# Patient Record
Sex: FEMALE | NOT HISPANIC OR LATINO | Employment: OTHER | URBAN - METROPOLITAN AREA
[De-identification: names, ages, dates, MRNs, and addresses within clinical notes are randomized per-mention and may not be internally consistent; named-entity substitution may affect disease eponyms.]

---

## 2017-01-11 ENCOUNTER — OFFICE VISIT (OUTPATIENT)
Dept: MEDICAL GROUP | Facility: MEDICAL CENTER | Age: 82
End: 2017-01-11
Payer: MEDICARE

## 2017-01-11 VITALS
OXYGEN SATURATION: 90 % | WEIGHT: 149 LBS | HEART RATE: 82 BPM | TEMPERATURE: 98.8 F | DIASTOLIC BLOOD PRESSURE: 72 MMHG | BODY MASS INDEX: 23.95 KG/M2 | RESPIRATION RATE: 16 BRPM | SYSTOLIC BLOOD PRESSURE: 120 MMHG | HEIGHT: 66 IN

## 2017-01-11 DIAGNOSIS — G89.29 CHRONIC PAIN OF RIGHT KNEE: ICD-10-CM

## 2017-01-11 DIAGNOSIS — R93.1 ABNORMAL ECHOCARDIOGRAM: ICD-10-CM

## 2017-01-11 DIAGNOSIS — Z01.818 PREOPERATIVE CLEARANCE: ICD-10-CM

## 2017-01-11 DIAGNOSIS — M25.561 CHRONIC PAIN OF RIGHT KNEE: ICD-10-CM

## 2017-01-11 PROCEDURE — 99213 OFFICE O/P EST LOW 20 MIN: CPT | Performed by: FAMILY MEDICINE

## 2017-01-11 NOTE — PROGRESS NOTES
CC: Preoperative     HPI:   Cecilia presents today patient is hr for preoperative clearance, patient is scheduled for right knee replacement in October.She is a low risk for anaesthesia. Has no h/o chronic heat or lung disease.has an echocardiogram done in 3/2016 showed normal left ventricular size, wall thickness, and systolic function.However patient has mild to moderate aortic insufficiency, and moderate pulmonic insufficiency, but has normal right ventricular systolic pressure is  20 mmHg.Patient wants to be seen by cardiology to make sure she is fine with the surgery. In my opinion she is low risk for surgery .       Patient Active Problem List    Diagnosis Date Noted   • Essential hypertension 02/29/2016   • H/O: stroke 02/29/2016   • Primary osteoarthritis involving multiple joints 02/29/2016   • Depression 02/29/2016   • Primary localized osteoarthrosis, pelvic region and thigh 03/02/2015   • S/P lumbar fusion 05/14/2013   • Lumbosacral spondylosis without myelopathy 05/09/2013       Current Outpatient Prescriptions   Medication Sig Dispense Refill   • amlodipine (NORVASC) 2.5 MG Tab Take 1 Tab by mouth every day. 90 Tab 1   • paroxetine (PAXIL) 10 MG Tab Take 1 Tab by mouth every day. 90 Tab 1   • atorvastatin (LIPITOR) 20 MG Tab Take 1 Tab by mouth every day. 90 Tab 1   • carvedilol (COREG) 3.125 MG Tab Take 1 Tab by mouth 2 times a day, with meals. 180 Tab 1   • trazodone (DESYREL) 50 MG Tab Take 1 Tab by mouth every bedtime. 90 Tab 0   • Dexlansoprazole 60 MG CAPSULE DELAYED RELEASE delayed-release capsule Take 1 Cap by mouth every morning before breakfast. Indications: Gastroesophageal Reflux Disease 90 Cap 1   • Cetirizine-Pseudoephedrine (ZYRTEC-D PO) Take  by mouth.     • Phosphatidylserine 50 MG Cap Take  by mouth.     • Cholecalciferol (VITAMIN D3 PO) Take 1 Tab by mouth every day.     • Lutein 40 MG Cap Take 1 Cap by mouth every day.     • Pyridoxine HCl (B-6 PO) Take 1 Tab by mouth every day.     •  "Omega-3 Fatty Acids (OMEGA 3 PO) Take 1 Tab by mouth every day.     • acetaminophen (TYLENOL) 500 MG TABS Take 1 Tab by mouth every 6 hours as needed for Mild Pain. 30 Tab 0   • Docusate Sodium (EQ STOOL SOFTENER PO) Take 1 Cap by mouth every bedtime.     • polyethylene glycol/lytes (MIRALAX) PACK Take 17 g by mouth every bedtime. Indications: Constipation     • fluticasone (FLONASE) 50 MCG/ACT nasal spray Spray 2 Sprays in nose every bedtime. Indications: Hayfever       No current facility-administered medications for this visit.         Allergies as of 01/11/2017 - Shashank as Reviewed 01/11/2017   Allergen Reaction Noted   • Codeine  02/01/2013   • Lisinopril Cough 02/29/2016   • Soap Rash and Itching 02/20/2015   • Tape Rash 02/01/2013        ROS: Denies any chest pain, Shortness of breath, Changes bowel or bladder, Lower extremity edema.    Physical Exam:  /72 mmHg  Pulse 82  Temp(Src) 37.1 °C (98.8 °F)  Resp 16  Ht 1.676 m (5' 5.98\")  Wt 67.586 kg (149 lb)  BMI 24.06 kg/m2  SpO2 90%  Gen.: Well-developed, well-nourished, no apparent distress,pleasant and cooperative with the examination  Skin:  Warm and dry with good turgor. No rashes or suspicious lesions in visible areas  HEENT:Sinuses nontender with palpation, TMs clear, nares patent with pink mucosa and clear rhinorrhea,no septal deviation ,polyps or lesions. lips without lesions, oropharynx clear.  Neck: Trachea midline,no masses or adenopathy. No JVD.  Cor: Regular rate and rhythm without murmur, gallop or rub.  Lungs: Respirations unlabored.Clear to auscultation with equal breath sounds bilaterally. No wheezes, rhonchi.  Extremities: No cyanosis, clubbing or edema.Restricted knee movement.      Assessment and Plan.   83 y.o. female     1. Preoperative clearance  Patient is a low risk for anaesthesia. No h/o chronic heat or lung disease.  Has mild to moderate Aortic, and pulmonic insufficiency, patient is asymptomatic.patient wants to be seen by " cardiology for 2nd opinion.    2. Chronic pain of right knee  Will be scheduled for knee replacement.    3. Abnormal echocardiogram  Normal Systolic function, however patient has mild to moderate aortic insufficiency, and moderate pulmonic insufficiency, but has normal right ventricular systolic pressure is  20 mmHg.patient wants to be seen by cardiology for 2nd opinion.    - REFERRAL TO CARDIOLOGY

## 2017-01-11 NOTE — MR AVS SNAPSHOT
"        Cecilia Alatorre   2017 1:20 PM   Office Visit   MRN: [id number]    Department:  [address] [place] [address]   Dept Phone:  [phone]    Description:  Female : 1933   Provider:  Luz Contreras M.D.           Reason for Visit     Medical Clearance right knee       Allergies as of 2017     Allergen Noted Reactions    Codeine 2013       Mental confusion    Lisinopril 2016   Cough    Soap 2015   Rash, Itching    Bathing and laundry soap cause itching and rash non scented soap OK    Tape 2013   Rash    Rash-Paper tape OK      You were diagnosed with     Preoperative clearance   [972369]       Chronic pain of right knee   [6657379]       Abnormal echocardiogram   [[id number]]         Vital Signs     Blood Pressure Pulse Temperature Respirations Height Weight    120/72 mmHg 82 37.1 °C (98.8 °F) 16 1.676 m (5' 5.98\") 67.586 kg (149 lb)    Body Mass Index Oxygen Saturation Smoking Status             24.06 kg/m2 90% Former Smoker         Basic Information     Date Of Birth Sex Race Ethnicity Preferred Language    1933 Female  or  Non- English      Your appointments     Mar 06, 2017  2:00 PM   Established Patient with Luz Contreras M.D.   [facility] [address] [place] ([facility])    [address] [facility] [address]  [facility] [address] [phone]           You will be receiving a confirmation call a few days before your appointment from our automated call confirmation system.            Mar 28, 2017 10:00 AM   Follow Up Visit with Michael J Bloch, M.D.   [facility]  (--)    [address] [facility] [address]   [phone]              Problem List              ICD-10-CM Priority Class Noted - Resolved    Lumbosacral spondylosis without myelopathy M47.817   2013 - Present    S/P lumbar fusion Z98.1   2013 - Present    Primary localized osteoarthrosis, pelvic region and thigh " M16.10   3/2/2015 - Present    Essential hypertension I10   2/29/2016 - Present    H/O: stroke Z86.73   2/29/2016 - Present    Primary osteoarthritis involving multiple joints M15.0   2/29/2016 - Present    Depression F32.9   2/29/2016 - Present      Health Maintenance        Date Due Completion Dates    IMM DTaP/Tdap/Td Vaccine (1 - Tdap) 2/6/1952 ---    PAP SMEAR 2/6/1954 ---    MAMMOGRAM 2/6/1973 ---    COLONOSCOPY 2/6/1983 ---    IMM ZOSTER VACCINE 2/6/1993 ---    BONE DENSITY 2/6/1998 ---    IMM PNEUMOCOCCAL 65+ (ADULT) LOW/MEDIUM RISK SERIES (2 of 2 - PCV13) 10/1/2011 10/1/2010    IMM INFLUENZA (1) 9/1/2016 11/2/2014, 10/10/2012            Current Immunizations     Influenza TIV (IM) 11/2/2014, 10/10/2012    Pneumococcal polysaccharide vaccine (PPSV-23) 10/1/2010      Below and/or attached are the medications your provider expects you to take. Review all of your home medications and newly ordered medications with your provider and/or pharmacist. Follow medication instructions as directed by your provider and/or pharmacist. Please keep your medication list with you and share with your provider. Update the information when medications are discontinued, doses are changed, or new medications (including over-the-counter products) are added; and carry medication information at all times in the event of emergency situations     Allergies:  CODEINE - (reactions not documented)     LISINOPRIL - Cough     SOAP - Rash,Itching     TAPE - Rash               Medications  Valid as of: January 11, 2017 -  1:56 PM    Generic Name Brand Name Tablet Size Instructions for use    Acetaminophen (Tab) TYLENOL 500 MG Take 1 Tab by mouth every 6 hours as needed for Mild Pain.        AmLODIPine Besylate (Tab) NORVASC 2.5 MG Take 1 Tab by mouth every day.        Atorvastatin Calcium (Tab) LIPITOR 20 MG Take 1 Tab by mouth every day.        Carvedilol (Tab) COREG 3.125 MG Take 1 Tab by mouth 2 times a day, with meals.         Cetirizine-Pseudoephedrine   Take  by mouth.        Cholecalciferol   Take 1 Tab by mouth every day.        Dexlansoprazole (CAPSULE DELAYED RELEASE) Dexlansoprazole 60 MG Take 1 Cap by mouth every morning before breakfast. Indications: Gastroesophageal Reflux Disease        Docusate Sodium   Take 1 Cap by mouth every bedtime.        Fluticasone Propionate (Suspension) FLONASE 50 MCG/ACT Spray 2 Sprays in nose every bedtime. Indications: Hayfever        Lutein (Cap) Lutein 40 MG Take 1 Cap by mouth every day.        Omega-3 Fatty Acids   Take 1 Tab by mouth every day.        PARoxetine HCl (Tab) PAXIL 10 MG Take 1 Tab by mouth every day.        Phosphatidylserine (Cap) Phosphatidylserine 50 MG Take  by mouth.        Polyethylene Glycol 3350 (Pack) MIRALAX  Take 17 g by mouth every bedtime. Indications: Constipation        Pyridoxine HCl   Take 1 Tab by mouth every day.        TraZODone HCl (Tab) DESYREL 50 MG Take 1 Tab by mouth every bedtime.        .                 Medicines prescribed today were sent to:     [facility] - [place], [place] - [address] AT PORTAL TO REGISTERED [facility]    [address] [facility] [address]    Phone: [phone] Fax: [phone]    Open 24 Hours?: No      Medication refill instructions:       If your prescription bottle indicates you have medication refills left, it is not necessary to call your provider’s office. Please contact your pharmacy and they will refill your medication.    If your prescription bottle indicates you do not have any refills left, you may request refills at any time through one of the following ways: The online Bee There system (except Urgent Care), by calling your provider’s office, or by asking your pharmacy to contact your provider’s office with a refill request. Medication refills are processed only during regular business hours and may not be available until the next business day. Your provider may request additional  information or to have a follow-up visit with you prior to refilling your medication.   *Please Note: Medication refills are assigned a new Rx number when refilled electronically. Your pharmacy may indicate that no refills were authorized even though a new prescription for the same medication is available at the pharmacy. Please request the medicine by name with the pharmacy before contacting your provider for a refill.        Referral     A referral request has been sent to our patient care coordination department. Please allow 3-5 business days for us to process this request and contact you either by phone or mail. If you do not hear from us by the 5th business day, please call us at [phone].           Careerise Access Code: [email or web address]  Expires: 2/10/2017  1:56 PM    Careerise  A secure, online tool to manage your health information     [employer]’s Careerise® is a secure, online tool that connects you to your personalized health information from the privacy of your home -- day or night - making it very easy for you to manage your healthcare. Once the activation process is completed, you can even access your medical information using the Careerise jose, which is available for free in the Apple Jose store or Google Play store.     Careerise provides the following levels of access (as shown below):   My Chart Features   [facility] Doctor [facility]  Urgent  Care Non-[facility]  Primary Care  Doctor   Email your healthcare team securely and privately 24/7 X X X    Manage appointments: schedule your next appointment; view details of past/upcoming appointments X      Request prescription refills. X      View recent personal medical records, including lab and immunizations X X X X   View health record, including health history, allergies, medications X X X X   Read reports about your outpatient visits, procedures, consult and ER notes X X X X   See your discharge summary, which is a recap of  your hospital and/or ER visit that includes your diagnosis, lab results, and care plan. X X       How to register for OrSense:  1. Go to  [email or web address].  2. Click on the Sign Up Now box, which takes you to the New Member Sign Up page. You will need to provide the following information:  a. Enter your OrSense Access Code exactly as it appears at the top of this page. (You will not need to use this code after you’ve completed the sign-up process. If you do not sign up before the expiration date, you must request a new code.)   b. Enter your date of birth.   c. Enter your home email address.   d. Click Submit, and follow the next screen’s instructions.  3. Create a L3t ID. This will be your OrSense login ID and cannot be changed, so think of one that is secure and easy to remember.  4. Create a L3t password. You can change your password at any time.  5. Enter your Password Reset Question and Answer. This can be used at a later time if you forget your password.   6. Enter your e-mail address. This allows you to receive e-mail notifications when new information is available in OrSense.  7. Click Sign Up. You can now view your health information.    For assistance activating your OrSense account, call [phone]

## 2017-01-19 DIAGNOSIS — Z01.812 PRE-OPERATIVE LABORATORY EXAMINATION: ICD-10-CM

## 2017-01-19 DIAGNOSIS — Z01.810 PRE-OPERATIVE CARDIOVASCULAR EXAMINATION: ICD-10-CM

## 2017-01-19 LAB
ANION GAP SERPL CALC-SCNC: 4 MMOL/L (ref 0–11.9)
APPEARANCE UR: CLEAR
BASOPHILS # BLD AUTO: 0.9 % (ref 0–1.8)
BASOPHILS # BLD: 0.05 K/UL (ref 0–0.12)
BILIRUB UR QL STRIP.AUTO: NEGATIVE
BUN SERPL-MCNC: 13 MG/DL (ref 8–22)
CALCIUM SERPL-MCNC: 9.2 MG/DL (ref 8.5–10.5)
CHLORIDE SERPL-SCNC: 106 MMOL/L (ref 96–112)
CO2 SERPL-SCNC: 30 MMOL/L (ref 20–33)
COLOR UR: NORMAL
CREAT SERPL-MCNC: 0.74 MG/DL (ref 0.5–1.4)
CULTURE IF INDICATED INDCX: NO UA CULTURE
EKG IMPRESSION: NORMAL
EOSINOPHIL # BLD AUTO: 0.12 K/UL (ref 0–0.51)
EOSINOPHIL NFR BLD: 2.2 % (ref 0–6.9)
ERYTHROCYTE [DISTWIDTH] IN BLOOD BY AUTOMATED COUNT: 45.8 FL (ref 35.9–50)
GFR SERPL CREATININE-BSD FRML MDRD: >60 ML/MIN/1.73 M 2
GLUCOSE SERPL-MCNC: 88 MG/DL (ref 65–99)
GLUCOSE UR STRIP.AUTO-MCNC: NEGATIVE MG/DL
HCT VFR BLD AUTO: 37.3 % (ref 37–47)
HGB BLD-MCNC: 12.4 G/DL (ref 12–16)
IMM GRANULOCYTES # BLD AUTO: 0.02 K/UL (ref 0–0.11)
IMM GRANULOCYTES NFR BLD AUTO: 0.4 % (ref 0–0.9)
KETONES UR STRIP.AUTO-MCNC: NEGATIVE MG/DL
LEUKOCYTE ESTERASE UR QL STRIP.AUTO: NEGATIVE
LYMPHOCYTES # BLD AUTO: 1.24 K/UL (ref 1–4.8)
LYMPHOCYTES NFR BLD: 22.8 % (ref 22–41)
MCH RBC QN AUTO: 31.1 PG (ref 27–33)
MCHC RBC AUTO-ENTMCNC: 33.2 G/DL (ref 33.6–35)
MCV RBC AUTO: 93.5 FL (ref 81.4–97.8)
MICRO URNS: NORMAL
MONOCYTES # BLD AUTO: 0.51 K/UL (ref 0–0.85)
MONOCYTES NFR BLD AUTO: 9.4 % (ref 0–13.4)
NEUTROPHILS # BLD AUTO: 3.5 K/UL (ref 2–7.15)
NEUTROPHILS NFR BLD: 64.3 % (ref 44–72)
NITRITE UR QL STRIP.AUTO: NEGATIVE
NRBC # BLD AUTO: 0 K/UL
NRBC BLD AUTO-RTO: 0 /100 WBC
PH UR STRIP.AUTO: 6.5 [PH]
PLATELET # BLD AUTO: 260 K/UL (ref 164–446)
PMV BLD AUTO: 9.2 FL (ref 9–12.9)
POTASSIUM SERPL-SCNC: 4 MMOL/L (ref 3.6–5.5)
PROT UR QL STRIP: NEGATIVE MG/DL
RBC # BLD AUTO: 3.99 M/UL (ref 4.2–5.4)
RBC UR QL AUTO: NEGATIVE
SCCMEC + MECA PNL NOSE NAA+PROBE: NEGATIVE
SCCMEC + MECA PNL NOSE NAA+PROBE: POSITIVE
SODIUM SERPL-SCNC: 140 MMOL/L (ref 135–145)
SP GR UR STRIP.AUTO: 1.01
WBC # BLD AUTO: 5.4 K/UL (ref 4.8–10.8)

## 2017-01-19 PROCEDURE — 87640 STAPH A DNA AMP PROBE: CPT

## 2017-01-19 PROCEDURE — 81003 URINALYSIS AUTO W/O SCOPE: CPT

## 2017-01-19 PROCEDURE — 85025 COMPLETE CBC W/AUTO DIFF WBC: CPT

## 2017-01-19 PROCEDURE — 80048 BASIC METABOLIC PNL TOTAL CA: CPT

## 2017-01-19 PROCEDURE — 36415 COLL VENOUS BLD VENIPUNCTURE: CPT

## 2017-01-19 PROCEDURE — 87641 MR-STAPH DNA AMP PROBE: CPT

## 2017-01-19 NOTE — DISCHARGE PLANNING
Met with patient during preadmission appointment.  Procedure: Total Knee    Procedure Date: 10/4/16  Insurance:  Medicare  Equipment currently available at home? FWW, raised toilet seat, shower chair  Steps into the home? 14  Steps within the home? 0  Toilet height? Regular with raised toilet seat  Type of shower? Walk-in shower with chair  Who will be with you during your recovery? Lives alone, [facility]  Is Outpatient Physical Therapy set up after surgery? no  Did you take the Total Joint Class and where? Yes, [facility]    Plan: [facility]. I explained the process and that the doctor at Rehab would evaluate her. This is done on a case by case basis and there is no guarantee. Reviewed SNF list and star ratings, provided copies. I advised patient to choose 1 or 2 other SNF that she maybe would go to. She will think about it.  She has been to [facility] and does not want to go back there.

## 2017-01-20 ENCOUNTER — OFFICE VISIT (OUTPATIENT)
Dept: MEDICAL GROUP | Facility: MEDICAL CENTER | Age: 82
End: 2017-01-20
Payer: MEDICARE

## 2017-01-20 VITALS
TEMPERATURE: 98.2 F | BODY MASS INDEX: 23.63 KG/M2 | OXYGEN SATURATION: 94 % | RESPIRATION RATE: 16 BRPM | SYSTOLIC BLOOD PRESSURE: 124 MMHG | WEIGHT: 147 LBS | DIASTOLIC BLOOD PRESSURE: 70 MMHG | HEART RATE: 84 BPM | HEIGHT: 66 IN

## 2017-01-20 DIAGNOSIS — Z02.4 ENCOUNTER FOR DRIVER'S LICENSE HISTORY AND PHYSICAL: ICD-10-CM

## 2017-01-20 PROCEDURE — 99213 OFFICE O/P EST LOW 20 MIN: CPT | Performed by: FAMILY MEDICINE

## 2017-01-20 NOTE — MR AVS SNAPSHOT
"        Cecilia Alatorre   2017 2:40 PM   Office Visit   MRN: [id number]    Department:  [address] [place] [address]   Dept Phone:  [phone]    Description:  Female : 1933   Provider:  Luz Contreras M.D.           Reason for Visit     Other dmv paperwork      Allergies as of 2017     Allergen Noted Reactions    Codeine 2013       Mental confusion    Latex 2017       Only to adhesive bandaids and tape; rash, redness, itching    Lisinopril 2016   Cough    Soap 2015   Rash, Itching    Bathing and laundry soap cause itching and rash non scented soap OK    Tape 2013   Rash    Rash-Paper tape OK      Vital Signs     Blood Pressure Pulse Temperature Respirations Height Weight    124/70 mmHg 84 36.8 °C (98.2 °F) 16 1.676 m (5' 5.98\") 66.679 kg (147 lb)    Body Mass Index Oxygen Saturation Smoking Status             23.74 kg/m2 94% Former Smoker         Basic Information     Date Of Birth Sex Race Ethnicity Preferred Language    1933 Female  or  Non- English      Your appointments     Mar 06, 2017  2:00 PM   Established Patient with Luz Contreras M.D.   [facility] [address]    [address] [facility] [address]  [facility] [id number] [phone]           You will be receiving a confirmation call a few days before your appointment from our automated call confirmation system.            Mar 28, 2017 10:00 AM   Follow Up Visit with Michael J Bloch, M.D.   [facility]  (--)    [address] [facility] [address]   [phone]              Problem List              ICD-10-CM Priority Class Noted - Resolved    Lumbosacral spondylosis without myelopathy M47.817   2013 - Present    S/P lumbar fusion Z98.1   2013 - Present    Primary localized osteoarthrosis, pelvic region and thigh M16.10   3/2/2015 - Present    Essential hypertension I10   2016 - Present   " H/O: stroke Z86.73   2/29/2016 - Present    Primary osteoarthritis involving multiple joints M15.0   2/29/2016 - Present    Depression F32.9   2/29/2016 - Present      Health Maintenance        Date Due Completion Dates    IMM DTaP/Tdap/Td Vaccine (1 - Tdap) 2/6/1952 ---    PAP SMEAR 2/6/1954 ---    MAMMOGRAM 2/6/1973 ---    COLONOSCOPY 2/6/1983 ---    IMM ZOSTER VACCINE 2/6/1993 ---    BONE DENSITY 2/6/1998 ---    IMM PNEUMOCOCCAL 65+ (ADULT) LOW/MEDIUM RISK SERIES (2 of 2 - PCV13) 10/1/2011 10/1/2010    IMM INFLUENZA (1) 9/1/2016 11/2/2014, 10/10/2012            Current Immunizations     Influenza TIV (IM) 11/2/2014, 10/10/2012    Pneumococcal polysaccharide vaccine (PPSV-23) 10/1/2010      Below and/or attached are the medications your provider expects you to take. Review all of your home medications and newly ordered medications with your provider and/or pharmacist. Follow medication instructions as directed by your provider and/or pharmacist. Please keep your medication list with you and share with your provider. Update the information when medications are discontinued, doses are changed, or new medications (including over-the-counter products) are added; and carry medication information at all times in the event of emergency situations     Allergies:  CODEINE - (reactions not documented)     LATEX - (reactions not documented)     LISINOPRIL - Cough     SOAP - Rash,Itching     TAPE - Rash               Medications  Valid as of: January 20, 2017 -  3:04 PM    Generic Name Brand Name Tablet Size Instructions for use    Acetaminophen (Tab) TYLENOL 500 MG Take 1 Tab by mouth every 6 hours as needed for Mild Pain.        AmLODIPine Besylate (Tab) NORVASC 2.5 MG Take 1 Tab by mouth every day.        Atorvastatin Calcium (Tab) LIPITOR 20 MG Take 1 Tab by mouth every day.        Biotin   Take  by mouth every day.        Carvedilol (Tab) COREG 3.125 MG Take 1 Tab by mouth 2 times a day, with meals.         Cetirizine-Pseudoephedrine   Take  by mouth.        Cholecalciferol   Take 1 Tab by mouth every day.        Colostrum   Take  by mouth every day.        Dexlansoprazole (CAPSULE DELAYED RELEASE) Dexlansoprazole 60 MG Take 1 Cap by mouth every morning before breakfast. Indications: Gastroesophageal Reflux Disease        Docusate Sodium   Take 1 Cap by mouth every bedtime.        Fluticasone Propionate (Suspension) FLONASE 50 MCG/ACT Spray 2 Sprays in nose every bedtime. Indications: Hayfever        Lutein (Cap) Lutein 40 MG Take 1 Cap by mouth every day.        Omega-3 Fatty Acids   Take 1 Tab by mouth every day.        PARoxetine HCl (Tab) PAXIL 10 MG Take 1 Tab by mouth every day.        Phosphatidylserine (Cap) Phosphatidylserine 50 MG Take  by mouth.        Polyethylene Glycol 3350 (Pack) MIRALAX  Take 17 g by mouth every bedtime. Indications: Constipation        Pyridoxine HCl   Take 1 Tab by mouth every day.        TraZODone HCl (Tab) DESYREL 50 MG Take 1 Tab by mouth every bedtime.        .                 Medicines prescribed today were sent to:     [facility] - [place], [place] - [address] AT PORTAL TO REGISTERED [facility]    [address] [facility] [address]    Phone: [phone] Fax: [phone]    Open 24 Hours?: No      Medication refill instructions:       If your prescription bottle indicates you have medication refills left, it is not necessary to call your provider’s office. Please contact your pharmacy and they will refill your medication.    If your prescription bottle indicates you do not have any refills left, you may request refills at any time through one of the following ways: The online Lolabox system (except Urgent Care), by calling your provider’s office, or by asking your pharmacy to contact your provider’s office with a refill request. Medication refills are processed only during regular business hours and may not be available until the next business  day. Your provider may request additional information or to have a follow-up visit with you prior to refilling your medication.   *Please Note: Medication refills are assigned a new Rx number when refilled electronically. Your pharmacy may indicate that no refills were authorized even though a new prescription for the same medication is available at the pharmacy. Please request the medicine by name with the pharmacy before contacting your provider for a refill.           Amplimmune Access Code: [email or web address]  Expires: 2/10/2017  1:56 PM    Amplimmune  A secure, online tool to manage your health information     [employer]’s Amplimmune® is a secure, online tool that connects you to your personalized health information from the privacy of your home -- day or night - making it very easy for you to manage your healthcare. Once the activation process is completed, you can even access your medical information using the Amplimmune jose, which is available for free in the Apple Jose store or Google Play store.     Amplimmune provides the following levels of access (as shown below):   My Chart Features   [facility] Doctor [facility]  Specialists [facility]  Urgent  Care Non-[facility]  Primary Care  Doctor   Email your healthcare team securely and privately 24/7 X X X    Manage appointments: schedule your next appointment; view details of past/upcoming appointments X      Request prescription refills. X      View recent personal medical records, including lab and immunizations X X X X   View health record, including health history, allergies, medications X X X X   Read reports about your outpatient visits, procedures, consult and ER notes X X X X   See your discharge summary, which is a recap of your hospital and/or ER visit that includes your diagnosis, lab results, and care plan. X X       How to register for Amplimmune:  1. Go to  [email or web address].  2. Click on the Sign Up Now box, which takes you to the New Member Sign Up page.  You will need to provide the following information:  a. Enter your Moovweb Access Code exactly as it appears at the top of this page. (You will not need to use this code after you’ve completed the sign-up process. If you do not sign up before the expiration date, you must request a new code.)   b. Enter your date of birth.   c. Enter your home email address.   d. Click Submit, and follow the next screen’s instructions.  3. Create a Son[employer]ate Medicalt ID. This will be your Moovweb login ID and cannot be changed, so think of one that is secure and easy to remember.  4. Create a Moovweb password. You can change your password at any time.  5. Enter your Password Reset Question and Answer. This can be used at a later time if you forget your password.   6. Enter your e-mail address. This allows you to receive e-mail notifications when new information is available in Moovweb.  7. Click Sign Up. You can now view your health information.    For assistance activating your Moovweb account, call [phone]

## 2017-01-20 NOTE — PROGRESS NOTES
CC:  license renewal physical and history    HPI:   Cecilia presents today for  license renewal physical and history.    Patient has h/o minor stroke, but has now residual weakness, has been active, and independent with all ADLs, no dementia. No medical issues prevent patient from driving. However patient advised to avoid driving in the free way, and at night.  Her ophthalmologist stated patient has a progressive eye disease, however  her eye sight has been acceptable, patient denies any h/o macular degenration. Patient advised to contact eye physician to explain that, and also stated if the condition correctable or note. Patient advised to do all this before taking the form to the DMV.        Patient Active Problem List    Diagnosis Date Noted   • Essential hypertension 02/29/2016   • H/O: stroke 02/29/2016   • Primary osteoarthritis involving multiple joints 02/29/2016   • Depression 02/29/2016   • Primary localized osteoarthrosis, pelvic region and thigh 03/02/2015   • S/P lumbar fusion 05/14/2013   • Lumbosacral spondylosis without myelopathy 05/09/2013       Current Outpatient Prescriptions   Medication Sig Dispense Refill   • BIOTIN PO Take  by mouth every day.     • COLOSTRUM PO Take  by mouth every day.     • amlodipine (NORVASC) 2.5 MG Tab Take 1 Tab by mouth every day. 90 Tab 1   • paroxetine (PAXIL) 10 MG Tab Take 1 Tab by mouth every day. 90 Tab 1   • atorvastatin (LIPITOR) 20 MG Tab Take 1 Tab by mouth every day. 90 Tab 1   • carvedilol (COREG) 3.125 MG Tab Take 1 Tab by mouth 2 times a day, with meals. 180 Tab 1   • trazodone (DESYREL) 50 MG Tab Take 1 Tab by mouth every bedtime. 90 Tab 0   • Dexlansoprazole 60 MG CAPSULE DELAYED RELEASE delayed-release capsule Take 1 Cap by mouth every morning before breakfast. Indications: Gastroesophageal Reflux Disease 90 Cap 1   • Cetirizine-Pseudoephedrine (ZYRTEC-D PO) Take  by mouth.     • Cholecalciferol (VITAMIN D3 PO) Take 1 Tab by mouth every day.  "    • Lutein 40 MG Cap Take 1 Cap by mouth every day.     • Pyridoxine HCl (B-6 PO) Take 1 Tab by mouth every day.     • Omega-3 Fatty Acids (OMEGA 3 PO) Take 1 Tab by mouth every day.     • acetaminophen (TYLENOL) 500 MG TABS Take 1 Tab by mouth every 6 hours as needed for Mild Pain. 30 Tab 0   • Docusate Sodium (EQ STOOL SOFTENER PO) Take 1 Cap by mouth every bedtime.     • polyethylene glycol/lytes (MIRALAX) PACK Take 17 g by mouth every bedtime. Indications: Constipation     • fluticasone (FLONASE) 50 MCG/ACT nasal spray Spray 2 Sprays in nose every bedtime. Indications: Hayfever     • Phosphatidylserine 50 MG Cap Take  by mouth.       No current facility-administered medications for this visit.         Allergies as of 01/20/2017 - Shashank as Reviewed 01/20/2017   Allergen Reaction Noted   • Codeine  02/01/2013   • Latex  01/19/2017   • Lisinopril Cough 02/29/2016   • Soap Rash and Itching 02/20/2015   • Tape Rash 02/01/2013        ROS: Denies any chest pain, Shortness of breath, Changes bowel or bladder, Lower extremity edema.    Physical Exam:  /70 mmHg  Pulse 84  Temp(Src) 36.8 °C (98.2 °F)  Resp 16  Ht 1.676 m (5' 5.98\")  Wt 66.679 kg (147 lb)  BMI 23.74 kg/m2  SpO2 94%  Gen.: Well-developed, well-nourished, no apparent distress,pleasant and cooperative with the examination  Skin:  Warm and dry with good turgor. No rashes or suspicious lesions in visible areas  HEENT:Pupils are reactive to light, and accomodation, Sinuses nontender with palpation, TMs clear, nares patent with pink mucosa and clear rhinorrhea,no septal deviation ,polyps or lesions. lips without lesions, oropharynx clear.  Neck: Trachea midline,no masses or adenopathy. No JVD.  Cor: Regular rate and rhythm without murmur, gallop or rub.  Lungs: Respirations unlabored.Clear to auscultation with equal breath sounds bilaterally. No wheezes, rhonchi.  Extremities: No cyanosis, clubbing or edema.Normal reflexes, and " tone.        Assessment and Plan.   83 y.o. female     1. Encounter for 's license history and physical  No medical issues prevent patient drom driving. However advised to drive in the free way, and at night.  Her ophthalmologist stated patient has a progressive eye disease, however  her eye sight has been acceptable, patient denies any h/o macular degeneration. Patient advised to contact eye physician to explain that.

## 2017-01-30 NOTE — H&P
CHIEF COMPLAINT:  Right knee pain.    HISTORY OF PRESENT ILLNESS:  Patient is an 83-year-old female who we have   followed for many years with severe progression of degenerative arthritis in   her right knee.  She has been scheduled for knee replacement before, but we   postponed because her pain did not seem bad, but she has been extremely   limited lately and she is not responding well to injections.  She has kept her   weight down. She stays active, strengthens her leg, and she is just getting   pretty constant pain now and is coming in today for right knee replacement.    ALLERGIES:  TAPE, CODEINE, PERFUME, SOME SHEETS AND PILLOW CASES AND   CORTISONE.    CURRENT MEDICATIONS:  Celebrex, citalopram, amlodipine, carvedilol,   atorvastatin, lutein, coenzyme Q, multivitamins, calcium, Tylenol, and   trazodone.    PAST SURGICAL HISTORY:  Includes hand and wrist surgery on the right with   tonsillectomy, appendectomy, and total hip replacement.    PAST MEDICAL HISTORY:  Significant for high blood pressure and arthritis.  She   did have a TIA, but no significant residual.    SOCIAL HISTORY:  She is single.  She lives alone, but does have some family in   town.  She may end up needing a skilled nursing facility.    PHYSICAL EXAMINATION:  VITAL SIGNS:  She reports a height of 5 feet 2 inches, weight of 137 pounds.  GENERAL:  She is alert and oriented and responds appropriately.  EXTREMITIES:  Upper extremities move well.  She walks with a bit of an   antalgic gait.  Now, there is slight valgus deformity on the right knee.  She   does maintain very good range of motion of the right knee with stability and   mild effusion.  Tenderness, crepitus ___ over the lateral compartment.  The   hip and ankle move fine on the ___ and well aligned.    RADIOGRAPHS:  Standing 4 views of the right knee show severe bone-on-bone   degenerative arthritis in the lateral compartment.    IMPRESSION:  1.  Severe degenerative arthritis of the  right knee lateral compartment.  2.  History of transient ischemic attack/stroke.  3.  Lives alone, may need an extra help postoperatively.    PLAN:  Right total knee arthroplasty.  Patient understands the risks and goals   of surgery.       ____________________________________     MD STEPHEN MARINO / RUBI    DD:  01/29/2017 16:20:35  DT:  01/29/2017 17:58:49    D#:  [id number]  Job#:  [id number]

## 2017-02-01 ENCOUNTER — APPOINTMENT (OUTPATIENT)
Dept: RADIOLOGY | Facility: MEDICAL CENTER | Age: 82
DRG: 470 | End: 2017-02-01
Attending: ORTHOPAEDIC SURGERY
Payer: MEDICARE

## 2017-02-01 ENCOUNTER — PATIENT OUTREACH (OUTPATIENT)
Dept: HEALTH INFORMATION MANAGEMENT | Facility: OTHER | Age: 82
End: 2017-02-01

## 2017-02-01 ENCOUNTER — HOSPITAL ENCOUNTER (INPATIENT)
Facility: MEDICAL CENTER | Age: 82
LOS: 2 days | DRG: 470 | End: 2017-02-03
Attending: ORTHOPAEDIC SURGERY | Admitting: ORTHOPAEDIC SURGERY
Payer: MEDICARE

## 2017-02-01 DIAGNOSIS — M17.11 PRIMARY OSTEOARTHRITIS OF RIGHT KNEE: ICD-10-CM

## 2017-02-01 PROCEDURE — 770006 HCHG ROOM/CARE - MED/SURG/GYN SEMI*

## 2017-02-01 PROCEDURE — 160041 HCHG SURGERY MINUTES - EA ADDL 1 MIN LEVEL 4: Performed by: ORTHOPAEDIC SURGERY

## 2017-02-01 PROCEDURE — 700105 HCHG RX REV CODE 258: Performed by: ORTHOPAEDIC SURGERY

## 2017-02-01 PROCEDURE — 500088 HCHG BLADE, SAGITTAL: Performed by: ORTHOPAEDIC SURGERY

## 2017-02-01 PROCEDURE — 160048 HCHG OR STATISTICAL LEVEL 1-5: Performed by: ORTHOPAEDIC SURGERY

## 2017-02-01 PROCEDURE — 160009 HCHG ANES TIME/MIN: Performed by: ORTHOPAEDIC SURGERY

## 2017-02-01 PROCEDURE — 502779 HCHG SUTURE, QUILL: Performed by: ORTHOPAEDIC SURGERY

## 2017-02-01 PROCEDURE — 302196 LINEN, HYPOALLERGENIC: Performed by: ORTHOPAEDIC SURGERY

## 2017-02-01 PROCEDURE — 502240 HCHG MISC OR SUPPLY RC 0272: Performed by: ORTHOPAEDIC SURGERY

## 2017-02-01 PROCEDURE — 160002 HCHG RECOVERY MINUTES (STAT): Performed by: ORTHOPAEDIC SURGERY

## 2017-02-01 PROCEDURE — 501486 HCHG STRYKER IRRIG SET HC W/TUBING: Performed by: ORTHOPAEDIC SURGERY

## 2017-02-01 PROCEDURE — 160029 HCHG SURGERY MINUTES - 1ST 30 MINS LEVEL 4: Performed by: ORTHOPAEDIC SURGERY

## 2017-02-01 PROCEDURE — 700111 HCHG RX REV CODE 636 W/ 250 OVERRIDE (IP): Performed by: ORTHOPAEDIC SURGERY

## 2017-02-01 PROCEDURE — 700102 HCHG RX REV CODE 250 W/ 637 OVERRIDE(OP): Performed by: ORTHOPAEDIC SURGERY

## 2017-02-01 PROCEDURE — 160035 HCHG PACU - 1ST 60 MINS PHASE I: Performed by: ORTHOPAEDIC SURGERY

## 2017-02-01 PROCEDURE — 160036 HCHG PACU - EA ADDL 30 MINS PHASE I: Performed by: ORTHOPAEDIC SURGERY

## 2017-02-01 PROCEDURE — 502000 HCHG MISC OR IMPLANTS RC 0278: Performed by: ORTHOPAEDIC SURGERY

## 2017-02-01 PROCEDURE — 700101 HCHG RX REV CODE 250: Performed by: ORTHOPAEDIC SURGERY

## 2017-02-01 PROCEDURE — 501838 HCHG SUTURE GENERAL: Performed by: ORTHOPAEDIC SURGERY

## 2017-02-01 PROCEDURE — A9270 NON-COVERED ITEM OR SERVICE: HCPCS | Performed by: ORTHOPAEDIC SURGERY

## 2017-02-01 PROCEDURE — 502579 HCHG PACK, TOTAL KNEE: Performed by: ORTHOPAEDIC SURGERY

## 2017-02-01 PROCEDURE — 700101 HCHG RX REV CODE 250

## 2017-02-01 PROCEDURE — 500811 HCHG LENS/HOOD FOR SPACESUIT: Performed by: ORTHOPAEDIC SURGERY

## 2017-02-01 PROCEDURE — 700111 HCHG RX REV CODE 636 W/ 250 OVERRIDE (IP)

## 2017-02-01 PROCEDURE — A4606 OXYGEN PROBE USED W OXIMETER: HCPCS | Performed by: ORTHOPAEDIC SURGERY

## 2017-02-01 PROCEDURE — 501487 HCHG STRYKER TIP: Performed by: ORTHOPAEDIC SURGERY

## 2017-02-01 PROCEDURE — 500093 HCHG BONE CEMENT MIXER: Performed by: ORTHOPAEDIC SURGERY

## 2017-02-01 PROCEDURE — 160023 HCHG SPINAL: Performed by: ORTHOPAEDIC SURGERY

## 2017-02-01 PROCEDURE — 700112 HCHG RX REV CODE 229: Performed by: ORTHOPAEDIC SURGERY

## 2017-02-01 PROCEDURE — 160022 HCHG BLOCK: Performed by: ORTHOPAEDIC SURGERY

## 2017-02-01 PROCEDURE — 0SRC0J9 REPLACEMENT OF RIGHT KNEE JOINT WITH SYNTHETIC SUBSTITUTE, CEMENTED, OPEN APPROACH: ICD-10-PCS | Performed by: ORTHOPAEDIC SURGERY

## 2017-02-01 PROCEDURE — 500097 HCHG BONE CEMENT, SIMPLEX FULL DOSE: Performed by: ORTHOPAEDIC SURGERY

## 2017-02-01 PROCEDURE — 73560 X-RAY EXAM OF KNEE 1 OR 2: CPT | Mod: RT

## 2017-02-01 PROCEDURE — 110382 HCHG SHELL REV 271: Performed by: ORTHOPAEDIC SURGERY

## 2017-02-01 PROCEDURE — 51798 US URINE CAPACITY MEASURE: CPT

## 2017-02-01 PROCEDURE — 501480 HCHG STOCKINETTE: Performed by: ORTHOPAEDIC SURGERY

## 2017-02-01 DEVICE — IMPLANT GII CM TIB SIZE 3 RIGHT (1EA): Type: IMPLANTABLE DEVICE | Status: FUNCTIONAL

## 2017-02-01 DEVICE — BONE CEMENT SIMPLEX FULL DOSE - (10EA/PK): Type: IMPLANTABLE DEVICE | Status: FUNCTIONAL

## 2017-02-01 DEVICE — IMPLANTABLE DEVICE: Type: IMPLANTABLE DEVICE | Status: FUNCTIONAL

## 2017-02-01 DEVICE — IMPLANT GII PS HI FLEX ISRT SZ 3-4 11: Type: IMPLANTABLE DEVICE | Status: FUNCTIONAL

## 2017-02-01 RX ORDER — FLUTICASONE PROPIONATE 50 MCG
2 SPRAY, SUSPENSION (ML) NASAL
Status: DISCONTINUED | OUTPATIENT
Start: 2017-02-01 | End: 2017-02-03 | Stop reason: HOSPADM

## 2017-02-01 RX ORDER — SODIUM CHLORIDE, SODIUM LACTATE, POTASSIUM CHLORIDE, CALCIUM CHLORIDE 600; 310; 30; 20 MG/100ML; MG/100ML; MG/100ML; MG/100ML
1000 INJECTION, SOLUTION INTRAVENOUS
Status: COMPLETED | OUTPATIENT
Start: 2017-02-01 | End: 2017-02-01

## 2017-02-01 RX ORDER — ENEMA 19; 7 G/133ML; G/133ML
1 ENEMA RECTAL
Status: DISCONTINUED | OUTPATIENT
Start: 2017-02-01 | End: 2017-02-03 | Stop reason: HOSPADM

## 2017-02-01 RX ORDER — AMLODIPINE BESYLATE 5 MG/1
2.5 TABLET ORAL DAILY
Status: DISCONTINUED | OUTPATIENT
Start: 2017-02-01 | End: 2017-02-03 | Stop reason: HOSPADM

## 2017-02-01 RX ORDER — CEFAZOLIN SODIUM 2 G/100ML
2 INJECTION, SOLUTION INTRAVENOUS ONCE
Status: ACTIVE | OUTPATIENT
Start: 2017-02-01 | End: 2017-02-02

## 2017-02-01 RX ORDER — DEXAMETHASONE SODIUM PHOSPHATE 10 MG/ML
10 INJECTION, SOLUTION INTRAMUSCULAR; INTRAVENOUS ONCE
Status: DISCONTINUED | OUTPATIENT
Start: 2017-02-02 | End: 2017-02-01

## 2017-02-01 RX ORDER — POLYETHYLENE GLYCOL 3350 17 G/17G
1 POWDER, FOR SOLUTION ORAL
Status: DISCONTINUED | OUTPATIENT
Start: 2017-02-01 | End: 2017-02-02

## 2017-02-01 RX ORDER — KETOROLAC TROMETHAMINE 30 MG/ML
INJECTION, SOLUTION INTRAMUSCULAR; INTRAVENOUS
Status: DISCONTINUED | OUTPATIENT
Start: 2017-02-01 | End: 2017-02-01 | Stop reason: HOSPADM

## 2017-02-01 RX ORDER — HALOPERIDOL 5 MG/ML
1 INJECTION INTRAMUSCULAR EVERY 6 HOURS PRN
Status: DISCONTINUED | OUTPATIENT
Start: 2017-02-01 | End: 2017-02-03 | Stop reason: HOSPADM

## 2017-02-01 RX ORDER — OXYCODONE HYDROCHLORIDE 5 MG/1
2.5 TABLET ORAL
Status: DISCONTINUED | OUTPATIENT
Start: 2017-02-01 | End: 2017-02-03 | Stop reason: HOSPADM

## 2017-02-01 RX ORDER — LIDOCAINE HYDROCHLORIDE 10 MG/ML
INJECTION, SOLUTION INFILTRATION; PERINEURAL
Status: COMPLETED
Start: 2017-02-01 | End: 2017-02-01

## 2017-02-01 RX ORDER — DEXLANSOPRAZOLE 60 MG/1
1 CAPSULE, DELAYED RELEASE ORAL
Status: DISCONTINUED | OUTPATIENT
Start: 2017-02-01 | End: 2017-02-01

## 2017-02-01 RX ORDER — TRAMADOL HYDROCHLORIDE 50 MG/1
50 TABLET ORAL EVERY 6 HOURS PRN
Status: DISCONTINUED | OUTPATIENT
Start: 2017-02-01 | End: 2017-02-03 | Stop reason: HOSPADM

## 2017-02-01 RX ORDER — POLYETHYLENE GLYCOL 3350 17 G/17G
1 POWDER, FOR SOLUTION ORAL 2 TIMES DAILY PRN
Status: DISCONTINUED | OUTPATIENT
Start: 2017-02-01 | End: 2017-02-03 | Stop reason: HOSPADM

## 2017-02-01 RX ORDER — CARVEDILOL 6.25 MG/1
3.12 TABLET ORAL 2 TIMES DAILY WITH MEALS
Status: DISCONTINUED | OUTPATIENT
Start: 2017-02-01 | End: 2017-02-03 | Stop reason: HOSPADM

## 2017-02-01 RX ORDER — ONDANSETRON 2 MG/ML
4 INJECTION INTRAMUSCULAR; INTRAVENOUS EVERY 4 HOURS PRN
Status: DISCONTINUED | OUTPATIENT
Start: 2017-02-01 | End: 2017-02-03 | Stop reason: HOSPADM

## 2017-02-01 RX ORDER — BISACODYL 10 MG
10 SUPPOSITORY, RECTAL RECTAL
Status: DISCONTINUED | OUTPATIENT
Start: 2017-02-01 | End: 2017-02-03 | Stop reason: HOSPADM

## 2017-02-01 RX ORDER — OXYCODONE HYDROCHLORIDE 5 MG/1
5 TABLET ORAL
Status: DISCONTINUED | OUTPATIENT
Start: 2017-02-01 | End: 2017-02-03 | Stop reason: HOSPADM

## 2017-02-01 RX ORDER — DEXAMETHASONE SODIUM PHOSPHATE 4 MG/ML
10 INJECTION, SOLUTION INTRA-ARTICULAR; INTRALESIONAL; INTRAMUSCULAR; INTRAVENOUS; SOFT TISSUE ONCE
Status: COMPLETED | OUTPATIENT
Start: 2017-02-02 | End: 2017-02-02

## 2017-02-01 RX ORDER — TAMSULOSIN HYDROCHLORIDE 0.4 MG/1
0.4 CAPSULE ORAL
Status: DISCONTINUED | OUTPATIENT
Start: 2017-02-01 | End: 2017-02-03 | Stop reason: HOSPADM

## 2017-02-01 RX ORDER — DOCUSATE SODIUM 100 MG/1
100 CAPSULE, LIQUID FILLED ORAL 2 TIMES DAILY
Status: DISCONTINUED | OUTPATIENT
Start: 2017-02-01 | End: 2017-02-03 | Stop reason: HOSPADM

## 2017-02-01 RX ORDER — ACETAMINOPHEN 500 MG
1000 TABLET ORAL EVERY 6 HOURS
Status: DISCONTINUED | OUTPATIENT
Start: 2017-02-01 | End: 2017-02-03 | Stop reason: HOSPADM

## 2017-02-01 RX ORDER — VANCOMYCIN HYDROCHLORIDE 500 MG/10ML
INJECTION, POWDER, LYOPHILIZED, FOR SOLUTION INTRAVENOUS
Status: COMPLETED
Start: 2017-02-01 | End: 2017-02-01

## 2017-02-01 RX ORDER — OMEPRAZOLE 20 MG/1
40 CAPSULE, DELAYED RELEASE ORAL DAILY
Status: DISCONTINUED | OUTPATIENT
Start: 2017-02-02 | End: 2017-02-03 | Stop reason: HOSPADM

## 2017-02-01 RX ORDER — BUPIVACAINE HYDROCHLORIDE AND EPINEPHRINE 2.5; 5 MG/ML; UG/ML
INJECTION, SOLUTION EPIDURAL; INFILTRATION; INTRACAUDAL; PERINEURAL
Status: DISCONTINUED | OUTPATIENT
Start: 2017-02-01 | End: 2017-02-01 | Stop reason: HOSPADM

## 2017-02-01 RX ORDER — DIPHENHYDRAMINE HYDROCHLORIDE 50 MG/ML
25 INJECTION INTRAMUSCULAR; INTRAVENOUS EVERY 6 HOURS PRN
Status: DISCONTINUED | OUTPATIENT
Start: 2017-02-01 | End: 2017-02-03 | Stop reason: HOSPADM

## 2017-02-01 RX ORDER — METOCLOPRAMIDE 10 MG/1
10 TABLET ORAL 3 TIMES DAILY PRN
Status: DISCONTINUED | OUTPATIENT
Start: 2017-02-01 | End: 2017-02-03 | Stop reason: HOSPADM

## 2017-02-01 RX ORDER — ATORVASTATIN CALCIUM 20 MG/1
20 TABLET, FILM COATED ORAL DAILY
Status: DISCONTINUED | OUTPATIENT
Start: 2017-02-01 | End: 2017-02-03 | Stop reason: HOSPADM

## 2017-02-01 RX ORDER — CEFAZOLIN SODIUM 2 G/100ML
2 INJECTION, SOLUTION INTRAVENOUS EVERY 8 HOURS
Status: COMPLETED | OUTPATIENT
Start: 2017-02-01 | End: 2017-02-02

## 2017-02-01 RX ORDER — DEXAMETHASONE SODIUM PHOSPHATE 4 MG/ML
4 INJECTION, SOLUTION INTRA-ARTICULAR; INTRALESIONAL; INTRAMUSCULAR; INTRAVENOUS; SOFT TISSUE
Status: DISCONTINUED | OUTPATIENT
Start: 2017-02-01 | End: 2017-02-03 | Stop reason: HOSPADM

## 2017-02-01 RX ORDER — AMOXICILLIN 250 MG
1 CAPSULE ORAL NIGHTLY
Status: DISCONTINUED | OUTPATIENT
Start: 2017-02-01 | End: 2017-02-03 | Stop reason: HOSPADM

## 2017-02-01 RX ORDER — ACETAMINOPHEN 650 MG
TABLET, EXTENDED RELEASE ORAL
Status: DISCONTINUED | OUTPATIENT
Start: 2017-02-01 | End: 2017-02-01 | Stop reason: HOSPADM

## 2017-02-01 RX ORDER — MAGNESIUM HYDROXIDE 1200 MG/15ML
LIQUID ORAL
Status: DISCONTINUED | OUTPATIENT
Start: 2017-02-01 | End: 2017-02-01 | Stop reason: HOSPADM

## 2017-02-01 RX ORDER — DIPHENHYDRAMINE HCL 25 MG
25 TABLET ORAL EVERY 6 HOURS PRN
Status: DISCONTINUED | OUTPATIENT
Start: 2017-02-01 | End: 2017-02-03 | Stop reason: HOSPADM

## 2017-02-01 RX ORDER — AMOXICILLIN 250 MG
1 CAPSULE ORAL
Status: DISCONTINUED | OUTPATIENT
Start: 2017-02-01 | End: 2017-02-03 | Stop reason: HOSPADM

## 2017-02-01 RX ORDER — DIPHENHYDRAMINE HCL 25 MG
25 TABLET ORAL NIGHTLY PRN
Status: DISCONTINUED | OUTPATIENT
Start: 2017-02-02 | End: 2017-02-03 | Stop reason: HOSPADM

## 2017-02-01 RX ORDER — CELECOXIB 100 MG/1
100 CAPSULE ORAL DAILY
Status: DISCONTINUED | OUTPATIENT
Start: 2017-02-01 | End: 2017-02-03 | Stop reason: HOSPADM

## 2017-02-01 RX ORDER — PAROXETINE HYDROCHLORIDE 20 MG/1
10 TABLET, FILM COATED ORAL DAILY
Status: DISCONTINUED | OUTPATIENT
Start: 2017-02-02 | End: 2017-02-03 | Stop reason: HOSPADM

## 2017-02-01 RX ORDER — TRAZODONE HYDROCHLORIDE 50 MG/1
50 TABLET ORAL
Status: DISCONTINUED | OUTPATIENT
Start: 2017-02-01 | End: 2017-02-03 | Stop reason: HOSPADM

## 2017-02-01 RX ADMIN — SODIUM CHLORIDE, SODIUM LACTATE, POTASSIUM CHLORIDE, CALCIUM CHLORIDE 1000 ML: 600; 310; 30; 20 INJECTION, SOLUTION INTRAVENOUS at 07:51

## 2017-02-01 RX ADMIN — TAMSULOSIN HYDROCHLORIDE 0.4 MG: 0.4 CAPSULE ORAL at 13:17

## 2017-02-01 RX ADMIN — ONDANSETRON 4 MG: 2 INJECTION, SOLUTION INTRAMUSCULAR; INTRAVENOUS at 16:27

## 2017-02-01 RX ADMIN — CELECOXIB 100 MG: 100 CAPSULE ORAL at 13:16

## 2017-02-01 RX ADMIN — LIDOCAINE HYDROCHLORIDE: 10 INJECTION, SOLUTION INFILTRATION; PERINEURAL at 07:15

## 2017-02-01 RX ADMIN — ACETAMINOPHEN 1000 MG: 500 TABLET, FILM COATED ORAL at 16:28

## 2017-02-01 RX ADMIN — OXYCODONE HYDROCHLORIDE 5 MG: 5 TABLET ORAL at 19:19

## 2017-02-01 RX ADMIN — ACETAMINOPHEN 1000 MG: 500 TABLET, FILM COATED ORAL at 22:06

## 2017-02-01 RX ADMIN — TRANEXAMIC ACID 1000 MG: 100 INJECTION, SOLUTION INTRAVENOUS at 06:00

## 2017-02-01 RX ADMIN — HYDROMORPHONE HYDROCHLORIDE 0.25 MG: 1 INJECTION, SOLUTION INTRAMUSCULAR; INTRAVENOUS; SUBCUTANEOUS at 13:16

## 2017-02-01 RX ADMIN — CARVEDILOL 3.12 MG: 6.25 TABLET, FILM COATED ORAL at 16:27

## 2017-02-01 RX ADMIN — Medication 1 TABLET: at 21:00

## 2017-02-01 RX ADMIN — ATORVASTATIN CALCIUM 20 MG: 20 TABLET, FILM COATED ORAL at 13:17

## 2017-02-01 RX ADMIN — OXYCODONE HYDROCHLORIDE 5 MG: 5 TABLET ORAL at 15:51

## 2017-02-01 RX ADMIN — ATORVASTATIN CALCIUM 20 MG: 20 TABLET, FILM COATED ORAL at 13:16

## 2017-02-01 RX ADMIN — FLUTICASONE PROPIONATE 100 MCG: 50 SPRAY, METERED NASAL at 22:12

## 2017-02-01 RX ADMIN — ACETAMINOPHEN 1000 MG: 500 TABLET, FILM COATED ORAL at 13:16

## 2017-02-01 RX ADMIN — ASPIRIN 81 MG: 81 TABLET ORAL at 22:06

## 2017-02-01 RX ADMIN — FENTANYL CITRATE 25 MCG: 50 INJECTION, SOLUTION INTRAMUSCULAR; INTRAVENOUS at 12:10

## 2017-02-01 RX ADMIN — DOCUSATE SODIUM 100 MG: 100 CAPSULE ORAL at 13:17

## 2017-02-01 RX ADMIN — CEFAZOLIN SODIUM 2 G: 2 INJECTION, SOLUTION INTRAVENOUS at 16:28

## 2017-02-01 RX ADMIN — OXYCODONE HYDROCHLORIDE 5 MG: 5 TABLET ORAL at 15:34

## 2017-02-01 RX ADMIN — POLYETHYLENE GLYCOL 3350 1 PACKET: 17 POWDER, FOR SOLUTION ORAL at 22:07

## 2017-02-01 RX ADMIN — TRAZODONE HYDROCHLORIDE 50 MG: 50 TABLET ORAL at 21:00

## 2017-02-01 RX ADMIN — FENTANYL CITRATE 25 MCG: 50 INJECTION, SOLUTION INTRAMUSCULAR; INTRAVENOUS at 11:55

## 2017-02-01 RX ADMIN — AMLODIPINE BESYLATE 2.5 MG: 2.5 TABLET ORAL at 13:18

## 2017-02-01 RX ADMIN — TRANEXAMIC ACID 1000 MG: 100 INJECTION, SOLUTION INTRAVENOUS at 15:34

## 2017-02-01 RX ADMIN — DOCUSATE SODIUM 100 MG: 100 CAPSULE ORAL at 22:06

## 2017-02-01 ASSESSMENT — PAIN SCALES - GENERAL
PAINLEVEL_OUTOF10: 6
PAINLEVEL_OUTOF10: 0
PAINLEVEL_OUTOF10: 0
PAINLEVEL_OUTOF10: 6
PAINLEVEL_OUTOF10: 0
PAINLEVEL_OUTOF10: 0
PAINLEVEL_OUTOF10: 3
PAINLEVEL_OUTOF10: 4
PAINLEVEL_OUTOF10: 5
PAINLEVEL_OUTOF10: 0
PAINLEVEL_OUTOF10: 5

## 2017-02-01 ASSESSMENT — LIFESTYLE VARIABLES
AVERAGE NUMBER OF DAYS PER WEEK YOU HAVE A DRINK CONTAINING ALCOHOL: 0
ON A TYPICAL DAY WHEN YOU DRINK ALCOHOL HOW MANY DRINKS DO YOU HAVE: 0
EVER_SMOKED: NEVER
EVER FELT BAD OR GUILTY ABOUT YOUR DRINKING: NO
HOW MANY TIMES IN THE PAST YEAR HAVE YOU HAD 5 OR MORE DRINKS IN A DAY: 0
HAVE PEOPLE ANNOYED YOU BY CRITICIZING YOUR DRINKING: NO
TOTAL SCORE: 0
TOTAL SCORE: 0
CONSUMPTION TOTAL: NEGATIVE
EVER HAD A DRINK FIRST THING IN THE MORNING TO STEADY YOUR NERVES TO GET RID OF A HANGOVER: NO
ALCOHOL_USE: YES
TOTAL SCORE: 0
HAVE YOU EVER FELT YOU SHOULD CUT DOWN ON YOUR DRINKING: NO

## 2017-02-01 ASSESSMENT — PATIENT HEALTH QUESTIONNAIRE - PHQ9
SUM OF ALL RESPONSES TO PHQ9 QUESTIONS 1 AND 2: 0
1. LITTLE INTEREST OR PLEASURE IN DOING THINGS: NOT AT ALL
SUM OF ALL RESPONSES TO PHQ QUESTIONS 1-9: 0

## 2017-02-01 NOTE — OR NURSING
Patient requesting hypoallergenic linen following surgery. New hypoallergenic linens placed on patient bed prior to bringing patient to PACU.

## 2017-02-01 NOTE — PROGRESS NOTES
Received shift report from PACU RNAmanda, and assumed care of this pt at 1245 Pt AOx4, calm, resting in bed. Pt reports pain is 5/10, polar ice in place, medicated prn per MAR. Instructed on use of call light, pt return demonstrates properly. PIV assessed and is patent, CDI, running LR from PACU @ 100ml/hr. Pt is on RA with SaO2 >90%,  in place. SCDs placed. Ace to R knee, CDI. Knees on bed locked in flat position. Pt states priority this shift is pain management. Discussed POC for monitoring for void 2/2 spinal block in sx (bladder scan ordered), medications, day time routine, comfort, and safety. Patient has call light and personal belongings within reach. Safety and fall precautions in place. Reviewed orders, notes, labs, and test results. Hourly rounding in place with RN rounding on odd hours and CNA on even hours.

## 2017-02-01 NOTE — OP REPORT
DATE OF SERVICE:  02/01/2017    PREOPERATIVE DIAGNOSIS:  Degenerative arthritis, right knee.    POSTOPERATIVE DIAGNOSIS:  Degenerative arthritis, right knee.    PROCEDURE PERFORMED:  Right total knee arthroplasty.    SURGEON:  Aaron Burton MD    ANESTHESIA:  Spinal and adductor canal block.    ANESTHESIOLOGIST:  Carrie Roca MD    ASSISTANT:  Vince Crocker MD    ESTIMATED BLOOD LOSS:  50 mL.    COMPONENTS PLACED:  Cemented [employer] size 6 narrow posterior   stabilized femur, size 3 tibia with 11 mm insert and a 29 mm thin patellar   button.    FINDINGS:  Severe lateral patellofemoral disease.    COMPLICATIONS:  None.    INDICATIONS FOR PROCEDURE:  The patient is an 83-year-old female with a   progressively worsening pain and limitation of activities of daily living   secondary to degenerative arthritis of her right knee.  She has been followed   by us for years.  She has always kept her weight down.  She is on physical   therapy to strengthen the leg for months' at a time.  She has had   intraarticular injections, taken over-the-counter anti-inflammatories, used   bracing, but just not getting adequate relief at this point.  On exam, she has   got a valgus deformity of the right knee of about 9 degrees.  She has got   crepitus, grating, and tenderness laterally and a mild effusion.  Overall good   arc of motion and good ligament stability.  Her x-ray standing 4 views of the   right knee show bone-on-bone degenerative change in the lateral compartment   and nearly bone-on-bone at the patellofemoral compartment with peripheral   osteophytes, subchondral sclerosis.  She is a therefore a candidate for right   knee replacement.    SUMMARY:  Patient was brought to the operating room and anesthesia was   administered.  Antibiotics and tranexamic acid were given IV.  Right leg   prepped and draped in the usual sterile manner.  Leg was exsanguinated,   tourniquet inflated, time-out was called.   We made an anterior incision   centered over the medial aspect of the patella.  Dissection was carried   sharply through the skin and subcutaneous tissues.  Bleeders stopped with   cautery.  A medial parapatellar arthrotomy was made.  The medial release done   distally just to the mid coronal plane.  We everted the patella, excised the   synovium and fat pad enough for visualization.  The patella was quite thin.    It measured just 22 mm.  It was very worn throughout it.  We just took it down   to uniform 13-14 mm.  A 29 button had the best coverage.  We medialized it,   prepared it and resected some of the excess lateral bone.  That was retracted   laterally and the knee flexed up.  Tourniquet was released at this point.    Bleeders stopped with cautery throughout the rest of the case.  We used a 5   degree jig on the distal femur.  We just took the standard amount off because   there was no significant contracture.  Because of that wear laterally, we just   ended up taking a few millimeters off that distal lateral cut.  We set the   rotation of [place]'s line appropriately and measured to a size 6.  We made   our anterior cut, it was really a flush cut.  She had a pretty large femur,   anterior to posterior.  We really were not going to be able to go any smaller   than the 6.  All the cuts were then made for a size 6 cruciate retaining   implant.  Tibia was then exposed.  Extramedullary jig utilized.  We centered   off the medial third tubercle, centered the ankle with just slight posterior   slope and took the standard amount of less involved medial side.  Tibia cut   piece was removed with care to protect the medial collateral and posterior   cruciate.  Peripheral osteophytes removed from the tibia.  Posterior   osteophytes were removed from the femur, besides it was between a size 3 and 4   for the tibia.  We pinned the 4 and there was a little bit of overhang   posterolaterally and we trialed it at this  point, but even though what looked   like palpable posterior cruciate fibers, it really was not functional at 90   degrees of flexion.  She had more posterior sag then I was comfortable with,   so we converted the femoral component to a posterior stabilized and that   improved the mechanics greatly.  She was a little bit tighter laterally than   medially, so we released intact fibers of the IT band and we were able to get   an 11 mm in which stabilized her very nicely in flexion and extension.  The   patella was tracking perfectly, so we were comfortable at that point with our   sizing, tracking, and balancing.  Looking at the trials, we went down on the   size 3 tibia, set the rotation appropriately and punched that and had less   overhang and we definitely needed the 6 narrow femur.  Everything was   irrigated out thoroughly.  The components were cemented into place.  Cement   debris was removed.  Wound was soaked with dilute Betadine for a few minutes,   which was then flushed with saline.  After trialing, we went again with the 11   mm, which I felt was most stable throughout the full motion.  Final reduction   was done.  We made sure that the poly was securely locked into place with   flexion and rotation.  The knee was placed in flexion and tendon closed with a   running #2 Quill.  Skin closed with interrupted 2-0 Vicryl and running 3-0   Monocryl.  We injected the knee intraarticularly with solution of analgesic   and anesthetic.  We placed a silver impregnated dressing followed by a   compression wrap and PolarCare machine.  She was stable during the procedure   and went to recovery room in good condition.       ____________________________________     MD STEPHEN MARINO / RUBI    DD:  02/01/2017 10:20:53  DT:  02/01/2017 13:20:29    D#:  [id number]  Job#:  [id number]

## 2017-02-01 NOTE — IP AVS SNAPSHOT
" <p align=\"LEFT\"><IMG SRC=\"//EMRWB/blob$/[email or web address]\" alt=\"Image\" WIDTH=\"50%\" HEIGHT=\"200\" BORDER=\"\"></p>                   Name:Cecilia Alatorre  Medical Record Number:[id number]  CSN: [id number]    YOB: 1933   Age: 83 y.o.  Sex: female  HT:1.626 m (5' 4.02\") WT: 66.5 kg (146 lb 9.7 oz)          Admit Date: 2/1/2017     Discharge Date:   Today's Date: 2/3/2017  Attending Doctor:  Aaron Burton M.D.                  Allergies:  Codeine; Latex; Lisinopril; Soap; and Tape          Your appointments     Mar 06, 2017  2:00 PM   Established Patient with Luz Contreras M.D.   [facility] [address] [place] [address] ([address])    [address] [place] [address]  [facility] [id number] [phone]           You will be receiving a confirmation call a few days before your appointment from our automated call confirmation system.            Apr 10, 2017  4:40 PM   Follow Up Visit with Michael J Bloch, M.D.   [facility]  (--)    [address] [facility] [id number] [phone]              Follow-up Information     1. Schedule an appointment as soon as possible for a visit with Aaron Burton M.D..    Specialty:  Orthopaedics    Contact information    [address]  [address]  [facility] [id number] [phone]           Medication List      Take these Medications        Instructions    acetaminophen 500 MG Tabs   Commonly known as:  TYLENOL    Take 1 Tab by mouth every 6 hours as needed for Mild Pain.   Dose:  500 mg       amlodipine 2.5 MG Tabs   Commonly known as:  NORVASC    Take 1 Tab by mouth every day.   Dose:  2.5 mg       aspirin 81 MG EC tablet    Take 1 Tab by mouth 2 Times a Day.   Dose:  81 mg       atorvastatin 20 MG Tabs   Commonly known as:  LIPITOR    Take 1 Tab by mouth every day.   Dose:  20 mg       carvedilol 3.125 MG Tabs   Commonly known as:  COREG    Take 1 Tab by mouth 2 times a day, with meals.   Dose:  3.125 mg       Dexlansoprazole 60 MG " Cpdr delayed-release capsule    Take 1 Cap by mouth every morning before breakfast. Indications: Gastroesophageal Reflux Disease   Dose:  1 Cap       EQ STOOL SOFTENER PO    Take 1 Cap by mouth every bedtime.   Dose:  1 Cap       fluticasone 50 MCG/ACT nasal spray   Commonly known as:  FLONASE    Spray 2 Sprays in nose every bedtime. Indications: Hayfever   Dose:  2 Spray       magnesium hydroxide 400 MG/5ML Susp   Commonly known as:  MILK OF MAGNESIA    Take 30 mL by mouth 1 time daily as needed (if sennosides, docusate, and/or polyethylene glycol 3350 ineffective or not ordered).   Dose:  30 mL       oxycodone immediate-release 5 MG Tabs   Commonly known as:  ROXICODONE    Take 1-2 Tabs by mouth every four hours as needed (pain).   Dose:  5-10 mg       paroxetine 10 MG Tabs   Commonly known as:  PAXIL    Take 1 Tab by mouth every day.   Dose:  10 mg       trazodone 50 MG Tabs   Commonly known as:  DESYREL    Take 1 Tab by mouth every bedtime.   Dose:  50 mg

## 2017-02-01 NOTE — OR NURSING
Respirations easy. Right leg elevated with ice pack in place. Toes warm and pink with brisk cap refill, easily palpable pedal pulses. Patient able to wiggle her toes. Denies pain and nausea. Report called to floor.

## 2017-02-01 NOTE — IP AVS SNAPSHOT
2/3/2017          Cecilia Alatorre  [address]   [place] [address]    Dear Cecilia:    [facility] wants to ensure your discharge home is safe and you or your loved ones have had all your questions answered regarding your care after you leave the hospital.    You may receive a telephone call within two days of your discharge.  This call is to make certain you understand your discharge instructions as well as ensure we provided you with the best care possible during your stay with us.     The call will only last approximately 3-5 minutes and will be done by a nurse.    Once again, we want to ensure your discharge home is safe and that you have a clear understanding of any next steps in your care.  If you have any questions or concerns, please do not hesitate to contact us, we are here for you.  Thank you for choosing [facility] for your healthcare needs.    Sincerely,    López Frank    [facility]

## 2017-02-01 NOTE — PROGRESS NOTES
"Bladder scan complete upon arrival from PACU; Pt w 600ml; Discussed options for voiding w pt; Pt declining straight cath at this time, stating \"I will get to the commode\"    Update 1430: Pt voided small amount, bladder rescan ordered  "

## 2017-02-01 NOTE — IP AVS SNAPSHOT
PayRange Access Code: [email or web address]  Expires: 2/10/2017  1:56 PM    Your email address is not on file at [facility].  Email Addresses are required for you to sign up for PayRange, please contact [phone] to verify your personal information and to provide your email address prior to attempting to register for PayRange.    Cecilia Nguyen Celi  [address] [place] [address]    PayRange  A secure, online tool to manage your health information     [facility]’s PayRange® is a secure, online tool that connects you to your personalized health information from the privacy of your home -- day or night - making it very easy for you to manage your healthcare. Once the activation process is completed, you can even access your medical information using the PayRange jose, which is available for free in the Apple Jose store or Google Play store.     To learn more about PayRange, visit [email or web address]    There are two levels of access available (as shown below):   My Chart Features  [facility] Primary Care Doctor [facility]  Specialists [facility]  Urgent  Care Non-[facility] Primary Care Doctor   Email your healthcare team securely and privately 24/7 X X X    Manage appointments: schedule your next appointment; view details of past/upcoming appointments X      Request prescription refills. X      View recent personal medical records, including lab and immunizations X X X X   View health record, including health history, allergies, medications X X X X   Read reports about your outpatient visits, procedures, consult and ER notes X X X X   See your discharge summary, which is a recap of your hospital and/or ER visit that includes your diagnosis, lab results, and care plan X X  X     How to register for [employer]:  Once your e-mail address has been verified, follow the following steps to sign up for [employer].     1. Go to  [email or web address]  2. Click on the Sign Up Now box, which takes you to the New Member Sign Up page.  You will need to provide the following information:  a. Enter your Zill[employer]te Access Code exactly as it appears at the top of this page. (You will not need to use this code after you’ve completed the sign-up process. If you do not sign up before the expiration date, you must request a new code.)   b. Enter your date of birth.   c. Enter your home email address.   d. Click Submit, and follow the next screen’s instructions.  3. Create a ASSURED PHARMACYt ID. This will be your Zillabyte login ID and cannot be changed, so think of one that is secure and easy to remember.  4. Create a Zillabyte password. You can change your password at any time.  5. Enter your Password Reset Question and Answer. This can be used at a later time if you forget your password.   6. Enter your e-mail address. This allows you to receive e-mail notifications when new information is available in Zillabyte.  7. Click Sign Up. You can now view your health information.    For assistance activating your Zillabyte account, call [phone]

## 2017-02-01 NOTE — OR SURGEON
Immediate Post-Operative Note      PreOp Diagnosis: right knee arthritis    PostOp Diagnosis: same    Procedure(s):  KNEE ARTHROPLASTY TOTAL - Wound Class: Clean    Surgeon(s):  Aaron Burton M.D.    Anesthesiologist/Type of Anesthesia:  Anesthesiologist: Carrie Roca M.D./Spinal    Surgical Staff:  Circulator: Tamera Bardales R.N.  Scrub Person: Catrina Ogden; Amy Smalls    Specimen: none    Estimated Blood Loss: 100 mL    Findings: see operative note    Complications: none        2/1/2017 10:33 AM Vince Crocker

## 2017-02-01 NOTE — OR NURSING
Post-op knee X-ray done.  IV running slowly, as patient had spinal and had 1500 cc in OR. VSS. Denies urge to void, bladder non-distended to palpation.

## 2017-02-01 NOTE — PROGRESS NOTES
Outcome:Pt currently admitted-requested call back    Attempt # 1st    Annual Wellness Visit Scheduling  Scheduling Status: requested call back    Care Gap Scheduling (Attempt to Schedule EACH Overdue Care Gap!)  Health Maintenance Due   Topic Date Due   • Annual Wellness Visit  02/06/1933   • PAP SMEAR  02/06/1954   • IMM ZOSTER VACCINE  02/06/1993   • BONE DENSITY  02/06/1998   • IMM PNEUMOCOCCAL 65+ (ADULT) LOW/MEDIUM RISK SERIES (2 of 2 - PCV13) 10/01/2011   • IMM INFLUENZA (1) 09/01/2016   • IMM DTaP/Tdap/Td Vaccine (2 - Td) 10/18/2016         MyChart Activation:  Already Active/Declined/Sent Activation Code

## 2017-02-01 NOTE — IP AVS SNAPSHOT
" After Visit Summary                                                                                                                  Name:Cecilia Alatorre  Medical Record Number:[id number]  CSN: [id number]    YOB: 1933   Age: 83 y.o.  Sex: female  HT:1.626 m (5' 4.02\") WT: 66.5 kg (146 lb 9.7 oz)          Admit Date: 2/1/2017     Discharge Date:   Today's Date: 2/3/2017  Attending Doctor:  Aaron Burton M.D.                  Allergies:  Codeine; Latex; Lisinopril; Soap; and Tape            Discharge Instructions       Discharge Instructions    Discharged to [facility] by medical transportation with self. Discharged via wheelchair, hospital escort: Yes.  Special equipment needed: Not Applicable    Be sure to schedule a follow-up appointment with your primary care doctor or any specialists as instructed.     Discharge Plan:   Diet Plan: Discussed  Activity Level: Discussed  Confirmed Follow up Appointment: Patient to Call and Schedule Appointment  Confirmed Symptoms Management: Discussed  Medication Reconciliation Updated: Yes  Influenza Vaccine Indication: Not indicated: Previously immunized this influenza season and > 8 years of age (10/2016)    I understand that a diet low in cholesterol, fat, and sodium is recommended for good health. Unless I have been given specific instructions below for another diet, I accept this instruction as my diet prescription.   Other diet: regular diet    Special Instructions:  *Follow up with Dr. Burton at scheduled appointment  *Weight bearing as tolerated.                      *Activity as tolerated  *Use assistive device for all activity  *Continue exercises provided by physical therapy and range of motion  *Elevate leg as needed; Ok to have pillow under the ankle NO pillow under knee  *Ice as needed (20 minutes every 1-2 hours)  *Keep silver dressing in place until follow up with doctor, ok to remove ace wrap  *Ok to shower with waterproof dressing in " place  *No soaking of the incision; no baths, hot tubs, or swimming until cleared by doctor  * Aspirin 81mg twice a day for blood clot prevention           *Take medications as prescribed by doctor  *Call doctor’s office with any questions or concerns      Discharge instructions for the Orthopedic Patient    Follow up with Primary Care Physician within 2 weeks of discharge to home, regarding:  Review of medications and diagnostic testing.  Surveillance for medical complications.  Workup and treatment of osteoporosis, if appropriate.     -Is this a Joint Replacement patient? Yes Total Joint Knee Replacement Discharge Instructions    Pain  - The goal is to slowly wean off the prescription pain medicine.  - Ice can be used for pain control.  20 minutes at a time is recommended, and never directly against your skin or incision.  - Most patients are off the pain pills by 3 weeks; others may require a low level of pain medications for many months.  If your pain continues to be severe, follow up with your physician.  Infection    Knee joint infections; occur in fewer than 2% of patients. The most common causes of infection following total knee replacement surgery are from bacteria that enter the bloodstream during dental procedures, urinary tract infections, or skin infections. These bacteria can lodge around your knee replacement and cause an infection.  - Keep the incision as clean and dry as possible.  - Always wash your hands before touching your incision.  - Skin infections tend to develop around 7-10 days after surgery; most can be treated with oral antibiotics.  - Dental Care should be delayed for 3 months after surgery, your surgeon recommends taking a dose of antibiotics 1 hour prior to any dental procedure. After 2 years, most surgeons recommend antibiotics only before an extensive procedure.  Ask your surgeon what he recommends.  - Signs and symptoms of infection can include:  low grade fever, redness, pain,  swelling and drainage from your incision.  Notify your surgeon immediately if you develop any of these symptoms.  Other instructions  - Bowel habits - constipation is extremely common and is caused by a combination of anesthesia, lack of mobility and pain medicine.  Use stool softeners or laxatives if necessary. It is important not to ignore this problem, as bowel obstructions can be a serious complication after joint replacement surgery.  - Mood/Energy Level - Many patients experience a lack of energy and endurance for up to 2-3 months after surgery.  Some may also feel down and can even become depressed.  This is likely due to the postoperative anemia, change in activity level, lack of sleep, pain medicine and just the emotional reaction to the surgery itself that is a big disruption in a person’s life.  This usually passes.  If symptoms persist, follow up with your primary physician.  - Returning to work - Your surgeon will give you more specific instructions. Depending on the type of activities you perform, it may be 6 to 8 weeks before you return to work.   Generally, if you work a sedentary job requiring little standing or walking, most patients may return within 2-6 weeks.  Manual labor jobs involving walking, lifting and standing may take longer. Your surgeon’s office can provide a release to part-time or light duty work early on in your recovery and progress you to full duty as able.    - Driving - If your left knee was replaced and you have an automatic transmission, you may be able to begin driving in a week or so, provided you are no longer taking narcotic pain medication. If your right knee was replaced, avoid driving for 6 to 8 weeks. Remember that your reflexes may not be as sharp as before your surgery. Ask your surgeon for specific instructions.   - Avoiding falls - A fall during the first few weeks after surgery can damage your new knee and may result in a need for further surgery.   throw  rugs and tack down loose carpeting.  Be aware of floor hazards such as pets, small objects or uneven surfaces.    - Airport Metal Detectors - The sensitivity of metal detectors varies and it is likely that your prosthesis will cause an alarm.  Inform the  of your artificial joint.  Diet  - Resume your normal diet as tolerated.  - It is important to achieve a healthy nutritional status by eating a well balanced diet on a regular basis.  - Your physician may recommend that you take iron and vitamin supplements.   - Continue to drink plenty of fluids.  Shower/Bathing  - You may shower as soon as you get home from the hospital unless otherwise instructed.  - Keep your incision out of water.  To keep the incision dry when showering, cover it with a plastic bag or plastic wrap.  - Pat incision dry if it gets wet.  Don’t rub.  - Do not submerge in a bath until staples are out and the incision is completely healed. (Approximately 6-8 weeks)  Dressing Change:  Procedure (if recommended by your physician)  - Wash hands.  - Open all new dressing change materials.  - Remove old dressing and discard.  - Inspect incision for redness, increase in clear drainage, yellow/green drainage, odor and surrounding skin hot to touch.  -  ABD (large gauze) pad or “island dressing” by one corner and lay over the incision.  Be careful not to touch the inside of the dressing that will lay over the incision.  - Secure in place as instructed (Ace wrap or tape).    Swelling/Bruising    - Swelling can last from 3-6 months.  - Elevate your leg higher than your heart while reclining.   The first week you are home you should elevate your leg an equal amount of time, as you are active.    - Anti-inflammatory pills can be taken once you have stopped the blood thinners.  - The swelling is usually worse after you go home since you are upright for longer periods of time.  - Bruising is common and can involve the entire leg including  the thigh, calf and even foot. Bruising often does not appear until after you arrive home and it can be quite dramatic- purple, black, and green.  The bruising you can see is not usually concerning and will subside without any treatment.      Blood Clot Prevention  Blood clots in the legs and the less common, but frightening, clots that travel to the lungs are a real focus of our preventative. Most patients are at standard risk for them, but those patients who are at higher risk include people who have had previous clots, a family history of clotting, smoking, diabetes, obesity, advanced age, use of estrogen and a sedentary lifestyle.    - Signs of blood clots in legs - Swelling in thigh, calf or ankle that does not go down with elevation.  Pain, heat and tenderness in calf, back of calf or groin area.  NOTE: blood clots can occur in either leg.  - You have been receiving anticoagulant therapy (blood thinners) in the hospital and you may be instructed to continue at home depending on your risk factors.  - Your risk for developing a clot continues for up to 2-3 months after surgery.  You should avoid prolonged sitting and dehydration during that time (long air trips and car trips).  If you do take a trip during this time, please get up and move around every 1- 1.5 hours.  - If you are prescribed blood-thinning medication for home, follow instructions as directed. (Handouts provided if applicable).      Activity  Once home, you should continue to stay active. The key is to remember not to overdo it! While you can expect some good days and some bad days, you should notice a gradual improvement and a gradual increase in your endurance over the next 6 to 12 months. Exercise is a critical component of home care, particularly during the first few weeks after surgery.     - Normal activities of daily living You should be able to resume most within 3 to 6 weeks following surgery. Some pain with activity and at night is  common for several weeks after surgery  -  Physical Therapy Exercises - Continue to do the exercises prescribed for at least two months after surgery. Riding a stationary bicycle can help maintain muscle tone and keep your knee flexible. Try to achieve the maximum degree of bending and extension possible. (handout provided by Therapist).  - Sexual Activity -. Your surgeon can tell you when it safe to resume sexual activity.    - Sleeping Positions - You can safely sleep on your back, on either side, or on your stomach.   - Other Activities - Walk as much as you like, but remember that walking is no substitute for the exercises your doctor and physical therapist will prescribe. Lower impact activities are preferred.  If you have specific questions, consult your Surgeon.    When to Call the Doctor   Call the physician if:   - Fever over 100.5? F  - Increased pain, drainage, redness, odor or heat around the incision area  - Shaking chills  - Increased knee pain with activity and rest  - Increased pain in calf, tenderness or redness above or below the knee  - Increased swelling of calf, ankle, foot  - Sudden increased shortness of breath, sudden onset of chest pain, localized chest pain with coughing  - Incision opening  Or, if there are any questions or concerns about medications or care.       -Is this patient being discharged with medication to prevent blood clots?  Yes, Aspirin Aspirin, ASA oral tablets  What is this medicine?  ASPIRIN (AS pir in) is a pain reliever. It is used to treat mild pain and fever. This medicine is also used as directed by a doctor to prevent and to treat heart attacks, to prevent strokes, and to treat arthritis or inflammation.  This medicine may be used for other purposes; ask your health care provider or pharmacist if you have questions.  COMMON BRAND NAME(S): Aspir-Low, Aspir-Riya , Aspirtab , Beata Advanced Aspirin, Beata Aspirin Extra Strength, Beata Aspirin Plus, Beata Aspirin, Beata  Genuine Aspirin, Beata Womens Aspirin , Bufferin Extra Strength, Bufferin Low Dose, Bufferin  What should I tell my health care provider before I take this medicine?  They need to know if you have any of these conditions:  -anemia  -asthma  -bleeding problems  -child with chickenpox, the flu, or other viral infection  -diabetes  -gout  -if you frequently drink alcohol containing drinks  -kidney disease  -liver disease  -low level of vitamin K  -lupus  -smoke tobacco  -stomach ulcers or other problems  -an unusual or allergic reaction to aspirin, tartrazine dye, other medicines, dyes, or preservatives  -pregnant or trying to get pregnant  -breast-feeding  How should I use this medicine?  Take this medicine by mouth with a glass of water. Follow the directions on the package or prescription label. You can take this medicine with or without food. If it upsets your stomach, take it with food. Do not take your medicine more often than directed.  Talk to your pediatrician regarding the use of this medicine in children. While this drug may be prescribed for children as young as 12 years of age for selected conditions, precautions do apply. Children and teenagers should not use this medicine to treat chicken pox or flu symptoms unless directed by a doctor.  Patients over 65 years old may have a stronger reaction and need a smaller dose.  Overdosage: If you think you have taken too much of this medicine contact a poison control center or emergency room at once.  NOTE: This medicine is only for you. Do not share this medicine with others.  What if I miss a dose?  If you are taking this medicine on a regular schedule and miss a dose, take it as soon as you can. If it is almost time for your next dose, take only that dose. Do not take double or extra doses.  What may interact with this medicine?  Do not take this medicine with any of the following medications:  -cidofovir  -ketorolac  -probenecid  This medicine may also  interact with the following medications:  -alcohol  -alendronate  -bismuth subsalicylate  -flavocoxid  -herbal supplements like feverfew, garlic, ivone, ginkgo biloba, horse chestnut  -medicines for diabetes or glaucoma like acetazolamide, methazolamide  -medicines for gout  -medicines that treat or prevent blood clots like enoxaparin, heparin, ticlopidine, warfarin  -other aspirin and aspirin-like medicines  -NSAIDs, medicines for pain and inflammation, like ibuprofen or naproxen  -pemetrexed  -sulfinpyrazone  -varicella live vaccine  This list may not describe all possible interactions. Give your health care provider a list of all the medicines, herbs, non-prescription drugs, or dietary supplements you use. Also tell them if you smoke, drink alcohol, or use illegal drugs. Some items may interact with your medicine.  What should I watch for while using this medicine?  If you are treating yourself for pain, tell your doctor or health care professional if the pain lasts more than 10 days, if it gets worse, or if there is a new or different kind of pain. Tell your doctor if you see redness or swelling. Also, check with your doctor if you have a fever that lasts for more than 3 days. Only take this medicine to prevent heart attacks or blood clotting if prescribed by your doctor or health care professional.  Do not take aspirin or aspirin-like medicines with this medicine. Too much aspirin can be dangerous. Always read the labels carefully.  This medicine can irritate your stomach or cause bleeding problems. Do not smoke cigarettes or drink alcohol while taking this medicine. Do not lie down for 30 minutes after taking this medicine to prevent irritation to your throat.  If you are scheduled for any medical or dental procedure, tell your healthcare provider that you are taking this medicine. You may need to stop taking this medicine before the procedure.  What side effects may I notice from receiving this  medicine?  Side effects that you should report to your doctor or health care professional as soon as possible:  -allergic reactions like skin rash, itching or hives, swelling of the face, lips, or tongue  -black, tarry stools  -bloody, coffee ground-like vomit  -breathing problems  -changes in hearing, ringing in the ears  -confusion  -general ill feeling or flu-like symptoms  -pain on swallowing  -redness, blistering, peeling or loosening of the skin, including inside the mouth or nose  -trouble passing urine or change in the amount of urine  -unusual bleeding or bruising  -unusually weak or tired  -yellowing of the eyes or skin  Side effects that usually do not require medical attention (report to your doctor or health care professional if they continue or are bothersome):  -diarrhea or constipation  -nausea, vomiting  -stomach gas, heartburn  This list may not describe all possible side effects. Call your doctor for medical advice about side effects. You may report side effects to FDA at 7-698-FDA-1870.  Where should I keep my medicine?  Keep out of the reach of children.  Store at room temperature between 15 and 30 degrees C (59 and 86 degrees F). Protect from heat and moisture. Do not use this medicine if it has a strong vinegar smell. Throw away any unused medicine after the expiration date.  NOTE: This sheet is a summary. It may not cover all possible information. If you have questions about this medicine, talk to your doctor, pharmacist, or health care provider.  © 2014, [employer]/Gold Standard. (3/10/2009 10:44:17 AM)      · Is patient discharged on Warfarin / Coumadin?   No     · Is patient Post Blood Transfusion?  No    Depression / Suicide Risk    As you are discharged from this [facility] facility, it is important to learn how to keep safe from harming yourself.    Recognize the warning signs:  · Abrupt changes in personality, positive or negative- including increase in energy   · Giving away  possessions  · Change in eating patterns- significant weight changes-  positive or negative  · Change in sleeping patterns- unable to sleep or sleeping all the time   · Unwillingness or inability to communicate  · Depression  · Unusual sadness, discouragement and loneliness  · Talk of wanting to die  · Neglect of personal appearance   · Rebelliousness- reckless behavior  · Withdrawal from people/activities they love  · Confusion- inability to concentrate     If you or a loved one observes any of these behaviors or has concerns about self-harm, here's what you can do:  · Talk about it- your feelings and reasons for harming yourself  · Remove any means that you might use to hurt yourself (examples: pills, rope, extension cords, firearm)  · Get professional help from the community (Mental Health, Substance Abuse, psychological counseling)  · Do not be alone:Call your Safe Contact- someone whom you trust who will be there for you.  · Call your local CRISIS HOTLINE 194-5608 or 928-504-7914  · Call your local Children's Mobile Crisis Response Team [place] (113) 824-1464 or wwwTabUp  · Call the toll free National Suicide Prevention Hotlines   · National Suicide Prevention Lifeline 606-724-KXGZ (1747)  · National Hope Line Network 800-SUICIDE (633-5234)        Your appointments     Mar 06, 2017  2:00 PM   Established Patient with Luz Contreras M.D.   [facility] [address]    [address]  [address]  [place] [id number] [phone]           You will be receiving a confirmation call a few days before your appointment from our automated call confirmation system.            Apr 10, 2017  4:40 PM   Follow Up Visit with Michael J Bloch, M.D.   [facility]  (--)    [address] [facility] [place] [address] [phone]              Follow-up Information     1. Schedule an appointment as soon as possible for a visit with Aaron  MIKY Burton M.D..    Specialty:  Orthopaedics    Contact information    [address] [address]  [phone]           Discharge Medication Instructions:    Below are the medications your physician expects you to take upon discharge:    Review all your home medications and newly ordered medications with your doctor and/or pharmacist. Follow medication instructions as directed by your doctor and/or pharmacist.    Please keep your medication list with you and share with your physician.               Medication List      START taking these medications        Instructions    aspirin 81 MG EC tablet   Last time this was given:  81 mg on 2/3/2017  8:12 AM    Take 1 Tab by mouth 2 Times a Day.   Dose:  81 mg       magnesium hydroxide 400 MG/5ML Susp   Last time this was given:  30 mL on 2/2/2017  3:43 PM   Commonly known as:  MILK OF MAGNESIA    Take 30 mL by mouth 1 time daily as needed (if sennosides, docusate, and/or polyethylene glycol 3350 ineffective or not ordered).   Dose:  30 mL       oxycodone immediate-release 5 MG Tabs   Last time this was given:  5 mg on 2/2/2017  9:48 PM   Commonly known as:  ROXICODONE    Take 1-2 Tabs by mouth every four hours as needed (pain).   Dose:  5-10 mg         CONTINUE taking these medications        Instructions    acetaminophen 500 MG Tabs   Last time this was given:  1,000 mg on 2/3/2017 12:00 PM   Commonly known as:  TYLENOL    Take 1 Tab by mouth every 6 hours as needed for Mild Pain.   Dose:  500 mg       amlodipine 2.5 MG Tabs   Last time this was given:  2.5 mg on 2/3/2017  8:12 AM   Commonly known as:  NORVASC    Take 1 Tab by mouth every day.   Dose:  2.5 mg       atorvastatin 20 MG Tabs   Last time this was given:  20 mg on 2/3/2017  8:12 AM   Commonly known as:  LIPITOR    Take 1 Tab by mouth every day.   Dose:  20 mg       carvedilol 3.125 MG Tabs   Last time this was given:  3.125 mg on 2/3/2017  8:12 AM   Commonly known as:  COREG    Take 1 Tab by  mouth 2 times a day, with meals.   Dose:  3.125 mg       Dexlansoprazole 60 MG Cpdr delayed-release capsule    Take 1 Cap by mouth every morning before breakfast. Indications: Gastroesophageal Reflux Disease   Dose:  1 Cap       EQ STOOL SOFTENER PO   Last time this was given:  100 mg on 2/3/2017  8:12 AM    Take 1 Cap by mouth every bedtime.   Dose:  1 Cap       fluticasone 50 MCG/ACT nasal spray   Last time this was given:  100 mcg on 2/1/2017 10:12 PM   Commonly known as:  FLONASE    Spray 2 Sprays in nose every bedtime. Indications: Hayfever   Dose:  2 Spray       paroxetine 10 MG Tabs   Last time this was given:  10 mg on 2/3/2017  8:12 AM   Commonly known as:  PAXIL    Take 1 Tab by mouth every day.   Dose:  10 mg       trazodone 50 MG Tabs   Last time this was given:  50 mg on 2/2/2017  9:48 PM   Commonly known as:  DESYREL    Take 1 Tab by mouth every bedtime.   Dose:  50 mg               Instructions           Diet / Nutrition:    Follow any diet instructions given to you by your doctor or the dietician, including how much salt (sodium) you are allowed each day.    If you are overweight, talk to your doctor about a weight reduction plan.    Activity:    Remain physically active following your doctor's instructions about exercise and activity.    Rest often.     Any time you become even a little tired or short of breath, SIT DOWN and rest.    Worsening Symptoms:    Report any of the following signs and symptoms to the doctor's office immediately:    *Pain of jaw, arm, or neck  *Chest pain not relieved by medication                               *Dizziness or loss of consciousness  *Difficulty breathing even when at rest   *More tired than usual                                       *Bleeding drainage or swelling of surgical site  *Swelling of feet, ankles, legs or stomach                 *Fever (>100ºF)  *Pink or blood tinged sputum  *Weight gain (3lbs/day or 5lbs /week)           *Shock from internal  defibrillator (if applicable)  *Palpitations or irregular heartbeats                *Cool and/or numb extremities    Stroke Awareness    Common Risk Factors for Stroke include:    Age  Atrial Fibrillation  Carotid Artery Stenosis  Diabetes Mellitus  Excessive alcohol consumption  High blood pressure  Overweight   Physical inactivity  Smoking    Warning signs and symptoms of a stroke include:    *Sudden numbness or weakness of the face, arm or leg (especially on one side of the body).  *Sudden confusion, trouble speaking or understanding.  *Sudden trouble seeing in one or both eyes.  *Sudden trouble walking, dizziness, loss of balance or coordination.Sudden severe headache with no known cause.    It is very important to get treatment quickly when a stroke occurs. If you experience any of the above warning signs, call 911 immediately.                   Disclaimer         Quit Smoking / Tobacco Use:    I understand the use of any tobacco products increases my chance of suffering from future heart disease or stroke and could cause other illnesses which may shorten my life. Quitting the use of tobacco products is the single most important thing I can do to improve my health. For further information on smoking / tobacco cessation call a Toll Free Quit Line at 1-230.556.7285 (*[employer] [place]) or 1-163.710.4866 (American [employer] Association) or you can access the web based program at www.lungusa.org.    [place] Tobacco Users Help Line:  (193) 364-9718       Toll Free: 1-797.679.9311  Quit Tobacco Program [facility] (094)317-6321    Crisis Hotline:    [place] Crisis Hotline:  8-193-WCJDADT or 1-506.349.1548    [place] Crisis Hotline:    1-989.324.5756 or 177-116-2411    Discharge Survey:   Thank you for choosing [facility]. We hope we did everything we could to make your hospital stay a pleasant one. You may be receiving a phone survey and we would appreciate your time and participation in  answering the questions. Your input is very valuable to us in our efforts to improve our service to our patients and their families.        My signature on this form indicates that:    1. I have reviewed and understand the above information.  2. My questions regarding this information have been answered to my satisfaction.  3. I have formulated a plan with my discharge nurse to obtain my prescribed medications for home.                  Disclaimer         __________________________________                     __________       ________                       Patient Signature                                                 Date                    Time

## 2017-02-01 NOTE — OR NURSING
Patient beginning to have discomfort in operative knee. No change in NV status, dressing remains clean and dry. IV Fentanyl with good effect.  Patient prefers to take oral meds with food.

## 2017-02-02 LAB
HCT VFR BLD AUTO: 31 % (ref 37–47)
HGB BLD-MCNC: 10.1 G/DL (ref 12–16)

## 2017-02-02 PROCEDURE — 97162 PT EVAL MOD COMPLEX 30 MIN: CPT

## 2017-02-02 PROCEDURE — 700111 HCHG RX REV CODE 636 W/ 250 OVERRIDE (IP): Performed by: ORTHOPAEDIC SURGERY

## 2017-02-02 PROCEDURE — 85014 HEMATOCRIT: CPT

## 2017-02-02 PROCEDURE — 302196 LINEN, HYPOALLERGENIC: Performed by: ORTHOPAEDIC SURGERY

## 2017-02-02 PROCEDURE — 770006 HCHG ROOM/CARE - MED/SURG/GYN SEMI*

## 2017-02-02 PROCEDURE — A9270 NON-COVERED ITEM OR SERVICE: HCPCS | Performed by: ORTHOPAEDIC SURGERY

## 2017-02-02 PROCEDURE — G8979 MOBILITY GOAL STATUS: HCPCS | Mod: CI

## 2017-02-02 PROCEDURE — 700102 HCHG RX REV CODE 250 W/ 637 OVERRIDE(OP): Performed by: ORTHOPAEDIC SURGERY

## 2017-02-02 PROCEDURE — G8978 MOBILITY CURRENT STATUS: HCPCS | Mod: CJ

## 2017-02-02 PROCEDURE — 97165 OT EVAL LOW COMPLEX 30 MIN: CPT

## 2017-02-02 PROCEDURE — G8988 SELF CARE GOAL STATUS: HCPCS | Mod: CI

## 2017-02-02 PROCEDURE — 85018 HEMOGLOBIN: CPT

## 2017-02-02 PROCEDURE — 700112 HCHG RX REV CODE 229: Performed by: ORTHOPAEDIC SURGERY

## 2017-02-02 PROCEDURE — G8987 SELF CARE CURRENT STATUS: HCPCS | Mod: CJ

## 2017-02-02 PROCEDURE — 36415 COLL VENOUS BLD VENIPUNCTURE: CPT

## 2017-02-02 PROCEDURE — A9270 NON-COVERED ITEM OR SERVICE: HCPCS

## 2017-02-02 PROCEDURE — 700102 HCHG RX REV CODE 250 W/ 637 OVERRIDE(OP)

## 2017-02-02 RX ORDER — POLYETHYLENE GLYCOL 3350 17 G/17G
1 POWDER, FOR SOLUTION ORAL DAILY
Status: DISCONTINUED | OUTPATIENT
Start: 2017-02-02 | End: 2017-02-03 | Stop reason: HOSPADM

## 2017-02-02 RX ADMIN — CEFAZOLIN SODIUM 2 G: 2 INJECTION, SOLUTION INTRAVENOUS at 00:30

## 2017-02-02 RX ADMIN — DEXAMETHASONE SODIUM PHOSPHATE 10 MG: 4 INJECTION, SOLUTION INTRAMUSCULAR; INTRAVENOUS at 06:39

## 2017-02-02 RX ADMIN — ASPIRIN 81 MG: 81 TABLET ORAL at 21:49

## 2017-02-02 RX ADMIN — CELECOXIB 100 MG: 100 CAPSULE ORAL at 09:04

## 2017-02-02 RX ADMIN — DOCUSATE SODIUM 100 MG: 100 CAPSULE ORAL at 21:49

## 2017-02-02 RX ADMIN — OMEPRAZOLE 40 MG: 20 CAPSULE, DELAYED RELEASE ORAL at 09:04

## 2017-02-02 RX ADMIN — POLYETHYLENE GLYCOL 3350 1 PACKET: 17 POWDER, FOR SOLUTION ORAL at 17:20

## 2017-02-02 RX ADMIN — DOCUSATE SODIUM 100 MG: 100 CAPSULE ORAL at 09:04

## 2017-02-02 RX ADMIN — PAROXETINE HYDROCHLORIDE 10 MG: 20 TABLET, FILM COATED ORAL at 09:04

## 2017-02-02 RX ADMIN — ASPIRIN 81 MG: 81 TABLET ORAL at 09:04

## 2017-02-02 RX ADMIN — OXYCODONE HYDROCHLORIDE 5 MG: 5 TABLET ORAL at 00:39

## 2017-02-02 RX ADMIN — ACETAMINOPHEN 1000 MG: 500 TABLET, FILM COATED ORAL at 12:00

## 2017-02-02 RX ADMIN — OXYCODONE HYDROCHLORIDE 5 MG: 5 TABLET ORAL at 14:15

## 2017-02-02 RX ADMIN — OXYCODONE HYDROCHLORIDE 5 MG: 5 TABLET ORAL at 21:48

## 2017-02-02 RX ADMIN — TRAZODONE HYDROCHLORIDE 50 MG: 50 TABLET ORAL at 21:48

## 2017-02-02 RX ADMIN — TAMSULOSIN HYDROCHLORIDE 0.4 MG: 0.4 CAPSULE ORAL at 09:04

## 2017-02-02 RX ADMIN — ACETAMINOPHEN 1000 MG: 500 TABLET, FILM COATED ORAL at 18:00

## 2017-02-02 RX ADMIN — Medication 1 TABLET: at 21:48

## 2017-02-02 RX ADMIN — ATORVASTATIN CALCIUM 20 MG: 20 TABLET, FILM COATED ORAL at 09:04

## 2017-02-02 RX ADMIN — MAGNESIUM HYDROXIDE 30 ML: 400 SUSPENSION ORAL at 15:43

## 2017-02-02 RX ADMIN — ACETAMINOPHEN 1000 MG: 500 TABLET, FILM COATED ORAL at 06:39

## 2017-02-02 RX ADMIN — CARVEDILOL 3.12 MG: 6.25 TABLET, FILM COATED ORAL at 17:17

## 2017-02-02 ASSESSMENT — GAIT ASSESSMENTS
GAIT LEVEL OF ASSIST: CONTACT GUARD ASSIST
DEVIATION: ANTALGIC;STEP TO;BRADYKINETIC
ASSISTIVE DEVICE: FRONT WHEEL WALKER
DISTANCE (FEET): 20

## 2017-02-02 ASSESSMENT — PAIN SCALES - GENERAL
PAINLEVEL_OUTOF10: ASSUMED PAIN PRESENT
PAINLEVEL_OUTOF10: 5
PAINLEVEL_OUTOF10: 3
PAINLEVEL_OUTOF10: 2
PAINLEVEL_OUTOF10: 5
PAINLEVEL_OUTOF10: 2

## 2017-02-02 ASSESSMENT — ACTIVITIES OF DAILY LIVING (ADL): TOILETING: INDEPENDENT

## 2017-02-02 NOTE — CARE PLAN
Problem: Safety  Goal: Will remain free from injury  Call light within reach, pt calls for assistance at all times.   Bed in low position and locked, upper bedside rails up, proper mobility signs placed, personal possessions within reach, treaded socks on.  Hourly rounding in place.          Problem: Risk for Deep Vein Thrombosis/Venous Thromboembolism  Goal: DVT/VTE Prevention Measures in Place  Intervention: Intermittent sequential compression device/foot pumps/GINA hose  SCDS to CARMELO, asa per MAR for DVT prophylaxis

## 2017-02-02 NOTE — CARE PLAN
Problem: Risk for Impaired Mobility  Goal: Mobilization  Outcome: PROGRESSING AS EXPECTED  Ambulated 50 + feet pod # 0    Problem: Elimination  Goal: Establishment and Maintenance of regular bowel elimination  Outcome: PROGRESSING SLOWER THAN EXPECTED  Pt c/o constipation stool softeners given

## 2017-02-02 NOTE — CARE PLAN
Problem: Nutritional:  Goal: Achieve adequate nutritional intake  Patient will consume 50% of meals  Outcome: MET Date Met:  02/02/17  -patient denies difficulty tolerating regular textured food and beverage  -patient states that she takes B vitamin complex at home, but does not drink nutrition supplements (Boost, Ensure) - she does not want supplements while admitted to [facility]

## 2017-02-02 NOTE — CARE PLAN
Problem: Knowledge Deficit  Goal: Knowledge of disease process/condition, treatment plan, diagnostic tests, and medications will improve  Outcome: PROGRESSING AS EXPECTED  Pt educated regarding plan of care and medications. All questions answered.     Problem: Risk for Impaired Mobility  Goal: Mobilization  Intervention: Progressive mobilization-ADL Flow Sheet  Pt OOB to bathroom with 1 assist + FWW; Pt ambulated for 5min and distance of 50ft; Tolerated well      Problem: Risk for Circulatory Compromise  Goal: Maintain optimum circulation, motion and sensation  Outcome: PROGRESSING AS EXPECTED  Q4 CMS assessments; Nerve block wearing off    Problem: Elimination  Goal: Regular urinary elimination  Outcome: PROGRESSING AS EXPECTED  Pt able to void following sx; 500ml @ 1530, followed by 350 @ 1630    Problem: Oxygenation/Respiratory Function  Goal: Patient will Achieve/Maintain Optimal Respiratory Function  Outcome: PROGRESSING AS EXPECTED  Pt on 1-2 L O2 via nc;  in place; Pt desats when sleeping; Plan to wean

## 2017-02-02 NOTE — PROGRESS NOTES
Assume care for patient at 0700. Pt AA/O X4. LS clear. On ra. IS enc pulling 2353-1225. VSS. HRR. BS+ Denies N/V. Voids via brp with x1 assist using fww. CMS+ ADAMES. WBAT to RLE. Denies N/T. SCDS to BLE. Rt knee with mepilex silver and ace wrap dressing cdi, polar ice in place. PIV to lfa saline lock. Denies need for pain meds. Discussed hourly rounding and POC, pt agreeable. Refused bed alarm, pt educated re: fall risk and need of bed alarm, Pt educated and verbalized understanding of the education, pt call for assistance at all times. Pt reoriented to skylight and call light at bedside and within reach.

## 2017-02-02 NOTE — PROGRESS NOTES
"S: Overall comfortable    O: dressing dry, Good ROM, NVI    Blood pressure 129/67, pulse 67, temperature 36.8 °C (98.2 °F), resp. rate 16, height 1.626 m (5' 4.02\"), weight 66.5 kg (146 lb 9.7 oz), SpO2 95 %, not currently breastfeeding.    Recent Labs      02/02/17   0250   HEMOGLOBIN  10.1*   HEMATOCRIT  31.0*         Intake/Output Summary (Last 24 hours) at 02/02/17 0715  Last data filed at 02/01/17 2045   Gross per 24 hour   Intake   2080 ml   Output   1350 ml   Net    730 ml         A:  Patient Active Problem List    Diagnosis Date Noted   • Primary osteoarthritis of right knee 02/01/2017   • Essential hypertension 02/29/2016   • H/O: stroke 02/29/2016   • Primary osteoarthritis involving multiple joints 02/29/2016   • Depression 02/29/2016   • Primary localized osteoarthrosis, pelvic region and thigh 03/02/2015   • S/P lumbar fusion 05/14/2013   • Lumbosacral spondylosis without myelopathy 05/09/2013       STable after TKA    PLAN: Mobilize, likely SKilled on Sat.  "

## 2017-02-02 NOTE — THERAPY
"Occupational Therapy Evaluation completed.   Functional Status:  Pt seen for OT eval due to recent R total knee sx. Pt lives alone and has assistance for occassional IADLs only. Min A for ADLs at this time. Pt will benefit from continued OT in the acute care setting and next level of care.   Plan of Care: Will benefit from Occupational Therapy 3 times per week  Discharge Recommendations:  Equipment: Will Continue to Assess for Equipment Needs. Post-acute therapy recommended before discharged home.    See \"Rehab Therapy-Acute\" Patient Summary Report for complete documentation.    "

## 2017-02-03 VITALS
DIASTOLIC BLOOD PRESSURE: 71 MMHG | HEIGHT: 64 IN | HEART RATE: 80 BPM | SYSTOLIC BLOOD PRESSURE: 116 MMHG | BODY MASS INDEX: 25.03 KG/M2 | RESPIRATION RATE: 16 BRPM | TEMPERATURE: 98.4 F | OXYGEN SATURATION: 93 % | WEIGHT: 146.61 LBS

## 2017-02-03 PROCEDURE — 700112 HCHG RX REV CODE 229: Performed by: ORTHOPAEDIC SURGERY

## 2017-02-03 PROCEDURE — 302196 LINEN, HYPOALLERGENIC: Performed by: ORTHOPAEDIC SURGERY

## 2017-02-03 PROCEDURE — A9270 NON-COVERED ITEM OR SERVICE: HCPCS | Performed by: ORTHOPAEDIC SURGERY

## 2017-02-03 PROCEDURE — 700102 HCHG RX REV CODE 250 W/ 637 OVERRIDE(OP): Performed by: ORTHOPAEDIC SURGERY

## 2017-02-03 PROCEDURE — 700102 HCHG RX REV CODE 250 W/ 637 OVERRIDE(OP)

## 2017-02-03 PROCEDURE — A9270 NON-COVERED ITEM OR SERVICE: HCPCS

## 2017-02-03 RX ORDER — ASPIRIN 81 MG/1
81 TABLET ORAL 2 TIMES DAILY
Qty: 30 TAB | Status: SHIPPED | DISCHARGE
Start: 2017-02-03 | End: 2017-07-18

## 2017-02-03 RX ORDER — OXYCODONE HYDROCHLORIDE 5 MG/1
5-10 TABLET ORAL EVERY 4 HOURS PRN
Status: SHIPPED | DISCHARGE
Start: 2017-02-03 | End: 2017-07-18

## 2017-02-03 RX ADMIN — ASPIRIN 81 MG: 81 TABLET ORAL at 08:12

## 2017-02-03 RX ADMIN — POLYETHYLENE GLYCOL 3350 1 PACKET: 17 POWDER, FOR SOLUTION ORAL at 08:11

## 2017-02-03 RX ADMIN — BISACODYL 10 MG: 10 SUPPOSITORY RECTAL at 13:40

## 2017-02-03 RX ADMIN — DOCUSATE SODIUM 100 MG: 100 CAPSULE ORAL at 08:12

## 2017-02-03 RX ADMIN — CELECOXIB 100 MG: 100 CAPSULE ORAL at 08:11

## 2017-02-03 RX ADMIN — ATORVASTATIN CALCIUM 20 MG: 20 TABLET, FILM COATED ORAL at 08:12

## 2017-02-03 RX ADMIN — CARVEDILOL 3.12 MG: 6.25 TABLET, FILM COATED ORAL at 08:12

## 2017-02-03 RX ADMIN — ACETAMINOPHEN 1000 MG: 500 TABLET, FILM COATED ORAL at 06:40

## 2017-02-03 RX ADMIN — TAMSULOSIN HYDROCHLORIDE 0.4 MG: 0.4 CAPSULE ORAL at 08:11

## 2017-02-03 RX ADMIN — AMLODIPINE BESYLATE 2.5 MG: 2.5 TABLET ORAL at 08:12

## 2017-02-03 RX ADMIN — ACETAMINOPHEN 1000 MG: 500 TABLET, FILM COATED ORAL at 12:00

## 2017-02-03 RX ADMIN — OMEPRAZOLE 40 MG: 20 CAPSULE, DELAYED RELEASE ORAL at 08:12

## 2017-02-03 RX ADMIN — PAROXETINE HYDROCHLORIDE 10 MG: 20 TABLET, FILM COATED ORAL at 08:12

## 2017-02-03 ASSESSMENT — PATIENT HEALTH QUESTIONNAIRE - PHQ9
SUM OF ALL RESPONSES TO PHQ QUESTIONS 1-9: 0
1. LITTLE INTEREST OR PLEASURE IN DOING THINGS: NOT AT ALL
SUM OF ALL RESPONSES TO PHQ9 QUESTIONS 1 AND 2: 0

## 2017-02-03 ASSESSMENT — PAIN SCALES - GENERAL: PAINLEVEL_OUTOF10: 5

## 2017-02-03 NOTE — THERAPY
"Physical Therapy Evaluation completed.   Bed Mobility:  Supine to Sit:  (NT up to chair)  Transfers: Sit to Stand: Contact Guard Assist  Gait: Level Of Assist: Contact Guard Assist with Front-Wheel Walker       Plan of Care: Will benefit from Physical Therapy 5 times per week  Discharge Recommendations: Equipment: Front-Wheel Walker. Post-acute therapy recommended before discharged home.    See \"Rehab Therapy-Acute\" Patient Summary Report for complete documentation.     "

## 2017-02-03 NOTE — DISCHARGE PLANNING
TCN met with patient at bedside to discuss the care teams recommendation for SNF. Patient reports she is going to [facility]. Patient signed choice for [facility], Choice faxed to [facility]. TCN to follow as needed.

## 2017-02-03 NOTE — DISCHARGE PLANNING
Medical Social Work    Pt is medically cleared to transfer to SNF today and has been accepted by [facility].  Transport arranged for 1630.  SW met with pt at bedside.  Pt agreeable to transfer and signed cobra.  Cobra and transfer packet placed on pt's chart.  Pt states she has already contacted her family to inform of transfer.  Bedside RN updated.     Plan: Pt to transfer to [facility] today at 1630.

## 2017-02-03 NOTE — PROGRESS NOTES
Assume care for patient at 0700. Pt AA/O X4. LS clear. On ra. IS enc pulling 2000. VSS. HRR. BS+ Denies N/V. Voids via brp with x1 assist using fww. CMS+ ADAMES. WBAT to RLE. Denies N/T. SCDS to BLE. Rt knee with mepilex silver and ace wrap dressing cdi, polar ice in place. PIV to lfa saline lock. Oxy ir prn for pain with adequate relief. Discussed hourly rounding and POC, pt agreeable. Refused bed alarm, pt educated re: fall risk and need of bed alarm, Pt educated and verbalized understanding of the education, pt call for assistance at all times. Pt reoriented to skylight and call light at bedside and within reach. Pending SNF placement.

## 2017-02-03 NOTE — PROGRESS NOTES
"S: More pain today, but walking well, no BM    O: DRessing dry, with good ROM    Blood pressure 148/72, pulse 72, temperature 36.9 °C (98.4 °F), resp. rate 16, height 1.626 m (5' 4.02\"), weight 66.5 kg (146 lb 9.7 oz), SpO2 93 %, not currently breastfeeding.    Recent Labs      02/02/17   0250   HEMOGLOBIN  10.1*   HEMATOCRIT  31.0*         Intake/Output Summary (Last 24 hours) at 02/03/17 0703  Last data filed at 02/03/17 0500   Gross per 24 hour   Intake    150 ml   Output      0 ml   Net    150 ml         A:  Patient Active Problem List    Diagnosis Date Noted   • Primary osteoarthritis of right knee 02/01/2017   • Essential hypertension 02/29/2016   • H/O: stroke 02/29/2016   • Primary osteoarthritis involving multiple joints 02/29/2016   • Depression 02/29/2016   • Primary localized osteoarthrosis, pelvic region and thigh 03/02/2015   • S/P lumbar fusion 05/14/2013   • Lumbosacral spondylosis without myelopathy 05/09/2013       Stable after TKA, no BM    PLAN: Plan for skilled tomorrow if she has BM  "

## 2017-02-03 NOTE — DISCHARGE INSTRUCTIONS
Discharge Instructions    Discharged to [facility] by medical transportation with self. Discharged via wheelchair, hospital escort: Yes.  Special equipment needed: Not Applicable    Be sure to schedule a follow-up appointment with your primary care doctor or any specialists as instructed.     Discharge Plan:   Diet Plan: Discussed  Activity Level: Discussed  Confirmed Follow up Appointment: Patient to Call and Schedule Appointment  Confirmed Symptoms Management: Discussed  Medication Reconciliation Updated: Yes  Influenza Vaccine Indication: Not indicated: Previously immunized this influenza season and > 8 years of age (10/2016)    I understand that a diet low in cholesterol, fat, and sodium is recommended for good health. Unless I have been given specific instructions below for another diet, I accept this instruction as my diet prescription.   Other diet: regular diet    Special Instructions:  *Follow up with Dr. Burton at scheduled appointment  *Weight bearing as tolerated.                      *Activity as tolerated  *Use assistive device for all activity  *Continue exercises provided by physical therapy and range of motion  *Elevate leg as needed; Ok to have pillow under the ankle NO pillow under knee  *Ice as needed (20 minutes every 1-2 hours)  *Keep silver dressing in place until follow up with doctor, ok to remove ace wrap  *Ok to shower with waterproof dressing in place  *No soaking of the incision; no baths, hot tubs, or swimming until cleared by doctor  * Aspirin 81mg twice a day for blood clot prevention           *Take medications as prescribed by doctor  *Call doctor’s office with any questions or concerns      Discharge instructions for the Orthopedic Patient    Follow up with Primary Care Physician within 2 weeks of discharge to home, regarding:  Review of medications and diagnostic testing.  Surveillance for medical complications.  Workup and treatment of osteoporosis, if appropriate.      -Is this a Joint Replacement patient? Yes Total Joint Knee Replacement Discharge Instructions    Pain  - The goal is to slowly wean off the prescription pain medicine.  - Ice can be used for pain control.  20 minutes at a time is recommended, and never directly against your skin or incision.  - Most patients are off the pain pills by 3 weeks; others may require a low level of pain medications for many months.  If your pain continues to be severe, follow up with your physician.  Infection    Knee joint infections; occur in fewer than 2% of patients. The most common causes of infection following total knee replacement surgery are from bacteria that enter the bloodstream during dental procedures, urinary tract infections, or skin infections. These bacteria can lodge around your knee replacement and cause an infection.  - Keep the incision as clean and dry as possible.  - Always wash your hands before touching your incision.  - Skin infections tend to develop around 7-10 days after surgery; most can be treated with oral antibiotics.  - Dental Care should be delayed for 3 months after surgery, your surgeon recommends taking a dose of antibiotics 1 hour prior to any dental procedure. After 2 years, most surgeons recommend antibiotics only before an extensive procedure.  Ask your surgeon what he recommends.  - Signs and symptoms of infection can include:  low grade fever, redness, pain, swelling and drainage from your incision.  Notify your surgeon immediately if you develop any of these symptoms.  Other instructions  - Bowel habits - constipation is extremely common and is caused by a combination of anesthesia, lack of mobility and pain medicine.  Use stool softeners or laxatives if necessary. It is important not to ignore this problem, as bowel obstructions can be a serious complication after joint replacement surgery.  - Mood/Energy Level - Many patients experience a lack of energy and endurance for up to 2-3 months  after surgery.  Some may also feel down and can even become depressed.  This is likely due to the postoperative anemia, change in activity level, lack of sleep, pain medicine and just the emotional reaction to the surgery itself that is a big disruption in a person’s life.  This usually passes.  If symptoms persist, follow up with your primary physician.  - Returning to work - Your surgeon will give you more specific instructions. Depending on the type of activities you perform, it may be 6 to 8 weeks before you return to work.   Generally, if you work a sedentary job requiring little standing or walking, most patients may return within 2-6 weeks.  Manual labor jobs involving walking, lifting and standing may take longer. Your surgeon’s office can provide a release to part-time or light duty work early on in your recovery and progress you to full duty as able.    - Driving - If your left knee was replaced and you have an automatic transmission, you may be able to begin driving in a week or so, provided you are no longer taking narcotic pain medication. If your right knee was replaced, avoid driving for 6 to 8 weeks. Remember that your reflexes may not be as sharp as before your surgery. Ask your surgeon for specific instructions.   - Avoiding falls - A fall during the first few weeks after surgery can damage your new knee and may result in a need for further surgery.   throw rugs and tack down loose carpeting.  Be aware of floor hazards such as pets, small objects or uneven surfaces.    - [employer] Metal Detectors - The sensitivity of metal detectors varies and it is likely that your prosthesis will cause an alarm.  Inform the  of your artificial joint.  Diet  - Resume your normal diet as tolerated.  - It is important to achieve a healthy nutritional status by eating a well balanced diet on a regular basis.  - Your physician may recommend that you take iron and vitamin supplements.   - Continue  to drink plenty of fluids.  Shower/Bathing  - You may shower as soon as you get home from the hospital unless otherwise instructed.  - Keep your incision out of water.  To keep the incision dry when showering, cover it with a plastic bag or plastic wrap.  - Pat incision dry if it gets wet.  Don’t rub.  - Do not submerge in a bath until staples are out and the incision is completely healed. (Approximately 6-8 weeks)  Dressing Change:  Procedure (if recommended by your physician)  - Wash hands.  - Open all new dressing change materials.  - Remove old dressing and discard.  - Inspect incision for redness, increase in clear drainage, yellow/green drainage, odor and surrounding skin hot to touch.  -  ABD (large gauze) pad or “island dressing” by one corner and lay over the incision.  Be careful not to touch the inside of the dressing that will lay over the incision.  - Secure in place as instructed (Ace wrap or tape).    Swelling/Bruising    - Swelling can last from 3-6 months.  - Elevate your leg higher than your heart while reclining.   The first week you are home you should elevate your leg an equal amount of time, as you are active.    - Anti-inflammatory pills can be taken once you have stopped the blood thinners.  - The swelling is usually worse after you go home since you are upright for longer periods of time.  - Bruising is common and can involve the entire leg including the thigh, calf and even foot. Bruising often does not appear until after you arrive home and it can be quite dramatic- purple, black, and green.  The bruising you can see is not usually concerning and will subside without any treatment.      Blood Clot Prevention  Blood clots in the legs and the less common, but frightening, clots that travel to the lungs are a real focus of our preventative. Most patients are at standard risk for them, but those patients who are at higher risk include people who have had previous clots, a family history  of clotting, smoking, diabetes, obesity, advanced age, use of estrogen and a sedentary lifestyle.    - Signs of blood clots in legs - Swelling in thigh, calf or ankle that does not go down with elevation.  Pain, heat and tenderness in calf, back of calf or groin area.  NOTE: blood clots can occur in either leg.  - You have been receiving anticoagulant therapy (blood thinners) in the hospital and you may be instructed to continue at home depending on your risk factors.  - Your risk for developing a clot continues for up to 2-3 months after surgery.  You should avoid prolonged sitting and dehydration during that time (long air trips and car trips).  If you do take a trip during this time, please get up and move around every 1- 1.5 hours.  - If you are prescribed blood-thinning medication for home, follow instructions as directed. (Handouts provided if applicable).      Activity  Once home, you should continue to stay active. The key is to remember not to overdo it! While you can expect some good days and some bad days, you should notice a gradual improvement and a gradual increase in your endurance over the next 6 to 12 months. Exercise is a critical component of home care, particularly during the first few weeks after surgery.     - Normal activities of daily living You should be able to resume most within 3 to 6 weeks following surgery. Some pain with activity and at night is common for several weeks after surgery  -  Physical Therapy Exercises - Continue to do the exercises prescribed for at least two months after surgery. Riding a stationary bicycle can help maintain muscle tone and keep your knee flexible. Try to achieve the maximum degree of bending and extension possible. (handout provided by Therapist).  - Sexual Activity -. Your surgeon can tell you when it safe to resume sexual activity.    - Sleeping Positions - You can safely sleep on your back, on either side, or on your stomach.   - Other Activities -  Walk as much as you like, but remember that walking is no substitute for the exercises your doctor and physical therapist will prescribe. Lower impact activities are preferred.  If you have specific questions, consult your Surgeon.    When to Call the Doctor   Call the physician if:   - Fever over 100.5? F  - Increased pain, drainage, redness, odor or heat around the incision area  - Shaking chills  - Increased knee pain with activity and rest  - Increased pain in calf, tenderness or redness above or below the knee  - Increased swelling of calf, ankle, foot  - Sudden increased shortness of breath, sudden onset of chest pain, localized chest pain with coughing  - Incision opening  Or, if there are any questions or concerns about medications or care.       -Is this patient being discharged with medication to prevent blood clots?  Yes, Aspirin Aspirin, ASA oral tablets  What is this medicine?  ASPIRIN (AS pir in) is a pain reliever. It is used to treat mild pain and fever. This medicine is also used as directed by a doctor to prevent and to treat heart attacks, to prevent strokes, and to treat arthritis or inflammation.  This medicine may be used for other purposes; ask your health care provider or pharmacist if you have questions.  COMMON BRAND NAME(S): Aspir-Low, Aspir-Riya , Aspirtab , Click Security Advanced Aspirin, Click Security Aspirin Extra Strength, Click Security Aspirin Plus, Beata Aspirin, Beata Genuine Aspirin, Beata Womens Aspirin , Bufferin Extra Strength, Bufferin Low Dose, Bufferin  What should I tell my health care provider before I take this medicine?  They need to know if you have any of these conditions:  -anemia  -asthma  -bleeding problems  -child with chickenpox, the flu, or other viral infection  -diabetes  -gout  -if you frequently drink alcohol containing drinks  -kidney disease  -liver disease  -low level of vitamin K  -lupus  -smoke tobacco  -stomach ulcers or other problems  -an unusual or allergic reaction to  aspirin, tartrazine dye, other medicines, dyes, or preservatives  -pregnant or trying to get pregnant  -breast-feeding  How should I use this medicine?  Take this medicine by mouth with a glass of water. Follow the directions on the package or prescription label. You can take this medicine with or without food. If it upsets your stomach, take it with food. Do not take your medicine more often than directed.  Talk to your pediatrician regarding the use of this medicine in children. While this drug may be prescribed for children as young as 12 years of age for selected conditions, precautions do apply. Children and teenagers should not use this medicine to treat chicken pox or flu symptoms unless directed by a doctor.  Patients over 65 years old may have a stronger reaction and need a smaller dose.  Overdosage: If you think you have taken too much of this medicine contact a poison control center or emergency room at once.  NOTE: This medicine is only for you. Do not share this medicine with others.  What if I miss a dose?  If you are taking this medicine on a regular schedule and miss a dose, take it as soon as you can. If it is almost time for your next dose, take only that dose. Do not take double or extra doses.  What may interact with this medicine?  Do not take this medicine with any of the following medications:  -cidofovir  -ketorolac  -probenecid  This medicine may also interact with the following medications:  -alcohol  -alendronate  -bismuth subsalicylate  -flavocoxid  -herbal supplements like feverfew, garlic, ivone, ginkgo biloba, horse chestnut  -medicines for diabetes or glaucoma like acetazolamide, methazolamide  -medicines for gout  -medicines that treat or prevent blood clots like enoxaparin, heparin, ticlopidine, warfarin  -other aspirin and aspirin-like medicines  -NSAIDs, medicines for pain and inflammation, like ibuprofen or naproxen  -pemetrexed  -sulfinpyrazone  -varicella live vaccine  This  list may not describe all possible interactions. Give your health care provider a list of all the medicines, herbs, non-prescription drugs, or dietary supplements you use. Also tell them if you smoke, drink alcohol, or use illegal drugs. Some items may interact with your medicine.  What should I watch for while using this medicine?  If you are treating yourself for pain, tell your doctor or health care professional if the pain lasts more than 10 days, if it gets worse, or if there is a new or different kind of pain. Tell your doctor if you see redness or swelling. Also, check with your doctor if you have a fever that lasts for more than 3 days. Only take this medicine to prevent heart attacks or blood clotting if prescribed by your doctor or health care professional.  Do not take aspirin or aspirin-like medicines with this medicine. Too much aspirin can be dangerous. Always read the labels carefully.  This medicine can irritate your stomach or cause bleeding problems. Do not smoke cigarettes or drink alcohol while taking this medicine. Do not lie down for 30 minutes after taking this medicine to prevent irritation to your throat.  If you are scheduled for any medical or dental procedure, tell your healthcare provider that you are taking this medicine. You may need to stop taking this medicine before the procedure.  What side effects may I notice from receiving this medicine?  Side effects that you should report to your doctor or health care professional as soon as possible:  -allergic reactions like skin rash, itching or hives, swelling of the face, lips, or tongue  -black, tarry stools  -bloody, coffee ground-like vomit  -breathing problems  -changes in hearing, ringing in the ears  -confusion  -general ill feeling or flu-like symptoms  -pain on swallowing  -redness, blistering, peeling or loosening of the skin, including inside the mouth or nose  -trouble passing urine or change in the amount of urine  -unusual  bleeding or bruising  -unusually weak or tired  -yellowing of the eyes or skin  Side effects that usually do not require medical attention (report to your doctor or health care professional if they continue or are bothersome):  -diarrhea or constipation  -nausea, vomiting  -stomach gas, heartburn  This list may not describe all possible side effects. Call your doctor for medical advice about side effects. You may report side effects to FDA at 1-257-WAC-5643.  Where should I keep my medicine?  Keep out of the reach of children.  Store at room temperature between 15 and 30 degrees C (59 and 86 degrees F). Protect from heat and moisture. Do not use this medicine if it has a strong vinegar smell. Throw away any unused medicine after the expiration date.  NOTE: This sheet is a summary. It may not cover all possible information. If you have questions about this medicine, talk to your doctor, pharmacist, or health care provider.  © 2014, [employer]/Gold Standard. (3/10/2009 10:44:17 AM)      · Is patient discharged on Warfarin / Coumadin?   No     · Is patient Post Blood Transfusion?  No    Depression / Suicide Risk    As you are discharged from this [facility] facility, it is important to learn how to keep safe from harming yourself.    Recognize the warning signs:  · Abrupt changes in personality, positive or negative- including increase in energy   · Giving away possessions  · Change in eating patterns- significant weight changes-  positive or negative  · Change in sleeping patterns- unable to sleep or sleeping all the time   · Unwillingness or inability to communicate  · Depression  · Unusual sadness, discouragement and loneliness  · Talk of wanting to die  · Neglect of personal appearance   · Rebelliousness- reckless behavior  · Withdrawal from people/activities they love  · Confusion- inability to concentrate     If you or a loved one observes any of these behaviors or has concerns about self-harm, here's what you  can do:  · Talk about it- your feelings and reasons for harming yourself  · Remove any means that you might use to hurt yourself (examples: pills, rope, extension cords, firearm)  · Get professional help from the community (Mental Health, Substance Abuse, psychological counseling)  · Do not be alone:Call your Safe Contact- someone whom you trust who will be there for you.  · Call your local CRISIS HOTLINE 813-5599 or 458-988-9014  · Call your local Children's Mobile Crisis Response Team [place] (342) 371-4895 or www.[employer]ewi  · Call the toll free National Suicide Prevention Hotlines   · National Suicide Prevention Lifeline 626-555-MLSO (1698)  · National Hope Line Network 800-SUICIDE (547-4358)

## 2017-02-03 NOTE — CARE PLAN
Problem: Pain  Goal: Alleviation of Pain or a reduction in pain to the patient’s comfort goal  Call light within reach, pt calls for assistance at all times.   Bed in low position and locked, upper bedside rails up, proper mobility signs placed, personal possessions within reach, treaded socks on.  Hourly rounding in place.          Problem: Risk for Deep Vein Thrombosis/Venous Thromboembolism  Goal: DVT/VTE Prevention Measures in Place  scds to ble, ASA for DVT prophylaxis

## 2017-02-03 NOTE — PROGRESS NOTES
Helped assist pt. to the bathroom. Gait was steady and required minimal assistance. Pt. voided successfully. Also ambulated in hallway, approx. 300 feet until she felt tired. Pt. Now back in bed, call light in reach, bed rails raised up and bed locked in lowest position.

## 2017-02-03 NOTE — DISCHARGE SUMMARY
Cecilia Alatorre was admitted on 2/1/2017 for UNILATERAL PRIMARY OSTEOARTHRITIS   Primary osteoarthritis of right knee  Patient was diagnosed with severe degenerative arthritis in the right knee and underwent a right total knee arthroplasty by Dr. Aaron Burton on the date of admission.    Hospital course:     The patient has done well, with no complications.  Patient denies chest pain, calf pain or shortness of breath.   Pain is well-controlled at present.  Patient is ambulating well with the use of an assistive device, and progressing in physical therapy.   Patient is neurologically and vascularly intact with palpable pedal pulses bilaterally.      Discharge date: 2/3/17    Patient is being discharged to SNF on Saturday.     Allergies:  Codeine; Latex; Lisinopril; Soap; and Tape       Medication List      START taking these medications       Instructions    aspirin 81 MG EC tablet   Last time this was given:  81 mg on 2/2/2017  9:49 PM    Take 1 Tab by mouth 2 Times a Day.   Dose:  81 mg       magnesium hydroxide 400 MG/5ML Susp   Last time this was given:  30 mL on 2/2/2017  3:43 PM   Commonly known as:  MILK OF MAGNESIA    Take 30 mL by mouth 1 time daily as needed (if sennosides, docusate, and/or polyethylene glycol 3350 ineffective or not ordered).   Dose:  30 mL       oxycodone immediate-release 5 MG Tabs   Last time this was given:  5 mg on 2/2/2017  9:48 PM   Commonly known as:  ROXICODONE    Take 1-2 Tabs by mouth every four hours as needed (pain).   Dose:  5-10 mg         CONTINUE taking these medications       Instructions    acetaminophen 500 MG Tabs   Last time this was given:  1,000 mg on 2/3/2017  6:40 AM   Commonly known as:  TYLENOL    Take 1 Tab by mouth every 6 hours as needed for Mild Pain.   Dose:  500 mg       amlodipine 2.5 MG Tabs   Last time this was given:  2.5 mg on 2/1/2017  1:18 PM   Commonly known as:  NORVASC    Take 1 Tab by mouth every day.   Dose:  2.5 mg       atorvastatin 20 MG  Tabs   Last time this was given:  20 mg on 2/2/2017  9:04 AM   Commonly known as:  LIPITOR    Take 1 Tab by mouth every day.   Dose:  20 mg       carvedilol 3.125 MG Tabs   Last time this was given:  3.125 mg on 2/2/2017  5:17 PM   Commonly known as:  COREG    Take 1 Tab by mouth 2 times a day, with meals.   Dose:  3.125 mg       Dexlansoprazole 60 MG Cpdr delayed-release capsule    Take 1 Cap by mouth every morning before breakfast. Indications: Gastroesophageal Reflux Disease   Dose:  1 Cap       EQ STOOL SOFTENER PO   Last time this was given:  100 mg on 2/2/2017  9:49 PM    Take 1 Cap by mouth every bedtime.   Dose:  1 Cap       fluticasone 50 MCG/ACT nasal spray   Last time this was given:  100 mcg on 2/1/2017 10:12 PM   Commonly known as:  FLONASE    Spray 2 Sprays in nose every bedtime. Indications: Hayfever   Dose:  2 Spray       paroxetine 10 MG Tabs   Last time this was given:  10 mg on 2/2/2017  9:04 AM   Commonly known as:  PAXIL    Take 1 Tab by mouth every day.   Dose:  10 mg       trazodone 50 MG Tabs   Last time this was given:  50 mg on 2/2/2017  9:48 PM   Commonly known as:  DESYREL    Take 1 Tab by mouth every bedtime.   Dose:  50 mg             Discharge Instructions:     Patient is instructed to ambulate and weight bear as tolerated with the use of an assistive device, and to continue physical therapy exercises given during this hospital stay.   Patient is to ice and elevate the surgical leg regularly, with pillows under the ankle, nothing is to be placed under the knee.   Patient was given detailed wound care instructions, and will leave the silver dressing on until first post-op visit.   Aspirin twice daily for DVT prophylaxis.  Patient is to follow up with Dr. Burton's office in 1-2 weeks.

## 2017-02-03 NOTE — DISCHARGE PLANNING
Transport arranged with [employer]. Patient will be leaving today @ 1630 via [facility] van going to [facility]. ZACHARIAH for BRIGIDA Rodriguez.

## 2017-02-04 NOTE — PROGRESS NOTES
Discharge orders given to patient. Pt instructed to take Aspirin for home for DVT prophylaxis. Pt verbalized understanding of the orders. Will f/u with Dr. Burton. IV removed, tip intact. Rt knee drsg cdi. [facility] all her personal belongings after all questions were answered. Attempted to give report to RN in [facility] but she is unavailable. Will try to give report again.

## 2017-02-06 DIAGNOSIS — F32.A DEPRESSION, UNSPECIFIED DEPRESSION TYPE: ICD-10-CM

## 2017-02-06 RX ORDER — PAROXETINE 10 MG/1
10 TABLET, FILM COATED ORAL DAILY
Qty: 90 TAB | Refills: 1 | Status: SHIPPED | OUTPATIENT
Start: 2017-02-06 | End: 2017-04-10

## 2017-02-13 DIAGNOSIS — I10 ESSENTIAL HYPERTENSION: ICD-10-CM

## 2017-02-13 DIAGNOSIS — F51.04 CHRONIC INSOMNIA: ICD-10-CM

## 2017-02-13 RX ORDER — CARVEDILOL 3.12 MG/1
3.12 TABLET ORAL 2 TIMES DAILY WITH MEALS
Qty: 180 TAB | Refills: 0 | Status: SHIPPED | OUTPATIENT
Start: 2017-02-13 | End: 2017-04-18 | Stop reason: SDUPTHER

## 2017-02-13 RX ORDER — TRAZODONE HYDROCHLORIDE 50 MG/1
50 TABLET ORAL
Qty: 90 TAB | Refills: 0 | Status: SHIPPED | OUTPATIENT
Start: 2017-02-13 | End: 2017-05-24 | Stop reason: SDUPTHER

## 2017-02-13 RX ORDER — CARVEDILOL 3.12 MG/1
TABLET ORAL
Refills: 1 | OUTPATIENT
Start: 2017-02-13

## 2017-03-08 ENCOUNTER — OFFICE VISIT (OUTPATIENT)
Dept: MEDICAL GROUP | Facility: MEDICAL CENTER | Age: 82
End: 2017-03-08
Payer: MEDICARE

## 2017-03-08 VITALS
WEIGHT: 142 LBS | BODY MASS INDEX: 24.24 KG/M2 | OXYGEN SATURATION: 93 % | HEIGHT: 64 IN | TEMPERATURE: 99.6 F | DIASTOLIC BLOOD PRESSURE: 70 MMHG | HEART RATE: 77 BPM | RESPIRATION RATE: 14 BRPM | SYSTOLIC BLOOD PRESSURE: 110 MMHG

## 2017-03-08 DIAGNOSIS — M25.561 RIGHT KNEE PAIN, UNSPECIFIED CHRONICITY: ICD-10-CM

## 2017-03-08 DIAGNOSIS — M79.604 PAIN OF RIGHT LOWER EXTREMITY: ICD-10-CM

## 2017-03-08 PROCEDURE — 4040F PNEUMOC VAC/ADMIN/RCVD: CPT | Performed by: FAMILY MEDICINE

## 2017-03-08 PROCEDURE — 99214 OFFICE O/P EST MOD 30 MIN: CPT | Performed by: FAMILY MEDICINE

## 2017-03-08 PROCEDURE — G8432 DEP SCR NOT DOC, RNG: HCPCS | Performed by: FAMILY MEDICINE

## 2017-03-08 PROCEDURE — G8484 FLU IMMUNIZE NO ADMIN: HCPCS | Performed by: FAMILY MEDICINE

## 2017-03-08 PROCEDURE — 1101F PT FALLS ASSESS-DOCD LE1/YR: CPT | Performed by: FAMILY MEDICINE

## 2017-03-08 PROCEDURE — G8420 CALC BMI NORM PARAMETERS: HCPCS | Performed by: FAMILY MEDICINE

## 2017-03-08 PROCEDURE — 1036F TOBACCO NON-USER: CPT | Performed by: FAMILY MEDICINE

## 2017-03-08 RX ORDER — GABAPENTIN 100 MG/1
100 CAPSULE ORAL 3 TIMES DAILY
Qty: 60 CAP | Refills: 0 | Status: SHIPPED | OUTPATIENT
Start: 2017-03-08 | End: 2017-03-09 | Stop reason: SDUPTHER

## 2017-03-08 ASSESSMENT — PATIENT HEALTH QUESTIONNAIRE - PHQ9
CLINICAL INTERPRETATION OF PHQ2 SCORE: 5
SUM OF ALL RESPONSES TO PHQ QUESTIONS 1-9: 15
5. POOR APPETITE OR OVEREATING: 0 - NOT AT ALL

## 2017-03-08 NOTE — MR AVS SNAPSHOT
"        Cecilia Alatorre   3/8/2017 10:20 AM   Office Visit   MRN: [id number]    Department:  [address] [place] [address]   Dept Phone:  [phone]    Description:  Female : 1933   Provider:  Luz Contreras M.D.           Reason for Visit     Follow-Up 3 month check up, States had a total knee rep. in feb., States right knee inflammed      Allergies as of 3/8/2017     Allergen Noted Reactions    Codeine 2013       Mental confusion    Latex 2017       Only to adhesive bandaids and tape; rash, redness, itching    Lisinopril 2016   Cough    Soap 2015   Rash, Itching    Bathing and laundry soap cause itching and rash non scented soap OK    Tape 2013   Rash    Rash-Paper tape OK      You were diagnosed with     Right knee pain, unspecified chronicity   [6515643]       Pain of right lower extremity   [[id number]]         Vital Signs     Blood Pressure Pulse Temperature Respirations Height Weight    110/70 mmHg 77 37.6 °C (99.6 °F) 14 1.626 m (5' 4.02\") 64.411 kg (142 lb)    Body Mass Index Oxygen Saturation Smoking Status             24.36 kg/m2 93% Former Smoker         Basic Information     Date Of Birth Sex Race Ethnicity Preferred Language    1933 Female  or  Non- English      Your appointments     Apr 10, 2017  4:40 PM   Follow Up Visit with Michael J Bloch, M.D.   [facility]  (--)    [address] [place] [id number]   [phone]              Problem List              ICD-10-CM Priority Class Noted - Resolved    Lumbosacral spondylosis without myelopathy M47.817   2013 - Present    S/P lumbar fusion Z98.1   2013 - Present    Primary localized osteoarthrosis, pelvic region and thigh M16.10   3/2/2015 - Present    Essential hypertension I10   2016 - Present    H/O: stroke Z86.73   2016 - Present    Primary osteoarthritis involving multiple joints M15.0   2016 - Present   " Depression F32.9   2/29/2016 - Present    Primary osteoarthritis of right knee M17.11   2/1/2017 - Present      Health Maintenance        Date Due Completion Dates    PAP SMEAR 2/6/1954 ---    BONE DENSITY 2/6/1998 ---    IMM PNEUMOCOCCAL 65+ (ADULT) LOW/MEDIUM RISK SERIES (2 of 2 - PCV13) 10/1/2011 10/1/2010    IMM INFLUENZA (1) 9/1/2016 10/1/2015, 11/2/2014, 10/10/2012    IMM DTaP/Tdap/Td Vaccine (2 - Td) 10/18/2016 10/18/2006            Current Immunizations     Hepatitis A Vaccine, Ped/Adol 5/8/2007, 10/10/2006    IPV 10/10/2006    Influenza TIV (IM) 10/1/2015, 11/2/2014, 10/10/2012    Pneumococcal polysaccharide vaccine (PPSV-23) 10/1/2010    SHINGLES VACCINE 6/28/2012    Tdap Vaccine 10/18/2006      Below and/or attached are the medications your provider expects you to take. Review all of your home medications and newly ordered medications with your provider and/or pharmacist. Follow medication instructions as directed by your provider and/or pharmacist. Please keep your medication list with you and share with your provider. Update the information when medications are discontinued, doses are changed, or new medications (including over-the-counter products) are added; and carry medication information at all times in the event of emergency situations     Allergies:  CODEINE - (reactions not documented)     LATEX - (reactions not documented)     LISINOPRIL - Cough     SOAP - Rash,Itching     TAPE - Rash               Medications  Valid as of: March 08, 2017 - 11:07 AM    Generic Name Brand Name Tablet Size Instructions for use    Acetaminophen (Tab) TYLENOL 500 MG Take 1 Tab by mouth every 6 hours as needed for Mild Pain.        AmLODIPine Besylate (Tab) NORVASC 2.5 MG Take 1 Tab by mouth every day.        Aspirin (Tablet Delayed Response) aspirin 81 MG Take 1 Tab by mouth 2 Times a Day.        Atorvastatin Calcium (Tab) LIPITOR 20 MG Take 1 Tab by mouth every day.        Carvedilol (Tab) COREG 3.125 MG Take 1  Tab by mouth 2 times a day, with meals.        Dexlansoprazole (CAPSULE DELAYED RELEASE) Dexlansoprazole 60 MG Take 1 Cap by mouth every morning before breakfast. Indications: Gastroesophageal Reflux Disease        Docusate Sodium   Take 1 Cap by mouth every bedtime.        Fluticasone Propionate (Suspension) FLONASE 50 MCG/ACT Spray 2 Sprays in nose every bedtime. Indications: Hayfever        Gabapentin (Cap) NEURONTIN 100 MG Take 1 Cap by mouth 3 times a day.        Magnesium Hydroxide (Suspension) MILK OF MAGNESIA 400 MG/5ML Take 30 mL by mouth 1 time daily as needed (if sennosides, docusate, and/or polyethylene glycol 3350 ineffective or not ordered).        OxyCODONE HCl (Tab) ROXICODONE 5 MG Take 1-2 Tabs by mouth every four hours as needed (pain).        PARoxetine HCl (Tab) PAXIL 10 MG Take 1 Tab by mouth every day.        TraZODone HCl (Tab) DESYREL 50 MG Take 1 Tab by mouth every bedtime.        .                 Medicines prescribed today were sent to:     [facility] - [place], [place] - [address]    [address] [address]    Phone: [phone] Fax: [phone]    Open 24 Hours?: No      Medication refill instructions:       If your prescription bottle indicates you have medication refills left, it is not necessary to call your provider’s office. Please contact your pharmacy and they will refill your medication.    If your prescription bottle indicates you do not have any refills left, you may request refills at any time through one of the following ways: The online [employer] system (except Urgent Care), by calling your provider’s office, or by asking your pharmacy to contact your provider’s office with a refill request. Medication refills are processed only during regular business hours and may not be available until the next business day. Your provider may request additional information or to have a follow-up visit with you  prior to refilling your medication.   *Please Note: Medication refills are assigned a new Rx number when refilled electronically. Your pharmacy may indicate that no refills were authorized even though a new prescription for the same medication is available at the pharmacy. Please request the medicine by name with the pharmacy before contacting your provider for a refill.        Your To Do List     Future Labs/Procedures Complete By Expires    US-EXTREMITY VENOUS UNILATERAL-LOWER RIGHT  As directed 3/8/2018         MyChart Status: Patient Declined

## 2017-03-08 NOTE — PROGRESS NOTES
CC: Right knee and leg pain    HPI:   Cecilia presents today because she has right knee and leg pain. Patient underwent total knee replacement in 2/1/2017 since then she has been having knee and leg pain, has been on tylenol, and oxycodone has helped a little bit but she does not want to take more than one tablet of oxycodone , so much of the time she takes the tylenol and it has been helping partially.The swelling gets a little bit better, and still there. Has been having the pain at rest, and more with movement. Denies any chest pain, or SOB. She has been feeling alittle bit down after the surgery but antoidepssant medications has been helping, denies any suicidal ideation.      Patient Active Problem List    Diagnosis Date Noted   • Primary osteoarthritis of right knee 02/01/2017   • Essential hypertension 02/29/2016   • H/O: stroke 02/29/2016   • Primary osteoarthritis involving multiple joints 02/29/2016   • Depression 02/29/2016   • Primary localized osteoarthrosis, pelvic region and thigh 03/02/2015   • S/P lumbar fusion 05/14/2013   • Lumbosacral spondylosis without myelopathy 05/09/2013       Current Outpatient Prescriptions   Medication Sig Dispense Refill   • gabapentin (NEURONTIN) 100 MG Cap Take 1 Cap by mouth 3 times a day. 60 Cap 0   • carvedilol (COREG) 3.125 MG Tab Take 1 Tab by mouth 2 times a day, with meals. 180 Tab 0   • trazodone (DESYREL) 50 MG Tab Take 1 Tab by mouth every bedtime. 90 Tab 0   • paroxetine (PAXIL) 10 MG Tab Take 1 Tab by mouth every day. 90 Tab 1   • aspirin EC 81 MG EC tablet Take 1 Tab by mouth 2 Times a Day. 30 Tab    • magnesium hydroxide (MILK OF MAGNESIA) 400 MG/5ML Suspension Take 30 mL by mouth 1 time daily as needed (if sennosides, docusate, and/or polyethylene glycol 3350 ineffective or not ordered). 1 Bottle    • oxycodone immediate-release (ROXICODONE) 5 MG Tab Take 1-2 Tabs by mouth every four hours as needed (pain).     • amlodipine (NORVASC) 2.5 MG Tab Take 1 Tab  "by mouth every day. 90 Tab 1   • atorvastatin (LIPITOR) 20 MG Tab Take 1 Tab by mouth every day. 90 Tab 1   • Dexlansoprazole 60 MG CAPSULE DELAYED RELEASE delayed-release capsule Take 1 Cap by mouth every morning before breakfast. Indications: Gastroesophageal Reflux Disease 90 Cap 1   • acetaminophen (TYLENOL) 500 MG TABS Take 1 Tab by mouth every 6 hours as needed for Mild Pain. 30 Tab 0   • Docusate Sodium (EQ STOOL SOFTENER PO) Take 1 Cap by mouth every bedtime.     • fluticasone (FLONASE) 50 MCG/ACT nasal spray Spray 2 Sprays in nose every bedtime. Indications: Hayfever       No current facility-administered medications for this visit.         Allergies as of 03/08/2017 - Shashank as Reviewed 03/08/2017   Allergen Reaction Noted   • Codeine  02/01/2013   • Latex  01/19/2017   • Lisinopril Cough 02/29/2016   • Soap Rash and Itching 02/20/2015   • Tape Rash 02/01/2013        ROS: Denies any chest pain, Shortness of breath, Changes bowel or bladder, Lower extremity edema.    Physical Exam:  /70 mmHg  Pulse 77  Temp(Src) 37.6 °C (99.6 °F)  Resp 14  Ht 1.626 m (5' 4.02\")  Wt 64.411 kg (142 lb)  BMI 24.36 kg/m2  SpO2 93%  Gen.: Well-developed, well-nourished, no apparent distress,pleasant and cooperative with the examination  Right knee, mid swelling, and tenderness, incision has healed normally, no sign of infection.  Right leg: Mild calf swelling and tenderness        Assessment and Plan.   84 y.o. female     1. Right knee pain, unspecified chronicity  S/P total knee replacement.  Will give gabapentin, continue on oxycodone as needed.    - gabapentin (NEURONTIN) 100 MG Cap; Take 1 Cap by mouth 3 times a day.  Dispense: 60 Cap; Refill: 0      2. Pain, and swelling of right lower extremity  R/O DVT.  STAT US of right LE ordered.    - US-EXTREMITY VENOUS UNILATERAL-LOWER RIGHT; Future (STAT)      "

## 2017-03-09 ENCOUNTER — HOSPITAL ENCOUNTER (OUTPATIENT)
Dept: RADIOLOGY | Facility: MEDICAL CENTER | Age: 82
End: 2017-03-09
Attending: FAMILY MEDICINE
Payer: MEDICARE

## 2017-03-09 DIAGNOSIS — M25.561 RIGHT KNEE PAIN, UNSPECIFIED CHRONICITY: ICD-10-CM

## 2017-03-09 DIAGNOSIS — M79.604 PAIN OF RIGHT LOWER EXTREMITY: ICD-10-CM

## 2017-03-09 PROCEDURE — 93971 EXTREMITY STUDY: CPT | Mod: RT

## 2017-03-09 RX ORDER — GABAPENTIN 100 MG/1
100 CAPSULE ORAL 3 TIMES DAILY
Qty: 90 CAP | Refills: 3 | Status: SHIPPED | OUTPATIENT
Start: 2017-03-09 | End: 2017-04-10

## 2017-03-09 NOTE — TELEPHONE ENCOUNTER
1. Caller Name: Cecilia                      Call Back Number: [phone] (home)     2. Message: Left VM stating that Melissa did not receive the Rx from the pharmacy, and she doesn't know what to do. Left VM to call us back. The gabapentin went to the mail order pharmacy. Pt didn't specify where Rx was supposed to go.    3. Patient approves office to leave a detailed voicemail/MyChart message: N\A

## 2017-03-13 ENCOUNTER — TELEPHONE (OUTPATIENT)
Dept: MEDICAL GROUP | Facility: MEDICAL CENTER | Age: 82
End: 2017-03-13

## 2017-03-13 NOTE — TELEPHONE ENCOUNTER
1. Caller Name: Cecilia                      Call Back Number: [phone] (home)     2. Message: States had the ultrasound done and came back negative, states has been elevating leg and swelling is coming down. She is worried on what to do next. I told her that everything looks good and sounds good. I scheduled a F/V for 4-18 at 3pm. She has a cardiology apt 4-10-17     3. Patient approves office to leave a detailed voicemail/MyChart message: yes

## 2017-03-21 NOTE — TELEPHONE ENCOUNTER
VOICEMAIL  1. Caller Name: Meaghan                      Call Back Number: [phone]    2. Message: Left Vm for the patient to call back regarding RX.    3. Patient approves office to leave a detailed voicemail/MyChart message: N\A

## 2017-04-10 ENCOUNTER — OFFICE VISIT (OUTPATIENT)
Dept: VASCULAR LAB | Facility: MEDICAL CENTER | Age: 82
End: 2017-04-10
Attending: INTERNAL MEDICINE
Payer: MEDICARE

## 2017-04-10 VITALS
SYSTOLIC BLOOD PRESSURE: 110 MMHG | HEART RATE: 80 BPM | HEIGHT: 64 IN | BODY MASS INDEX: 24.75 KG/M2 | WEIGHT: 145 LBS | DIASTOLIC BLOOD PRESSURE: 74 MMHG

## 2017-04-10 DIAGNOSIS — E78.5 DYSLIPIDEMIA: ICD-10-CM

## 2017-04-10 DIAGNOSIS — Z96.651 STATUS POST TOTAL RIGHT KNEE REPLACEMENT: ICD-10-CM

## 2017-04-10 DIAGNOSIS — I10 ESSENTIAL HYPERTENSION: ICD-10-CM

## 2017-04-10 PROCEDURE — G8432 DEP SCR NOT DOC, RNG: HCPCS | Performed by: INTERNAL MEDICINE

## 2017-04-10 PROCEDURE — 99213 OFFICE O/P EST LOW 20 MIN: CPT | Performed by: INTERNAL MEDICINE

## 2017-04-10 PROCEDURE — 4040F PNEUMOC VAC/ADMIN/RCVD: CPT | Performed by: INTERNAL MEDICINE

## 2017-04-10 PROCEDURE — 1036F TOBACCO NON-USER: CPT | Performed by: INTERNAL MEDICINE

## 2017-04-10 PROCEDURE — 1101F PT FALLS ASSESS-DOCD LE1/YR: CPT | Performed by: INTERNAL MEDICINE

## 2017-04-10 PROCEDURE — G8420 CALC BMI NORM PARAMETERS: HCPCS | Performed by: INTERNAL MEDICINE

## 2017-04-10 RX ORDER — TRAMADOL HYDROCHLORIDE 50 MG/1
50 TABLET ORAL EVERY 4 HOURS PRN
COMMUNITY
End: 2017-10-17

## 2017-04-10 ASSESSMENT — ENCOUNTER SYMPTOMS
CLAUDICATION: 0
SEIZURES: 0
HEADACHES: 0
DEPRESSION: 0
PALPITATIONS: 0
FOCAL WEAKNESS: 0
COUGH: 0
LOSS OF CONSCIOUSNESS: 0
SHORTNESS OF BREATH: 0

## 2017-04-10 NOTE — PROGRESS NOTES
"  INITIAL VASCULAR VISIT  Subjective:   Cecilia Alatorre is a 83 y.o. female who presents today 4/20/17   Chief Complaint   Patient presents with   • Amb Vascular Reason For Visit     HPI:   Patient here for f/u of CVA, htn and dyslipidemia  PCP instructed to increase atorva to 20 mg but hasn't done yet  BP usually 100-110s/70s  No blood thinners  No new stroke symptoms  Had echo and cartoids    Social History   Substance Use Topics   • Smoking status: Former Smoker -- 1.00 packs/day for 20 years     Types: Cigarettes     Quit date: 01/01/1972   • Smokeless tobacco: Never Used   • Alcohol Use: Yes      Comment: 0-1 per month     DIET AND EXERCISE:  Weight Change: Stable  Diet: Relatively heart healthy  Exercise: bikes but less with bands    Review of Systems   Constitutional: Negative for malaise/fatigue.   Respiratory: Negative for cough and shortness of breath.    Cardiovascular: Negative for chest pain, palpitations and claudication.   Neurological: Negative for focal weakness, seizures, loss of consciousness and headaches.   Psychiatric/Behavioral: Negative for depression.      Objective:     Filed Vitals:    04/10/17 1636   BP: 110/74   Pulse: 80   Height: 1.626 m (5' 4.02\")   Weight: 65.772 kg (145 lb)      Body mass index is 24.88 kg/(m^2).  Physical Exam   Constitutional: She is oriented to person, place, and time. No distress.   Cardiovascular: Normal rate, regular rhythm, normal heart sounds and intact distal pulses.    No murmur heard.  Pulmonary/Chest: Effort normal and breath sounds normal. No respiratory distress. She has no wheezes. She has no rales.   Musculoskeletal: She exhibits no edema.   Neurological: She is alert and oriented to person, place, and time. No cranial nerve deficit. Coordination normal.   Skin: She is not diaphoretic.   Psychiatric: She has a normal mood and affect.   Vitals reviewed.             Lab Results   Component Value Date    SODIUM 140 01/19/2017    POTASSIUM 4.0 " 01/19/2017    CHLORIDE 106 01/19/2017    CO2 30 01/19/2017    GLUCOSE 88 01/19/2017    BUN 13 01/19/2017    CREATININE 0.74 01/19/2017    IFAFRICA >60 01/19/2017    IFNOTAFR >60 01/19/2017        Lab Results   Component Value Date    WBC 5.4 01/19/2017    RBC 3.99* 01/19/2017    HEMOGLOBIN 10.1* 02/02/2017    HEMATOCRIT 31.0* 02/02/2017    MCV 93.5 01/19/2017    MCH 31.1 01/19/2017    MCHC 33.2* 01/19/2017    MPV 9.2 01/19/2017      Blood work from 5-16-16:  Glucose 87, GFR 74, LDL 85, nonHDL 104, tsh 2.55, urine for ma <3.0    Multiple imaging studies available in emr and were reviewed with patient at today's visit    Medical Decision Making:  Today's Assessment / Status / Plan:     1. Essential hypertension     2. Dyslipidemia       Patient Type: Secondary Prevention    Etiology of Established CVD if Present:   1. Intracerebral hemorrhage  2.  Lacunar cerebral infarction and microvascular change    Lipid Management: Qualifies for Statin Therapy Based on 2013 ACC/AHA Guidelines: yes  Calculated 10-Year Risk of ASCVD: N/A  Currently on Statin: Yes  Patient qualifies for moderate intensity statin which she is on   Lipids reasonable but not optimal  Plan:  - Agree with increase in atorva to 20 mg daily as recommended by pcp    Blood Pressure Management:Goal: JNC8 (2013) Office BP Goal:<150/90; Under Control: yes  Blood pressure is somewhat variable, but appears under reasonable control both at home and in the office  Had cough on lisinopril resolved with discontinuation of the agent  Tolerating current blood pressure medications  Plan:  - Continue carvedilol  - Continue amlodipine  - Continue lifestyle modification  - Continue home blood pressure monitoring    Glycemic Status: Normal    Anti-Platelet/Anti-Coagulant Tx: no  - Avoid all antiplatelet agents, anticoagulants, and NSAIDs    Smoking: Continue complete avoidance    Physical Activity: Recommended daily home therapy with bands and exercise bike as recommended  by PT.    Weight Management and Nutrition: Dietary plan was discussed with patient at this visit including Mediterranean style eating plan    Other:     1. Intracerebral hemorrhage - unclear whether or not actually related to trauma or elevated blood pressure. No evidence of aneurysm on MRA from [place].  - Blood pressure control as above  - Physical therapy as above  - Avoid antiplatelet agents as above    2.  Lacunar cerebral infarction and microvascular change  Echo and carotids essentially unremarkable  - Risk factor modification as above    Instructed to follow-up with PCP for remainder of adult medical needs: yes  We will partner with other providers in the management of established vascular disease and cardiometabolic risk factors.    Studies to Be Obtained: None  Labs to Be Obtained:  Per pcp    Follow up in: 9 months    Michael J Bloch, M.D.    Dr. Contreras

## 2017-04-10 NOTE — PROGRESS NOTES
"  INITIAL VASCULAR VISIT  Subjective:   Cecilia Alatorre is a 83 y.o. female who presents today 4/10/17 for   Chief Complaint   Patient presents with   • Amb Vascular Reason For Visit     HPI:   Patient here for f/u of CVA, htn and dyslipidemia  Had TKR - no cv complications  Recent u/s with now DVT  Doing PT  PCP instructed to increase atorva to 20 mg but hasn't done yet  BP usually 100-110s/70s  No blood thinners  No new stroke symptoms    Social History   Substance Use Topics   • Smoking status: Former Smoker -- 1.00 packs/day for 20 years     Types: Cigarettes     Quit date: 01/01/1972   • Smokeless tobacco: Never Used   • Alcohol Use: Yes      Comment: 0-1 per month     DIET AND EXERCISE:  Weight Change: Stable  Diet: Relatively heart healthy  Exercise: bikes but less with bands    Review of Systems   Constitutional: Negative for malaise/fatigue.   Respiratory: Negative for cough and shortness of breath.    Cardiovascular: Negative for chest pain, palpitations and claudication.   Neurological: Negative for focal weakness, seizures, loss of consciousness and headaches.   Psychiatric/Behavioral: Negative for depression.      Objective:     Filed Vitals:    04/10/17 1636   BP: 110/74   Pulse: 80   Height: 1.626 m (5' 4.02\")   Weight: 65.772 kg (145 lb)      Body mass index is 24.88 kg/(m^2).  Physical Exam   Constitutional: She is oriented to person, place, and time. No distress.   Cardiovascular: Normal rate, regular rhythm, normal heart sounds and intact distal pulses.    No murmur heard.  Pulmonary/Chest: Effort normal and breath sounds normal. No respiratory distress. She has no wheezes. She has no rales.   Musculoskeletal: She exhibits no edema.   Neurological: She is alert and oriented to person, place, and time. No cranial nerve deficit. Coordination normal.   Skin: She is not diaphoretic.   Psychiatric: She has a normal mood and affect.   Vitals reviewed.             Lab Results   Component Value Date "    SODIUM 140 01/19/2017    POTASSIUM 4.0 01/19/2017    CHLORIDE 106 01/19/2017    CO2 30 01/19/2017    GLUCOSE 88 01/19/2017    BUN 13 01/19/2017    CREATININE 0.74 01/19/2017    IFAFRICA >60 01/19/2017    IFNOTAFR >60 01/19/2017        Lab Results   Component Value Date    WBC 5.4 01/19/2017    RBC 3.99* 01/19/2017    HEMOGLOBIN 10.1* 02/02/2017    HEMATOCRIT 31.0* 02/02/2017    MCV 93.5 01/19/2017    MCH 31.1 01/19/2017    MCHC 33.2* 01/19/2017    MPV 9.2 01/19/2017      Blood work from 5-16-16:  Glucose 87, GFR 74, LDL 85, nonHDL 104, tsh 2.55, urine for ma <3.0    Medical Decision Making:  Today's Assessment / Status / Plan:     1. Essential hypertension     2. Dyslipidemia       Patient Type: Secondary Prevention    Etiology of Established CVD if Present:   1. Intracerebral hemorrhage  2.  Lacunar cerebral infarction and microvascular change    Lipid Management: Qualifies for Statin Therapy Based on 2013 ACC/AHA Guidelines: yes  Calculated 10-Year Risk of ASCVD: N/A  Currently on Statin: Yes  Patient qualifies for moderate intensity statin which she is on   Lipids panel much improved  Plan:  - continue atorvastatin 20 mg each night  - repeat fasting lipid panel for review of PCP    Blood Pressure Management:Goal: JNC8 (2013) Office BP Goal:<150/90; Under Control: yes  Blood pressure is somewhat variable, but appears under reasonable control both at home and in the office  Had cough on lisinopril resolved with discontinuation of the agent  Tolerating current blood pressure medications  Plan:  - Continue carvedilol  - Continue amlodipine  - Continue lifestyle modification  - Continue home blood pressure monitoring    Glycemic Status: Normal    Anti-Platelet/Anti-Coagulant Tx: no  - Avoid all antiplatelet agents, anticoagulants, and NSAIDs    Smoking: Continue complete avoidance    Physical Activity: Recommended daily home therapy with bands and exercise bike as recommended by PT.    Weight Management and  Nutrition: Dietary plan was discussed with patient at this visit including Mediterranean style eating plan    Other:     1. Intracerebral hemorrhage - unclear whether or not actually related to trauma or elevated blood pressure. No evidence of aneurysm on MRA from [place].  - Blood pressure control as above  - Physical therapy as above  - Avoid antiplatelet agents as above    2.  Lacunar cerebral infarction and microvascular change  Echo and carotids essentially unremarkable  - Risk factor modification as above    3.  Fatigue - most likely post op.  No evidence DVT on u/s.  Check cmp, cbc, tsh, urinalysis for review of PCP    Instructed to follow-up with PCP for remainder of adult medical needs: yes  We will partner with other providers in the management of established vascular disease and cardiometabolic risk factors.    Studies to Be Obtained: None  Labs to Be Obtained:  As above    Follow up in: AS NEEDED    Michael J Bloch, M.D.    Dr. Contreras

## 2017-04-18 ENCOUNTER — OFFICE VISIT (OUTPATIENT)
Dept: MEDICAL GROUP | Facility: MEDICAL CENTER | Age: 82
End: 2017-04-18
Payer: MEDICARE

## 2017-04-18 VITALS
TEMPERATURE: 97.7 F | OXYGEN SATURATION: 97 % | DIASTOLIC BLOOD PRESSURE: 52 MMHG | HEART RATE: 82 BPM | HEIGHT: 64 IN | WEIGHT: 143.08 LBS | RESPIRATION RATE: 14 BRPM | BODY MASS INDEX: 24.43 KG/M2 | SYSTOLIC BLOOD PRESSURE: 96 MMHG

## 2017-04-18 DIAGNOSIS — M17.11 PRIMARY OSTEOARTHRITIS OF RIGHT KNEE: ICD-10-CM

## 2017-04-18 DIAGNOSIS — I10 ESSENTIAL HYPERTENSION: ICD-10-CM

## 2017-04-18 DIAGNOSIS — R41.3 MEMORY PROBLEM: ICD-10-CM

## 2017-04-18 PROCEDURE — G8420 CALC BMI NORM PARAMETERS: HCPCS | Performed by: FAMILY MEDICINE

## 2017-04-18 PROCEDURE — 1101F PT FALLS ASSESS-DOCD LE1/YR: CPT | Performed by: FAMILY MEDICINE

## 2017-04-18 PROCEDURE — 4040F PNEUMOC VAC/ADMIN/RCVD: CPT | Performed by: FAMILY MEDICINE

## 2017-04-18 PROCEDURE — 1036F TOBACCO NON-USER: CPT | Performed by: FAMILY MEDICINE

## 2017-04-18 PROCEDURE — G8432 DEP SCR NOT DOC, RNG: HCPCS | Performed by: FAMILY MEDICINE

## 2017-04-18 PROCEDURE — 99214 OFFICE O/P EST MOD 30 MIN: CPT | Performed by: FAMILY MEDICINE

## 2017-04-18 RX ORDER — CARVEDILOL 3.12 MG/1
3.12 TABLET ORAL 2 TIMES DAILY WITH MEALS
Qty: 180 TAB | Refills: 2 | Status: SHIPPED | OUTPATIENT
Start: 2017-04-18 | End: 2017-11-13 | Stop reason: SDUPTHER

## 2017-04-18 RX ORDER — MELOXICAM 7.5 MG/1
7.5 TABLET ORAL DAILY
Qty: 90 TAB | Refills: 1 | Status: SHIPPED | OUTPATIENT
Start: 2017-04-18 | End: 2017-07-18

## 2017-04-18 RX ORDER — MELOXICAM 7.5 MG/1
7.5 TABLET ORAL DAILY
Qty: 30 TAB | Refills: 3 | Status: SHIPPED | OUTPATIENT
Start: 2017-04-18 | End: 2017-04-18

## 2017-04-18 RX ORDER — TRAMADOL HYDROCHLORIDE 50 MG/1
50 TABLET ORAL EVERY 8 HOURS PRN
Qty: 90 TAB | Refills: 2 | Status: CANCELLED
Start: 2017-04-18

## 2017-04-18 NOTE — PROGRESS NOTES
CC: Concerns about her memory/ right knee pain/ medication refill    HPI:   Cecilia presents today to discuss the following:    Patient has been having a real concern that she may have Alzheimer dementia, because she has been having memory issues. Her daughter has been telling her thinks to do and she has been forgetting it. She concerns about Alzheimer because her  had it, and he passed away, she dies want to go through what he went through. She has been active, and independent with all ADLs. We performed the mini mental status exam today and was normal. Has been reading a lot, and have a good social life.    Primary osteoarthritis of right knee, she underwent total knee replacement recently in 2/2017.Still doing physical therapy, and she has been successful. However sometimes when she wakes up in AM she feels great pain, and stiff knee joint, has been on otc tylenol has helped a little bit.        Patient Active Problem List    Diagnosis Date Noted   • Primary osteoarthritis of right knee 02/01/2017   • Essential hypertension 02/29/2016   • H/O: stroke 02/29/2016   • Primary osteoarthritis involving multiple joints 02/29/2016   • Depression 02/29/2016   • Primary localized osteoarthrosis, pelvic region and thigh 03/02/2015   • S/P lumbar fusion 05/14/2013   • Lumbosacral spondylosis without myelopathy 05/09/2013       Current Outpatient Prescriptions   Medication Sig Dispense Refill   • carvedilol (COREG) 3.125 MG Tab Take 1 Tab by mouth 2 times a day, with meals. 180 Tab 2   • meloxicam (MOBIC) 7.5 MG Tab Take 1 Tab by mouth every day. 90 Tab 1   • tramadol (ULTRAM) 50 MG Tab Take 50 mg by mouth every four hours as needed.     • trazodone (DESYREL) 50 MG Tab Take 1 Tab by mouth every bedtime. 90 Tab 0   • amlodipine (NORVASC) 2.5 MG Tab Take 1 Tab by mouth every day. 90 Tab 1   • atorvastatin (LIPITOR) 20 MG Tab Take 1 Tab by mouth every day. 90 Tab 1   • Dexlansoprazole 60 MG CAPSULE DELAYED RELEASE  "delayed-release capsule Take 1 Cap by mouth every morning before breakfast. Indications: Gastroesophageal Reflux Disease 90 Cap 1   • acetaminophen (TYLENOL) 500 MG TABS Take 1 Tab by mouth every 6 hours as needed for Mild Pain. 30 Tab 0   • Docusate Sodium (EQ STOOL SOFTENER PO) Take 1 Cap by mouth every bedtime.     • fluticasone (FLONASE) 50 MCG/ACT nasal spray Spray 2 Sprays in nose every bedtime. Indications: Hayfever     • aspirin EC 81 MG EC tablet Take 1 Tab by mouth 2 Times a Day. 30 Tab    • oxycodone immediate-release (ROXICODONE) 5 MG Tab Take 1-2 Tabs by mouth every four hours as needed (pain).       No current facility-administered medications for this visit.         Allergies as of 04/18/2017 - Shashank as Reviewed 04/18/2017   Allergen Reaction Noted   • Codeine  02/01/2013   • Latex  01/19/2017   • Lisinopril Cough 02/29/2016   • Soap Rash and Itching 02/20/2015   • Tape Rash 02/01/2013        ROS: Denies any chest pain, Shortness of breath, Changes bowel or bladder, Lower extremity edema.    Physical Exam:  BP 96/52 mmHg  Pulse 82  Temp(Src) 36.5 °C (97.7 °F)  Resp 14  Ht 1.626 m (5' 4\")  Wt 64.9 kg (143 lb 1.3 oz)  BMI 24.55 kg/m2  SpO2 97%  Gen.: Well-developed, well-nourished, no apparent distress,pleasant and cooperative with the examination  Skin:  Warm and dry with good turgor. No rashes or suspicious lesions in visible areas  Neck:  No JVD.  Cor: Regular rate and rhythm without murmur, gallop or rub.  Lungs: Respirations unlabored.Clear to auscultation with equal breath sounds bilaterally. No wheezes, rhonchi.  Extremities: No  edema.  Right knee: mild tenderness, and swelling, normal ROM.      MMSE : 29/30.    Assessment and Plan.   84 y.o. female     1. Memory problem  Probably age related, MMSE scroe is 29/30    2. Primary osteoarthritis of right knee  S/P total knee replacement recently in 2/2017.  Will give Meloxicam once a day with meals ( has been having normal kidney functions), " advised to take tylenol in between if pain persist.  Call, or RTC if gets worse.    - meloxicam (MOBIC) 7.5 MG Tab; Take 1 Tab by mouth every day.  Dispense: 90 Tab; Refill: 1    3. Medication refill    - carvedilol (COREG) 3.125 MG Tab; Take 1 Tab by mouth 2 times a day, with meals.  Dispense: 180 Tab; Refill: 2

## 2017-04-18 NOTE — MR AVS SNAPSHOT
"        Cecilia Alatorre   2017 3:00 PM   Office Visit   MRN: [id number]    Department:  [address] [place] [address]   Dept Phone:  [phone]    Description:  Female : 1933   Provider:  Luz Contreras M.D.           Reason for Visit     Follow-Up 1 month check up, lab results.    Knee Pain swelling from Rt knee surgery.      Allergies as of 2017     Allergen Noted Reactions    Codeine 2013       Mental confusion    Latex 2017       Only to adhesive bandaids and tape; rash, redness, itching    Lisinopril 2016   Cough    Soap 2015   Rash, Itching    Bathing and laundry soap cause itching and rash non scented soap OK    Tape 2013   Rash    Rash-Paper tape OK      You were diagnosed with     Essential hypertension   [4791704]       Memory problem   [821496]       Primary osteoarthritis of right knee   [172768]         Vital Signs     Blood Pressure Pulse Temperature Respirations Height Weight    96/52 mmHg 82 36.5 °C (97.7 °F) 14 1.626 m (5' 4\") 64.9 kg (143 lb 1.3 oz)    Body Mass Index Oxygen Saturation Smoking Status             24.55 kg/m2 97% Former Smoker         Basic Information     Date Of Birth Sex Race Ethnicity Preferred Language    1933 Female  or  Non- English      Your appointments     2017  2:20 PM   Established Patient with Luz Contreras M.D.   [address] ([address])    [address] [place] [id number]   [phone]           You will be receiving a confirmation call a few days before your appointment from our automated call confirmation system.              Problem List              ICD-10-CM Priority Class Noted - Resolved    Lumbosacral spondylosis without myelopathy M47.817   2013 - Present    S/P lumbar fusion Z98.1   2013 - Present    Primary localized osteoarthrosis, pelvic region and thigh M16.10   3/2/2015 - Present    Essential hypertension I10   " 2/29/2016 - Present    H/O: stroke Z86.73   2/29/2016 - Present    Primary osteoarthritis involving multiple joints M15.0   2/29/2016 - Present    Depression F32.9   2/29/2016 - Present    Primary osteoarthritis of right knee M17.11   2/1/2017 - Present      Health Maintenance        Date Due Completion Dates    PAP SMEAR 2/6/1954 ---    BONE DENSITY 2/6/1998 ---    IMM PNEUMOCOCCAL 65+ (ADULT) LOW/MEDIUM RISK SERIES (2 of 2 - PCV13) 10/1/2011 10/1/2010    IMM DTaP/Tdap/Td Vaccine (2 - Td) 10/18/2016 10/18/2006            Current Immunizations     Hepatitis A Vaccine, Ped/Adol 5/8/2007, 10/10/2006    IPV 10/10/2006    Influenza TIV (IM) 10/1/2015, 11/2/2014, 10/10/2012    Pneumococcal polysaccharide vaccine (PPSV-23) 10/1/2010    SHINGLES VACCINE 6/28/2012    Tdap Vaccine 10/18/2006      Below and/or attached are the medications your provider expects you to take. Review all of your home medications and newly ordered medications with your provider and/or pharmacist. Follow medication instructions as directed by your provider and/or pharmacist. Please keep your medication list with you and share with your provider. Update the information when medications are discontinued, doses are changed, or new medications (including over-the-counter products) are added; and carry medication information at all times in the event of emergency situations     Allergies:  CODEINE - (reactions not documented)     LATEX - (reactions not documented)     LISINOPRIL - Cough     SOAP - Rash,Itching     TAPE - Rash               Medications  Valid as of: April 18, 2017 -  4:06 PM    Generic Name Brand Name Tablet Size Instructions for use    Acetaminophen (Tab) TYLENOL 500 MG Take 1 Tab by mouth every 6 hours as needed for Mild Pain.        AmLODIPine Besylate (Tab) NORVASC 2.5 MG Take 1 Tab by mouth every day.        Aspirin (Tablet Delayed Response) aspirin 81 MG Take 1 Tab by mouth 2 Times a Day.        Atorvastatin Calcium (Tab) LIPITOR  20 MG Take 1 Tab by mouth every day.        Carvedilol (Tab) COREG 3.125 MG Take 1 Tab by mouth 2 times a day, with meals.        Dexlansoprazole (CAPSULE DELAYED RELEASE) Dexlansoprazole 60 MG Take 1 Cap by mouth every morning before breakfast. Indications: Gastroesophageal Reflux Disease        Docusate Sodium   Take 1 Cap by mouth every bedtime.        Fluticasone Propionate (Suspension) FLONASE 50 MCG/ACT Spray 2 Sprays in nose every bedtime. Indications: Hayfever        Meloxicam (Tab) MOBIC 7.5 MG Take 1 Tab by mouth every day.        OxyCODONE HCl (Tab) ROXICODONE 5 MG Take 1-2 Tabs by mouth every four hours as needed (pain).        TraMADol HCl (Tab) ULTRAM 50 MG Take 50 mg by mouth every four hours as needed.        TraZODone HCl (Tab) DESYREL 50 MG Take 1 Tab by mouth every bedtime.        .                 Medicines prescribed today were sent to:     [facility] - [place], [place] - [address] AT [address] TO [place] [address] [address] [facility] [address]    Phone: [phone] Fax: [phone]    Open 24 Hours?: No    [facility] [address] [facility], [place] - [address] [facility]    [address] [place] [address]    Phone: [phone] Fax: [phone]    Open 24 Hours?: No      Medication refill instructions:       If your prescription bottle indicates you have medication refills left, it is not necessary to call your provider’s office. Please contact your pharmacy and they will refill your medication.    If your prescription bottle indicates you do not have any refills left, you may request refills at any time through one of the following ways: The online [employer] system (except Urgent Care), by calling your provider’s office, or by asking your pharmacy to contact your provider’s office with a refill request. Medication refills are processed only during regular business hours and may not be available until the next business day. Your provider may request additional  information or to have a follow-up visit with you prior to refilling your medication.   *Please Note: Medication refills are assigned a new Rx number when refilled electronically. Your pharmacy may indicate that no refills were authorized even though a new prescription for the same medication is available at the pharmacy. Please request the medicine by name with the pharmacy before contacting your provider for a refill.           MyChart Status: Patient Declined

## 2017-05-15 ENCOUNTER — OFFICE VISIT (OUTPATIENT)
Dept: MEDICAL GROUP | Facility: MEDICAL CENTER | Age: 82
End: 2017-05-15
Payer: MEDICARE

## 2017-05-15 VITALS
DIASTOLIC BLOOD PRESSURE: 70 MMHG | HEART RATE: 84 BPM | HEIGHT: 64 IN | OXYGEN SATURATION: 93 % | TEMPERATURE: 98.8 F | SYSTOLIC BLOOD PRESSURE: 110 MMHG | WEIGHT: 140 LBS | BODY MASS INDEX: 23.9 KG/M2

## 2017-05-15 DIAGNOSIS — I10 ESSENTIAL HYPERTENSION: ICD-10-CM

## 2017-05-15 DIAGNOSIS — J40 BRONCHITIS: ICD-10-CM

## 2017-05-15 PROCEDURE — G8432 DEP SCR NOT DOC, RNG: HCPCS | Performed by: INTERNAL MEDICINE

## 2017-05-15 PROCEDURE — 99213 OFFICE O/P EST LOW 20 MIN: CPT | Performed by: INTERNAL MEDICINE

## 2017-05-15 PROCEDURE — 1101F PT FALLS ASSESS-DOCD LE1/YR: CPT | Performed by: INTERNAL MEDICINE

## 2017-05-15 PROCEDURE — G8420 CALC BMI NORM PARAMETERS: HCPCS | Performed by: INTERNAL MEDICINE

## 2017-05-15 PROCEDURE — 1036F TOBACCO NON-USER: CPT | Performed by: INTERNAL MEDICINE

## 2017-05-15 PROCEDURE — 4040F PNEUMOC VAC/ADMIN/RCVD: CPT | Performed by: INTERNAL MEDICINE

## 2017-05-15 RX ORDER — BENZONATATE 100 MG/1
100 CAPSULE ORAL 3 TIMES DAILY PRN
Qty: 45 CAP | Refills: 0 | Status: SHIPPED | OUTPATIENT
Start: 2017-05-15 | End: 2017-07-18

## 2017-05-15 NOTE — PROGRESS NOTES
Subjective:     Chief Complaint   Patient presents with   • Follow-Up     cough x 2 weeks with green phlem     Cecilia Alatorre is a 84 y.o. female here today for evaluation of a cough that she's had for the last 2 weeks.    Bronchitis  This patient reports that she has had a cough for the last 2 weeks. This was not preceded by an upper respiratory infection. She denies dyspnea or significant sputum production. She denies fevers. Aside from the cough, she feels well.    Essential hypertension  Her blood pressure is under quite good control. She denies lightheadedness or headaches.       Diagnoses of Bronchitis and Essential hypertension were pertinent to this visit.    Allergies: Codeine; Latex; Lisinopril; Soap; and Tape  Current medicines (including changes today)  Current Outpatient Prescriptions   Medication Sig Dispense Refill   • benzonatate (TESSALON) 100 MG Cap Take 1 Cap by mouth 3 times a day as needed for Cough. 45 Cap 0   • carvedilol (COREG) 3.125 MG Tab Take 1 Tab by mouth 2 times a day, with meals. 180 Tab 2   • meloxicam (MOBIC) 7.5 MG Tab Take 1 Tab by mouth every day. 90 Tab 1   • tramadol (ULTRAM) 50 MG Tab Take 50 mg by mouth every four hours as needed.     • trazodone (DESYREL) 50 MG Tab Take 1 Tab by mouth every bedtime. 90 Tab 0   • aspirin EC 81 MG EC tablet Take 1 Tab by mouth 2 Times a Day. 30 Tab    • oxycodone immediate-release (ROXICODONE) 5 MG Tab Take 1-2 Tabs by mouth every four hours as needed (pain).     • amlodipine (NORVASC) 2.5 MG Tab Take 1 Tab by mouth every day. 90 Tab 1   • atorvastatin (LIPITOR) 20 MG Tab Take 1 Tab by mouth every day. 90 Tab 1   • Dexlansoprazole 60 MG CAPSULE DELAYED RELEASE delayed-release capsule Take 1 Cap by mouth every morning before breakfast. Indications: Gastroesophageal Reflux Disease 90 Cap 1   • acetaminophen (TYLENOL) 500 MG TABS Take 1 Tab by mouth every 6 hours as needed for Mild Pain. 30 Tab 0   • Docusate Sodium (EQ STOOL SOFTENER PO)  "Take 1 Cap by mouth every bedtime.     • fluticasone (FLONASE) 50 MCG/ACT nasal spray Spray 2 Sprays in nose every bedtime. Indications: Hayfever       No current facility-administered medications for this visit.       She  has a past medical history of Indigestion; Hiatus hernia syndrome; Acid reflux; Psychiatric problem; CATARACT; Arthritis; Cancer (CMS-HCC); High cholesterol; Other specified disorder of intestines; Seasonal allergies; Hypertension; Dental disorder; Urinary bladder disorder; Intracerebral bleed (CMS-HCC) (12/16/2015); Pain (9/2016); Heart burn; Shoulder injury; and Urinary incontinence (2017).  Health Maintenance: We reviewed her outstanding health maintenance topics. She does not want any of them at this time and will discuss with Dr. Contreras.  ROS    Patient denies significant change in strength, weight or appetite.  No significant lightheadedness or headaches.  No change in vision, hearing, or swallowing.  No new dyspnea, coughing, chest pain, or palpitations except for the cough noted above.  No indigestion, abdominal pain, or change in bowel habits.  No change in urinating.  No new ankle swelling.       Objective:     PE:  /70 mmHg  Pulse 84  Temp(Src) 37.1 °C (98.8 °F)  Ht 1.626 m (5' 4.02\")  Wt 63.504 kg (140 lb)  BMI 24.02 kg/m2  SpO2 93%   Neck is supple without significant lymphadenopathy or masses. No percussion tenderness over the sinuses.  Lungs are clear with normal breath sounds without wheezes or rales .  Cardiovascular: peripheral circulation is satisfactory, heart sounds are unchanged and unremarkable.  Abdomen is soft, without masses or tenderness, with normal bowel sounds.  Extremities are without significant edema, cyanosis or deformity except for trace to 1+ ankle edema on the right which is not a new finding.      Assessment and Plan:   The following treatment plan was discussed  1. Bronchitis      She reports allergy to codeine. I thus ordered Tessalon pearls. " She will use Mucinex and remain well hydrated. She will report any lack of improvement.   2. Essential hypertension      Blood pressure is borderline low. She will report any significant lightheadedness. She was cautioned about getting up or turning quickly.       Followup: She will keep her next scheduled appointment with Dr. Contreras or contact us as needed sooner.

## 2017-05-15 NOTE — ASSESSMENT & PLAN NOTE
This patient reports that she has had a cough for the last 2 weeks. This was not preceded by an upper respiratory infection. She denies dyspnea or his significant sputum production. She denies fevers. Aside from the cough, she feels well.

## 2017-05-15 NOTE — MR AVS SNAPSHOT
"        Cecilia Alatorre   5/15/2017 10:00 AM   Office Visit   MRN: [id number]    Department:  [address] [place] [address]   Dept Phone:  [phone]    Description:  Female : 1933   Provider:  Ross Menchaca M.D.           Reason for Visit     Follow-Up cough x 2 weeks with green phlem      Allergies as of 5/15/2017     Allergen Noted Reactions    Codeine 2013       Mental confusion    Latex 2017       Only to adhesive bandaids and tape; rash, redness, itching    Lisinopril 2016   Cough    Soap 2015   Rash, Itching    Bathing and laundry soap cause itching and rash non scented soap OK    Tape 2013   Rash    Rash-Paper tape OK      You were diagnosed with     Bronchitis   [[id number]]         Vital Signs     Blood Pressure Pulse Temperature Height Weight Body Mass Index    110/70 mmHg 84 37.1 °C (98.8 °F) 1.626 m (5' 4.02\") 63.504 kg (140 lb) 24.02 kg/m2    Oxygen Saturation Smoking Status                93% Former Smoker          Basic Information     Date Of Birth Sex Race Ethnicity Preferred Language    1933 Female  or  Non- English      Your appointments     2017  2:20 PM   Established Patient with Luz Contreras M.D.   [place] [address] [address]    [address] [place] [address]  [facility] [address]   [phone]           You will be receiving a confirmation call a few days before your appointment from our automated call confirmation system.              Problem List              ICD-10-CM Priority Class Noted - Resolved    Lumbosacral spondylosis without myelopathy M47.817   2013 - Present    S/P lumbar fusion Z98.1   2013 - Present    Primary localized osteoarthrosis, pelvic region and thigh M16.10   3/2/2015 - Present    Essential hypertension I10   2016 - Present    H/O: stroke Z86.73   2016 - Present    Primary osteoarthritis involving multiple joints M15.0   2016 - Present    Depression F32.9   " 2/29/2016 - Present    Primary osteoarthritis of right knee M17.11   2/1/2017 - Present      Health Maintenance        Date Due Completion Dates    PAP SMEAR 2/6/1954 ---    BONE DENSITY 2/6/1998 ---    IMM PNEUMOCOCCAL 65+ (ADULT) LOW/MEDIUM RISK SERIES (2 of 2 - PCV13) 10/1/2011 10/1/2010    IMM DTaP/Tdap/Td Vaccine (2 - Td) 10/18/2016 10/18/2006            Current Immunizations     Hepatitis A Vaccine, Ped/Adol 5/8/2007, 10/10/2006    IPV 10/10/2006    Influenza TIV (IM) 10/1/2015, 11/2/2014, 10/10/2012    Pneumococcal polysaccharide vaccine (PPSV-23) 10/1/2010    SHINGLES VACCINE 6/28/2012    Tdap Vaccine 10/18/2006      Below and/or attached are the medications your provider expects you to take. Review all of your home medications and newly ordered medications with your provider and/or pharmacist. Follow medication instructions as directed by your provider and/or pharmacist. Please keep your medication list with you and share with your provider. Update the information when medications are discontinued, doses are changed, or new medications (including over-the-counter products) are added; and carry medication information at all times in the event of emergency situations     Allergies:  CODEINE - (reactions not documented)     LATEX - (reactions not documented)     LISINOPRIL - Cough     SOAP - Rash,Itching     TAPE - Rash               Medications  Valid as of: May 15, 2017 - 10:29 AM    Generic Name Brand Name Tablet Size Instructions for use    Acetaminophen (Tab) TYLENOL 500 MG Take 1 Tab by mouth every 6 hours as needed for Mild Pain.        AmLODIPine Besylate (Tab) NORVASC 2.5 MG Take 1 Tab by mouth every day.        Aspirin (Tablet Delayed Response) aspirin 81 MG Take 1 Tab by mouth 2 Times a Day.        Atorvastatin Calcium (Tab) LIPITOR 20 MG Take 1 Tab by mouth every day.        Benzonatate (Cap) TESSALON 100 MG Take 1 Cap by mouth 3 times a day as needed for Cough.        Carvedilol (Tab) COREG 3.125  MG Take 1 Tab by mouth 2 times a day, with meals.        Dexlansoprazole (CAPSULE DELAYED RELEASE) Dexlansoprazole 60 MG Take 1 Cap by mouth every morning before breakfast. Indications: Gastroesophageal Reflux Disease        Docusate Sodium   Take 1 Cap by mouth every bedtime.        Fluticasone Propionate (Suspension) FLONASE 50 MCG/ACT Spray 2 Sprays in nose every bedtime. Indications: Hayfever        Meloxicam (Tab) MOBIC 7.5 MG Take 1 Tab by mouth every day.        OxyCODONE HCl (Tab) ROXICODONE 5 MG Take 1-2 Tabs by mouth every four hours as needed (pain).        TraMADol HCl (Tab) ULTRAM 50 MG Take 50 mg by mouth every four hours as needed.        TraZODone HCl (Tab) DESYREL 50 MG Take 1 Tab by mouth every bedtime.        .                 Medicines prescribed today were sent to:     [facility] - [place], [place] - [address] AT [address] TO [place] [address] [address] [facility] [address]    Phone: [phone] Fax: [phone]    Open 24 Hours?: No    [facility] [address] [facility], [place] - [address] [facility]    [address] [facility] [address]    Phone: [phone] Fax: [phone]    Open 24 Hours?: No      Medication refill instructions:       If your prescription bottle indicates you have medication refills left, it is not necessary to call your provider’s office. Please contact your pharmacy and they will refill your medication.    If your prescription bottle indicates you do not have any refills left, you may request refills at any time through one of the following ways: The online [employer] system (except Urgent Care), by calling your provider’s office, or by asking your pharmacy to contact your provider’s office with a refill request. Medication refills are processed only during regular business hours and may not be available until the next business day. Your provider may request additional information or to have a follow-up visit with you prior to refilling your  medication.   *Please Note: Medication refills are assigned a new Rx number when refilled electronically. Your pharmacy may indicate that no refills were authorized even though a new prescription for the same medication is available at the pharmacy. Please request the medicine by name with the pharmacy before contacting your provider for a refill.           MyChart Status: Patient Declined

## 2017-05-24 DIAGNOSIS — I10 ESSENTIAL HYPERTENSION: ICD-10-CM

## 2017-05-24 DIAGNOSIS — F51.04 CHRONIC INSOMNIA: ICD-10-CM

## 2017-05-24 RX ORDER — AMLODIPINE BESYLATE 2.5 MG/1
2.5 TABLET ORAL
Qty: 90 TAB | Refills: 1 | Status: SHIPPED | OUTPATIENT
Start: 2017-05-24 | End: 2017-12-08 | Stop reason: SDUPTHER

## 2017-05-24 RX ORDER — TRAZODONE HYDROCHLORIDE 50 MG/1
50 TABLET ORAL
Qty: 90 TAB | Refills: 1 | Status: SHIPPED
Start: 2017-05-24 | End: 2017-11-13 | Stop reason: SDUPTHER

## 2017-07-18 ENCOUNTER — OFFICE VISIT (OUTPATIENT)
Dept: MEDICAL GROUP | Facility: MEDICAL CENTER | Age: 82
End: 2017-07-18
Payer: MEDICARE

## 2017-07-18 VITALS
DIASTOLIC BLOOD PRESSURE: 64 MMHG | HEIGHT: 63 IN | BODY MASS INDEX: 25.16 KG/M2 | WEIGHT: 142 LBS | HEART RATE: 82 BPM | OXYGEN SATURATION: 98 % | SYSTOLIC BLOOD PRESSURE: 110 MMHG | RESPIRATION RATE: 16 BRPM | TEMPERATURE: 99.1 F

## 2017-07-18 DIAGNOSIS — K21.9 GASTROESOPHAGEAL REFLUX DISEASE WITHOUT ESOPHAGITIS: ICD-10-CM

## 2017-07-18 DIAGNOSIS — Z00.00 HEALTH CARE MAINTENANCE: ICD-10-CM

## 2017-07-18 DIAGNOSIS — F51.04 CHRONIC INSOMNIA: ICD-10-CM

## 2017-07-18 DIAGNOSIS — E78.5 HYPERLIPIDEMIA, UNSPECIFIED HYPERLIPIDEMIA TYPE: ICD-10-CM

## 2017-07-18 DIAGNOSIS — I10 ESSENTIAL HYPERTENSION: ICD-10-CM

## 2017-07-18 DIAGNOSIS — F32.A DEPRESSION, UNSPECIFIED DEPRESSION TYPE: ICD-10-CM

## 2017-07-18 PROCEDURE — G0009 ADMIN PNEUMOCOCCAL VACCINE: HCPCS | Performed by: FAMILY MEDICINE

## 2017-07-18 PROCEDURE — 99214 OFFICE O/P EST MOD 30 MIN: CPT | Mod: 25 | Performed by: FAMILY MEDICINE

## 2017-07-18 PROCEDURE — 90670 PCV13 VACCINE IM: CPT | Performed by: FAMILY MEDICINE

## 2017-07-18 RX ORDER — BUPROPION HYDROCHLORIDE 100 MG/1
100 TABLET ORAL 2 TIMES DAILY
Qty: 60 TAB | Refills: 3 | Status: SHIPPED | OUTPATIENT
Start: 2017-07-18 | End: 2017-07-20 | Stop reason: SDUPTHER

## 2017-07-18 ASSESSMENT — PATIENT HEALTH QUESTIONNAIRE - PHQ9: CLINICAL INTERPRETATION OF PHQ2 SCORE: 0

## 2017-07-18 NOTE — PROGRESS NOTES
CC: Multiple medical issues/ fluctuating depression    HPI:   Cecilia presents today to discuss the following issues:    Essential hypertension, BP has been adequately controlled on current medication. Denies headache, chest pain, and SOB.he is on Amlodipine 2.5 mg daily, and Carvedilol 25 mg bid.No side effects.    Hyperlipidemia, she has been tolerating the statin. Denies muscle pain LFTs has been normal, currently on lipitor 20 mg daily.    GERD without esophagitis, has been asymptomatic , denies epigastric pain, and heart burn. She has been doing fine with Dex lansoprazole.    Chronic insomnia, he sleep has improved after she started the trazodone 50 mg nightly.Needs medication refill.    Depression, her mood has been fluctuating, patient could not tolerate the Paxil, and the Celexa in the past.    Due for PCV 13.      Patient Active Problem List    Diagnosis Date Noted   • Bronchitis 05/15/2017   • Primary osteoarthritis of right knee 02/01/2017   • Essential hypertension 02/29/2016   • H/O: stroke 02/29/2016   • Primary osteoarthritis involving multiple joints 02/29/2016   • Depression 02/29/2016   • Primary localized osteoarthrosis, pelvic region and thigh 03/02/2015   • S/P lumbar fusion 05/14/2013   • Lumbosacral spondylosis without myelopathy 05/09/2013       Current Outpatient Prescriptions   Medication Sig Dispense Refill   • buPROPion (WELLBUTRIN) 100 MG Tab Take 1 Tab by mouth 2 times a day. 60 Tab 3   • trazodone (DESYREL) 50 MG Tab Take 1 Tab by mouth every bedtime. 90 Tab 1   • amlodipine (NORVASC) 2.5 MG Tab Take 1 Tab by mouth every day. 90 Tab 1   • carvedilol (COREG) 3.125 MG Tab Take 1 Tab by mouth 2 times a day, with meals. 180 Tab 2   • tramadol (ULTRAM) 50 MG Tab Take 50 mg by mouth every four hours as needed.     • atorvastatin (LIPITOR) 20 MG Tab Take 1 Tab by mouth every day. 90 Tab 1   • Dexlansoprazole 60 MG CAPSULE DELAYED RELEASE delayed-release capsule Take 1 Cap by mouth every morning  "before breakfast. Indications: Gastroesophageal Reflux Disease 90 Cap 1   • acetaminophen (TYLENOL) 500 MG TABS Take 1 Tab by mouth every 6 hours as needed for Mild Pain. 30 Tab 0   • Docusate Sodium (EQ STOOL SOFTENER PO) Take 1 Cap by mouth every bedtime.     • fluticasone (FLONASE) 50 MCG/ACT nasal spray Spray 2 Sprays in nose every bedtime. Indications: Hayfever       No current facility-administered medications for this visit.         Allergies as of 07/18/2017 - Shashank as Reviewed 07/18/2017   Allergen Reaction Noted   • Codeine  02/01/2013   • Latex  01/19/2017   • Lisinopril Cough 02/29/2016   • Soap Rash and Itching 02/20/2015   • Tape Rash 02/01/2013        ROS: Denies any chest pain, Shortness of breath, Changes bowel or bladder, Lower extremity edema.    Physical Exam:  /64 mmHg  Pulse 82  Temp(Src) 37.3 °C (99.1 °F)  Resp 16  Ht 1.6 m (5' 2.99\")  Wt 64.411 kg (142 lb)  BMI 25.16 kg/m2  SpO2 98%  Gen.: Well-developed, well-nourished, no apparent distress,pleasant and cooperative with the examination  Skin:  Warm and dry with good turgor. No rashes or suspicious lesions in visible areas  HEENT:Sinuses nontender with palpation, TMs clear, nares patent with pink mucosa and clear rhinorrhea,no septal deviation ,polyps or lesions. lips without lesions, oropharynx clear.  Neck: Trachea midline,no masses or adenopathy. No JVD.  Cor: Regular rate and rhythm without murmur, gallop or rub.  Lungs: Respirations unlabored.Clear to auscultation with equal breath sounds bilaterally. No wheezes, rhonchi.  Extremities: No cyanosis, clubbing or edema.      Assessment and Plan.   84 y.o. female     1. Essential hypertension  Has been adequately controlled on current medication. Denies headache, chest pain, and SOB.  Continue on Amlodipine 2.5 mg daily, and Carvedilol 25 mg bid.    2. Hyperlipidemia, unspecified hyperlipidemia type  He has been tolerating the statin. Denies muscle pain LFTs has been " normal  Continue on lipitor 20 mg daily.    3. Gastroesophageal reflux disease without esophagitis  Asymptomatic has been doing fine with Dex lansoprazole.    4. Chronic insomnia  Doing fine with trazodone 50 mg nightly    5. Depression, unspecified depression type  Stable. No suicidal ideation.  Could not tolerate the Paxil, and the Celexa. Will try Wellbutrin 100 mg bid.    - buPROPion (WELLBUTRIN) 100 MG Tab; Take 1 Tab by mouth 2 times a day.  Dispense: 60 Tab; Refill: 3    6. Health care maintenance  Due for PCV 13.    - Prevnar 13 PCV-13

## 2017-07-18 NOTE — MR AVS SNAPSHOT
"        Cecilia Alatorre   2017 2:20 PM   Office Visit   MRN: [id number]    Department:  [address] [place] [address]   Dept Phone:  [phone]    Description:  Female : 1933   Provider:  Luz Contreras M.D.           Reason for Visit     Follow-Up           Allergies as of 2017     Allergen Noted Reactions    Codeine 2013       Mental confusion    Latex 2017       Only to adhesive bandaids and tape; rash, redness, itching    Lisinopril 2016   Cough    Soap 2015   Rash, Itching    Bathing and laundry soap cause itching and rash non scented soap OK    Tape 2013   Rash    Rash-Paper tape OK      You were diagnosed with     Essential hypertension   [3446765]       Hyperlipidemia, unspecified hyperlipidemia type   [8227399]       Gastroesophageal reflux disease without esophagitis   [746034]       Chronic insomnia   [922464]       Depression, unspecified depression type   [2607288]       Health care maintenance   [828583]         Vital Signs     Blood Pressure Pulse Temperature Respirations Height Weight    110/64 mmHg 82 37.3 °C (99.1 °F) 16 1.6 m (5' 2.99\") 64.411 kg (142 lb)    Body Mass Index Oxygen Saturation Smoking Status             25.16 kg/m2 98% Former Smoker         Basic Information     Date Of Birth Sex Race Ethnicity Preferred Language    1933 Female  or  Non- English      Your appointments     Oct 17, 2017  2:40 PM   Established Patient with Luz Contreras M.D.   [address] ([address])    [address] [place] [id number]   [phone]           You will be receiving a confirmation call a few days before your appointment from our automated call confirmation system.              Problem List              ICD-10-CM Priority Class Noted - Resolved    Lumbosacral spondylosis without myelopathy M47.817   2013 - Present    S/P lumbar fusion Z98.1   2013 - Present    Primary " localized osteoarthrosis, pelvic region and thigh M16.10   3/2/2015 - Present    Essential hypertension I10   2/29/2016 - Present    H/O: stroke Z86.73   2/29/2016 - Present    Primary osteoarthritis involving multiple joints M15.0   2/29/2016 - Present    Depression F32.9   2/29/2016 - Present    Primary osteoarthritis of right knee M17.11   2/1/2017 - Present    Bronchitis J40   5/15/2017 - Present      Health Maintenance        Date Due Completion Dates    COLON CANCER SCREENING ANNUAL FIT 2/6/1983 ---    BONE DENSITY 2/6/1998 ---    IMM DTaP/Tdap/Td Vaccine (2 - Td) 10/18/2016 10/18/2006    IMM INFLUENZA (1) 9/1/2017 10/1/2015, 11/2/2014, 10/10/2012            Current Immunizations     13-VALENT PCV PREVNAR 7/18/2017    Hepatitis A Vaccine, Ped/Adol 5/8/2007, 10/10/2006    IPV 10/10/2006    Influenza TIV (IM) 10/1/2015, 11/2/2014, 10/10/2012    Pneumococcal polysaccharide vaccine (PPSV-23) 10/1/2010    SHINGLES VACCINE 6/28/2012    Tdap Vaccine 10/18/2006      Below and/or attached are the medications your provider expects you to take. Review all of your home medications and newly ordered medications with your provider and/or pharmacist. Follow medication instructions as directed by your provider and/or pharmacist. Please keep your medication list with you and share with your provider. Update the information when medications are discontinued, doses are changed, or new medications (including over-the-counter products) are added; and carry medication information at all times in the event of emergency situations     Allergies:  CODEINE - (reactions not documented)     LATEX - (reactions not documented)     LISINOPRIL - Cough     SOAP - Rash,Itching     TAPE - Rash               Medications  Valid as of: July 18, 2017 -  2:48 PM    Generic Name Brand Name Tablet Size Instructions for use    Acetaminophen (Tab) TYLENOL 500 MG Take 1 Tab by mouth every 6 hours as needed for Mild Pain.        AmLODIPine Besylate (Tab)  NORVASC 2.5 MG Take 1 Tab by mouth every day.        Atorvastatin Calcium (Tab) LIPITOR 20 MG Take 1 Tab by mouth every day.        BuPROPion HCl (Tab) WELLBUTRIN 100 MG Take 1 Tab by mouth 2 times a day.        Carvedilol (Tab) COREG 3.125 MG Take 1 Tab by mouth 2 times a day, with meals.        Dexlansoprazole (CAPSULE DELAYED RELEASE) Dexlansoprazole 60 MG Take 1 Cap by mouth every morning before breakfast. Indications: Gastroesophageal Reflux Disease        Docusate Sodium   Take 1 Cap by mouth every bedtime.        Fluticasone Propionate (Suspension) FLONASE 50 MCG/ACT Spray 2 Sprays in nose every bedtime. Indications: Hayfever        TraMADol HCl (Tab) ULTRAM 50 MG Take 50 mg by mouth every four hours as needed.        TraZODone HCl (Tab) DESYREL 50 MG Take 1 Tab by mouth every bedtime.        .                 Medicines prescribed today were sent to:     [facility] - [place], [place] - [address] AT [address] TO [place] [address] [address] [facility] [address]    Phone: [phone] Fax: [phone]    Open 24 Hours?: No    [facility], [place] - [address] [facility]    [address] [facility] [address]    Phone: [phone] Fax: [phone]    Open 24 Hours?: No      Medication refill instructions:       If your prescription bottle indicates you have medication refills left, it is not necessary to call your provider’s office. Please contact your pharmacy and they will refill your medication.    If your prescription bottle indicates you do not have any refills left, you may request refills at any time through one of the following ways: The online [employer] system (except Urgent Care), by calling your provider’s office, or by asking your pharmacy to contact your provider’s office with a refill request. Medication refills are processed only during regular business hours and may not be available until the next business day. Your provider may request additional  information or to have a follow-up visit with you prior to refilling your medication.   *Please Note: Medication refills are assigned a new Rx number when refilled electronically. Your pharmacy may indicate that no refills were authorized even though a new prescription for the same medication is available at the pharmacy. Please request the medicine by name with the pharmacy before contacting your provider for a refill.           MyChart Status: Patient Declined

## 2017-07-20 DIAGNOSIS — F32.A DEPRESSION, UNSPECIFIED DEPRESSION TYPE: ICD-10-CM

## 2017-07-20 RX ORDER — BUPROPION HYDROCHLORIDE 100 MG/1
100 TABLET ORAL 2 TIMES DAILY
Qty: 180 TAB | Refills: 3 | Status: SHIPPED | OUTPATIENT
Start: 2017-07-20 | End: 2017-10-17

## 2017-07-24 ENCOUNTER — RX ONLY (OUTPATIENT)
Age: 82
Setting detail: RX ONLY
End: 2017-07-24

## 2017-07-24 PROBLEM — C44.311 BASAL CELL CARCINOMA OF SKIN OF NOSE: Status: ACTIVE | Noted: 2017-07-24

## 2017-08-10 ENCOUNTER — TELEPHONE (OUTPATIENT)
Dept: MEDICAL GROUP | Facility: MEDICAL CENTER | Age: 82
End: 2017-08-10

## 2017-08-10 NOTE — TELEPHONE ENCOUNTER
She has routine visit on 12/5/2016, and preoperative visit on 1/11/2017, so the one on 1/20/2017  could not be a routine visit .

## 2017-08-10 NOTE — TELEPHONE ENCOUNTER
1. Caller Name: Cecilia Alatorre                                           Call Back Number: [phone] (home)         Patient approves a detailed voicemail message: N\A    Patient said that she had a routine visit on 1[facility] and that the patients Medicare will not cover the visit, patient wanted to know if you would change the code? Please advise

## 2017-09-08 DIAGNOSIS — E78.5 HYPERLIPIDEMIA, UNSPECIFIED HYPERLIPIDEMIA TYPE: ICD-10-CM

## 2017-09-08 RX ORDER — ATORVASTATIN CALCIUM 20 MG/1
TABLET, FILM COATED ORAL
Qty: 90 TAB | Refills: 3 | Status: SHIPPED | OUTPATIENT
Start: 2017-09-08 | End: 2018-09-24 | Stop reason: SDUPTHER

## 2017-09-27 ENCOUNTER — APPOINTMENT (RX ONLY)
Dept: URBAN - METROPOLITAN AREA CLINIC 36 | Facility: CLINIC | Age: 82
Setting detail: DERMATOLOGY
End: 2017-09-27

## 2017-09-27 DIAGNOSIS — Z48.817 ENCOUNTER FOR SURGICAL AFTERCARE FOLLOWING SURGERY ON THE SKIN AND SUBCUTANEOUS TISSUE: ICD-10-CM

## 2017-09-27 DIAGNOSIS — K21.9 GERD WITHOUT ESOPHAGITIS: ICD-10-CM

## 2017-09-27 PROCEDURE — ? POST-OP WOUND CHECK

## 2017-09-27 PROCEDURE — ? DESTRUCTION: CO2 LASER

## 2017-09-27 PROCEDURE — 99024 POSTOP FOLLOW-UP VISIT: CPT

## 2017-09-27 PROCEDURE — ? INTRALESIONAL KENALOG

## 2017-09-27 RX ORDER — DEXLANSOPRAZOLE 60 MG/1
CAPSULE, DELAYED RELEASE ORAL
Qty: 90 CAP | Refills: 1 | Status: SHIPPED | OUTPATIENT
Start: 2017-09-27 | End: 2022-09-26 | Stop reason: SDUPTHER

## 2017-09-27 ASSESSMENT — LOCATION SIMPLE DESCRIPTION DERM
LOCATION SIMPLE: LEFT NOSE
LOCATION SIMPLE: NOSE

## 2017-09-27 ASSESSMENT — LOCATION ZONE DERM: LOCATION ZONE: NOSE

## 2017-09-27 ASSESSMENT — LOCATION DETAILED DESCRIPTION DERM
LOCATION DETAILED: NASAL DORSUM
LOCATION DETAILED: NASAL TIP
LOCATION DETAILED: LEFT NASAL SIDEWALL

## 2017-09-27 NOTE — PROCEDURE: INTRALESIONAL KENALOG
Total Volume (Ccs): 0.2
Kenalog Preparation: Kenalog
Medical Necessity Clause: This procedure was medically necessary because the lesions that were treated were:
Concentration Of Kenalog Solution Injected (Mg/Ml): 40.0
Consent: The risks of atrophy were reviewed with the patient.
Include Z78.9 (Other Specified Conditions Influencing Health Status) As An Associated Diagnosis?: No
X Size Of Lesion In Cm (Optional): 0
Detail Level: Detailed

## 2017-09-27 NOTE — PROCEDURE: DESTRUCTION: CO2 LASER
Feathering Statement: Following the treatment the wound edges were feathered using 260mJ.
Anesthesia Type: 1% lidocaine with epinephrine
Consent: Written consent obtained, risks reviewed including but not limited to crusting, scabbing, blistering, scarring, darker or lighter pigmentary change, incomplete improvement, infection and bleeding.
Power (Kelly-Leave At Zero If Unwanted): 18
Post-Care Statement: Following the procedure, vaseline and a bandage were applied.
Energy(Mj-Leave At Zero If Unwanted): 0
Laser Type: CO2 laser
External Cooling Fan Speed: 5
Was Feathering Performed?: No
Treatment Number: 1
Wavelength: 10,600nm
Spot Sizes: 5mm
Detail Level: Simple
Anesthesia Volume In Cc: 3
Post-Care Instructions: I reviewed with the patient in detail post-care instructions. Patient should avoid sun until the area is fully healed. Pt should apply vaseline to treated areas.

## 2017-09-27 NOTE — PROCEDURE: POST-OP WOUND CHECK
Add 20011 Cpt? (Important Note: In 2017 The Use Of 37857 Is Being Tracked By Cms To Determine Future Global Period Reimbursement For Global Periods): yes
Detail Level: Detailed

## 2017-10-11 ENCOUNTER — APPOINTMENT (RX ONLY)
Dept: URBAN - METROPOLITAN AREA CLINIC 20 | Facility: CLINIC | Age: 82
Setting detail: DERMATOLOGY
End: 2017-10-11

## 2017-10-11 DIAGNOSIS — Z85.828 PERSONAL HISTORY OF OTHER MALIGNANT NEOPLASM OF SKIN: ICD-10-CM

## 2017-10-11 DIAGNOSIS — D18.0 HEMANGIOMA: ICD-10-CM

## 2017-10-11 DIAGNOSIS — L57.8 OTHER SKIN CHANGES DUE TO CHRONIC EXPOSURE TO NONIONIZING RADIATION: ICD-10-CM

## 2017-10-11 DIAGNOSIS — L81.4 OTHER MELANIN HYPERPIGMENTATION: ICD-10-CM

## 2017-10-11 DIAGNOSIS — L82.1 OTHER SEBORRHEIC KERATOSIS: ICD-10-CM

## 2017-10-11 DIAGNOSIS — D22 MELANOCYTIC NEVI: ICD-10-CM

## 2017-10-11 DIAGNOSIS — L57.0 ACTINIC KERATOSIS: ICD-10-CM

## 2017-10-11 PROBLEM — D18.01 HEMANGIOMA OF SKIN AND SUBCUTANEOUS TISSUE: Status: ACTIVE | Noted: 2017-10-11

## 2017-10-11 PROBLEM — D22.5 MELANOCYTIC NEVI OF TRUNK: Status: ACTIVE | Noted: 2017-10-11

## 2017-10-11 PROCEDURE — 17000 DESTRUCT PREMALG LESION: CPT

## 2017-10-11 PROCEDURE — 99214 OFFICE O/P EST MOD 30 MIN: CPT | Mod: 25

## 2017-10-11 PROCEDURE — ? LIQUID NITROGEN

## 2017-10-11 PROCEDURE — ? COUNSELING

## 2017-10-11 ASSESSMENT — LOCATION DETAILED DESCRIPTION DERM
LOCATION DETAILED: NASAL DORSUM
LOCATION DETAILED: MIDDLE STERNUM
LOCATION DETAILED: RIGHT RADIAL DORSAL HAND
LOCATION DETAILED: RIGHT INFERIOR CENTRAL MALAR CHEEK
LOCATION DETAILED: SUPERIOR THORACIC SPINE
LOCATION DETAILED: RIGHT SUPERIOR UPPER BACK
LOCATION DETAILED: LEFT RADIAL DORSAL HAND
LOCATION DETAILED: RIGHT INFERIOR MEDIAL UPPER BACK
LOCATION DETAILED: RIGHT INFERIOR ANTERIOR NECK
LOCATION DETAILED: LEFT SUPERIOR UPPER BACK

## 2017-10-11 ASSESSMENT — LOCATION ZONE DERM
LOCATION ZONE: NOSE
LOCATION ZONE: TRUNK
LOCATION ZONE: FACE
LOCATION ZONE: NECK
LOCATION ZONE: HAND

## 2017-10-11 ASSESSMENT — LOCATION SIMPLE DESCRIPTION DERM
LOCATION SIMPLE: CHEST
LOCATION SIMPLE: RIGHT ANTERIOR NECK
LOCATION SIMPLE: RIGHT CHEEK
LOCATION SIMPLE: UPPER BACK
LOCATION SIMPLE: RIGHT HAND
LOCATION SIMPLE: LEFT UPPER BACK
LOCATION SIMPLE: LEFT HAND
LOCATION SIMPLE: RIGHT UPPER BACK
LOCATION SIMPLE: NOSE

## 2017-10-17 ENCOUNTER — OFFICE VISIT (OUTPATIENT)
Dept: MEDICAL GROUP | Facility: MEDICAL CENTER | Age: 82
End: 2017-10-17
Payer: MEDICARE

## 2017-10-17 VITALS
SYSTOLIC BLOOD PRESSURE: 122 MMHG | OXYGEN SATURATION: 100 % | RESPIRATION RATE: 16 BRPM | BODY MASS INDEX: 25.34 KG/M2 | WEIGHT: 143 LBS | DIASTOLIC BLOOD PRESSURE: 76 MMHG | HEIGHT: 63 IN | TEMPERATURE: 97.6 F | HEART RATE: 67 BPM

## 2017-10-17 DIAGNOSIS — Z23 NEEDS FLU SHOT: ICD-10-CM

## 2017-10-17 DIAGNOSIS — E78.5 HYPERLIPIDEMIA, UNSPECIFIED HYPERLIPIDEMIA TYPE: ICD-10-CM

## 2017-10-17 DIAGNOSIS — G89.29 CHRONIC THORACIC BACK PAIN, UNSPECIFIED BACK PAIN LATERALITY: ICD-10-CM

## 2017-10-17 DIAGNOSIS — M17.11 PRIMARY OSTEOARTHRITIS OF RIGHT KNEE: ICD-10-CM

## 2017-10-17 DIAGNOSIS — I10 ESSENTIAL HYPERTENSION: ICD-10-CM

## 2017-10-17 DIAGNOSIS — M54.6 CHRONIC THORACIC BACK PAIN, UNSPECIFIED BACK PAIN LATERALITY: ICD-10-CM

## 2017-10-17 PROCEDURE — 90662 IIV NO PRSV INCREASED AG IM: CPT | Performed by: FAMILY MEDICINE

## 2017-10-17 PROCEDURE — 99214 OFFICE O/P EST MOD 30 MIN: CPT | Mod: 25 | Performed by: FAMILY MEDICINE

## 2017-10-17 PROCEDURE — G0008 ADMIN INFLUENZA VIRUS VAC: HCPCS | Performed by: FAMILY MEDICINE

## 2017-10-17 RX ORDER — MELOXICAM 7.5 MG/1
7.5 TABLET ORAL DAILY
Qty: 90 TAB | Refills: 0 | Status: SHIPPED | OUTPATIENT
Start: 2017-10-17 | End: 2018-05-01 | Stop reason: SDUPTHER

## 2017-10-17 NOTE — PROGRESS NOTES
CC: Multiple medical issues.    HPI:   Cecilia presents today to discuss the following medical issues:    Essential hypertension, BP has been adequately controlled on current medication. Denies headache, chest pain, and SOB.Currently on amlodipine 2.5 mg daily.No side effects.    Patient has h/o chronic lower back pain,with h/o lumbar fusion. She has been doing fine on meloxicam.has been having a normal kidney function.    Primary osteoarthritis of right knee, her pain has been adequately controlled on the Meloxicam.    Hyperlipidemia, she has been tolerating the statin. Denies muscle pain LFTs has been normal, has been on lipitor 20 mg daily.    Due for flu shot.        Patient Active Problem List    Diagnosis Date Noted   • Bronchitis 05/15/2017   • Primary osteoarthritis of right knee 02/01/2017   • Essential hypertension 02/29/2016   • H/O: stroke 02/29/2016   • Primary osteoarthritis involving multiple joints 02/29/2016   • Depression 02/29/2016   • Primary localized osteoarthrosis, pelvic region and thigh 03/02/2015   • S/P lumbar fusion 05/14/2013       Current Outpatient Prescriptions   Medication Sig Dispense Refill   • meloxicam (MOBIC) 7.5 MG Tab Take 1 Tab by mouth every day. 90 Tab 0   • DEXILANT 60 MG CAPSULE DELAYED RELEASE delayed-release capsule TAKE 1 CAPSULE EVERY       MORNING BEFORE BREAKFAST   FOR GASTROESOPHAGEAL REFLUXDISEASE 90 Cap 1   • atorvastatin (LIPITOR) 20 MG Tab TAKE 1 TABLET DAILY 90 Tab 3   • trazodone (DESYREL) 50 MG Tab Take 1 Tab by mouth every bedtime. 90 Tab 1   • amlodipine (NORVASC) 2.5 MG Tab Take 1 Tab by mouth every day. 90 Tab 1   • carvedilol (COREG) 3.125 MG Tab Take 1 Tab by mouth 2 times a day, with meals. 180 Tab 2   • acetaminophen (TYLENOL) 500 MG TABS Take 1 Tab by mouth every 6 hours as needed for Mild Pain. 30 Tab 0   • Docusate Sodium (EQ STOOL SOFTENER PO) Take 1 Cap by mouth every bedtime.     • fluticasone (FLONASE) 50 MCG/ACT nasal spray Spray 2 Sprays in  "nose every bedtime. Indications: Hayfever       No current facility-administered medications for this visit.          Allergies as of 10/17/2017 - Reviewed 10/17/2017   Allergen Reaction Noted   • Codeine  02/01/2013   • Latex  01/19/2017   • Lisinopril Cough 02/29/2016   • Soap Rash and Itching 02/20/2015   • Tape Rash 02/01/2013        ROS: Denies any chest pain, Shortness of breath, Changes bowel or bladder, Lower extremity edema.    Physical Exam:  /76   Pulse 67   Temp 36.4 °C (97.6 °F)   Resp 16   Ht 1.6 m (5' 3\")   Wt 64.9 kg (143 lb)   SpO2 100%   BMI 25.33 kg/m²   Gen.: Well-developed, well-nourished, no apparent distress,pleasant and cooperative with the examination  Skin:  Warm and dry with good turgor. No rashes or suspicious lesions in visible areas  HEENT:Sinuses nontender with palpation, TMs clear, nares patent with pink mucosa and clear rhinorrhea,no septal deviation ,polyps or lesions. lips without lesions, oropharynx clear.  Neck: Trachea midline,no masses or adenopathy. No JVD.  Cor: Regular rate and rhythm without murmur, gallop or rub.  Lungs: Respirations unlabored.Clear to auscultation with equal breath sounds bilaterally. No wheezes, rhonchi.  Extremities: No cyanosis, clubbing or edema.        Assessment and Plan.   84 y.o. female     1. Essential hypertension  Has been adequately controlled on current medication. Denies headache, chest pain, and SOB.  Continue on amlodipine 2.5 mg daily.    2. Chronic thoracic back pain, unspecified back pain laterality  Has been doing fine on meloxicam.    - meloxicam (MOBIC) 7.5 MG Tab; Take 1 Tab by mouth every day.  Dispense: 90 Tab; Refill: 0    3. Primary osteoarthritis of right knee  Has been doing fine on Meloxicam.    - meloxicam (MOBIC) 7.5 MG Tab; Take 1 Tab by mouth every day.  Dispense: 90 Tab; Refill: 0    4. Hyperlipidemia, unspecified hyperlipidemia type  He has been tolerating the statin. Denies muscle pain LFTs has been " normal  Continue on lipitor 20 mg daily.    5. Needs flu shot  Due for flu shot.    - INFLUENZA VACCINE, HIGH DOSE (65+ ONLY)

## 2017-11-13 DIAGNOSIS — I10 ESSENTIAL HYPERTENSION: ICD-10-CM

## 2017-11-13 DIAGNOSIS — F51.04 CHRONIC INSOMNIA: ICD-10-CM

## 2017-11-13 RX ORDER — TRAZODONE HYDROCHLORIDE 50 MG/1
TABLET ORAL
Qty: 90 TAB | Refills: 1 | Status: SHIPPED | OUTPATIENT
Start: 2017-11-13 | End: 2018-06-27 | Stop reason: SDUPTHER

## 2017-11-13 RX ORDER — CARVEDILOL 3.12 MG/1
3.12 TABLET ORAL 2 TIMES DAILY WITH MEALS
Qty: 180 TAB | Refills: 2 | Status: SHIPPED | OUTPATIENT
Start: 2017-11-13 | End: 2018-09-25 | Stop reason: SDUPTHER

## 2017-12-08 DIAGNOSIS — I10 ESSENTIAL HYPERTENSION: ICD-10-CM

## 2017-12-08 RX ORDER — AMLODIPINE BESYLATE 2.5 MG/1
TABLET ORAL
Qty: 90 TAB | Refills: 1 | Status: SHIPPED | OUTPATIENT
Start: 2017-12-08 | End: 2018-12-11

## 2018-01-15 ENCOUNTER — PATIENT OUTREACH (OUTPATIENT)
Dept: HEALTH INFORMATION MANAGEMENT | Facility: OTHER | Age: 83
End: 2018-01-15

## 2018-01-15 DIAGNOSIS — E78.5 HYPERLIPIDEMIA: ICD-10-CM

## 2018-01-15 RX ORDER — ATORVASTATIN CALCIUM 20 MG/1
TABLET, FILM COATED ORAL
Qty: 90 TAB | Refills: 1 | Status: SHIPPED | OUTPATIENT
Start: 2018-01-15 | End: 2018-02-13

## 2018-01-15 NOTE — PROGRESS NOTES
1. Attempt #: 1    2. [facility] Verified: no    3. Verify PCP: yes    4. Care Team Updated:       •   DME Company (gait device, O2, CPAP, etc.): YES       •   Other Specialists (eye doctor, derm, GYN, cardiology, endo, etc): YES    5.  Reviewed/Updated the following with patient:       •   Communication Preference Obtained? YES       •   Preferred Pharmacy? YES       •   Preferred Lab? YES       •   Family History (document living status of immediate family members and if + hx of cancer, diabetes, hypertension, hyperlipidemia, heart attack, stroke) YES. Was Abstract Encounter opened and chart updated? YES    6. [employer] Activation: declined    7. [employer] Jose: no    8. Annual Wellness Visit Scheduling  Scheduling Status:Scheduled      9. Care Gap Scheduling (Attempt to Schedule EACH Overdue Care Gap!)     Health Maintenance Due   Topic Date Due   • Annual Wellness Visit  02/06/1933   • BONE DENSITY  02/06/1998   • IMM DTaP/Tdap/Td Vaccine (2 - Td) 10/18/2016        Scheduled patient for Annual Wellness Visit      10. Patient was advised: “This is a free wellness visit. The provider will screen for medical conditions to help you stay healthy. If you have other concerns to address you may be asked to discuss these at a separate visit or there may be an additional fee.”     11. Patient was informed to arrive 15 min prior to their scheduled appointment and bring in their medication bottles.

## 2018-02-07 ENCOUNTER — APPOINTMENT (RX ONLY)
Dept: URBAN - METROPOLITAN AREA CLINIC 20 | Facility: CLINIC | Age: 83
Setting detail: DERMATOLOGY
End: 2018-02-07

## 2018-02-07 PROBLEM — D48.5 NEOPLASM OF UNCERTAIN BEHAVIOR OF SKIN: Status: ACTIVE | Noted: 2018-02-07

## 2018-02-07 PROCEDURE — ? COUNSELING

## 2018-02-07 PROCEDURE — 11100: CPT

## 2018-02-07 PROCEDURE — ? BIOPSY BY SHAVE METHOD

## 2018-02-07 NOTE — PROCEDURE: BIOPSY BY SHAVE METHOD
Hemostasis: Drysol and Electrocautery
X Size Of Lesion In Cm: 0.4
Cryotherapy Text: The wound bed was treated with cryotherapy after the biopsy was performed.
Wound Care: Aquaphor
Lab: [id number]
Notification Instructions: Patient will be notified of biopsy results. However, patient instructed to call the office if not contacted within 2 weeks.
Type Of Destruction Used: Curettage
Detail Level: Detailed
Additional Anesthesia Volume In Cc (Will Not Render If 0): 0
Bill 11626 For Specimen Handling/Conveyance To Laboratory?: no
Consent: Written consent was obtained and risks were reviewed including but not limited to scarring, infection, bleeding, scabbing, incomplete removal, nerve damage and allergy to anesthesia.
Silver Nitrate Text: The wound bed was treated with silver nitrate after the biopsy was performed.
Electrodesiccation Text: The wound bed was treated with electrodesiccation after the biopsy was performed.
Anesthesia Volume In Cc: 0.2
Dressing: Band-Aid
Billing Type: Third-Party Bill
Biopsy Type: H and E
Size Of Lesion In Cm: 0.5
Lab Facility: 
Anesthesia Type: 1% lidocaine with 1:100,000 epinephrine and a 1:6 solution of 8.4% sodium bicarbonate
Post-Care Instructions: I reviewed with the patient in detail post-care instructions. Patient is to keep the biopsy site dry overnight, and then apply bacitracin twice daily until healed. Patient may apply hydrogen peroxide soaks to remove any crusting.
Biopsy Method: Personna blade
Curettage Text: The wound bed was treated with curettage after the biopsy was performed.
Electrodesiccation And Curettage Text: The wound bed was treated with electrodesiccation and curettage after the biopsy was performed.

## 2018-02-13 ENCOUNTER — OFFICE VISIT (OUTPATIENT)
Dept: MEDICAL GROUP | Facility: MEDICAL CENTER | Age: 83
End: 2018-02-13
Payer: MEDICARE

## 2018-02-13 VITALS
SYSTOLIC BLOOD PRESSURE: 120 MMHG | RESPIRATION RATE: 16 BRPM | OXYGEN SATURATION: 94 % | BODY MASS INDEX: 26.05 KG/M2 | HEART RATE: 88 BPM | DIASTOLIC BLOOD PRESSURE: 80 MMHG | WEIGHT: 147 LBS | HEIGHT: 63 IN | TEMPERATURE: 99.9 F

## 2018-02-13 DIAGNOSIS — F51.04 CHRONIC INSOMNIA: ICD-10-CM

## 2018-02-13 DIAGNOSIS — Z86.73 H/O: STROKE: ICD-10-CM

## 2018-02-13 DIAGNOSIS — I10 ESSENTIAL HYPERTENSION: ICD-10-CM

## 2018-02-13 DIAGNOSIS — Z00.00 MEDICARE ANNUAL WELLNESS VISIT, INITIAL: ICD-10-CM

## 2018-02-13 DIAGNOSIS — Z00.00 HEALTHCARE MAINTENANCE: ICD-10-CM

## 2018-02-13 DIAGNOSIS — K21.9 GASTROESOPHAGEAL REFLUX DISEASE WITHOUT ESOPHAGITIS: ICD-10-CM

## 2018-02-13 DIAGNOSIS — M15.9 PRIMARY OSTEOARTHRITIS INVOLVING MULTIPLE JOINTS: ICD-10-CM

## 2018-02-13 DIAGNOSIS — F32.1 MODERATE SINGLE CURRENT EPISODE OF MAJOR DEPRESSIVE DISORDER (HCC): ICD-10-CM

## 2018-02-13 PROCEDURE — G0439 PPPS, SUBSEQ VISIT: HCPCS | Mod: 25 | Performed by: FAMILY MEDICINE

## 2018-02-13 PROCEDURE — 99999 PR NO CHARGE: CPT | Performed by: FAMILY MEDICINE

## 2018-02-13 RX ORDER — CHLORAL HYDRATE 500 MG
CAPSULE ORAL
COMMUNITY
End: 2023-07-28

## 2018-02-13 RX ORDER — CIDER VINEGAR 300 MG
TABLET ORAL
COMMUNITY
End: 2023-04-17

## 2018-02-13 RX ORDER — PYRIDOXINE HCL (VITAMIN B6) 100 MG
TABLET ORAL
COMMUNITY
End: 2023-07-28

## 2018-02-13 RX ORDER — BIOTIN 10000 MCG
CAPSULE ORAL
COMMUNITY
End: 2023-07-28

## 2018-02-13 RX ORDER — IBUPROFEN 200 MG
950 CAPSULE ORAL DAILY
COMMUNITY
End: 2023-04-17

## 2018-02-13 RX ORDER — VITS A,C,E/LUTEIN/MINERALS 300MCG-200
1 TABLET ORAL DAILY
COMMUNITY
End: 2023-07-28

## 2018-02-13 ASSESSMENT — PATIENT HEALTH QUESTIONNAIRE - PHQ9
5. POOR APPETITE OR OVEREATING: 0
9. THOUGHTS THAT YOU WOULD BE BETTER OFF DEAD, OR OF HURTING YOURSELF: 0
5. POOR APPETITE OR OVEREATING: 0 - NOT AT ALL
SUM OF ALL RESPONSES TO PHQ9 QUESTIONS 1 AND 2: 4
SUM OF ALL RESPONSES TO PHQ QUESTIONS 1-9: 8
SUM OF ALL RESPONSES TO PHQ QUESTIONS 1-9: 8
1. LITTLE INTEREST OR PLEASURE IN DOING THINGS: 2
3. TROUBLE FALLING OR STAYING ASLEEP OR SLEEPING TOO MUCH: 1
6. FEELING BAD ABOUT YOURSELF - OR THAT YOU ARE A FAILURE OR HAVE LET YOURSELF OR YOUR FAMILY DOWN: 0
4. FEELING TIRED OR HAVING LITTLE ENERGY: 2
2. FEELING DOWN, DEPRESSED, IRRITABLE, OR HOPELESS: 2
8. MOVING OR SPEAKING SO SLOWLY THAT OTHER PEOPLE COULD HAVE NOTICED. OR THE OPPOSITE, BEING SO FIGETY OR RESTLESS THAT YOU HAVE BEEN MOVING AROUND A LOT MORE THAN USUAL: 0
CLINICAL INTERPRETATION OF PHQ2 SCORE: 4
7. TROUBLE CONCENTRATING ON THINGS, SUCH AS READING THE NEWSPAPER OR WATCHING TELEVISION: 1

## 2018-02-13 ASSESSMENT — ACTIVITIES OF DAILY LIVING (ADL): BATHING_REQUIRES_ASSISTANCE: 0

## 2018-02-13 NOTE — PROGRESS NOTES
Chief Complaint   Patient presents with   • Annual Exam     hc         HPI:  Cecilia Alatorre is a 85 y.o. here for Medicare Annual Wellness Visit / tiredness due to slightly depressed mood.    Medicare annual wellness visit, initial  Preventive and chronic medical issues were reviewed.    Major depressive disorder    PHQ-9 score is 8. Patient stated that her mood has been fluctuating, lately feels more depressed, and tired but she denies any  suicidal ideation.She stated that she has not been taking her Wellbutrin 100 mg bid.    Essential hypertension  Has been adequately controlled on current medication. Denies headache, chest pain, and SOB.has been on Amlodipine 2.5 mg daily, and Carvedilol 25 mg bid.    H/O: stroke( 12/2015)  Has been asymptomatic, no residual or new symptoms.    Primary osteoarthritis involving multiple joints  Her joint pain has been adequately controlled on Meloxicam 7.5 mg daily, discussed side effect of the medication advised to be taken as needed, alternating with tylenol.    Gastroesophageal reflux disease without esophagitiss   Asymptomatic, denies epigastric pain, heart burn, nausea an vomiting.  She has been doing fine with Dex lansoprazole.    Hyperlipidemia, unspecified hyperlipidemia type  He has been tolerating the statin. Denies muscle pain LFTs has been normal, has been on lipitor 20 mg daily.     Chronic insomnia  She has been doing fine with trazodone 50 mg nightly     Pneumonia , and flu vaccines are  UTD.    Patient Active Problem List    Diagnosis Date Noted   • Bronchitis 05/15/2017   • Primary osteoarthritis of right knee 02/01/2017   • Essential hypertension 02/29/2016   • H/O: stroke 02/29/2016   • Primary osteoarthritis involving multiple joints 02/29/2016   • Depression 02/29/2016   • Primary localized osteoarthrosis, pelvic region and thigh 03/02/2015   • S/P lumbar fusion 05/14/2013       Current Outpatient Prescriptions   Medication Sig Dispense Refill   •  calcium citrate (CALCITRATE) 950 MG Tab Take 950 mg by mouth every day.     • Cholecalciferol (VITAMIN D3) 1000 units Cap Take  by mouth.     • Probiotic Product (PROBIOTIC-10 PO) Take  by mouth.     • Colostrum 500 MG Cap Take  by mouth.     • Omega-3 1000 MG Cap Take  by mouth.     • Pyridoxine HCl (B-6) 100 MG Tab Take  by mouth.     • Biotin 10 MG Cap Take  by mouth.     • coenzyme Q-10 30 MG capsule Take 60 mg by mouth every day.     • Multiple Vitamins-Minerals (OCUVITE-LUTEIN) Tab Take 1 tablet by mouth every day.     • doxylamine (UNISOM) 25 MG Tab tablet Take 25 mg by mouth every bedtime.     • amlodipine (NORVASC) 2.5 MG Tab TAKE 1 TABLET DAILY 90 Tab 1   • trazodone (DESYREL) 50 MG Tab TAKE 1 TABLET AT BEDTIME. 90 Tab 1   • carvedilol (COREG) 3.125 MG Tab Take 1 Tab by mouth 2 times a day, with meals. 180 Tab 2   • meloxicam (MOBIC) 7.5 MG Tab Take 1 Tab by mouth every day. 90 Tab 0   • DEXILANT 60 MG CAPSULE DELAYED RELEASE delayed-release capsule TAKE 1 CAPSULE EVERY       MORNING BEFORE BREAKFAST   FOR GASTROESOPHAGEAL REFLUXDISEASE 90 Cap 1   • atorvastatin (LIPITOR) 20 MG Tab TAKE 1 TABLET DAILY 90 Tab 3   • acetaminophen (TYLENOL) 500 MG TABS Take 1 Tab by mouth every 6 hours as needed for Mild Pain. 30 Tab 0     No current facility-administered medications for this visit.             Current supplements as per medication list.       Allergies: Codeine; Latex; Lisinopril; Soap; and Tape    Current social contact/activities:      She  reports that she quit smoking about 46 years ago. Her smoking use included Cigarettes. She has a 17.00 pack-year smoking history. She has never used smokeless tobacco. She reports that she drinks alcohol. She reports that she does not use drugs.  Counseling given: Not Answered        DPA/Advanced Directive:        ROS:    Gait:normal giat  Ostomy:  no  Other tubes:  no  Amputations:no    Chronic oxygen use:  no  Last eye exam:   : no incontinence.        Screening:    Depression Screening    Little interest or pleasure in doing things?  2  Feeling down, depressed , or hopeless? 2  Trouble falling or staying asleep, or sleeping too much?  0  Feeling tired or having little energy?  2  Poor appetite or overeating?  0   Feeling bad about yourself - or that you are a failure or have let yourself or your family down? 0 - not at all  Trouble concentrating on things, such as reading the newspaper or watching television? 2  Moving or speaking so slowly that other people could have noticed.  Or the opposite - being so fidgety or restless that you have been moving around a lot more than usual?  0 - not at all  Thoughts that you would be better off dead, or of hurting yourself?  0 - not at all  Patient Health Questionnaire Score: 8    If depressive symptoms identified deferred to follow up visit unless specifically addressed in assessment and plan.    Interpretation of PHQ-9 Total Score   Score Severity   1-4 No Depression   5-9 Mild Depression   10-14 Moderate Depression   15-19 Moderately Severe Depression   20-27 Severe Depression      Screening for Cognitive Impairment    Three Minute Recall (apple, watch, yeimi)  3/3    Draw clock face with all 12 numbers set to the hand to show 10 minutes past 11 o'clock  1    Cognitive concerns identified deferred for follow up unless specifically addressed in assessment and plan.    Fall Risk Assessment    Has the patient had two or more falls in the last year or any fall with injury in the last year?  No    Safety Assessment    Throw rugs on floor.  No  Handrails on all stairs.  Yes  Good lighting in all hallways.  Yes  Difficulty hearing.  No  Patient counseled about all safety risks that were identified.    Functional Assessment ADLs    Are there any barriers preventing you from cooking for yourself or meeting nutritional needs?  No.    Are there any barriers preventing you from driving safely or obtaining transportation?  No.     Are there any barriers preventing you from using a telephone or calling for help?  No.    Are there any barriers preventing you from shopping?  No.    Are there any barriers preventing you from taking care of your own finances?  No.    Are there any barriers preventing you from managing your medications?  No.    Are there any barriers preventing you from showering/bathing yourself?  No.    Are currently engaging any exercise or physical activity?  No.       Health Maintenance Summary                Annual Wellness Visit Overdue 2/6/1933     BONE DENSITY Overdue 2/6/1998     IMM DTaP/Tdap/Td Vaccine Overdue 10/18/2016      Done 10/18/2006 Imm Admin: Tdap Vaccine          Patient Care Team:  Luz Contreras M.D. as PCP - General (Geriatrics)      Social History   Substance Use Topics   • Smoking status: Former Smoker     Packs/day: 1.00     Years: 17.00     Types: Cigarettes     Quit date: 1/1/1972   • Smokeless tobacco: Never Used      Comment: Started smoking at age 22   • Alcohol use Yes      Comment: 0-1 per month     Family History   Problem Relation Age of Onset   • Stroke Father    • Cancer Mother      She  has a past medical history of Acid reflux; Arthritis; Cancer (CMS-[facility]); CATARACT; Dental disorder; Heart burn; Hiatus hernia syndrome; High cholesterol; Hypertension; Indigestion; Intracerebral bleed (CMS-HCC) (12/16/2015); Other specified disorder of intestines; Pain (9/2016); Psychiatric problem; Seasonal allergies; Shoulder injury; Urinary bladder disorder; and Urinary incontinence (2017).   Past Surgical History:   Procedure Laterality Date   • KNEE ARTHROPLASTY TOTAL Right 2/1/2017    Procedure: KNEE ARTHROPLASTY TOTAL;  Surgeon: Aaron Burton M.D.;  Location: [facility];  Service:    • HIP ARTHROPLASTY TOTAL  3/2/2015    Performed by Aaron Burton M.D. at [facility]   • LUMBAR FUSION POSTERIOR  5/9/2013    Performed by Sancho Rosales M.D. at [facility]   •  "LUMBAR DECOMPRESSION  5/9/2013    Performed by Sancho Rosales M.D. at [facility]   • OTHER ORTHOPEDIC SURGERY  2007    knee right   • OTHER  2004    face lift , tummy tuck   • OTHER ORTHOPEDIC SURGERY  1995    carpal tunnel, right   • OTHER  1948    tonsillectomy   • APPENDECTOMY         Exam:     Blood pressure 120/80, pulse 88, temperature 37.7 °C (99.9 °F), resp. rate 16, height 1.6 m (5' 3\"), weight 66.7 kg (147 lb), SpO2 94 %. Body mass index is 26.04 kg/m².    Hearing, good  Dentition , good  Alert, oriented in no acute distress.  Eye contact is good, speech goal directed, affect calm      Assessment and Plan. The following treatment and monitoring plan is recommended:    85 y.o. female     1. Medicare annual wellness visit, initial  Preventive and chronic medical issues were reviewed.    - Initial Wellness Visit - Includes PPPS ()    2. Essential hypertension  Has been adequately controlled on current medication. Denies headache, chest pain, and SOB.  Continue on Amlodipine 2.5 mg daily, and Carvedilol 25 mg bid.     - Initial Wellness Visit - Includes PPPS ()    3. H/O: stroke( 12/2015)  Stable , asymptomatic, no residual or new symptoms.  - Initial Wellness Visit - Includes PPPS ()    4.Moderate single current episode of major depressive disorder (CMS-[facility])  PHQ-9 score is 8  Has been fluctuating, lately feels more depressed, and tired but she denies any  suicidal ideation.  She stated that she has not been taking her Wellbutrin 100 mg bid.  Patient advised to go back to the Wellbutrin.      5. Primary osteoarthritis involving multiple joints  Has been Meloxicam 7.5 mg as needed.    - Initial Wellness Visit - Includes PPPS ()    6. Gastroesophageal reflux disease without esophagitiss   Asymptomatic has been doing fine with Dex lansoprazole.     - Initial Wellness Visit - Includes PPPS ()    7. Healthcare maintenance  Pneumonia vaccine is UTD.  - Initial Wellness Visit - " Includes PPPS ()    8. Hyperlipidemia, unspecified hyperlipidemia type  He has been tolerating the statin. Denies muscle pain LFTs has been normal  Continue on lipitor 20 mg daily.     - Initial Wellness Visit - Includes PPPS ()    9. Chronic insomnia  Doing fine with trazodone 50 mg nightly      - Initial Wellness Visit - Includes PPPS ()    Services suggested:   Health Care Screening: Age-appropriate preventive services Medicare covers discussed today and ordered if indicated.  Referrals offered: Community-based lifestyle interventions to reduce health risks and promote self-management and wellness, fall prevention, nutrition, physical activity, tobacco-use cessation, weight loss, and mental health services as per orders if indicated.    Discussion today about general wellness and lifestyle habits:    · Prevent falls and reduce trip hazards; Cautioned about securing or removing rugs.  · Have a working fire alarm and carbon monoxide detector;   · Engage in regular physical activity and social activities       Follow-up: No Follow-up on file.

## 2018-02-14 ENCOUNTER — HOSPITAL ENCOUNTER (OUTPATIENT)
Facility: MEDICAL CENTER | Age: 83
End: 2018-02-14
Attending: FAMILY MEDICINE
Payer: MEDICARE

## 2018-02-14 PROCEDURE — 82274 ASSAY TEST FOR BLOOD FECAL: CPT | Mod: GY

## 2018-02-15 DIAGNOSIS — Z00.00 HEALTHCARE MAINTENANCE: ICD-10-CM

## 2018-02-16 LAB — HEMOCCULT STL QL IA: NEGATIVE

## 2018-02-20 ENCOUNTER — TELEPHONE (OUTPATIENT)
Dept: MEDICAL GROUP | Facility: MEDICAL CENTER | Age: 83
End: 2018-02-20

## 2018-02-20 DIAGNOSIS — E78.2 MIXED HYPERLIPIDEMIA: ICD-10-CM

## 2018-02-20 DIAGNOSIS — I10 ESSENTIAL HYPERTENSION: ICD-10-CM

## 2018-02-20 NOTE — TELEPHONE ENCOUNTER
1. Caller Name: Cecilia Alatorre                                         Call Back Number: [phone] (home)       Patient approves a detailed voicemail message: yes    Pt would like labs done, would like to have them mailed to her house.

## 2018-02-21 NOTE — TELEPHONE ENCOUNTER
Order placed.  She can just go to the lab  And do it. Does not need orders ( unless she goes [facility]). However orders will be sent to her.

## 2018-02-27 ENCOUNTER — APPOINTMENT (RX ONLY)
Dept: URBAN - METROPOLITAN AREA CLINIC 20 | Facility: CLINIC | Age: 83
Setting detail: DERMATOLOGY
End: 2018-02-27

## 2018-02-27 PROBLEM — D04.9 CARCINOMA IN SITU OF SKIN, UNSPECIFIED: Status: ACTIVE | Noted: 2018-02-27

## 2018-02-27 PROCEDURE — ? MOHS SURGERY PHONE CONSULTATION

## 2018-02-27 NOTE — PROCEDURE: MOHS SURGERY PHONE CONSULTATION
Referring Provider: Melanie Rueda
Has The Patient Ever Been Seen In Our Practice For Mohs Surgery Before?: Yes
Office Location Of Mohs Surgery: [address]
Patient's Insurance: [facility] / BCBS
Date Of Mohs Surgery: 03/20/2018
If Yes- What Is The Patient's Pharmacy Number?: Pt already has rx.
Has The Patient Ever Received A Transplant?: No
If Yes- Additional Details: Rt Knee / Rt Hip
If Yes- Details?: Dec 2015
Patient Reported Location: [facility]
Time Of Mohs Surgery: 8:40 am
Detail Level: Detailed

## 2018-03-20 ENCOUNTER — APPOINTMENT (RX ONLY)
Dept: URBAN - METROPOLITAN AREA CLINIC 36 | Facility: CLINIC | Age: 83
Setting detail: DERMATOLOGY
End: 2018-03-20

## 2018-03-20 DIAGNOSIS — L57.8 OTHER SKIN CHANGES DUE TO CHRONIC EXPOSURE TO NONIONIZING RADIATION: ICD-10-CM

## 2018-03-20 PROBLEM — C44.329 SQUAMOUS CELL CARCINOMA OF SKIN OF OTHER PARTS OF FACE: Status: ACTIVE | Noted: 2018-03-20

## 2018-03-20 PROCEDURE — ? MOHS SURGERY

## 2018-03-20 PROCEDURE — 99212 OFFICE O/P EST SF 10 MIN: CPT | Mod: 25

## 2018-03-20 PROCEDURE — 13132 CMPLX RPR F/C/C/M/N/AX/G/H/F: CPT

## 2018-03-20 PROCEDURE — ? COUNSELING

## 2018-03-20 PROCEDURE — 17311 MOHS 1 STAGE H/N/HF/G: CPT

## 2018-03-20 PROCEDURE — 17312 MOHS ADDL STAGE: CPT

## 2018-03-20 NOTE — PROCEDURE: MOHS SURGERY
Surgical Defect Width In Cm (Optional): 1
Epidermal Closure: running cuticular
Suture Removal: 7 days
Quadrants Positive?: 0
Island Pedicle Flap-Requiring Vessel Identification Text: The defect edges were debeveled with a #15 scalpel blade.  Given the location of the defect, shape of the defect and the proximity to free margins an island pedicle advancement flap was deemed most appropriate.  Using a sterile surgical marker, an appropriate advancement flap was drawn, based on the axial vessel mentioned above, incorporating the defect, outlining the appropriate donor tissue and placing the expected incisions within the relaxed skin tension lines where possible.    The area thus outlined was incised deep to adipose tissue with a #15 scalpel blade.  The skin margins were undermined to an appropriate distance in all directions around the primary defect and laterally outward around the island pedicle utilizing iris scissors.  There was minimal undermining beneath the pedicle flap.
Partial Purse String (Intermediate) Text: Given the location of the defect and the characteristics of the surrounding skin an intermediate purse string closure was deemed most appropriate.  Undermining was performed circumfirentially around the surgical defect.  A purse string suture was then placed and tightened. Wound tension only allowed a partial closure of the circular defect.
Stage 2: Additional Anesthesia Type: 1% lidocaine with 1:100,000 epinephrine and 408mcg clindamycin/ml and a 1:10 solution of 8.4% sodium bicarbonate
Cartilage Graft Text: The defect edges were debeveled with a #15 scalpel blade.  Given the location of the defect, shape of the defect, the fact the defect involved a full thickness cartilage defect a cartilage graft was deemed most appropriate.  An appropriate donor site was identified, cleansed, and anesthetized. The cartilage graft was then harvested and transferred to the recipient site, oriented appropriately and then sutured into place.  The secondary defect was then repaired using a primary closure.
Dressing (No Sutures): dry sterile dressing
Helical Rim Advancement Flap Text: The defect edges were debeveled with a #15 blade scalpel.  Given the location of the defect and the proximity to free margins (helical rim) a double helical rim advancement flap was deemed most appropriate.  Using a sterile surgical marker, the appropriate advancement flaps were drawn incorporating the defect and placing the expected incisions between the helical rim and antihelix where possible.  The area thus outlined was incised through and through with a #15 scalpel blade.  With a skin hook and iris scissors, the flaps were gently and sharply undermined and freed up.
Subsequent Stages Histo Method Verbiage: Using a similar technique to that described above, a thin layer of tissue was removed from all areas where tumor was visible on the previous stage.  The tissue was again oriented, mapped, dyed, and processed as above.
Consent (Spinal Accessory)/Introductory Paragraph: The rationale for Mohs was explained to the patient and consent was obtained. The risks, benefits and alternatives to therapy were discussed in detail. Specifically, the risks of damage to the spinal accessory nerve, infection, scarring, bleeding, prolonged wound healing, incomplete removal, allergy to anesthesia, and recurrence were addressed. Prior to the procedure, the treatment site was clearly identified and confirmed by the patient. All components of Universal Protocol/PAUSE Rule completed.
Surgeon/Pathologist Verbiage (Will Incorporate Name Of Surgeon From Intro If Not Blank): operated in two distinct and integrated capacities as the surgeon and pathologist.
Burow's Advancement Flap Text: The defect edges were debeveled with a #15 scalpel blade.  Given the location of the defect and the proximity to free margins a Burow's advancement flap was deemed most appropriate.  Using a sterile surgical marker, the appropriate advancement flap was drawn incorporating the defect and placing the expected incisions within the relaxed skin tension lines where possible.    The area thus outlined was incised deep to adipose tissue with a #15 scalpel blade.  The skin margins were undermined to an appropriate distance in all directions utilizing iris scissors.
Hatchet Flap Text: The defect edges were debeveled with a #15 scalpel blade.  Given the location of the defect, shape of the defect and the proximity to free margins a hatchet flap based from the glabella was deemed most appropriate.  Using a sterile surgical marker, an appropriate glabellar hatchet flap was drawn incorporating the defect and placing the expected incisions within the relaxed skin tension lines where possible.    The area thus outlined was incised deep to adipose tissue with a #15 scalpel blade.  The skin margins were undermined to an appropriate distance in all directions utilizing iris scissors.
Wound Care: Aquaphor
O-L Flap Text: The defect edges were debeveled with a #15 scalpel blade.  Given the location of the defect, shape of the defect and the proximity to free margins an O-L flap was deemed most appropriate.  Using a sterile surgical marker, an appropriate advancement flap was drawn incorporating the defect and placing the expected incisions within the relaxed skin tension lines where possible.    The area thus outlined was incised deep to adipose tissue with a #15 scalpel blade.  The skin margins were undermined to an appropriate distance in all directions utilizing iris scissors.
Quadrant Reporting?: no
Unique Flap 3 Text: The defect edges were debeveled with a #15 scalpel blade.  Given the location of the defect, shape of the defect and the proximity to free margins a Mercedes (double advancement flap) was deemed most appropriate.  Using a sterile surgical marker, the appropriate transposition flaps were drawn incorporating the defect and placing the expected incisions within the relaxed skin tension lines where possible.    The area thus outlined was incised deep to adipose tissue with a #15 scalpel blade.  The skin margins were undermined to an appropriate distance in all directions utilizing iris scissors.  Hemostasis was achieved with electrocautery.  The flaps were then advanced into the defect and anchored with interrupted buried subcutaneous sutures.
Number Of Stages: 3
Simple / Intermediate / Complex Repair - Final Wound Length In Cm: 2.9
Posterior Auricular Interpolation Flap Text: A decision was made to reconstruct the defect utilizing an interpolation axial flap and a staged reconstruction.  A telfa template was made of the defect.  This telfa template was then used to outline the posterior auricular interpolation flap.  The donor area for the pedicle flap was then injected with anesthesia.  The flap was excised through the skin and subcutaneous tissue down to the layer of the underlying musculature.  The pedicle flap was carefully excised within this deep plane to maintain its blood supply.  The edges of the donor site were undermined.   The donor site was closed in a primary fashion.  The pedicle was then rotated into position and sutured.  Once the tube was sutured into place, adequate blood supply was confirmed with blanching and refill.  The pedicle was then wrapped with xeroform gauze and dressed appropriately with a telfa and gauze bandage to ensure continued blood supply and protect the attached pedicle.
Double Island Pedicle Flap Text: The defect edges were debeveled with a #15 scalpel blade.  Given the location of the defect, shape of the defect and the proximity to free margins a double island pedicle advancement flap was deemed most appropriate.  Using a sterile surgical marker, an appropriate advancement flap was drawn incorporating the defect, outlining the appropriate donor tissue and placing the expected incisions within the relaxed skin tension lines where possible.    The area thus outlined was incised deep to adipose tissue with a #15 scalpel blade.  The skin margins were undermined to an appropriate distance in all directions around the primary defect and laterally outward around the island pedicle utilizing iris scissors.  There was minimal undermining beneath the pedicle flap.
Show Asc Variables: Yes
Consent (Scalp)/Introductory Paragraph: The rationale for Mohs was explained to the patient and consent was obtained. The risks, benefits and alternatives to therapy were discussed in detail. Specifically, the risks of changes in hair growth pattern secondary to repair, infection, scarring, bleeding, prolonged wound healing, incomplete removal, allergy to anesthesia, nerve injury and recurrence were addressed. Prior to the procedure, the treatment site was clearly identified and confirmed by the patient. All components of Universal Protocol/PAUSE Rule completed.
Closure 3 Information: This tab is for additional flaps and grafts above and beyond our usual structured repairs.  Please note if you enter information here it will not currently bill and you will need to add the billing information manually.
Consent (Marginal Mandibular)/Introductory Paragraph: The rationale for Mohs was explained to the patient and consent was obtained. The risks, benefits and alternatives to therapy were discussed in detail. Specifically, the risks of damage to the marginal mandibular branch of the facial nerve, infection, scarring, bleeding, prolonged wound healing, incomplete removal, allergy to anesthesia, and recurrence were addressed. Prior to the procedure, the treatment site was clearly identified and confirmed by the patient. All components of Universal Protocol/PAUSE Rule completed.
Spiral Flap Text: The defect edges were debeveled with a #15 scalpel blade.  Given the location of the defect, shape of the defect and the proximity to free margins a spiral flap was deemed most appropriate.  Using a sterile surgical marker, an appropriate rotation flap was drawn incorporating the defect and placing the expected incisions within the relaxed skin tension lines where possible. The area thus outlined was incised deep to adipose tissue with a #15 scalpel blade.  The skin margins were undermined to an appropriate distance in all directions utilizing iris scissors.
No Residual Tumor Seen Histology Text: There were no malignant cells seen in the sections examined.
Intermediate Repair Preamble Text (Leave Blank If You Do Not Want): Undermining was performed with blunt dissection.
Initial Size Of Lesion: 0.6
Stage 10: Additional Anesthesia Type: 1% lidocaine with epinephrine
Unique Flap 3 Name: Mercedes Flap
Location Indication Override (Is Already Calculated Based On Selected Body Location): Area M
Referred To Otolaryngology For Closure Text (Leave Blank If You Do Not Want): After obtaining clear surgical margins the patient was sent to otolaryngology for surgical repair.  The patient understands they will receive post-surgical care and follow-up from the referring physician's office.
Additional Anesthesia Volume In Cc: 6
A-T Advancement Flap Text: The defect edges were debeveled with a #15 scalpel blade.  Given the location of the defect, shape of the defect and the proximity to free margins an A-T advancement flap was deemed most appropriate.  Using a sterile surgical marker, an appropriate advancement flap was drawn incorporating the defect and placing the expected incisions within the relaxed skin tension lines where possible.    The area thus outlined was incised deep to adipose tissue with a #15 scalpel blade.  The skin margins were undermined to an appropriate distance in all directions utilizing iris scissors.
Postop Diagnosis: same
Island Pedicle Flap With Canthal Suspension Text: The defect edges were debeveled with a #15 scalpel blade.  Given the location of the defect, shape of the defect and the proximity to free margins an island pedicle advancement flap was deemed most appropriate.  Using a sterile surgical marker, an appropriate advancement flap was drawn incorporating the defect, outlining the appropriate donor tissue and placing the expected incisions within the relaxed skin tension lines where possible. The area thus outlined was incised deep to adipose tissue with a #15 scalpel blade.  The skin margins were undermined to an appropriate distance in all directions around the primary defect and laterally outward around the island pedicle utilizing iris scissors.  There was minimal undermining beneath the pedicle flap. A suspension suture was placed in the canthal tendon to prevent tension and prevent ectropion.
Ftsg Text: The defect edges were debeveled with a #15 scalpel blade.  Given the location of the defect, shape of the defect and the proximity to free margins a full thickness skin graft was deemed most appropriate.  Using a sterile surgical marker, the primary defect shape was transferred to the donor site. The area thus outlined was incised deep to adipose tissue with a #15 scalpel blade.  The harvested graft was then trimmed of adipose tissue until only dermis and epidermis was left.  The skin margins of the secondary defect were undermined to an appropriate distance in all directions utilizing iris scissors.  The secondary defect was closed with interrupted buried subcutaneous sutures.  The skin edges were then re-apposed with running  sutures.  The skin graft was then placed in the primary defect and oriented appropriately.
Repair Anesthesia Method: local infiltration
Secondary Intention Text (Leave Blank If You Do Not Want): The defect will heal with secondary intention.
Donor Site Anesthesia Type: same as repair anesthesia
Surgeon Performing Repair (Optional): Patrick
V-Y Plasty Text: The defect edges were debeveled with a #15 scalpel blade.  Given the location of the defect, shape of the defect and the proximity to free margins an V-Y advancement flap was deemed most appropriate.  Using a sterile surgical marker, an appropriate advancement flap was drawn incorporating the defect and placing the expected incisions within the relaxed skin tension lines where possible.    The area thus outlined was incised deep to adipose tissue with a #15 scalpel blade.  The skin margins were undermined to an appropriate distance in all directions utilizing iris scissors.
Eye Protection Verbiage: Before proceeding with the stage, a plastic scleral shield was inserted. The globe was anesthetized with a few drops of 1% lidocaine with 1:100,000 epinephrine. Then, an appropriate sized scleral shield was chosen and coated with lacrilube ointment. The shield was gently inserted and left in place for the duration of each stage. After the stage was completed, the shield was gently removed.
Repair Type: Complex Repair
Closure 2 Information: This tab is for additional flaps and grafts, including complex repair and grafts and complex repair and flaps. You can also specify a different location for the additional defect, if the location is the same you do not need to select a new one. We will insert the automated text for the repair you select below just as we do for solitary flaps and grafts. Please note that at this time if you select a location with a different insurance zone you will need to override the ICD10 and CPT if appropriate.
Home Suture Removal Text: Patient was provided instructions on removing sutures and will remove their sutures at home.  If they have any questions or difficulties they will call the office.
Cheek Interpolation Flap Text: A decision was made to reconstruct the defect utilizing an interpolation axial flap and a staged reconstruction.  A telfa template was made of the defect.  This telfa template was then used to outline the Cheek Interpolation flap.  The donor area for the pedicle flap was then injected with anesthesia.  The flap was excised through the skin and subcutaneous tissue down to the layer of the underlying musculature.  The interpolation flap was carefully excised within this deep plane to maintain its blood supply.  The edges of the donor site were undermined.   The donor site was closed in a primary fashion.  The pedicle was then rotated into position and sutured.  Once the tube was sutured into place, adequate blood supply was confirmed with blanching and refill.  The pedicle was then wrapped with xeroform gauze and dressed appropriately with a telfa and gauze bandage to ensure continued blood supply and protect the attached pedicle.
Consent 3/Introductory Paragraph: I gave the patient a chance to ask questions they had about the procedure.  Following this I explained the Mohs procedure and consent was obtained. The risks, benefits and alternatives to therapy were discussed in detail. Specifically, the risks of infection, scarring, bleeding, prolonged wound healing, incomplete removal, allergy to anesthesia, nerve injury and recurrence were addressed. Prior to the procedure, the treatment site was clearly identified and confirmed by the patient. All components of Universal Protocol/PAUSE Rule completed.
Referred To Asc For Closure Text (Leave Blank If You Do Not Want): After obtaining clear surgical margins the patient was sent to an ASC for surgical repair.  The patient understands they will receive post-surgical care and follow-up from the ASC physician.
Bilobed Flap Text: The defect edges were debeveled with a #15 scalpel blade.  Given the location of the defect and the proximity to free margins a bilobe flap was deemed most appropriate.  Using a sterile surgical marker, an appropriate bilobe flap drawn around the defect.    The area thus outlined was incised deep to adipose tissue with a #15 scalpel blade.  The skin margins were undermined to an appropriate distance in all directions utilizing iris scissors.
Rhombic Flap Text: The defect edges were debeveled with a #15 scalpel blade.  Given the location of the defect and the proximity to free margins a rhombic flap was deemed most appropriate.  Using a sterile surgical marker, an appropriate rhombic flap was drawn incorporating the defect.    The area thus outlined was incised deep to adipose tissue with a #15 scalpel blade.  The skin margins were undermined to an appropriate distance in all directions utilizing iris scissors.
Modified Advancement Flap Text: The defect edges were debeveled with a #15 scalpel blade.  Given the location of the defect, shape of the defect and the proximity to free margins a modified advancement flap was deemed most appropriate.  Using a sterile surgical marker, an appropriate advancement flap was drawn incorporating the defect and placing the expected incisions within the relaxed skin tension lines where possible.    The area thus outlined was incised deep to adipose tissue with a #15 scalpel blade.  The skin margins were undermined to an appropriate distance in all directions utilizing iris scissors.
Ear Star Wedge Flap Text: The defect edges were debeveled with a #15 blade scalpel.  Given the location of the defect and the proximity to free margins (helical rim) an ear star wedge flap was deemed most appropriate.  Using a sterile surgical marker, the appropriate flap was drawn incorporating the defect and placing the expected incisions between the helical rim and antihelix where possible.  The area thus outlined was incised through and through with a #15 scalpel blade.
Anesthesia Type: 1% lidocaine with 1:100,000 epinephrine and a 1:10 solution of 8.4% sodium bicarbonate
V-Y Flap Text: The defect edges were debeveled with a #15 scalpel blade.  Given the location of the defect, shape of the defect and the proximity to free margins a V-Y flap was deemed most appropriate.  Using a sterile surgical marker, an appropriate advancement flap was drawn incorporating the defect and placing the expected incisions within the relaxed skin tension lines where possible.    The area thus outlined was incised deep to adipose tissue with a #15 scalpel blade.  The skin margins were undermined to an appropriate distance in all directions utilizing iris scissors.
Transposition Flap Text: The defect edges were debeveled with a #15 scalpel blade.  Given the location of the defect and the proximity to free margins a transposition flap was deemed most appropriate.  Using a sterile surgical marker, an appropriate transposition flap was drawn incorporating the defect.    The area thus outlined was incised deep to adipose tissue with a #15 scalpel blade.  The skin margins were undermined to an appropriate distance in all directions utilizing iris scissors.
Referring Physician (Optional): DORCAS Rueda
Previous Accession (Optional): [id number]
Xenograft Text: The defect edges were debeveled with a #15 scalpel blade.  Given the location of the defect, shape of the defect and the proximity to free margins a xenograft was deemed most appropriate.  The graft was then trimmed to fit the size of the defect.  The graft was then placed in the primary defect and oriented appropriately.
Complex Repair And Flap Additional Text (Will Appearing After The Standard Complex Repair Text): The complex repair was not sufficient to completely close the primary defect. The remaining additional defect was repaired with the flap mentioned below.
Unique Flap 1 Text: A decision was made to reconstruct the defect utilizing a myocutaneous Island pedicle Flap based on the levator labii superioris muscle.  A telfa template was made of the defect.  This telfa template was then used to outline the myocutaneous flap, based along the meilolabial fold.  The donor area for the pedicle flap was then injected with anesthesia.  The flap was excised through the skin and subcutaneous tissue down to the layer of the underlying musculature.  The myocutaneous flap was carefully excised within this deep plane to maintain its blood supply. Based on the muscle. The edges of the donor site were undermined.   The donor site was closed in a primary fashion to the point of transposition.  The pedicle was then transposed into position and sutured.  Once the flap was sutured into place, adequate blood supply was confirmed with blanching and refill.
Repair Performed By Another Provider Text (Leave Blank If You Do Not Want): After obtaining clear surgical margins the defect was repaired by another provider.
Consent (Nose)/Introductory Paragraph: The rationale for Mohs was explained to the patient and consent was obtained. The risks, benefits and alternatives to therapy were discussed in detail. Specifically, the risks of nasal deformity, changes in the flow of air through the nose, infection, scarring, bleeding, prolonged wound healing, incomplete removal, allergy to anesthesia, nerve injury and recurrence were addressed. Prior to the procedure, the treatment site was clearly identified and confirmed by the patient. All components of Universal Protocol/PAUSE Rule completed.
Composite Graft Text: The defect edges were debeveled with a #15 scalpel blade.  Given the location of the defect, shape of the defect, the proximity to free margins and the fact the defect was full thickness a composite graft was deemed most appropriate.  The defect was outline and then transferred to the donor site.  A full thickness graft was then excised from the donor site. The graft was then placed in the primary defect, oriented appropriately and then sutured into place.  The secondary defect was then repaired using a primary closure.
Lazy S Complex Repair Preamble Text (Leave Blank If You Do Not Want): Extensive wide undermining was performed at least 2 cm in all directions.
Detail Level: Detailed
Tissue Cultured Epidermal Autograft Text: The defect edges were debeveled with a #15 scalpel blade.  Given the location of the defect, shape of the defect and the proximity to free margins a tissue cultured epidermal autograft was deemed most appropriate.  The graft was then trimmed to fit the size of the defect.  The graft was then placed in the primary defect and oriented appropriately.
Consent 2/Introductory Paragraph: Mohs surgery was explained to the patient and consent was obtained. The risks, benefits and alternatives to therapy were discussed in detail. Specifically, the risks of infection, scarring, bleeding, prolonged wound healing, incomplete removal, allergy to anesthesia, nerve injury and recurrence were addressed. Prior to the procedure, the treatment site was clearly identified and confirmed by the patient. All components of Universal Protocol/PAUSE Rule completed.
Alternatives Discussed Intro (Do Not Add Period): I discussed alternative treatments to Mohs surgery and specifically discussed the risks and benefits of
Surgical Defect Length In Cm (Optional): 2.4
Unique Flap 2 Name: Peng Flap
Epidermal Sutures: 5-0 Ethilon
Area M Indication Text: Tumors in this location are included in Area M (cheek, forehead, scalp, neck, jawline and pretibial skin).  Mohs surgery is indicated for tumors in these anatomic locations.
Purse String (Intermediate) Text: Given the location of the defect and the characteristics of the surrounding skin a purse string intermediate closure was deemed most appropriate.  Undermining was performed circumfirentially around the surgical defect.  A purse string suture was then placed and tightened.
O-Z Plasty Text: The defect edges were debeveled with a #15 scalpel blade.  Given the location of the defect, shape of the defect and the proximity to free margins an O-Z plasty (double transposition flap) was deemed most appropriate.  Using a sterile surgical marker, the appropriate transposition flaps were drawn incorporating the defect and placing the expected incisions within the relaxed skin tension lines where possible.    The area thus outlined was incised deep to adipose tissue with a #15 scalpel blade.  The skin margins were undermined to an appropriate distance in all directions utilizing iris scissors.  Hemostasis was achieved with electrocautery.  The flaps were then transposed into place, one clockwise and the other counterclockwise, and anchored with interrupted buried subcutaneous sutures.
Bcc Histology Text: There were numerous aggregates of basaloid cells.
Island Pedicle Flap Text: The defect edges were debeveled with a #15 scalpel blade.  Given the location of the defect, shape of the defect and the proximity to free margins an island pedicle advancement flap was deemed most appropriate.  Using a sterile surgical marker, an appropriate advancement flap was drawn incorporating the defect, outlining the appropriate donor tissue and placing the expected incisions within the relaxed skin tension lines where possible.    The area thus outlined was incised deep to adipose tissue with a #15 scalpel blade.  The skin margins were undermined to an appropriate distance in all directions around the primary defect and laterally outward around the island pedicle utilizing iris scissors.  There was minimal undermining beneath the pedicle flap.
Unique Flap 4 Text: The defect edges were debeveled with a #15 scalpel blade.  Given the location of the defect and the proximity to free margins a Banner transposition flap was deemed most appropriate.  Using a sterile surgical marker, an appropriate Banner transposition flap was drawn incorporating the defect.    The area thus outlined was incised deep to adipose tissue with a #15 scalpel blade.  The skin margins were undermined to an appropriate distance in all directions utilizing iris scissors.
Interpolation Flap Text: A decision was made to reconstruct the defect utilizing an interpolation axial flap and a staged reconstruction.  A telfa template was made of the defect.  This telfa template was then used to outline the interpolation flap.  The donor area for the pedicle flap was then injected with anesthesia.  The flap was excised through the skin and subcutaneous tissue down to the layer of the underlying musculature.  The interpolation flap was carefully excised within this deep plane to maintain its blood supply.  The edges of the donor site were undermined.   The donor site was closed in a primary fashion.  The pedicle was then rotated into position and sutured.  Once the tube was sutured into place, adequate blood supply was confirmed with blanching and refill.  The pedicle was then wrapped with xeroform gauze and dressed appropriately with a telfa and gauze bandage to ensure continued blood supply and protect the attached pedicle.
Dermal Autograft Text: The defect edges were debeveled with a #15 scalpel blade.  Given the location of the defect, shape of the defect and the proximity to free margins a dermal autograft was deemed most appropriate.  Using a sterile surgical marker, the primary defect shape was transferred to the donor site. The area thus outlined was incised deep to adipose tissue with a #15 scalpel blade.  The harvested graft was then trimmed of adipose and epidermal tissue until only dermis was left.  The skin graft was then placed in the primary defect and oriented appropriately.
Advancement Flap (Double) Text: The defect edges were debeveled with a #15 scalpel blade.  Given the location of the defect and the proximity to free margins a double advancement flap was deemed most appropriate.  Using a sterile surgical marker, the appropriate advancement flaps were drawn incorporating the defect and placing the expected incisions within the relaxed skin tension lines where possible.    The area thus outlined was incised deep to adipose tissue with a #15 scalpel blade.  The skin margins were undermined to an appropriate distance in all directions utilizing iris scissors.
Trilobed Flap Text: The defect edges were debeveled with a #15 scalpel blade.  Given the location of the defect and the proximity to free margins a trilobed flap was deemed most appropriate.  Using a sterile surgical marker, an appropriate trilobed flap drawn around the defect.    The area thus outlined was incised deep to adipose tissue with a #15 scalpel blade.  The skin margins were undermined to an appropriate distance in all directions utilizing iris scissors.
Rotation Flap Text: The defect edges were debeveled with a #15 scalpel blade.  Given the location of the defect, shape of the defect and the proximity to free margins a rotation flap was deemed most appropriate.  Using a sterile surgical marker, an appropriate rotation flap was drawn incorporating the defect and placing the expected incisions within the relaxed skin tension lines where possible.    The area thus outlined was incised deep to adipose tissue with a #15 scalpel blade.  The skin margins were undermined to an appropriate distance in all directions utilizing iris scissors.
Ear Wedge Repair Text: A wedge excision was completed by carrying down an excision through the full thickness of the ear and cartilage with an inward facing Burow's triangle. The wound was then closed in a layered fashion.
Cheiloplasty (Less Than 50%) Text: A decision was made to reconstruct the defect with a  cheiloplasty.  The defect was undermined extensively.  Additional obicularis oris muscle was excised with a 15 blade scalpel.  The defect was converted into a full thickness wedge, of less than 50% of the vertical height of the lip, to facilite a better cosmetic result.  Small vessels were then tied off with 5-0 monocyrl. The obicularis oris, superficial fascia, adipose and dermis were then reapproximated.  After the deeper layers were approximated the epidermis was reapproximated with particular care given to realign the vermilion border.
W Plasty Text: The lesion was extirpated to the level of the fat with a #15 scalpel blade.  Given the location of the defect, shape of the defect and the proximity to free margins a W-plasty was deemed most appropriate for repair.  Using a sterile surgical marker, the appropriate transposition arms of the W-plasty were drawn incorporating the defect and placing the expected incisions within the relaxed skin tension lines where possible.    The area thus outlined was incised deep to adipose tissue with a #15 scalpel blade.  The skin margins were undermined to an appropriate distance in all directions utilizing iris scissors.  The opposing transposition arms were then transposed into place in opposite direction and anchored with interrupted buried subcutaneous sutures.
Unna Boot Text: An Unna boot was placed to help immobilize the limb and facilitate more rapid healing.
Inflammation Suggestive Of Cancer Camouflage Histology Text: There was a dense lymphocytic infiltrate which prevented adequate histologic evaluation of adjacent structures.
Alar Island Pedicle Flap Text: The defect edges were debeveled with a #15 scalpel blade.  Given the location of the defect, shape of the defect and the proximity to the alar rim an island pedicle advancement flap was deemed most appropriate.  Using a sterile surgical marker, an appropriate advancement flap was drawn incorporating the defect, outlining the appropriate donor tissue and placing the expected incisions within the nasal ala running parallel to the alar rim. The area thus outlined was incised with a #15 scalpel blade.  The skin margins were undermined minimally to an appropriate distance in all directions around the primary defect and laterally outward around the island pedicle utilizing iris scissors.  There was minimal undermining beneath the pedicle flap.
Deep Sutures: 5-0 Vicryl
H Plasty Text: Given the location of the defect, shape of the defect and the proximity to free margins a H-plasty was deemed most appropriate for repair.  Using a sterile surgical marker, the appropriate advancement arms of the H-plasty were drawn incorporating the defect and placing the expected incisions within the relaxed skin tension lines where possible. The area thus outlined was incised deep to adipose tissue with a #15 scalpel blade. The skin margins were undermined to an appropriate distance in all directions utilizing iris scissors.  The opposing advancement arms were then advanced into place in opposite direction and anchored with interrupted buried subcutaneous sutures.
No Repair - Repaired With Adjacent Surgical Defect Text (Leave Blank If You Do Not Want): After obtaining clear surgical margins the defect was repaired concurrently with another surgical defect which was in close approximation.
Hemostasis: Electrocautery
Consent (Temporal Branch)/Introductory Paragraph: The rationale for Mohs was explained to the patient and consent was obtained. The risks, benefits and alternatives to therapy were discussed in detail. Specifically, the risks of damage to the temporal branch of the facial nerve, infection, scarring, bleeding, prolonged wound healing, incomplete removal, allergy to anesthesia, and recurrence were addressed. Prior to the procedure, the treatment site was clearly identified and confirmed by the patient. All components of Universal Protocol/PAUSE Rule completed.
Partial Purse String (Simple) Text: Given the location of the defect and the characteristics of the surrounding skin a simple purse string closure was deemed most appropriate.  Undermining was performed circumfirentially around the surgical defect.  A purse string suture was then placed and tightened. Wound tension only allowed a partial closure of the circular defect.
Localized Dermabrasion With Wire Brush Text: The patient was draped in routine manner.  Localized dermabrasion using 3 x 17 mm wire brush was performed in routine manner to papillary dermis. This spot dermabrasion is being performed to complete skin cancer reconstruction. It also will eliminate the other sun damaged precancerous cells that are known to be part of the regional effect of a lifetime's worth of sun exposure. This localized dermabrasion is therapeutic and should not be considered cosmetic in any regard.
Mohs Method Verbiage: An incision at a 45 degree angle following the standard Mohs approach was done and the specimen was harvested as a microscopic controlled layer.
Epidermal Closure Graft Donor Site (Optional): simple interrupted
Bilobed Transposition Flap Text: The defect edges were debeveled with a #15 scalpel blade.  Given the location of the defect and the proximity to free margins a bilobed transposition flap was deemed most appropriate.  Using a sterile surgical marker, an appropriate bilobe flap drawn around the defect.    The area thus outlined was incised deep to adipose tissue with a #15 scalpel blade.  The skin margins were undermined to an appropriate distance in all directions utilizing iris scissors.
Referred To Mid-Level For Closure Text (Leave Blank If You Do Not Want): After obtaining clear surgical margins the patient was sent to a mid-level provider for surgical repair.  The patient understands they will receive post-surgical care and follow-up from the mid-level provider.
Skin Substitute Text: The defect edges were debeveled with a #15 scalpel blade.  Given the location of the defect, shape of the defect and the proximity to free margins a skin substitute graft was deemed most appropriate.  The graft material was trimmed to fit the size of the defect. The graft was then placed in the primary defect and oriented appropriately.
Referred To Oculoplastics For Closure Text (Leave Blank If You Do Not Want): After obtaining clear surgical margins the patient was sent to oculoplastics for surgical repair.  The patient understands they will receive post-surgical care and follow-up from the referring physician's office.
Bilateral Helical Rim Advancement Flap Text: The defect edges were debeveled with a #15 blade scalpel.  Given the location of the defect and the proximity to free margins (helical rim) a bilateral helical rim advancement flap was deemed most appropriate.  Using a sterile surgical marker, the appropriate advancement flaps were drawn incorporating the defect and placing the expected incisions between the helical rim and antihelix where possible.  The area thus outlined was incised through and through with a #15 scalpel blade.  With a skin hook and iris scissors, the flaps were gently and sharply undermined and freed up.
Post-Care Instructions: I reviewed with the patient in detail post-care instructions. Patient is not to engage in any heavy lifting, exercise, or swimming for the next 14 days. Should the patient develop any fevers, chills, bleeding, severe pain patient will contact the office immediately.
Keystone Flap Text: The defect edges were debeveled with a #15 scalpel blade.  Given the location of the defect, shape of the defect a keystone flap was deemed most appropriate.  Using a sterile surgical marker, an appropriate keystone flap was drawn incorporating the defect, outlining the appropriate donor tissue and placing the expected incisions within the relaxed skin tension lines where possible. The area thus outlined was incised deep to adipose tissue with a #15 scalpel blade.  The skin margins were undermined to an appropriate distance in all directions around the primary defect and laterally outward around the flap utilizing iris scissors.
Complex Repair And Graft Additional Text (Will Appearing After The Standard Complex Repair Text): The complex repair was not sufficient to completely close the primary defect. The remaining additional defect was repaired with the graft mentioned below.
Unique Flap 4 Name: Banner Flap
Split-Thickness Skin Graft Text: The defect edges were debeveled with a #15 scalpel blade.  Given the location of the defect, shape of the defect and the proximity to free margins a split thickness skin graft was deemed most appropriate.  Using a sterile surgical marker, the primary defect shape was transferred to the donor site. The split thickness graft was then harvested.  The skin graft was then placed in the primary defect and oriented appropriately.
Mastoid Interpolation Flap Text: A decision was made to reconstruct the defect utilizing an interpolation axial flap and a staged reconstruction.  A telfa template was made of the defect.  This telfa template was then used to outline the mastoid interpolation flap.  The donor area for the pedicle flap was then injected with anesthesia.  The flap was excised through the skin and subcutaneous tissue down to the layer of the underlying musculature.  The pedicle flap was carefully excised within this deep plane to maintain its blood supply.  The edges of the donor site were undermined.   The donor site was closed in a primary fashion.  The pedicle was then rotated into position and sutured.  Once the tube was sutured into place, adequate blood supply was confirmed with blanching and refill.  The pedicle was then wrapped with xeroform gauze and dressed appropriately with a telfa and gauze bandage to ensure continued blood supply and protect the attached pedicle.
Consent (Near Eyelid Margin)/Introductory Paragraph: The rationale for Mohs was explained to the patient and consent was obtained. The risks, benefits and alternatives to therapy were discussed in detail. Specifically, the risks of ectropion or eyelid deformity, infection, scarring, bleeding, prolonged wound healing, incomplete removal, allergy to anesthesia, nerve injury and recurrence were addressed. Prior to the procedure, the treatment site was clearly identified and confirmed by the patient. All components of Universal Protocol/PAUSE Rule completed.
Same Histology In Subsequent Stages Text: The pattern and morphology of the tumor is as described in the first stage.
Estimated Blood Loss (Cc): less than 5 cc
Mohs Case Number: [id number]
Star Wedge Flap Text: The defect edges were debeveled with a #15 scalpel blade.  Given the location of the defect, shape of the defect and the proximity to free margins a star wedge flap was deemed most appropriate.  Using a sterile surgical marker, an appropriate rotation flap was drawn incorporating the defect and placing the expected incisions within the relaxed skin tension lines where possible. The area thus outlined was incised deep to adipose tissue with a #15 scalpel blade.  The skin margins were undermined to an appropriate distance in all directions utilizing iris scissors.
Medical Necessity Statement: Based on my medical judgement, Mohs surgery is the most appropriate treatment for this cancer compared to other treatments.
O-T Advancement Flap Text: The defect edges were debeveled with a #15 scalpel blade.  Given the location of the defect, shape of the defect and the proximity to free margins an O-T advancement flap was deemed most appropriate.  Using a sterile surgical marker, an appropriate advancement flap was drawn incorporating the defect and placing the expected incisions within the relaxed skin tension lines where possible.    The area thus outlined was incised deep to adipose tissue with a #15 scalpel blade.  The skin margins were undermined to an appropriate distance in all directions utilizing iris scissors.
Area H Indication Text: Tumors in this location are included in Area H (eyelids, eyebrows, nose, lips, chin, ear, pre-auricular, post-auricular, temple, genitalia, hands, feet, ankles and areola).  Tissue conservation is critical in these anatomic locations.
Bcc Infiltrative Histology Text: There were numerous aggregates of basaloid cells demonstrating an infiltrative pattern.
Referred To Plastics For Closure Text (Leave Blank If You Do Not Want): After obtaining clear surgical margins the patient was sent to plastics for surgical repair.  The patient understands they will receive post-surgical care and follow-up from the referring physician's office.
Mucosal Advancement Flap Text: Given the location of the defect, shape of the defect and the proximity to free margins a mucosal advancement flap was deemed most appropriate. Incisions were made with a 15 blade scalpel in the appropriate fashion along the cutaneous vermilion border and the mucosal lip. The remaining actinically damaged mucosal tissue was excised.  The mucosal advancement flap was then elevated to the gingival sulcus with care taken to preserve the neurovascular structures and advanced into the primary defect. Care was taken to ensure that precise realignment of the vermilion border was achieved.
Graft Donor Site Epidermal Sutures (Optional): 5-0 Ethibond
Unique Flap 2 Text: A decision was made to reconstruct the defect utilizing a Peng Flap (Bilateral Advancement Rotation Flap). Given the location of the defect and the proximity to free margins, this flap was deemed most appropriate.  Using a sterile surgical marker, the appropriate rotation flaps were drawn incorporating the defect and placing the expected incisions within the relaxed skin tension lines where possible.    The area thus outlined was incised deep to adipose tissue with a #15 scalpel blade.  The skin margins were undermined to an appropriate distance in all directions utilizing iris scissors.
Cheek-To-Nose Interpolation Flap Text: A decision was made to reconstruct the defect utilizing an interpolation axial flap and a staged reconstruction.  A telfa template was made of the defect.  This telfa template was then used to outline the Cheek-To-Nose Interpolation flap.  The donor area for the pedicle flap was then injected with anesthesia.  The flap was excised through the skin and subcutaneous tissue down to the layer of the underlying musculature.  The interpolation flap was carefully excised within this deep plane to maintain its blood supply.  The edges of the donor site were undermined.   The donor site was closed in a primary fashion.  The pedicle was then rotated into position and sutured.  Once the tube was sutured into place, adequate blood supply was confirmed with blanching and refill.  The pedicle was then wrapped with xeroform gauze and dressed appropriately with a telfa and gauze bandage to ensure continued blood supply and protect the attached pedicle.
Crescentic Advancement Flap Text: The defect edges were debeveled with a #15 scalpel blade.  Given the location of the defect and the proximity to free margins a crescentic advancement flap was deemed most appropriate.  Using a sterile surgical marker, the appropriate advancement flap was drawn incorporating the defect and placing the expected incisions within the relaxed skin tension lines where possible.    The area thus outlined was incised deep to adipose tissue with a #15 scalpel blade.  The skin margins were undermined to an appropriate distance in all directions utilizing iris scissors.
Graft Donor Site Bandage (Optional-Leave Blank If You Don't Want In Note): Aquaphor and telefa placed on wound. Pressure dressing applied to donor site
Full Thickness Lip Wedge Repair (Flap) Text: Given the location of the defect and the proximity to free margins a full thickness wedge repair was deemed most appropriate.  Using a sterile surgical marker, the appropriate repair was drawn incorporating the defect and placing the expected incisions perpendicular to the vermilion border.  The vermilion border was also meticulously outlined to ensure appropriate reapproximation during the repair.  The area thus outlined was incised through and through with a #15 scalpel blade.  The muscularis and dermis were reaproximated with deep sutures following hemostasis. Care was taken to realign the vermilion border before proceeding with the superficial closure.  Once the vermilion was realigned the superfical and mucosal closure was finished.
Manual Repair Warning Statement: We plan on removing the manually selected variable below in favor of our much easier automatic structured text blocks found in the previous tab. We decided to do this to help make the flow better and give you the full power of structured data. Manual selection is never going to be ideal in our platform and I would encourage you to avoid using manual selection from this point on, especially since I will be sunsetting this feature. It is important that you do one of two things with the customized text below. First, you can save all of the text in a word file so you can have it for future reference. Second, transfer the text to the appropriate area in the Library tab. Lastly, if there is a flap or graft type which we do not have you need to let us know right away so I can add it in before the variable is hidden. No need to panic, we plan to give you roughly 6 months to make the change.
Dorsal Nasal Flap Text: The defect edges were debeveled with a #15 scalpel blade.  Given the location of the defect and the proximity to free margins a dorsal nasal flap,based upon the glabellar folds, was deemed most appropriate.  Using a sterile surgical marker, an appropriate dorsal nasal flap was drawn around the defect.    The area thus outlined was incised deep to adipose tissue with a #15 scalpel blade.  The skin margins were undermined to an appropriate distance in all directions utilizing iris scissors.
Melolabial Transposition Flap Text: The defect edges were debeveled with a #15 scalpel blade.  Given the location of the defect and the proximity to free margins a melolabial flap was deemed most appropriate.  Using a sterile surgical marker, an appropriate melolabial transposition flap was drawn incorporating the defect.    The area thus outlined was incised deep to adipose tissue with a #15 scalpel blade.  The skin margins were undermined to an appropriate distance in all directions utilizing iris scissors.
Advancement-Rotation Flap Text: The defect edges were debeveled with a #15 scalpel blade.  Given the location of the defect, shape of the defect and the proximity to free margins an advancement-rotation flap was deemed most appropriate.  Using a sterile surgical marker, an appropriate flap was drawn incorporating the defect and placing the expected incisions within the relaxed skin tension lines where possible. The area thus outlined was incised deep to adipose tissue with a #15 scalpel blade.  The skin margins were undermined to an appropriate distance in all directions utilizing iris scissors.
Cheiloplasty (Complex) Text: A decision was made to reconstruct the defect with a  cheiloplasty.  The defect was undermined extensively.  Additional obicularis oris muscle was excised with a 15 blade scalpel.  The defect was converted into a full thickness wedge to facilite a better cosmetic result.  Small vessels were then tied off with 5-0 monocyrl. The obicularis oris, superficial fascia, adipose and dermis were then reapproximated.  After the deeper layers were approximated the epidermis was reapproximated with particular care given to realign the vermilion border.
Purse String (Simple) Text: Given the location of the defect and the characteristics of the surrounding skin a purse string closure was deemed most appropriate.  Undermining was performed circumfirentially around the surgical defect.  A purse string suture was then placed and tightened.
Graft Basting Suture (Optional): 5-0 Fast Absorbing Gut
O-T Plasty Text: The defect edges were debeveled with a #15 scalpel blade.  Given the location of the defect, shape of the defect and the proximity to free margins an O-T plasty was deemed most appropriate.  Using a sterile surgical marker, an appropriate O-T plasty was drawn incorporating the defect and placing the expected incisions within the relaxed skin tension lines where possible.    The area thus outlined was incised deep to adipose tissue with a #15 scalpel blade.  The skin margins were undermined to an appropriate distance in all directions utilizing iris scissors.
S Plasty Text: Given the location and shape of the defect, and the orientation of relaxed skin tension lines, an S-plasty was deemed most appropriate for repair.  Using a sterile surgical marker, the appropriate outline of the S-plasty was drawn, incorporating the defect and placing the expected incisions within the relaxed skin tension lines where possible.  The area thus outlined was incised deep to adipose tissue with a #15 scalpel blade.  The skin margins were undermined to an appropriate distance in all directions utilizing iris scissors. The skin flaps were advanced over the defect.  The opposing margins were then approximated with interrupted buried subcutaneous sutures.
Mohs Rapid Report Verbiage: The area of clinically evident tumor was marked with skin marking ink and appropriately hatched.  The initial incision was made following the Mohs approach through the skin.  The specimen was taken to the lab, divided into the necessary number of pieces, chromacoded and processed according to the Mohs protocol.  This was repeated in successive stages until a tumor free defect was achieved.
Mohs Histo Method Verbiage: Each section was then chromacoded and processed in the Mohs lab using the Mohs protocol and submitted for frozen section.
Melolabial Interpolation Flap Text: A decision was made to reconstruct the defect utilizing an interpolation axial flap and a staged reconstruction.  A telfa template was made of the defect.  This telfa template was then used to outline the melolabial interpolation flap.  The donor area for the pedicle flap was then injected with anesthesia.  The flap was excised through the skin and subcutaneous tissue down to the layer of the underlying musculature.  The pedicle flap was carefully excised within this deep plane to maintain its blood supply.  The edges of the donor site were undermined.   The donor site was closed in a primary fashion.  The pedicle was then rotated into position and sutured.  Once the tube was sutured into place, adequate blood supply was confirmed with blanching and refill.  The pedicle was then wrapped with xeroform gauze and dressed appropriately with a telfa and gauze bandage to ensure continued blood supply and protect the attached pedicle.
Z Plasty Text: The lesion was extirpated to the level of the fat with a #15 scalpel blade.  Given the location of the defect, shape of the defect and the proximity to free margins a Z-plasty was deemed most appropriate for repair.  Using a sterile surgical marker, the appropriate transposition arms of the Z-plasty were drawn incorporating the defect and placing the expected incisions within the relaxed skin tension lines where possible.    The area thus outlined was incised deep to adipose tissue with a #15 scalpel blade.  The skin margins were undermined to an appropriate distance in all directions utilizing iris scissors.  The opposing transposition arms were then transposed into place in opposite direction and anchored with interrupted buried subcutaneous sutures.
Epidermal Autograft Text: The defect edges were debeveled with a #15 scalpel blade.  Given the location of the defect, shape of the defect and the proximity to free margins an epidermal autograft was deemed most appropriate.  Using a sterile surgical marker, the primary defect shape was transferred to the donor site. The epidermal graft was then harvested.  The skin graft was then placed in the primary defect and oriented appropriately.
Unique Flap 1 Name: Myocutaneous Island pedicle Flap
Consent (Ear)/Introductory Paragraph: The rationale for Mohs was explained to the patient and consent was obtained. The risks, benefits and alternatives to therapy were discussed in detail. Specifically, the risks of ear deformity, infection, scarring, bleeding, prolonged wound healing, incomplete removal, allergy to anesthesia, nerve injury and recurrence were addressed. Prior to the procedure, the treatment site was clearly identified and confirmed by the patient. All components of Universal Protocol/PAUSE Rule completed.
Advancement Flap (Single) Text: The defect edges were debeveled with a #15 scalpel blade.  Given the location of the defect and the proximity to free margins a single advancement flap was deemed most appropriate.  Using a sterile surgical marker, an appropriate advancement flap was drawn incorporating the defect and placing the expected incisions within the relaxed skin tension lines where possible.    The area thus outlined was incised deep to adipose tissue with a #15 scalpel blade.  The skin margins were undermined to an appropriate distance in all directions utilizing iris scissors.
Wound Care (No Sutures): Petrolatum
Bi-Rhombic Flap Text: The defect edges were debeveled with a #15 scalpel blade.  Given the location of the defect and the proximity to free margins a bi-rhombic flap was deemed most appropriate.  Using a sterile surgical marker, an appropriate rhombic flap was drawn incorporating the defect. The area thus outlined was incised deep to adipose tissue with a #15 scalpel blade.  The skin margins were undermined to an appropriate distance in all directions utilizing iris scissors.
Consent (Lip)/Introductory Paragraph: The rationale for Mohs was explained to the patient and consent was obtained. The risks, benefits and alternatives to therapy were discussed in detail. Specifically, the risks of lip deformity, changes in the oral aperture, infection, scarring, bleeding, prolonged wound healing, incomplete removal, allergy to anesthesia, nerve injury and recurrence were addressed. Prior to the procedure, the treatment site was clearly identified and confirmed by the patient. All components of Universal Protocol/PAUSE Rule completed.
Paramedian Forehead Flap Text: A decision was made to reconstruct the defect utilizing an interpolation axial flap and a staged reconstruction.  A telfa template was made of the defect.  This telfa template was then used to outline the paramedian forehead pedicle flap.  The donor area for the pedicle flap was then injected with anesthesia.  The flap was excised through the skin and subcutaneous tissue down to the layer of the underlying musculature.  The pedicle flap was carefully excised within this deep plane to maintain its blood supply.  The edges of the donor site were undermined.   The donor site was closed in a primary fashion.  The pedicle was then rotated into position and sutured.  Once the tube was sutured into place, adequate blood supply was confirmed with blanching and refill.  The pedicle was then wrapped with xeroform gauze and dressed appropriately with a telfa and gauze bandage to ensure continued blood supply and protect the attached pedicle.
Area L Indication Text: Tumors in this location are included in Area L (trunk and extremities).  Mohs surgery is indicated for larger tumors, or tumors with aggressive histologic features, in these anatomic locations.
Consent Type: Consent 1 (Standard)
Tarsorrhaphy Text: A tarsorrhaphy was performed using Frost sutures.
Mauc Instructions: By selecting yes to the question below the MAUC number will be added into the note.  This will be calculated automatically based on the diagnosis chosen, the size entered, the body zone selected (H,M,L) and the specific indications you chose. You will also have the option to override the Mohs AUC if you disagree with the automatically calculated number and this option is found in the Case Summary tab.
Consent 1/Introductory Paragraph: The rationale for Mohs was explained to the patient and consent was obtained. The risks, benefits and alternatives to therapy were discussed in detail. Specifically, the risks of infection, scarring, bleeding, prolonged wound healing, incomplete removal, allergy to anesthesia, nerve injury and recurrence were addressed. Prior to the procedure, the treatment site was clearly identified and confirmed by the patient. All components of Universal Protocol/PAUSE Rule completed.
Muscle Hinge Flap Text: The defect edges were debeveled with a #15 scalpel blade.  Given the size, depth and location of the defect and the proximity to free margins a muscle hinge flap was deemed most appropriate.  Using a sterile surgical marker, an appropriate hinge flap was drawn incorporating the defect. The area thus outlined was incised with a #15 scalpel blade.  The skin margins were undermined to an appropriate distance in all directions utilizing iris scissors.
Anesthesia Volume In Cc: 1.6

## 2018-03-26 ENCOUNTER — APPOINTMENT (RX ONLY)
Dept: URBAN - METROPOLITAN AREA CLINIC 20 | Facility: CLINIC | Age: 83
Setting detail: DERMATOLOGY
End: 2018-03-26

## 2018-03-26 DIAGNOSIS — Z48.02 ENCOUNTER FOR REMOVAL OF SUTURES: ICD-10-CM

## 2018-03-26 PROCEDURE — 99024 POSTOP FOLLOW-UP VISIT: CPT

## 2018-03-26 PROCEDURE — ? SUTURE REMOVAL (GLOBAL PERIOD)

## 2018-03-26 ASSESSMENT — LOCATION SIMPLE DESCRIPTION DERM: LOCATION SIMPLE: LEFT FOREHEAD

## 2018-03-26 ASSESSMENT — LOCATION ZONE DERM: LOCATION ZONE: FACE

## 2018-03-26 ASSESSMENT — LOCATION DETAILED DESCRIPTION DERM: LOCATION DETAILED: LEFT INFERIOR LATERAL FOREHEAD

## 2018-03-26 NOTE — PROCEDURE: SUTURE REMOVAL (GLOBAL PERIOD)
Detail Level: Detailed
Add 92781 Cpt? (Important Note: In 2017 The Use Of 77800 Is Being Tracked By Cms To Determine Future Global Period Reimbursement For Global Periods): yes

## 2018-04-19 LAB
ALBUMIN SERPL-MCNC: 3.9 G/DL (ref 3.5–4.7)
ALBUMIN/GLOB SERPL: 1.7 {RATIO} (ref 1.2–2.2)
ALP SERPL-CCNC: 62 IU/L (ref 39–117)
ALT SERPL-CCNC: 13 IU/L (ref 0–32)
AST SERPL-CCNC: 18 IU/L (ref 0–40)
BASOPHILS # BLD AUTO: 0.1 X10E3/UL (ref 0–0.2)
BASOPHILS NFR BLD AUTO: 1 %
BILIRUB SERPL-MCNC: 0.3 MG/DL (ref 0–1.2)
BUN SERPL-MCNC: 19 MG/DL (ref 8–27)
BUN/CREAT SERPL: 24 (ref 12–28)
CALCIUM SERPL-MCNC: 9.6 MG/DL (ref 8.7–10.3)
CHLORIDE SERPL-SCNC: 104 MMOL/L (ref 96–106)
CHOLEST SERPL-MCNC: 146 MG/DL (ref 100–199)
CO2 SERPL-SCNC: 26 MMOL/L (ref 18–29)
CREAT SERPL-MCNC: 0.79 MG/DL (ref 0.57–1)
EOSINOPHIL # BLD AUTO: 0.2 X10E3/UL (ref 0–0.4)
EOSINOPHIL NFR BLD AUTO: 5 %
ERYTHROCYTE [DISTWIDTH] IN BLOOD BY AUTOMATED COUNT: 14 % (ref 12.3–15.4)
GFR SERPLBLD CREATININE-BSD FMLA CKD-EPI: 68 ML/MIN/1.73
GFR SERPLBLD CREATININE-BSD FMLA CKD-EPI: 79 ML/MIN/1.73
GLOBULIN SER CALC-MCNC: 2.3 G/DL (ref 1.5–4.5)
GLUCOSE SERPL-MCNC: 90 MG/DL (ref 65–99)
HCT VFR BLD AUTO: 38.2 % (ref 34–46.6)
HDLC SERPL-MCNC: 53 MG/DL
HGB BLD-MCNC: 12.5 G/DL (ref 11.1–15.9)
IMM GRANULOCYTES # BLD: 0 X10E3/UL (ref 0–0.1)
IMM GRANULOCYTES NFR BLD: 0 %
IMMATURE CELLS  115398: NORMAL
LABORATORY COMMENT REPORT: NORMAL
LDLC SERPL CALC-MCNC: 75 MG/DL (ref 0–99)
LYMPHOCYTES # BLD AUTO: 1.5 X10E3/UL (ref 0.7–3.1)
LYMPHOCYTES NFR BLD AUTO: 30 %
MCH RBC QN AUTO: 29.3 PG (ref 26.6–33)
MCHC RBC AUTO-ENTMCNC: 32.7 G/DL (ref 31.5–35.7)
MCV RBC AUTO: 90 FL (ref 79–97)
MONOCYTES # BLD AUTO: 0.6 X10E3/UL (ref 0.1–0.9)
MONOCYTES NFR BLD AUTO: 12 %
MORPHOLOGY BLD-IMP: NORMAL
NEUTROPHILS # BLD AUTO: 2.6 X10E3/UL (ref 1.4–7)
NEUTROPHILS NFR BLD AUTO: 52 %
NRBC BLD AUTO-RTO: NORMAL %
PLATELET # BLD AUTO: 309 X10E3/UL (ref 150–379)
POTASSIUM SERPL-SCNC: 4.2 MMOL/L (ref 3.5–5.2)
PROT SERPL-MCNC: 6.2 G/DL (ref 6–8.5)
RBC # BLD AUTO: 4.27 X10E6/UL (ref 3.77–5.28)
SODIUM SERPL-SCNC: 144 MMOL/L (ref 134–144)
TRIGL SERPL-MCNC: 88 MG/DL (ref 0–149)
TSH SERPL DL<=0.005 MIU/L-ACNC: 4.45 UIU/ML (ref 0.45–4.5)
VLDLC SERPL CALC-MCNC: 18 MG/DL (ref 5–40)
WBC # BLD AUTO: 5 X10E3/UL (ref 3.4–10.8)

## 2018-05-01 ENCOUNTER — OFFICE VISIT (OUTPATIENT)
Dept: MEDICAL GROUP | Facility: MEDICAL CENTER | Age: 83
End: 2018-05-01
Payer: MEDICARE

## 2018-05-01 VITALS
SYSTOLIC BLOOD PRESSURE: 102 MMHG | BODY MASS INDEX: 26.41 KG/M2 | OXYGEN SATURATION: 94 % | RESPIRATION RATE: 16 BRPM | HEIGHT: 63 IN | WEIGHT: 149.03 LBS | DIASTOLIC BLOOD PRESSURE: 66 MMHG | TEMPERATURE: 97.6 F | HEART RATE: 78 BPM

## 2018-05-01 DIAGNOSIS — I10 ESSENTIAL HYPERTENSION: ICD-10-CM

## 2018-05-01 DIAGNOSIS — E78.2 MIXED HYPERLIPIDEMIA: ICD-10-CM

## 2018-05-01 DIAGNOSIS — M17.11 PRIMARY OSTEOARTHRITIS OF RIGHT KNEE: ICD-10-CM

## 2018-05-01 DIAGNOSIS — F32.9 MAJOR DEPRESSIVE DISORDER WITH SINGLE EPISODE, REMISSION STATUS UNSPECIFIED: ICD-10-CM

## 2018-05-01 PROCEDURE — 99214 OFFICE O/P EST MOD 30 MIN: CPT | Performed by: FAMILY MEDICINE

## 2018-05-01 RX ORDER — MELOXICAM 7.5 MG/1
TABLET ORAL
Qty: 30 TAB | Refills: 0 | Status: SHIPPED | OUTPATIENT
Start: 2018-05-01 | End: 2018-05-01

## 2018-05-01 RX ORDER — BUPROPION HYDROCHLORIDE 100 MG/1
100 TABLET ORAL 2 TIMES DAILY
Qty: 60 TAB | Refills: 3 | Status: SHIPPED | OUTPATIENT
Start: 2018-05-01 | End: 2018-08-18 | Stop reason: SDUPTHER

## 2018-05-01 RX ORDER — MELOXICAM 7.5 MG/1
TABLET ORAL
Qty: 30 TAB | Refills: 0 | Status: SHIPPED | OUTPATIENT
Start: 2018-05-01 | End: 2018-12-11

## 2018-05-01 NOTE — PROGRESS NOTES
CC: HTN, Depression, HLD, Knee pain    HPI:   Cecilia presents today to discuss the following medical issues:    Essential hypertension  BP has been adequately controlled on current medication. Denies headache, chest pain, and SOB.Has been on Amlodipine 2.5 mg daily, and Carvedilol 25 mg bid.    Major depressive disorder with single episode, remission status unspecified  Patient stated that her mood has been fluctuating,and lately feels more depressed, and tired , however she denies any suicidal ideation.She has not been taking her Wellbutrin 100 mg bid.She requested a referral to psychiatry    Mixed hyperlipidemia  He has been tolerating the statin. Denies muscle pain LFTs has been normal, has been on lipitor 20 mg daily     Primary osteoarthritis of right knee  Patient underwent replacement in 2/2017, has been having intermittent pain knee pain, tylenol has not been working.meloxicam ha worked in the past. Has been having a normal kidney function.      Patient Active Problem List    Diagnosis Date Noted   • Bronchitis 05/15/2017   • Primary osteoarthritis of right knee 02/01/2017   • Essential hypertension 02/29/2016   • H/O: stroke 02/29/2016   • Primary osteoarthritis involving multiple joints 02/29/2016   • Depression 02/29/2016   • Primary localized osteoarthrosis, pelvic region and thigh 03/02/2015   • S/P lumbar fusion 05/14/2013       Current Outpatient Prescriptions   Medication Sig Dispense Refill   • calcium citrate (CALCITRATE) 950 MG Tab Take 950 mg by mouth every day.     • Cholecalciferol (VITAMIN D3) 1000 units Cap Take  by mouth.     • Probiotic Product (PROBIOTIC-10 PO) Take  by mouth.     • Colostrum 500 MG Cap Take  by mouth.     • Omega-3 1000 MG Cap Take  by mouth.     • Pyridoxine HCl (B-6) 100 MG Tab Take  by mouth.     • Biotin 10 MG Cap Take  by mouth.     • coenzyme Q-10 30 MG capsule Take 60 mg by mouth every day.     • Multiple Vitamins-Minerals (OCUVITE-LUTEIN) Tab Take 1 tablet by mouth  "every day.     • doxylamine (UNISOM) 25 MG Tab tablet Take 25 mg by mouth every bedtime.     • amlodipine (NORVASC) 2.5 MG Tab TAKE 1 TABLET DAILY 90 Tab 1   • trazodone (DESYREL) 50 MG Tab TAKE 1 TABLET AT BEDTIME. 90 Tab 1   • carvedilol (COREG) 3.125 MG Tab Take 1 Tab by mouth 2 times a day, with meals. 180 Tab 2   • meloxicam (MOBIC) 7.5 MG Tab Take 1 Tab by mouth every day. 90 Tab 0   • DEXILANT 60 MG CAPSULE DELAYED RELEASE delayed-release capsule TAKE 1 CAPSULE EVERY       MORNING BEFORE BREAKFAST   FOR GASTROESOPHAGEAL REFLUXDISEASE 90 Cap 1   • atorvastatin (LIPITOR) 20 MG Tab TAKE 1 TABLET DAILY 90 Tab 3   • acetaminophen (TYLENOL) 500 MG TABS Take 1 Tab by mouth every 6 hours as needed for Mild Pain. 30 Tab 0     No current facility-administered medications for this visit.          Allergies as of 05/01/2018 - Reviewed 05/01/2018   Allergen Reaction Noted   • Codeine  02/01/2013   • Latex  01/19/2017   • Lisinopril Cough 02/29/2016   • Soap Rash and Itching 02/20/2015   • Tape Rash 02/01/2013        ROS: Denies any chest pain, Shortness of breath, Changes bowel or bladder, Lower extremity edema.    Physical Exam:  /66   Pulse 78   Temp 36.4 °C (97.6 °F)   Resp 16   Ht 1.6 m (5' 3\")   Wt 67.6 kg (149 lb 0.5 oz)   SpO2 94%   BMI 26.40 kg/m²   Gen.: Well-developed, well-nourished, no apparent distress,pleasant and cooperative with the examination  Skin:  Warm and dry with good turgor. No rashes or suspicious lesions in visible areas  HEENT:Sinuses nontender with palpation, TMs clear, nares patent with pink mucosa and clear rhinorrhea,no septal deviation ,polyps or lesions. lips without lesions, oropharynx clear.  Neck: Trachea midline,no masses or adenopathy. No JVD.  Cor: Regular rate and rhythm without murmur, gallop or rub.  Lungs: Respirations unlabored.Clear to auscultation with equal breath sounds bilaterally. No wheezes, rhonchi.  Extremities: No cyanosis, clubbing or edema. Decreased ROM " of the right knee, no swelling or tenderness.        Assessment and Plan.   85 y.o. female     1. Essential hypertension  Has been adequately controlled on current medication. Denies headache, chest pain, and SOB.  Continue on Amlodipine 2.5 mg daily, and Carvedilol 25 mg bid.    2. Major depressive disorder with single episode, remission status unspecified  Has been fluctuating, lately feels more depressed, and tired but she denies any  suicidal ideation.  She stated that she has not been taking her Wellbutrin 100 mg bid.  Patient advised to go back to the Wellbutrin.Medication refilled. Patient request a referral to psychiatry    - REFERRAL TO PSYCHIATRY  - buPROPion (WELLBUTRIN) 100 MG Tab; Take 1 Tab by mouth 2 times a day.  Dispense: 60 Tab; Refill: 3    3. Mixed hyperlipidemia  He has been tolerating the statin. Denies muscle pain LFTs has been normal  Continue on lipitor 20 mg daily     4. Primary osteoarthritis of right knee  S/P replacement in 2/2017, has been having intermittent pain.    - meloxicam (MOBIC) 7.5 MG Tab; Take it once a day as needed.  Dispense: 30 Tab; Refill: 0

## 2018-05-22 ENCOUNTER — APPOINTMENT (RX ONLY)
Dept: URBAN - METROPOLITAN AREA CLINIC 20 | Facility: CLINIC | Age: 83
Setting detail: DERMATOLOGY
End: 2018-05-22

## 2018-05-22 DIAGNOSIS — L57.0 ACTINIC KERATOSIS: ICD-10-CM

## 2018-05-22 PROCEDURE — ? COUNSELING

## 2018-05-22 PROCEDURE — 17000 DESTRUCT PREMALG LESION: CPT

## 2018-05-22 PROCEDURE — ? LIQUID NITROGEN

## 2018-05-22 ASSESSMENT — LOCATION DETAILED DESCRIPTION DERM: LOCATION DETAILED: LEFT SUPERIOR FOREHEAD

## 2018-05-22 ASSESSMENT — LOCATION SIMPLE DESCRIPTION DERM: LOCATION SIMPLE: LEFT FOREHEAD

## 2018-05-22 ASSESSMENT — LOCATION ZONE DERM: LOCATION ZONE: FACE

## 2018-06-11 DIAGNOSIS — I10 ESSENTIAL HYPERTENSION: ICD-10-CM

## 2018-06-11 DIAGNOSIS — K21.9 GERD WITHOUT ESOPHAGITIS: ICD-10-CM

## 2018-06-11 RX ORDER — DEXLANSOPRAZOLE 60 MG/1
CAPSULE, DELAYED RELEASE ORAL
Qty: 90 CAP | Refills: 1 | Status: SHIPPED | OUTPATIENT
Start: 2018-06-11 | End: 2018-12-11

## 2018-06-11 RX ORDER — AMLODIPINE BESYLATE 2.5 MG/1
TABLET ORAL
Qty: 90 TAB | Refills: 1 | Status: SHIPPED | OUTPATIENT
Start: 2018-06-11 | End: 2018-12-11

## 2018-06-27 DIAGNOSIS — F51.04 CHRONIC INSOMNIA: ICD-10-CM

## 2018-06-27 RX ORDER — TRAZODONE HYDROCHLORIDE 50 MG/1
TABLET ORAL
Qty: 90 TAB | Refills: 1 | Status: SHIPPED | OUTPATIENT
Start: 2018-06-27 | End: 2018-09-14

## 2018-07-07 DIAGNOSIS — M17.11 PRIMARY OSTEOARTHRITIS OF RIGHT KNEE: ICD-10-CM

## 2018-07-09 RX ORDER — MELOXICAM 7.5 MG/1
TABLET ORAL
Qty: 30 TAB | Refills: 0 | Status: SHIPPED | OUTPATIENT
Start: 2018-07-09 | End: 2018-12-11

## 2018-07-09 NOTE — PROGRESS NOTES
Order(s) created erroneously. Erroneous order ID: [id number]   Order moved by: KRISTEN BELTRAN   Order move date/time: 07/09/2018 8:55 AM   Source Patient: [id number]   Source Contact: 06/14/2018   Destination Patient: [id number]   Destination Contact: 02/08/2016

## 2018-08-09 ENCOUNTER — HOSPITAL ENCOUNTER (OUTPATIENT)
Dept: RADIOLOGY | Facility: MEDICAL CENTER | Age: 83
End: 2018-08-09
Attending: PHYSICAL MEDICINE & REHABILITATION
Payer: MEDICARE

## 2018-08-09 DIAGNOSIS — Z96.651 PRESENCE OF RIGHT ARTIFICIAL KNEE JOINT: ICD-10-CM

## 2018-08-09 PROCEDURE — A9503 TC99M MEDRONATE: HCPCS

## 2018-08-18 DIAGNOSIS — F32.9 MAJOR DEPRESSIVE DISORDER WITH SINGLE EPISODE, REMISSION STATUS UNSPECIFIED: ICD-10-CM

## 2018-08-20 RX ORDER — BUPROPION HYDROCHLORIDE 100 MG/1
TABLET ORAL
Qty: 60 TAB | Refills: 0 | Status: SHIPPED | OUTPATIENT
Start: 2018-08-20 | End: 2018-12-11

## 2018-09-06 ENCOUNTER — OFFICE VISIT (OUTPATIENT)
Dept: URGENT CARE | Facility: CLINIC | Age: 83
End: 2018-09-06
Payer: MEDICARE

## 2018-09-06 VITALS
WEIGHT: 140 LBS | RESPIRATION RATE: 15 BRPM | HEART RATE: 81 BPM | TEMPERATURE: 98.7 F | SYSTOLIC BLOOD PRESSURE: 106 MMHG | BODY MASS INDEX: 24.8 KG/M2 | HEIGHT: 63 IN | OXYGEN SATURATION: 96 % | DIASTOLIC BLOOD PRESSURE: 68 MMHG

## 2018-09-06 DIAGNOSIS — L73.9 FOLLICULITIS: ICD-10-CM

## 2018-09-06 PROCEDURE — 99213 OFFICE O/P EST LOW 20 MIN: CPT | Performed by: NURSE PRACTITIONER

## 2018-09-06 RX ORDER — SULFAMETHOXAZOLE AND TRIMETHOPRIM 800; 160 MG/1; MG/1
1 TABLET ORAL 2 TIMES DAILY
Qty: 20 TAB | Refills: 0 | Status: SHIPPED | OUTPATIENT
Start: 2018-09-06 | End: 2018-09-16

## 2018-09-06 RX ORDER — ZOLPIDEM TARTRATE 10 MG/1
10 TABLET ORAL NIGHTLY PRN
COMMUNITY
End: 2018-12-11

## 2018-09-06 ASSESSMENT — ENCOUNTER SYMPTOMS
CHILLS: 0
FEVER: 0

## 2018-09-07 NOTE — PROGRESS NOTES
"Subjective:      Cecilia Alatorre is a 85 y.o. female who presents with Cyst (On labia)    Past Medical History:   Diagnosis Date   • Acid reflux    • Arthritis     osteo, generalized   • Cancer (HCC)     skin   • CATARACT     elijah iol   • Dental disorder     partials lower   • Heart burn    • Hiatus hernia syndrome    • High cholesterol    • Hypertension    • Indigestion    • Intracerebral bleed (HCC) 12/16/2015    memory difficulty no residual weakness   • Other specified disorder of intestines     constipation w/anesthesia and narcotics   • Pain 9/2016    all over   • Psychiatric problem     depression   • Seasonal allergies    • Shoulder injury     right shoulder   • Urinary bladder disorder     urgency   • Urinary incontinence 2017    urgency     Social History     Social History Narrative   • No narrative on file       Allergies: Codeine; Latex; Lisinopril; Soap; and Tape    Patient is an 85-year-old female who presents today with complaint of possible boil on the left labia.  She has noted this 2 days ago.  She states the area is firm slightly tender but no drainage.  She denies fever, aches, or chills.  States she has been keeping warm compresses on this and it is considerably better today than it was yesterday.          Cyst   This is a new problem. The current episode started in the past 7 days. The problem occurs constantly. The problem has been unchanged. Pertinent negatives include no chills or fever. Nothing aggravates the symptoms. She has tried nothing for the symptoms. The treatment provided no relief.       Review of Systems   Constitutional: Negative for chills, fever and malaise/fatigue.   Genitourinary:        Swollen lesion on the left labia   All other systems reviewed and are negative.         Objective:     /68   Pulse 81   Temp 37.1 °C (98.7 °F)   Resp 15   Ht 1.6 m (5' 3\")   Wt 63.5 kg (140 lb)   SpO2 96%   BMI 24.80 kg/m²      Physical Exam   Constitutional: She appears " well-developed and well-nourished.   Genitourinary:   Genitourinary Comments: There is a small induration on the left labia.  It is firm and painless.  Area around is slightly red.  No drainage.  No abscess formation.   Skin: Skin is warm and dry. Capillary refill takes less than 2 seconds.   Psychiatric: She has a normal mood and affect. Her behavior is normal. Judgment and thought content normal.   Vitals reviewed.              Assessment/Plan:     1. Folliculitis    - sulfamethoxazole-trimethoprim (BACTRIM DS) 800-160 MG tablet; Take 1 Tab by mouth 2 times a day for 10 days.  Dispense: 20 Tab; Refill: 0  -warm compresses  -Return to urgent care for increasing size of developing pain.

## 2018-09-13 ENCOUNTER — APPOINTMENT (RX ONLY)
Dept: URBAN - METROPOLITAN AREA CLINIC 20 | Facility: CLINIC | Age: 83
Setting detail: DERMATOLOGY
End: 2018-09-13

## 2018-09-13 DIAGNOSIS — L57.8 OTHER SKIN CHANGES DUE TO CHRONIC EXPOSURE TO NONIONIZING RADIATION: ICD-10-CM

## 2018-09-13 DIAGNOSIS — L57.0 ACTINIC KERATOSIS: ICD-10-CM

## 2018-09-13 DIAGNOSIS — L81.4 OTHER MELANIN HYPERPIGMENTATION: ICD-10-CM

## 2018-09-13 DIAGNOSIS — L82.1 OTHER SEBORRHEIC KERATOSIS: ICD-10-CM

## 2018-09-13 DIAGNOSIS — D22 MELANOCYTIC NEVI: ICD-10-CM

## 2018-09-13 DIAGNOSIS — Z85.828 PERSONAL HISTORY OF OTHER MALIGNANT NEOPLASM OF SKIN: ICD-10-CM

## 2018-09-13 DIAGNOSIS — D18.0 HEMANGIOMA: ICD-10-CM

## 2018-09-13 PROBLEM — D22.5 MELANOCYTIC NEVI OF TRUNK: Status: ACTIVE | Noted: 2018-09-13

## 2018-09-13 PROBLEM — D18.01 HEMANGIOMA OF SKIN AND SUBCUTANEOUS TISSUE: Status: ACTIVE | Noted: 2018-09-13

## 2018-09-13 PROCEDURE — ? LIQUID NITROGEN

## 2018-09-13 PROCEDURE — ? COUNSELING

## 2018-09-13 PROCEDURE — 17000 DESTRUCT PREMALG LESION: CPT

## 2018-09-13 PROCEDURE — 99214 OFFICE O/P EST MOD 30 MIN: CPT | Mod: 25

## 2018-09-13 ASSESSMENT — LOCATION SIMPLE DESCRIPTION DERM
LOCATION SIMPLE: LEFT HAND
LOCATION SIMPLE: RIGHT THIGH
LOCATION SIMPLE: RIGHT HAND
LOCATION SIMPLE: RIGHT NOSE
LOCATION SIMPLE: RIGHT UPPER BACK
LOCATION SIMPLE: UPPER BACK
LOCATION SIMPLE: LEFT FOREHEAD
LOCATION SIMPLE: RIGHT ANKLE
LOCATION SIMPLE: RIGHT CHEEK
LOCATION SIMPLE: LEFT UPPER BACK
LOCATION SIMPLE: RIGHT ANTERIOR NECK

## 2018-09-13 ASSESSMENT — LOCATION DETAILED DESCRIPTION DERM
LOCATION DETAILED: RIGHT ANTERIOR PROXIMAL THIGH
LOCATION DETAILED: RIGHT ANKLE
LOCATION DETAILED: RIGHT INFERIOR MEDIAL UPPER BACK
LOCATION DETAILED: RIGHT INFERIOR CENTRAL MALAR CHEEK
LOCATION DETAILED: RIGHT INFERIOR ANTERIOR NECK
LOCATION DETAILED: RIGHT NASAL ALA
LOCATION DETAILED: LEFT INFERIOR LATERAL FOREHEAD
LOCATION DETAILED: LEFT SUPERIOR UPPER BACK
LOCATION DETAILED: LEFT RADIAL DORSAL HAND
LOCATION DETAILED: SUPERIOR THORACIC SPINE
LOCATION DETAILED: RIGHT SUPERIOR UPPER BACK
LOCATION DETAILED: RIGHT RADIAL DORSAL HAND

## 2018-09-13 ASSESSMENT — LOCATION ZONE DERM
LOCATION ZONE: LEG
LOCATION ZONE: TRUNK
LOCATION ZONE: NOSE
LOCATION ZONE: NECK
LOCATION ZONE: HAND
LOCATION ZONE: FACE

## 2018-09-14 ENCOUNTER — OFFICE VISIT (OUTPATIENT)
Dept: MEDICAL GROUP | Facility: MEDICAL CENTER | Age: 83
End: 2018-09-14
Payer: MEDICARE

## 2018-09-14 VITALS
HEART RATE: 75 BPM | OXYGEN SATURATION: 92 % | TEMPERATURE: 96.6 F | DIASTOLIC BLOOD PRESSURE: 70 MMHG | SYSTOLIC BLOOD PRESSURE: 110 MMHG | RESPIRATION RATE: 18 BRPM | WEIGHT: 145.5 LBS | BODY MASS INDEX: 25.78 KG/M2 | HEIGHT: 63 IN

## 2018-09-14 DIAGNOSIS — I10 ESSENTIAL HYPERTENSION: ICD-10-CM

## 2018-09-14 DIAGNOSIS — M17.11 PRIMARY OSTEOARTHRITIS OF RIGHT KNEE: ICD-10-CM

## 2018-09-14 DIAGNOSIS — F51.04 CHRONIC INSOMNIA: ICD-10-CM

## 2018-09-14 DIAGNOSIS — Z23 NEED FOR VACCINATION: ICD-10-CM

## 2018-09-14 PROCEDURE — 99214 OFFICE O/P EST MOD 30 MIN: CPT | Mod: 25 | Performed by: FAMILY MEDICINE

## 2018-09-14 PROCEDURE — 90662 IIV NO PRSV INCREASED AG IM: CPT | Performed by: FAMILY MEDICINE

## 2018-09-14 PROCEDURE — G0008 ADMIN INFLUENZA VIRUS VAC: HCPCS | Performed by: FAMILY MEDICINE

## 2018-09-14 RX ORDER — TRAZODONE HYDROCHLORIDE 100 MG/1
100 TABLET ORAL
Qty: 30 TAB | Refills: 3 | Status: SHIPPED | OUTPATIENT
Start: 2018-09-14 | End: 2018-09-18 | Stop reason: SDUPTHER

## 2018-09-14 NOTE — PROGRESS NOTES
CC:Insomnia, HTN, Knee pain    HPI:   Cecilia presents today to discuss the following medical issues:    Chronic insomnia  Patient has been having trouble sleeping. The Ambien in the past was working for her, however we discussed in a previous appointments the risk of taking this medication in elderly, and she was put on Trazodone 50 mg which she said still not work.     Essential hypertension  Has been adequately controlled on current medication. Denies headache, chest pain, and SOB.has been on Amlodipine 2.5 mg daily.    Primary osteoarthritis of right knee  Patient underwent replacement in 2/2017, has been having intermittent pain, and she feels like her knee is not stable, it moves to the sides causing the pain when standing .So patient has had a NM bone scan which showed: slightly increased delayed uptake surrounding the right total knee arthroplasty which could represent prosthetic loosening depending on the time of prosthetic placement.    Due for Flu shot     Patient Active Problem List    Diagnosis Date Noted   • Bronchitis 05/15/2017   • Primary osteoarthritis of right knee 02/01/2017   • Essential hypertension 02/29/2016   • H/O: stroke 02/29/2016   • Primary osteoarthritis involving multiple joints 02/29/2016   • Depression 02/29/2016   • Primary localized osteoarthrosis, pelvic region and thigh 03/02/2015   • S/P lumbar fusion 05/14/2013       Current Outpatient Prescriptions   Medication Sig Dispense Refill   • traZODone (DESYREL) 100 MG Tab Take 1 Tab by mouth every bedtime. 30 Tab 3   • zolpidem (AMBIEN) 10 MG Tab Take 10 mg by mouth at bedtime as needed for Sleep.     • sulfamethoxazole-trimethoprim (BACTRIM DS) 800-160 MG tablet Take 1 Tab by mouth 2 times a day for 10 days. 20 Tab 0   • buPROPion (WELLBUTRIN) 100 MG Tab TAKE 1 TABLET TWICE A DAY 60 Tab 0   • meloxicam (MOBIC) 7.5 MG Tab TAKE 1 TABLET DAILY AS     NEEDED 30 Tab 0   • DEXILANT 60 MG CAPSULE DELAYED RELEASE delayed-release capsule TAKE  "1 CAPSULE EVERY       MORNING BEFORE BREAKFAST   FOR GASTROESOPHAGEAL REFLUXDISEASE 90 Cap 1   • amLODIPine (NORVASC) 2.5 MG Tab TAKE 1 TABLET DAILY 90 Tab 1   • meloxicam (MOBIC) 7.5 MG Tab Take once a day as needed. 30 Tab 0   • calcium citrate (CALCITRATE) 950 MG Tab Take 950 mg by mouth every day.     • Cholecalciferol (VITAMIN D3) 1000 units Cap Take  by mouth.     • Probiotic Product (PROBIOTIC-10 PO) Take  by mouth.     • Colostrum 500 MG Cap Take  by mouth.     • Omega-3 1000 MG Cap Take  by mouth.     • Pyridoxine HCl (B-6) 100 MG Tab Take  by mouth.     • Biotin 10 MG Cap Take  by mouth.     • coenzyme Q-10 30 MG capsule Take 60 mg by mouth every day.     • Multiple Vitamins-Minerals (OCUVITE-LUTEIN) Tab Take 1 tablet by mouth every day.     • doxylamine (UNISOM) 25 MG Tab tablet Take 25 mg by mouth every bedtime.     • amlodipine (NORVASC) 2.5 MG Tab TAKE 1 TABLET DAILY 90 Tab 1   • carvedilol (COREG) 3.125 MG Tab Take 1 Tab by mouth 2 times a day, with meals. 180 Tab 2   • DEXILANT 60 MG CAPSULE DELAYED RELEASE delayed-release capsule TAKE 1 CAPSULE EVERY       MORNING BEFORE BREAKFAST   FOR GASTROESOPHAGEAL REFLUXDISEASE 90 Cap 1   • atorvastatin (LIPITOR) 20 MG Tab TAKE 1 TABLET DAILY 90 Tab 3   • acetaminophen (TYLENOL) 500 MG TABS Take 1 Tab by mouth every 6 hours as needed for Mild Pain. 30 Tab 0     No current facility-administered medications for this visit.          Allergies as of 09/14/2018 - Reviewed 09/14/2018   Allergen Reaction Noted   • Codeine  02/01/2013   • Latex  01/19/2017   • Lisinopril Cough 02/29/2016   • Soap Rash and Itching 02/20/2015   • Tape Rash 02/01/2013        ROS: Denies any chest pain, Shortness of breath, Changes bowel or bladder, Lower extremity edema.    Physical Exam:  /70   Pulse 75   Temp 35.9 °C (96.6 °F)   Resp 18   Ht 1.6 m (5' 3\")   Wt 66 kg (145 lb 8.1 oz)   SpO2 92%   BMI 25.77 kg/m²   Gen.: Well-developed, well-nourished, no apparent " distress,pleasant and cooperative with the examination  Skin:  Warm and dry with good turgor. No rashes or suspicious lesions in visible areas  HEENT:Sinuses nontender with palpation, TMs clear, nares patent with pink mucosa and clear rhinorrhea,no septal deviation ,polyps or lesions. lips without lesions, oropharynx clear.  Neck: Trachea midline,no masses or adenopathy. No JVD.  Cor: Regular rate and rhythm without murmur, gallop or rub.  Lungs: Respirations unlabored.Clear to auscultation with equal breath sounds bilaterally. No wheezes, rhonchi.  Extremities: No cyanosis, clubbing or edema.        Assessment and Plan.   85 y.o. female to discuss the followin. Chronic insomnia  Trazodone 50 mg has not been working.  Will increase the dose to 100 mg.    - traZODone (DESYREL) 100 MG Tab; Take 1 Tab by mouth every bedtime.  Dispense: 30 Tab; Refill: 3    2. Essential hypertension  Has been adequately controlled on current medication. Denies headache, chest pain, and SOB.  Continue on Amlodipine 2.5 mg daily.    3. Primary osteoarthritis of right knee  S/P replacement in 2017, has been having intermittent pain.So patient has had a NM bone scan which showed: slightly increased delayed uptake surrounding the right total knee arthroplasty which could represent prosthetic loosening depending on the time of prosthetic placement.  Patient advised to follow up with the orthopedic surgeon.    4. Need for vaccination  Flu shot is given today.    - INFLUENZA VACCINE, HIGH DOSE (65+ ONLY)

## 2018-09-18 DIAGNOSIS — F51.04 CHRONIC INSOMNIA: ICD-10-CM

## 2018-09-18 RX ORDER — TRAZODONE HYDROCHLORIDE 100 MG/1
100 TABLET ORAL
Qty: 30 TAB | Refills: 3 | Status: SHIPPED | OUTPATIENT
Start: 2018-09-18 | End: 2018-12-11 | Stop reason: SDUPTHER

## 2018-09-24 DIAGNOSIS — E78.5 HYPERLIPIDEMIA, UNSPECIFIED HYPERLIPIDEMIA TYPE: ICD-10-CM

## 2018-09-24 RX ORDER — ATORVASTATIN CALCIUM 20 MG/1
20 TABLET, FILM COATED ORAL DAILY
Qty: 90 TAB | Refills: 3 | Status: SHIPPED | OUTPATIENT
Start: 2018-09-24 | End: 2019-09-19 | Stop reason: SDUPTHER

## 2018-09-25 DIAGNOSIS — I10 ESSENTIAL HYPERTENSION: ICD-10-CM

## 2018-09-25 RX ORDER — CARVEDILOL 3.12 MG/1
3.12 TABLET ORAL 2 TIMES DAILY WITH MEALS
Qty: 180 TAB | Refills: 2 | Status: SHIPPED | OUTPATIENT
Start: 2018-09-25 | End: 2019-06-24 | Stop reason: SDUPTHER

## 2018-11-13 ENCOUNTER — APPOINTMENT (RX ONLY)
Dept: URBAN - METROPOLITAN AREA CLINIC 20 | Facility: CLINIC | Age: 83
Setting detail: DERMATOLOGY
End: 2018-11-13

## 2018-11-13 DIAGNOSIS — Z85.828 PERSONAL HISTORY OF OTHER MALIGNANT NEOPLASM OF SKIN: ICD-10-CM | Status: STABLE

## 2018-11-13 DIAGNOSIS — L57.8 OTHER SKIN CHANGES DUE TO CHRONIC EXPOSURE TO NONIONIZING RADIATION: ICD-10-CM

## 2018-11-13 DIAGNOSIS — L57.0 ACTINIC KERATOSIS: ICD-10-CM

## 2018-11-13 PROCEDURE — 99213 OFFICE O/P EST LOW 20 MIN: CPT | Mod: 25

## 2018-11-13 PROCEDURE — 17000 DESTRUCT PREMALG LESION: CPT

## 2018-11-13 PROCEDURE — ? COUNSELING

## 2018-11-13 PROCEDURE — ? LIQUID NITROGEN

## 2018-11-13 PROCEDURE — 17003 DESTRUCT PREMALG LES 2-14: CPT

## 2018-11-13 ASSESSMENT — LOCATION DETAILED DESCRIPTION DERM
LOCATION DETAILED: RIGHT RADIAL DORSAL HAND
LOCATION DETAILED: LEFT INFERIOR CENTRAL MALAR CHEEK
LOCATION DETAILED: LEFT NASAL ALA
LOCATION DETAILED: RIGHT ULNAR DORSAL HAND
LOCATION DETAILED: LEFT ULNAR DORSAL HAND

## 2018-11-13 ASSESSMENT — LOCATION ZONE DERM
LOCATION ZONE: NOSE
LOCATION ZONE: FACE
LOCATION ZONE: HAND

## 2018-11-13 ASSESSMENT — LOCATION SIMPLE DESCRIPTION DERM
LOCATION SIMPLE: RIGHT HAND
LOCATION SIMPLE: LEFT NOSE
LOCATION SIMPLE: LEFT HAND
LOCATION SIMPLE: LEFT CHEEK

## 2018-12-11 ENCOUNTER — OFFICE VISIT (OUTPATIENT)
Dept: MEDICAL GROUP | Facility: MEDICAL CENTER | Age: 83
End: 2018-12-11
Payer: MEDICARE

## 2018-12-11 VITALS
TEMPERATURE: 98.2 F | RESPIRATION RATE: 16 BRPM | DIASTOLIC BLOOD PRESSURE: 72 MMHG | HEART RATE: 77 BPM | HEIGHT: 63 IN | OXYGEN SATURATION: 93 % | WEIGHT: 152.78 LBS | SYSTOLIC BLOOD PRESSURE: 102 MMHG | BODY MASS INDEX: 27.07 KG/M2

## 2018-12-11 DIAGNOSIS — M25.561 CHRONIC PAIN OF RIGHT KNEE: ICD-10-CM

## 2018-12-11 DIAGNOSIS — G89.29 CHRONIC PAIN OF RIGHT KNEE: ICD-10-CM

## 2018-12-11 DIAGNOSIS — I10 ESSENTIAL HYPERTENSION: ICD-10-CM

## 2018-12-11 DIAGNOSIS — F51.04 CHRONIC INSOMNIA: ICD-10-CM

## 2018-12-11 DIAGNOSIS — E78.2 MIXED HYPERLIPIDEMIA: ICD-10-CM

## 2018-12-11 DIAGNOSIS — M54.50 CHRONIC MIDLINE LOW BACK PAIN WITHOUT SCIATICA: ICD-10-CM

## 2018-12-11 DIAGNOSIS — K21.9 GASTROESOPHAGEAL REFLUX DISEASE WITHOUT ESOPHAGITIS: ICD-10-CM

## 2018-12-11 DIAGNOSIS — G89.29 CHRONIC MIDLINE LOW BACK PAIN WITHOUT SCIATICA: ICD-10-CM

## 2018-12-11 PROCEDURE — 99214 OFFICE O/P EST MOD 30 MIN: CPT | Performed by: FAMILY MEDICINE

## 2018-12-11 RX ORDER — TRAZODONE HYDROCHLORIDE 100 MG/1
100 TABLET ORAL
Qty: 90 TAB | Refills: 3 | Status: SHIPPED | OUTPATIENT
Start: 2018-12-11 | End: 2019-07-02 | Stop reason: SDUPTHER

## 2018-12-12 NOTE — PROGRESS NOTES
CC: Hypertension, acid reflux, hyperlipidemia, chronic kidney disease insomnia, chronic back pain/chronic knee pain    HPI:   Cecilia presents today to discuss the following medical issues,    Essential hypertension  Has been adequately controlled on current medication. Denies headache, chest pain, and SOB.  Patient has been on carvedilol 3.125 twice a day, no side effects.  She used to take amlodipine 2.5 mg but she started, however her blood pressure has been within normal limits even after she stopped the amlodipine.    Gastroesophageal reflux disease without esophagitis  Denies epigastric pain, heartburn, nausea, and vomiting.  Patient has been doing fine on Dexilant.  No side effects    Mixed hyperlipidemia  She has been tolerating the statin. Denies muscle pain LFTs has been normal, has been on atorvastatin 20 mg daily.    Chronic insomnia  The past patient used to take Ambien, she stopped the medication, then was started on trazodone 50 mg, has helped a little bit then it was increased to 100 mg and since then she has not been having any trouble with sleeping.      Chronic midline low back pain without sciatica  Patient has been taking over-the-counter pain medication however sometimes does not work.  Has had multiple epidurals, has not helped, however in the past physical therapy has helped, needs a referral.    Chronic pain of right knee  Patient underwent right total knee replacement.  However her previous bone scan showed that he had some instability of the prosthesis, was seen by orthopedist, was told that should not be checked that early after surgery.  But was told that it is not uncommon.  However patient stated that she has been having pain in the knee whenever she walks.  Wants to try physical therapy.  Sometimes she has to take over-the-counter pain medication to help with the pain.        Patient Active Problem List    Diagnosis Date Noted   • Bronchitis 05/15/2017   • Primary osteoarthritis of  right knee 02/01/2017   • Essential hypertension 02/29/2016   • H/O: stroke 02/29/2016   • Primary osteoarthritis involving multiple joints 02/29/2016   • Depression 02/29/2016   • Primary localized osteoarthrosis, pelvic region and thigh 03/02/2015   • S/P lumbar fusion 05/14/2013       Current Outpatient Prescriptions   Medication Sig Dispense Refill   • traZODone (DESYREL) 100 MG Tab Take 1 Tab by mouth every bedtime. 90 Tab 3   • carvedilol (COREG) 3.125 MG Tab Take 1 Tab by mouth 2 times a day, with meals. 180 Tab 2   • atorvastatin (LIPITOR) 20 MG Tab Take 1 Tab by mouth every day. 90 Tab 3   • calcium citrate (CALCITRATE) 950 MG Tab Take 950 mg by mouth every day.     • Cholecalciferol (VITAMIN D3) 1000 units Cap Take  by mouth.     • Probiotic Product (PROBIOTIC-10 PO) Take  by mouth.     • Colostrum 500 MG Cap Take  by mouth.     • Omega-3 1000 MG Cap Take  by mouth.     • Pyridoxine HCl (B-6) 100 MG Tab Take  by mouth.     • Biotin 10 MG Cap Take  by mouth.     • coenzyme Q-10 30 MG capsule Take 60 mg by mouth every day.     • Multiple Vitamins-Minerals (OCUVITE-LUTEIN) Tab Take 1 tablet by mouth every day.     • doxylamine (UNISOM) 25 MG Tab tablet Take 25 mg by mouth every bedtime.     • DEXILANT 60 MG CAPSULE DELAYED RELEASE delayed-release capsule TAKE 1 CAPSULE EVERY       MORNING BEFORE BREAKFAST   FOR GASTROESOPHAGEAL REFLUXDISEASE 90 Cap 1   • acetaminophen (TYLENOL) 500 MG TABS Take 1 Tab by mouth every 6 hours as needed for Mild Pain. 30 Tab 0     No current facility-administered medications for this visit.          Allergies as of 12/11/2018 - Reviewed 12/11/2018   Allergen Reaction Noted   • Codeine  02/01/2013   • Latex  01/19/2017   • Lisinopril Cough 02/29/2016   • Soap Rash and Itching 02/20/2015   • Tape Rash 02/01/2013        ROS: Denies any chest pain, Shortness of breath, Changes bowel or bladder, Lower extremity edema.    Physical Exam:  /72 (BP Location: Right arm, Patient  "Position: Sitting, BP Cuff Size: Adult)   Pulse 77   Temp 36.8 °C (98.2 °F)   Resp 16   Ht 1.6 m (5' 3\")   Wt 69.3 kg (152 lb 12.5 oz)   SpO2 93%   BMI 27.06 kg/m²   Gen.: Well-developed, well-nourished, no apparent distress,pleasant and cooperative with the examination  Skin:  Warm and dry with good turgor. No rashes or suspicious lesions in visible areas  HEENT:Sinuses nontender with palpation, TMs clear, nares patent with pink mucosa and clear rhinorrhea,no septal deviation ,polyps or lesions. lips without lesions, oropharynx clear.  Neck: Trachea midline,no masses or adenopathy. No JVD.  Cor: Regular rate and rhythm without murmur, gallop or rub.  Lungs: Respirations unlabored.Clear to auscultation with equal breath sounds bilaterally. No wheezes, rhonchi.  Extremities: No cyanosis, clubbing or edema.  Right knee: Mild swelling, no tenderness, decreased range of motion.      Assessment and Plan.   85 y.o. female     1. Essential hypertension  Adequately controlled.  Continue on carvedilol 3.125 mg twice a day.    2. Gastroesophageal reflux disease without esophagitis  She has been doing fine on Dexilant.    3. Mixed hyperlipidemia  He has been tolerating the statin. Denies muscle pain LFTs has been normal  Continue on atorvastatin 20 mg daily.    4. Chronic insomnia  She has been doing fine on trazodone 100 mg daily, needs refill.    - traZODone (DESYREL) 100 MG Tab; Take 1 Tab by mouth every bedtime.  Dispense: 90 Tab; Refill: 3    5. Chronic midline low back pain without sciatica  Patient has been taking over-the-counter pain medication however sometimes does not work.  In the past physical therapy has helped, needs a referral    - REFERRAL TO PHYSICAL THERAPY Reason for Therapy: Eval/Treat/Report    6. Chronic pain of right knee  Status post total knee replacement posterior.  However her previous bone scan showed that he had some instability in the prosthesis, was seen by orthopedist, was told that " should not be checked just recently.  However patient stated that she has been having pain in the knee whenever she walks.  Wants to try physical therapy.    - REFERRAL TO PHYSICAL THERAPY Reason for Therapy: Eval/Treat/Report

## 2018-12-17 DIAGNOSIS — K21.9 GERD WITHOUT ESOPHAGITIS: ICD-10-CM

## 2018-12-17 RX ORDER — DEXLANSOPRAZOLE 60 MG/1
CAPSULE, DELAYED RELEASE ORAL
Qty: 90 CAP | Refills: 3 | Status: SHIPPED | OUTPATIENT
Start: 2018-12-17 | End: 2023-07-13 | Stop reason: SDUPTHER

## 2019-01-22 NOTE — NON-PROVIDER
PAT note: PAT call made 01/22/2019 at 1405. Spoke with Cecilia. Health history, allergies, medications and pre-procedure instructions reviewed with patient.Pt verbalized understanding of instructions.

## 2019-01-24 ENCOUNTER — HOSPITAL ENCOUNTER (OUTPATIENT)
Dept: RADIOLOGY | Facility: REHABILITATION | Age: 84
End: 2019-01-24
Attending: PHYSICAL MEDICINE & REHABILITATION

## 2019-01-24 ENCOUNTER — HOSPITAL ENCOUNTER (OUTPATIENT)
Dept: PAIN MANAGEMENT | Facility: REHABILITATION | Age: 84
End: 2019-01-24
Attending: PHYSICAL MEDICINE & REHABILITATION
Payer: MEDICARE

## 2019-01-24 VITALS
SYSTOLIC BLOOD PRESSURE: 149 MMHG | HEIGHT: 61 IN | BODY MASS INDEX: 28.51 KG/M2 | TEMPERATURE: 98.1 F | WEIGHT: 151.01 LBS | RESPIRATION RATE: 16 BRPM | DIASTOLIC BLOOD PRESSURE: 97 MMHG | OXYGEN SATURATION: 98 % | HEART RATE: 77 BPM

## 2019-01-24 PROCEDURE — 700111 HCHG RX REV CODE 636 W/ 250 OVERRIDE (IP)

## 2019-01-24 PROCEDURE — 99152 MOD SED SAME PHYS/QHP 5/>YRS: CPT

## 2019-01-24 PROCEDURE — 700101 HCHG RX REV CODE 250

## 2019-01-24 PROCEDURE — 64640 INJECTION TREATMENT OF NERVE: CPT

## 2019-01-24 RX ORDER — LIDOCAINE HYDROCHLORIDE 20 MG/ML
INJECTION, SOLUTION EPIDURAL; INFILTRATION; INTRACAUDAL; PERINEURAL
Status: COMPLETED
Start: 2019-01-24 | End: 2019-01-24

## 2019-01-24 RX ORDER — MIDAZOLAM HYDROCHLORIDE 1 MG/ML
INJECTION INTRAMUSCULAR; INTRAVENOUS
Status: COMPLETED
Start: 2019-01-24 | End: 2019-01-24

## 2019-01-24 RX ADMIN — LIDOCAINE HYDROCHLORIDE 10 ML: 20 INJECTION, SOLUTION EPIDURAL; INFILTRATION; INTRACAUDAL; PERINEURAL at 14:09

## 2019-01-24 RX ADMIN — FENTANYL CITRATE 25 MCG: 50 INJECTION, SOLUTION INTRAMUSCULAR; INTRAVENOUS at 14:02

## 2019-01-24 RX ADMIN — MIDAZOLAM HYDROCHLORIDE 0.5 MG: 1 INJECTION, SOLUTION INTRAMUSCULAR; INTRAVENOUS at 14:02

## 2019-01-24 NOTE — NON-PROVIDER
"Current medications reviewed with pt, see medications reconciliation form. Pt lobito taking ASA or other blood thinners or anti-inflammatories.  Pt has a ride post-procedure(Katie to drive).  Printed and verbal discharge instructions given to pt who verbalized understanding.Pt states,\"Did not bring any  controled substance or valuables on admitted.\"  "

## 2019-01-24 NOTE — PROGRESS NOTES
Date of Service: 1-24-18     Physician/s: Abelino Garcia MD     Pre-operative Diagnosis: Right Failed Total Knee Replacement     Post-operative Diagnosis: Right Failed Total Knee Replacement     Procedure: RIGHT Knee Genicular Nerve RFN     Description of procedure:  The risks, benefits, and alternatives of the procedure were reviewed and discussed with the patient.  Written informed consent was freely obtained. A pre-procedural time-out was conducted by the physician verifying patient’s identity, procedure to be performed, procedure site and side, and allergy verification. Appropriate equipment was determined to be in place for the procedure.      An IV was placed peripherally, and the patient received a low dose of IV Versed for anxiolysis and IV Fentanyl for analgesia. The patient's vital signs were carefully monitored before, throughout, and after the procedure.      In the fluoroscopy suite the patient was placed in a supine position, the knee was flexed with a pillow/towels underneath for support, and the skin was prepped and draped in the usual sterile fashion. The fluoroscope was placed over the  RIGHT knee at an true AP position, and THREE  targets for injection were marked. A 25g needle was placed into each of the markings at the genicular nerve targets, and approx 2cc of 1% Lidocaine was injected subcutaneously into the epidermal and dermal layers. The RF needle was then advanced into the superior lateral, superior medial, inferior medial target points for the genicular nerves. The needle tips were then verified by AP and lateral views.  Following negative aspiration, approx 1cc of 1% Lidocaine was then injected at the above levels. The RF probes were placed into each of the THREE needles, and  RF was applied to each of the nerves for 90 seconds at 80 degrees celsius. This was repeated.  The needles were removed intact after RF and 2 cc of 2% lidocaine was then injected on the way out after multiple  negative aspirations. . The patient's area was cleansed with sterile normal saline, and a dressing was applied.      The patient was then escorted to the recovery room and given post op instructions and will follow up in two to three weeks     There were no complications noted, was hemodynamically stable, and tolerated the procedure well.      Abelino Garcia MD  PM&R/Pain Mgmt

## 2019-01-25 NOTE — NON-PROVIDER
Patient positioned pre-procedure by RN, CST and xray tech. Pillow placed under lower legs and feet for support. Grounding pad applied to right thigh by CST and verified by RN

## 2019-01-30 ENCOUNTER — APPOINTMENT (OUTPATIENT)
Dept: RADIOLOGY | Facility: MEDICAL CENTER | Age: 84
End: 2019-01-30
Attending: EMERGENCY MEDICINE
Payer: MEDICARE

## 2019-01-30 ENCOUNTER — HOSPITAL ENCOUNTER (EMERGENCY)
Facility: MEDICAL CENTER | Age: 84
End: 2019-01-31
Attending: EMERGENCY MEDICINE
Payer: MEDICARE

## 2019-01-30 DIAGNOSIS — S61.452A DOG BITE OF LEFT HAND, INITIAL ENCOUNTER: ICD-10-CM
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
## 2019-01-30 DIAGNOSIS — T14.8XXA PUNCTURE WOUND: ICD-10-CM

## 2019-01-30 DIAGNOSIS — T14.8XXA ABRASION: ICD-10-CM

## 2019-01-30 PROCEDURE — 700102 HCHG RX REV CODE 250 W/ 637 OVERRIDE(OP): Performed by: EMERGENCY MEDICINE

## 2019-01-30 PROCEDURE — 73110 X-RAY EXAM OF WRIST: CPT | Mod: LT

## 2019-01-30 PROCEDURE — A9270 NON-COVERED ITEM OR SERVICE: HCPCS | Performed by: EMERGENCY MEDICINE

## 2019-01-30 PROCEDURE — 99284 EMERGENCY DEPT VISIT MOD MDM: CPT

## 2019-01-30 PROCEDURE — 700101 HCHG RX REV CODE 250: Performed by: EMERGENCY MEDICINE

## 2019-01-30 PROCEDURE — 304217 HCHG IRRIGATION SYSTEM

## 2019-01-30 RX ORDER — AMOXICILLIN AND CLAVULANATE POTASSIUM 875; 125 MG/1; MG/1
1 TABLET, FILM COATED ORAL 2 TIMES DAILY
Qty: 14 TAB | Refills: 0 | Status: SHIPPED | OUTPATIENT
Start: 2019-01-30 | End: 2019-02-06

## 2019-01-30 RX ORDER — IBUPROFEN 600 MG/1
600 TABLET ORAL ONCE
Status: COMPLETED | OUTPATIENT
Start: 2019-01-30 | End: 2019-01-30

## 2019-01-30 RX ORDER — IBUPROFEN 400 MG/1
400 TABLET ORAL EVERY 6 HOURS PRN
Qty: 30 TAB | Refills: 0 | Status: SHIPPED | OUTPATIENT
Start: 2019-01-30 | End: 2023-07-28

## 2019-01-30 RX ORDER — LIDOCAINE HYDROCHLORIDE 10 MG/ML
10 INJECTION, SOLUTION INFILTRATION; PERINEURAL ONCE
Status: COMPLETED | OUTPATIENT
Start: 2019-01-30 | End: 2019-01-30

## 2019-01-30 RX ORDER — ACETAMINOPHEN 325 MG/1
650 TABLET ORAL ONCE
Status: COMPLETED | OUTPATIENT
Start: 2019-01-30 | End: 2019-01-30

## 2019-01-30 RX ADMIN — IBUPROFEN 600 MG: 600 TABLET ORAL at 20:08

## 2019-01-30 RX ADMIN — ACETAMINOPHEN 650 MG: 325 TABLET, FILM COATED ORAL at 20:09

## 2019-01-30 RX ADMIN — LIDOCAINE HYDROCHLORIDE 10 ML: 10 INJECTION, SOLUTION INFILTRATION; PERINEURAL at 22:45

## 2019-01-31 VITALS
TEMPERATURE: 97.4 F | RESPIRATION RATE: 20 BRPM | WEIGHT: 155.65 LBS | OXYGEN SATURATION: 93 % | HEART RATE: 93 BPM | HEIGHT: 61 IN | DIASTOLIC BLOOD PRESSURE: 86 MMHG | BODY MASS INDEX: 29.39 KG/M2 | SYSTOLIC BLOOD PRESSURE: 138 MMHG

## 2019-01-31 PROCEDURE — 303485 HCHG DRESSING MEDIUM

## 2019-01-31 PROCEDURE — 303747 HCHG EXTRA SUTURE

## 2019-01-31 PROCEDURE — 304999 HCHG REPAIR-SIMPLE/INTERMED LEVEL 1

## 2019-01-31 PROCEDURE — A6403 STERILE GAUZE>16 <= 48 SQ IN: HCPCS

## 2019-01-31 NOTE — ED TRIAGE NOTES
Cecilia Alatorre 85 y.o. female     Chief Complaint   Patient presents with   • Dog Bite     Puncture wounds to L hand.  Vaccinations current on dog.      Tetanus current    Pt returned to lobby and educated on triage process.  Advised to notify RN with changes or concerns.

## 2019-01-31 NOTE — ED PROVIDER NOTES
ED Provider Note    CHIEF COMPLAINT  Chief Complaint   Patient presents with   • Dog Bite     Puncture wounds to L hand.  Vaccinations current on dog.       HPI  HPI  Patient is a 85 female with a history of hypertension who presents to the ER for evaluation of dog bite.  This occurred at 6:30 PM.  Her friends dog, who has been acting appropriately, got out of his cage and bit her left wrist.  She had no fall, no LOC, no other acute complaints.  Pain is moderate to severe at the left wrist with several open lacerations.  She denies numbness or inability to move hand.  No elbow or shoulder pain.  Tetanus up to date.     REVIEW OF SYSTEMS  ROS  Constitutional: No fevers, chills, or recent illness.  Skin as above  Respiratory: No SOB.   Cardiac: No CP, palpitations, edema.   GI: No Abdominal Pain; N/V  : No dysuria. No D/C.   Neuro: No HA or paresthesias. No focal weakness.  Endocrine: No polyuria or polydipsia. No heat or cold intolerance.  Heme: No easy bruising. No history of bleeding disorders or anemia.      PAST MEDICAL HISTORY   has a past medical history of Acid reflux; Arthritis; Cancer (HCC); CATARACT; Dental disorder; Heart burn; Hiatus hernia syndrome; High cholesterol; Hypertension; Indigestion; Intracerebral bleed (HCC) (12/16/2015); Other specified disorder of intestines; Pain (9/2016); Psychiatric problem; Seasonal allergies; Shoulder injury; Urinary bladder disorder; and Urinary incontinence (2017).    SOCIAL HISTORY  Social History     Social History Main Topics   • Smoking status: Former Smoker     Packs/day: 1.00     Years: 17.00     Types: Cigarettes     Quit date: 1/1/1972   • Smokeless tobacco: Never Used      Comment: Started smoking at age 22   • Alcohol use Yes      Comment: 0-1 per month   • Drug use: No   • Sexual activity: No     SURGICAL HISTORY   has a past surgical history that includes other (2004); other (1948); lumbar fusion posterior (5/9/2013); lumbar decompression (5/9/2013); hip  "arthroplasty total (3/2/2015); appendectomy; other orthopedic surgery (1995); other orthopedic surgery (2007); and knee arthroplasty total (Right, 2/1/2017).    CURRENT MEDICATIONS  Home Medications    **Home medications have not yet been reviewed for this encounter**       ALLERGIES  Allergies   Allergen Reactions   • Codeine      Mental confusion   • Latex      Only to adhesive bandaids and tape; rash, redness, itching   • Lisinopril Cough   • Other Drug      Steroids raise blood pressure and blood sugar   • Soap Rash and Itching     Bathing and laundry soap cause itching and rash non scented soap OK   • Tape Rash     Rash-Paper tape OK     PHYSICAL EXAM  VITAL SIGNS: /86   Pulse 93   Temp 36.3 °C (97.4 °F) (Temporal)   Resp 20   Ht 1.549 m (5' 1\")   Wt 70.6 kg (155 lb 10.3 oz)   SpO2 93%   BMI 29.41 kg/m²  @MARV[[id number]::@  Pulse ox interpretation: I interpret this pulse ox as normal.    Physical Exam  Genl: F sitting in chair comfortably, speaking clearly, appears in no acute distress   Head: NC/AT   ENT: Mucous membranes moist, posterior pharynx clear, uvula midline, nares patent bilaterally   Pulmonary: Lungs are clear to auscultation bilaterally  Chest: No TTP  CV:  RRR, no murmur appreciated, pulses 2+ in both upper and lower extremities  Abdomen: soft, NT/ND  Musculoskeletal: Pain free ROM of the neck. Moving upper and lower extremities and spontaneous in coordinated fashion  Left Upper Extremity  - Skin: 2 scattered skin abrasions and avulsions.  Single 2cm open laceration with macerated edges along the lateral wrist over the distal ulna.  + ecchymosis  - Motor: Full ROM at shoulder, elbow, wrist; 5/5 wrist flexion/extension, thumb IP joint flexion/extension (AIN/PIN), abduction/adduction (ulnar)  - Sensation intact to median/ulnar/radial nerves  - 2+ radial pulse, < 2 sec cap refill x 5 digits  Neuro: A&Ox4 (person, place, time, situation), speech fluent, gait steady, no focal deficits " appreciated  Psych: Patient has an appropriate affect and behavior  Skin: as above.    COURSE & MEDICAL DECISION MAKING  Pertinent Labs & Imaging studies reviewed. (See chart for details)    Wrist XR:  1.  No evidence of acute fracture or dislocation.  2.  Soft tissue injury.  3.  Prominent osteoarthritis    DDX:  Laceration, wound contamination, fracture, muscular injury, neurovascular injury, tendon injury    PROCEDURE:  Procedure - Laceration closure    Patient was positioned appropriately, 3cc lidocaine with epinephrine was used as a local anesthetic. 30cc NaCl was used for irrigation. Patient was sterile draped with wound exposed. 4x  5.0 nylon sutures were placed with good approximation. Procedure tolerated without complications. Wound dressed with bacitracin and sterile gauze.     [facility]    Initial evaluation at 1010:  Patient presents emergency room for symptoms as described above.  She is alert and oriented with isolated symptoms in the left wrist.  She had macerated wound margins and skin avulsions with delicate skin due to her age.  She has neurovascularly intact with visible tendons without rupture or deep penetrating injury.  She has localized tenderness around the wrist and x-rays were obtained.  There is no evidence of gross wound contamination after irrigation and no retained after administration of localized anesthetics and closure as above, bacitracin wound dressing is applied.  She is placed in a protective volar splint and was advised on duration of wound care and overall wound healing tips.    Follow up in 7 days for suture removal  Augmentin for animal bite prescribed    FINAL IMPRESSION  Visit Diagnoses     ICD-10-CM   1. Dog bite of left hand, initial encounter S61.452A    W54.0XXA   2. Abrasion T14.8XXA   3. Puncture wound T14.8XXA     Electronically signed by: Fran Pena, 1/30/2019 9:59 PM

## 2019-01-31 NOTE — ED NOTES
Discharge instructions and prescriptions provided.  Pt verbalized understanding.  Pt ambulated from the ED with no incidents reported.  Family to drive patient home.

## 2019-01-31 NOTE — ED NOTES
Roomed, this pt feels better after medication in triage. Left deep wrist laceration from a dog bite.

## 2019-02-01 ENCOUNTER — TELEPHONE (OUTPATIENT)
Dept: MEDICAL GROUP | Facility: MEDICAL CENTER | Age: 84
End: 2019-02-01

## 2019-02-01 NOTE — TELEPHONE ENCOUNTER
1. Caller Name: Cecilia Alatorre                                           Call Back Number: [phone] (home)         Patient approves a detailed voicemail message: yes    patient went to emergency for a dog bite i called her to make an appointment however it kept going to voicemail. left message to call us to make appointment

## 2019-02-06 ENCOUNTER — OFFICE VISIT (OUTPATIENT)
Dept: MEDICAL GROUP | Facility: MEDICAL CENTER | Age: 84
End: 2019-02-06
Payer: MEDICARE

## 2019-02-06 VITALS
WEIGHT: 153.88 LBS | BODY MASS INDEX: 27.27 KG/M2 | TEMPERATURE: 99 F | SYSTOLIC BLOOD PRESSURE: 128 MMHG | HEIGHT: 63 IN | HEART RATE: 78 BPM | DIASTOLIC BLOOD PRESSURE: 80 MMHG | RESPIRATION RATE: 16 BRPM | OXYGEN SATURATION: 95 %

## 2019-02-06 DIAGNOSIS — T14.8XXA INFECTED WOUND: ICD-10-CM

## 2019-02-06 DIAGNOSIS — Z48.02 VISIT FOR SUTURE REMOVAL: ICD-10-CM

## 2019-02-06 DIAGNOSIS — L08.9 INFECTED WOUND: ICD-10-CM

## 2019-02-06 PROCEDURE — 99214 OFFICE O/P EST MOD 30 MIN: CPT | Performed by: FAMILY MEDICINE

## 2019-02-06 NOTE — PROGRESS NOTES
CC: Removal of suture, dog bite    HPI:   Cecilia presents today with sutured skin wounds.  Patient has had a dog bite a week ago.    Was seen in the ER, with several open lacerations.  Underwent skin stitches and was given oral antibiotics(Augmentin twice a day  for a week ), she has been feeling better.  Her wound is almost healed, with few areas of unhealed ulcerations.  Came in today for wound follow-up and suture removal.  Has been having some pain in the area, no discharge or change in the color of the skin.  Denies any fever, chills.  Her normal peripheral pulses.        Patient Active Problem List    Diagnosis Date Noted   • Bronchitis 05/15/2017   • Primary osteoarthritis of right knee 02/01/2017   • Essential hypertension 02/29/2016   • H/O: stroke 02/29/2016   • Primary osteoarthritis involving multiple joints 02/29/2016   • Depression 02/29/2016   • Primary localized osteoarthrosis, pelvic region and thigh 03/02/2015   • S/P lumbar fusion 05/14/2013       Current Outpatient Prescriptions   Medication Sig Dispense Refill   • ibuprofen (MOTRIN) 400 MG Tab Take 1 Tab by mouth every 6 hours as needed for Moderate Pain. 30 Tab 0   • amoxicillin-clavulanate (AUGMENTIN) 875-125 MG Tab Take 1 Tab by mouth 2 times a day for 7 days. 14 Tab 0   • DEXILANT 60 MG CAPSULE DELAYED RELEASE delayed-release capsule TAKE 1 CAPSULE EVERY       MORNING BEFORE BREAKFAST   FOR GASTROESOPHAGEAL REFLUXDISEASE 90 Cap 3   • traZODone (DESYREL) 100 MG Tab Take 1 Tab by mouth every bedtime. 90 Tab 3   • carvedilol (COREG) 3.125 MG Tab Take 1 Tab by mouth 2 times a day, with meals. 180 Tab 2   • atorvastatin (LIPITOR) 20 MG Tab Take 1 Tab by mouth every day. 90 Tab 3   • calcium citrate (CALCITRATE) 950 MG Tab Take 950 mg by mouth every day.     • Cholecalciferol (VITAMIN D3) 1000 units Cap Take  by mouth.     • Probiotic Product (PROBIOTIC-10 PO) Take  by mouth.     • Colostrum 500 MG Cap Take  by mouth.     • Omega-3 1000 MG Cap Take  " by mouth.     • Pyridoxine HCl (B-6) 100 MG Tab Take  by mouth.     • Biotin 10 MG Cap Take  by mouth.     • coenzyme Q-10 30 MG capsule Take 60 mg by mouth every day.     • Multiple Vitamins-Minerals (OCUVITE-LUTEIN) Tab Take 1 tablet by mouth every day.     • DEXILANT 60 MG CAPSULE DELAYED RELEASE delayed-release capsule TAKE 1 CAPSULE EVERY       MORNING BEFORE BREAKFAST   FOR GASTROESOPHAGEAL REFLUXDISEASE 90 Cap 1   • acetaminophen (TYLENOL) 500 MG TABS Take 1 Tab by mouth every 6 hours as needed for Mild Pain. 30 Tab 0     No current facility-administered medications for this visit.          Allergies as of 02/06/2019 - Reviewed 02/06/2019   Allergen Reaction Noted   • Codeine  02/01/2013   • Latex  01/19/2017   • Lisinopril Cough 02/29/2016   • Other drug  01/22/2019   • Soap Rash and Itching 02/20/2015   • Tape Rash 02/01/2013        ROS: Denies any chest pain, Shortness of breath, Changes bowel or bladder, Lower extremity edema.    Physical Exam:  /80 (BP Location: Right arm, Patient Position: Sitting, BP Cuff Size: Adult)   Pulse 78   Temp 37.2 °C (99 °F) (Temporal)   Resp 16   Ht 1.6 m (5' 3\")   Wt 69.8 kg (153 lb 14.1 oz)   SpO2 95%   BMI 27.26 kg/m²   Gen.: Well-developed, well-nourished, no apparent distress,pleasant and cooperative with the examination  Skin on the left extremity: Wound on the dorsum of the left hand with 4 stitches, removed, normal healing.  2 areas of unstitched skin with a early sign of infection.            Assessment and Plan.   86 y.o. female     1. Visit for suture removal/infected wound  Stitches removed.  Wound at stitched skin showed normal healing.  Patient has 2 unhealed skin lacerations with mild infection, mildly infected.  Continue Augmentin.  Topical antibiotics applied, wound dressing performed.  Advised to continue wound dressing for 3 days, will use topical antibiotics.  Then after 3 days keep the wound open.      "

## 2019-03-12 ENCOUNTER — OFFICE VISIT (OUTPATIENT)
Dept: MEDICAL GROUP | Facility: MEDICAL CENTER | Age: 84
End: 2019-03-12
Payer: MEDICARE

## 2019-03-12 ENCOUNTER — APPOINTMENT (RX ONLY)
Dept: URBAN - METROPOLITAN AREA CLINIC 20 | Facility: CLINIC | Age: 84
Setting detail: DERMATOLOGY
End: 2019-03-12

## 2019-03-12 VITALS
RESPIRATION RATE: 16 BRPM | OXYGEN SATURATION: 94 % | WEIGHT: 154 LBS | HEIGHT: 63 IN | SYSTOLIC BLOOD PRESSURE: 128 MMHG | TEMPERATURE: 99 F | BODY MASS INDEX: 27.29 KG/M2 | DIASTOLIC BLOOD PRESSURE: 82 MMHG | HEART RATE: 80 BPM

## 2019-03-12 DIAGNOSIS — I10 ESSENTIAL HYPERTENSION: ICD-10-CM

## 2019-03-12 DIAGNOSIS — R42 DIZZINESS: ICD-10-CM

## 2019-03-12 DIAGNOSIS — L57.0 ACTINIC KERATOSIS: ICD-10-CM | Status: RESOLVED

## 2019-03-12 DIAGNOSIS — L82.0 INFLAMED SEBORRHEIC KERATOSIS: ICD-10-CM

## 2019-03-12 DIAGNOSIS — Z86.73 H/O: STROKE: ICD-10-CM

## 2019-03-12 DIAGNOSIS — L57.8 OTHER SKIN CHANGES DUE TO CHRONIC EXPOSURE TO NONIONIZING RADIATION: ICD-10-CM

## 2019-03-12 DIAGNOSIS — K21.9 GASTROESOPHAGEAL REFLUX DISEASE WITHOUT ESOPHAGITIS: ICD-10-CM

## 2019-03-12 DIAGNOSIS — E78.2 MIXED HYPERLIPIDEMIA: ICD-10-CM

## 2019-03-12 PROCEDURE — 99214 OFFICE O/P EST MOD 30 MIN: CPT | Performed by: FAMILY MEDICINE

## 2019-03-12 PROCEDURE — ? LIQUID NITROGEN

## 2019-03-12 PROCEDURE — 17110 DESTRUCTION B9 LES UP TO 14: CPT

## 2019-03-12 PROCEDURE — 99213 OFFICE O/P EST LOW 20 MIN: CPT | Mod: 25

## 2019-03-12 PROCEDURE — ? COUNSELING

## 2019-03-12 ASSESSMENT — LOCATION SIMPLE DESCRIPTION DERM
LOCATION SIMPLE: RIGHT THIGH
LOCATION SIMPLE: RIGHT HAND
LOCATION SIMPLE: LEFT HAND
LOCATION SIMPLE: LEFT CHEEK

## 2019-03-12 ASSESSMENT — LOCATION DETAILED DESCRIPTION DERM
LOCATION DETAILED: LEFT RADIAL DORSAL HAND
LOCATION DETAILED: RIGHT ULNAR DORSAL HAND
LOCATION DETAILED: LEFT SUPERIOR MEDIAL BUCCAL CHEEK
LOCATION DETAILED: RIGHT ANTERIOR PROXIMAL THIGH

## 2019-03-12 ASSESSMENT — LOCATION ZONE DERM
LOCATION ZONE: LEG
LOCATION ZONE: FACE
LOCATION ZONE: HAND

## 2019-03-12 NOTE — PROGRESS NOTES
CC: History of stroke, hypertension, hyperlipidemia, acid reflux.    HPI:   Cecilia presents today discuss the following medical issues    H/O: stroke  Patient has history of stroke in 2015.  However gradually her symptoms has improved.  Currently denies any weakness or speech problems.  She is currently on atorvastatin 20 mg and aspirin daily.    Essential hypertension  Blood pressure has been adequately controlled.  Denies headache, chest pain, shortness of breath.  Patient has been on carvedilol 3.125 mg twice a day.  No side effects.    Mixed hyperlipidemia  She has been tolerating the statin. Denies muscle pain LFTs has been normal, has been on atorvastatin 20 mg daily.  No history of diabetes or CAD, however patient has history of stroke.    Gastroesophageal reflux disease without esophagitis  Denies epigastric pain, nausea, vomiting.  Patient has been doing fine on Dexilant 60 mg daily patient stated that she cannot live without Dexilant.    Dizziness  Patient has been having chronic intermittent dizziness.  Most of the times it happens while she is walking or moving sometimes it happens while she is sitting or even laying down.  Usually not associated with nausea but sometimes it does.  Patient noticed that she always feels dry and thirsty when she has a dizziness.  However she has history of benign positional vertigo.  Denies any associated focal neurological symptoms e.g weakness, speech issues ..etc.      Patient Active Problem List    Diagnosis Date Noted   • Mixed hyperlipidemia 03/12/2019   • Bronchitis 05/15/2017   • Primary osteoarthritis of right knee 02/01/2017   • Essential hypertension 02/29/2016   • H/O: stroke 02/29/2016   • Primary osteoarthritis involving multiple joints 02/29/2016   • Depression 02/29/2016   • Primary localized osteoarthrosis, pelvic region and thigh 03/02/2015   • S/P lumbar fusion 05/14/2013       Current Outpatient Prescriptions   Medication Sig Dispense Refill   •  "ibuprofen (MOTRIN) 400 MG Tab Take 1 Tab by mouth every 6 hours as needed for Moderate Pain. 30 Tab 0   • DEXILANT 60 MG CAPSULE DELAYED RELEASE delayed-release capsule TAKE 1 CAPSULE EVERY       MORNING BEFORE BREAKFAST   FOR GASTROESOPHAGEAL REFLUXDISEASE 90 Cap 3   • traZODone (DESYREL) 100 MG Tab Take 1 Tab by mouth every bedtime. 90 Tab 3   • carvedilol (COREG) 3.125 MG Tab Take 1 Tab by mouth 2 times a day, with meals. 180 Tab 2   • atorvastatin (LIPITOR) 20 MG Tab Take 1 Tab by mouth every day. 90 Tab 3   • calcium citrate (CALCITRATE) 950 MG Tab Take 950 mg by mouth every day.     • Cholecalciferol (VITAMIN D3) 1000 units Cap Take  by mouth.     • Probiotic Product (PROBIOTIC-10 PO) Take  by mouth.     • Colostrum 500 MG Cap Take  by mouth.     • Omega-3 1000 MG Cap Take  by mouth.     • Pyridoxine HCl (B-6) 100 MG Tab Take  by mouth.     • Biotin 10 MG Cap Take  by mouth.     • coenzyme Q-10 30 MG capsule Take 60 mg by mouth every day.     • Multiple Vitamins-Minerals (OCUVITE-LUTEIN) Tab Take 1 tablet by mouth every day.     • DEXILANT 60 MG CAPSULE DELAYED RELEASE delayed-release capsule TAKE 1 CAPSULE EVERY       MORNING BEFORE BREAKFAST   FOR GASTROESOPHAGEAL REFLUXDISEASE 90 Cap 1   • acetaminophen (TYLENOL) 500 MG TABS Take 1 Tab by mouth every 6 hours as needed for Mild Pain. 30 Tab 0     No current facility-administered medications for this visit.          Allergies as of 03/12/2019 - Reviewed 03/12/2019   Allergen Reaction Noted   • Codeine  02/01/2013   • Latex  01/19/2017   • Lisinopril Cough 02/29/2016   • Other drug  01/22/2019   • Soap Rash and Itching 02/20/2015   • Tape Rash 02/01/2013        ROS: Denies any chest pain, Shortness of breath, Changes bowel or bladder, Lower extremity edema.    Physical Exam:  /82 (BP Location: Right arm, Patient Position: Sitting, BP Cuff Size: Adult)   Pulse 80   Temp 37.2 °C (99 °F) (Temporal)   Resp 16   Ht 1.6 m (5' 3\")   Wt 69.9 kg (154 lb)   " SpO2 94%   BMI 27.28 kg/m²   Gen.: Well-developed, well-nourished, no apparent distress,pleasant and cooperative with the examination  Skin:  Warm and dry with good turgor. No rashes or suspicious lesions in visible areas  HEENT:Sinuses nontender with palpation, TMs clear, nares patent with pink mucosa and clear rhinorrhea,no septal deviation ,polyps or lesions. lips without lesions, oropharynx clear.  Neck: Trachea midline,no masses or adenopathy. No JVD.  Cor: Regular rate and rhythm without murmur, gallop or rub.  Lungs: Respirations unlabored.Clear to auscultation with equal breath sounds bilaterally. No wheezes, rhonchi.  Extremities: No cyanosis, clubbing or edema.      Assessment and Plan.   86 y.o. female     1. H/O: stroke  Stable.  No residual.  Continue on statin, and aspirin.    2. Essential hypertension  Adequately controlled.  Continue on carvedilol 3.125 mg twice a day.    3. Mixed hyperlipidemia  He has been tolerating the statin. Denies muscle pain LFTs has been normal  Continue on atorvastatin 20 mg daily.    4. Gastroesophageal reflux disease without esophagitis  Patient has been doing fine on Dexilant 60 mg daily.    5. Dizziness  Intermittent.  Probably dehydration +/-benign proximal vertigo  Recommend hydration.  Advised to move her head and trunk slowly.  Recommend meclizine as needed.  Return to the clinic if it gets worse.

## 2019-03-12 NOTE — PROCEDURE: LIQUID NITROGEN
Post-Care Instructions: I reviewed with the patient in detail post-care instructions. Patient is to wear sunprotection, and avoid picking at any of the treated lesions. Pt may apply Vaseline to crusted or scabbing areas.
Detail Level: Detailed
Medical Necessity Clause: This procedure was medically necessary because the lesions that were treated were:
Render Post-Care Instructions In Note?: no
Medical Necessity Information: It is in your best interest to select a reason for this procedure from the list below. All of these items fulfill various CMS LCD requirements except the new and changing color options.
Consent: The patient's consent was obtained including but not limited to risks of crusting, scabbing, blistering, scarring, darker or lighter pigmentary change, recurrence, incomplete removal and infection.

## 2019-05-07 DIAGNOSIS — F51.04 CHRONIC INSOMNIA: ICD-10-CM

## 2019-05-07 RX ORDER — TRAZODONE HYDROCHLORIDE 100 MG/1
TABLET ORAL
Qty: 90 TAB | Refills: 3 | Status: SHIPPED | OUTPATIENT
Start: 2019-05-07 | End: 2023-04-17

## 2019-06-17 ENCOUNTER — OFFICE VISIT (OUTPATIENT)
Dept: MEDICAL GROUP | Facility: MEDICAL CENTER | Age: 84
End: 2019-06-17
Payer: MEDICARE

## 2019-06-17 VITALS
RESPIRATION RATE: 16 BRPM | BODY MASS INDEX: 27.29 KG/M2 | TEMPERATURE: 98.4 F | DIASTOLIC BLOOD PRESSURE: 68 MMHG | WEIGHT: 154 LBS | OXYGEN SATURATION: 97 % | HEIGHT: 63 IN | HEART RATE: 74 BPM | SYSTOLIC BLOOD PRESSURE: 110 MMHG

## 2019-06-17 DIAGNOSIS — I10 ESSENTIAL HYPERTENSION: ICD-10-CM

## 2019-06-17 DIAGNOSIS — R53.83 TIREDNESS: ICD-10-CM

## 2019-06-17 DIAGNOSIS — E78.2 MIXED HYPERLIPIDEMIA: ICD-10-CM

## 2019-06-17 PROCEDURE — 99214 OFFICE O/P EST MOD 30 MIN: CPT | Performed by: FAMILY MEDICINE

## 2019-06-19 LAB
ALBUMIN SERPL-MCNC: 3.9 G/DL (ref 3.5–4.7)
ALBUMIN/GLOB SERPL: 1.8 {RATIO} (ref 1.2–2.2)
ALP SERPL-CCNC: 66 IU/L (ref 39–117)
ALT SERPL-CCNC: 11 IU/L (ref 0–32)
AST SERPL-CCNC: 14 IU/L (ref 0–40)
BASOPHILS # BLD AUTO: 0 X10E3/UL (ref 0–0.2)
BASOPHILS NFR BLD AUTO: 0 %
BILIRUB SERPL-MCNC: 0.4 MG/DL (ref 0–1.2)
BUN SERPL-MCNC: 20 MG/DL (ref 8–27)
BUN/CREAT SERPL: 22 (ref 12–28)
CALCIUM SERPL-MCNC: 9.5 MG/DL (ref 8.7–10.3)
CHLORIDE SERPL-SCNC: 107 MMOL/L (ref 96–106)
CHOLEST SERPL-MCNC: 146 MG/DL (ref 100–199)
CO2 SERPL-SCNC: 23 MMOL/L (ref 20–29)
CREAT SERPL-MCNC: 0.92 MG/DL (ref 0.57–1)
EOSINOPHIL # BLD AUTO: 0.1 X10E3/UL (ref 0–0.4)
EOSINOPHIL NFR BLD AUTO: 2 %
ERYTHROCYTE [DISTWIDTH] IN BLOOD BY AUTOMATED COUNT: 14.6 % (ref 12.3–15.4)
GLOBULIN SER CALC-MCNC: 2.2 G/DL (ref 1.5–4.5)
GLUCOSE SERPL-MCNC: 91 MG/DL (ref 65–99)
HCT VFR BLD AUTO: 39.2 % (ref 34–46.6)
HDLC SERPL-MCNC: 64 MG/DL
HGB BLD-MCNC: 12.5 G/DL (ref 11.1–15.9)
IMM GRANULOCYTES # BLD AUTO: 0 X10E3/UL (ref 0–0.1)
IMM GRANULOCYTES NFR BLD AUTO: 0 %
IMMATURE CELLS  115398: NORMAL
LABORATORY COMMENT REPORT: NORMAL
LDLC SERPL CALC-MCNC: 70 MG/DL (ref 0–99)
LYMPHOCYTES # BLD AUTO: 1.2 X10E3/UL (ref 0.7–3.1)
LYMPHOCYTES NFR BLD AUTO: 19 %
MCH RBC QN AUTO: 28.7 PG (ref 26.6–33)
MCHC RBC AUTO-ENTMCNC: 31.9 G/DL (ref 31.5–35.7)
MCV RBC AUTO: 90 FL (ref 79–97)
MONOCYTES # BLD AUTO: 0.6 X10E3/UL (ref 0.1–0.9)
MONOCYTES NFR BLD AUTO: 10 %
MORPHOLOGY BLD-IMP: NORMAL
NEUTROPHILS # BLD AUTO: 4.2 X10E3/UL (ref 1.4–7)
NEUTROPHILS NFR BLD AUTO: 69 %
NRBC BLD AUTO-RTO: NORMAL %
PLATELET # BLD AUTO: 305 X10E3/UL (ref 150–450)
POTASSIUM SERPL-SCNC: 4.7 MMOL/L (ref 3.5–5.2)
PROT SERPL-MCNC: 6.1 G/DL (ref 6–8.5)
RBC # BLD AUTO: 4.35 X10E6/UL (ref 3.77–5.28)
SODIUM SERPL-SCNC: 144 MMOL/L (ref 134–144)
TRIGL SERPL-MCNC: 62 MG/DL (ref 0–149)
TSH SERPL DL<=0.005 MIU/L-ACNC: 2.62 UIU/ML (ref 0.45–4.5)
VIT B12 SERPL-MCNC: >2000 PG/ML (ref 232–1245)
VLDLC SERPL CALC-MCNC: 12 MG/DL (ref 5–40)
WBC # BLD AUTO: 6.1 X10E3/UL (ref 3.4–10.8)

## 2019-06-24 DIAGNOSIS — I10 ESSENTIAL HYPERTENSION: ICD-10-CM

## 2019-06-24 RX ORDER — CARVEDILOL 3.12 MG/1
3.12 TABLET ORAL 2 TIMES DAILY WITH MEALS
Qty: 180 TAB | Refills: 2 | Status: SHIPPED | OUTPATIENT
Start: 2019-06-24 | End: 2019-08-15 | Stop reason: SDUPTHER

## 2019-07-02 DIAGNOSIS — F51.04 CHRONIC INSOMNIA: ICD-10-CM

## 2019-07-02 RX ORDER — TRAZODONE HYDROCHLORIDE 100 MG/1
100 TABLET ORAL
Qty: 90 TAB | Refills: 3 | Status: SHIPPED | OUTPATIENT
Start: 2019-07-02 | End: 2020-07-27

## 2019-07-02 NOTE — TELEPHONE ENCOUNTER
Blood work showed no significant abnormality.  His vitamin B12 has been high, however he still can take vitamin B12 with his multivitamins.  Otherwise no abnormality.

## 2019-08-15 DIAGNOSIS — I10 ESSENTIAL HYPERTENSION: ICD-10-CM

## 2019-08-15 RX ORDER — CARVEDILOL 3.12 MG/1
3.12 TABLET ORAL 2 TIMES DAILY WITH MEALS
Qty: 180 TAB | Refills: 2 | Status: SHIPPED | OUTPATIENT
Start: 2019-08-15 | End: 2020-06-09 | Stop reason: SDUPTHER

## 2019-09-10 ENCOUNTER — APPOINTMENT (RX ONLY)
Dept: URBAN - METROPOLITAN AREA CLINIC 20 | Facility: CLINIC | Age: 84
Setting detail: DERMATOLOGY
End: 2019-09-10

## 2019-09-10 ENCOUNTER — RX ONLY (OUTPATIENT)
Age: 84
Setting detail: RX ONLY
End: 2019-09-10

## 2019-09-10 DIAGNOSIS — Z85.828 PERSONAL HISTORY OF OTHER MALIGNANT NEOPLASM OF SKIN: ICD-10-CM

## 2019-09-10 DIAGNOSIS — L57.8 OTHER SKIN CHANGES DUE TO CHRONIC EXPOSURE TO NONIONIZING RADIATION: ICD-10-CM

## 2019-09-10 DIAGNOSIS — L81.4 OTHER MELANIN HYPERPIGMENTATION: ICD-10-CM

## 2019-09-10 DIAGNOSIS — L82.1 OTHER SEBORRHEIC KERATOSIS: ICD-10-CM

## 2019-09-10 DIAGNOSIS — D22 MELANOCYTIC NEVI: ICD-10-CM

## 2019-09-10 DIAGNOSIS — D18.0 HEMANGIOMA: ICD-10-CM

## 2019-09-10 DIAGNOSIS — L57.0 ACTINIC KERATOSIS: ICD-10-CM

## 2019-09-10 PROBLEM — D22.5 MELANOCYTIC NEVI OF TRUNK: Status: ACTIVE | Noted: 2019-09-10

## 2019-09-10 PROBLEM — D18.01 HEMANGIOMA OF SKIN AND SUBCUTANEOUS TISSUE: Status: ACTIVE | Noted: 2019-09-10

## 2019-09-10 PROCEDURE — ? LIQUID NITROGEN

## 2019-09-10 PROCEDURE — 17000 DESTRUCT PREMALG LESION: CPT

## 2019-09-10 PROCEDURE — 99214 OFFICE O/P EST MOD 30 MIN: CPT | Mod: 25

## 2019-09-10 PROCEDURE — ? COUNSELING

## 2019-09-10 RX ORDER — TRETINOIN 0.5 MG/G
CREAM TOPICAL
Qty: 3 | Refills: 1 | Status: ERX

## 2019-09-10 ASSESSMENT — LOCATION SIMPLE DESCRIPTION DERM
LOCATION SIMPLE: RIGHT NOSE
LOCATION SIMPLE: LEFT FOREHEAD
LOCATION SIMPLE: LEFT HAND
LOCATION SIMPLE: RIGHT UPPER BACK
LOCATION SIMPLE: RIGHT ANKLE
LOCATION SIMPLE: LEFT UPPER BACK
LOCATION SIMPLE: RIGHT HAND
LOCATION SIMPLE: UPPER BACK
LOCATION SIMPLE: RIGHT ANTERIOR NECK
LOCATION SIMPLE: RIGHT CHEEK

## 2019-09-10 ASSESSMENT — LOCATION ZONE DERM
LOCATION ZONE: NECK
LOCATION ZONE: LEG
LOCATION ZONE: FACE
LOCATION ZONE: NOSE
LOCATION ZONE: HAND
LOCATION ZONE: TRUNK

## 2019-09-10 ASSESSMENT — LOCATION DETAILED DESCRIPTION DERM
LOCATION DETAILED: RIGHT INFERIOR MEDIAL UPPER BACK
LOCATION DETAILED: RIGHT SUPERIOR UPPER BACK
LOCATION DETAILED: LEFT SUPERIOR UPPER BACK
LOCATION DETAILED: RIGHT RADIAL DORSAL HAND
LOCATION DETAILED: LEFT INFERIOR LATERAL FOREHEAD
LOCATION DETAILED: RIGHT ANKLE
LOCATION DETAILED: LEFT LATERAL FOREHEAD
LOCATION DETAILED: RIGHT INFERIOR CENTRAL MALAR CHEEK
LOCATION DETAILED: RIGHT INFERIOR ANTERIOR NECK
LOCATION DETAILED: SUPERIOR THORACIC SPINE
LOCATION DETAILED: LEFT RADIAL DORSAL HAND
LOCATION DETAILED: RIGHT NASAL ALA

## 2019-09-12 ENCOUNTER — OFFICE VISIT (OUTPATIENT)
Dept: MEDICAL GROUP | Facility: MEDICAL CENTER | Age: 84
End: 2019-09-12
Payer: MEDICARE

## 2019-09-12 VITALS
SYSTOLIC BLOOD PRESSURE: 112 MMHG | BODY MASS INDEX: 28.42 KG/M2 | TEMPERATURE: 98.3 F | WEIGHT: 160.4 LBS | HEART RATE: 79 BPM | DIASTOLIC BLOOD PRESSURE: 68 MMHG | RESPIRATION RATE: 14 BRPM | HEIGHT: 63 IN | OXYGEN SATURATION: 99 %

## 2019-09-12 DIAGNOSIS — Z86.73 H/O: STROKE: ICD-10-CM

## 2019-09-12 DIAGNOSIS — Z23 NEED FOR VACCINATION: ICD-10-CM

## 2019-09-12 DIAGNOSIS — I10 ESSENTIAL HYPERTENSION: ICD-10-CM

## 2019-09-12 DIAGNOSIS — Z78.0 ASYMPTOMATIC POSTMENOPAUSAL STATE: ICD-10-CM

## 2019-09-12 DIAGNOSIS — Z98.1 S/P LUMBAR FUSION: ICD-10-CM

## 2019-09-12 DIAGNOSIS — F33.1 MODERATE EPISODE OF RECURRENT MAJOR DEPRESSIVE DISORDER (HCC): ICD-10-CM

## 2019-09-12 DIAGNOSIS — M15.9 PRIMARY OSTEOARTHRITIS INVOLVING MULTIPLE JOINTS: ICD-10-CM

## 2019-09-12 DIAGNOSIS — E78.2 MIXED HYPERLIPIDEMIA: ICD-10-CM

## 2019-09-12 DIAGNOSIS — Z00.00 MEDICARE ANNUAL WELLNESS VISIT, SUBSEQUENT: ICD-10-CM

## 2019-09-12 DIAGNOSIS — F51.04 CHRONIC INSOMNIA: ICD-10-CM

## 2019-09-12 PROCEDURE — G0439 PPPS, SUBSEQ VISIT: HCPCS | Performed by: FAMILY MEDICINE

## 2019-09-12 PROCEDURE — 99213 OFFICE O/P EST LOW 20 MIN: CPT | Mod: 25 | Performed by: FAMILY MEDICINE

## 2019-09-12 PROCEDURE — 90471 IMMUNIZATION ADMIN: CPT | Performed by: FAMILY MEDICINE

## 2019-09-12 PROCEDURE — 90662 IIV NO PRSV INCREASED AG IM: CPT | Performed by: FAMILY MEDICINE

## 2019-09-12 RX ORDER — SERTRALINE HYDROCHLORIDE 25 MG/1
25 TABLET, FILM COATED ORAL DAILY
Qty: 30 TAB | Refills: 3 | Status: SHIPPED | OUTPATIENT
Start: 2019-09-12 | End: 2019-09-16 | Stop reason: SDUPTHER

## 2019-09-12 ASSESSMENT — PATIENT HEALTH QUESTIONNAIRE - PHQ9
5. POOR APPETITE OR OVEREATING: 3 - NEARLY EVERY DAY
CLINICAL INTERPRETATION OF PHQ2 SCORE: 1
SUM OF ALL RESPONSES TO PHQ QUESTIONS 1-9: 9

## 2019-09-12 ASSESSMENT — ENCOUNTER SYMPTOMS: GENERAL WELL-BEING: FAIR

## 2019-09-12 ASSESSMENT — ACTIVITIES OF DAILY LIVING (ADL): BATHING_REQUIRES_ASSISTANCE: 0

## 2019-09-12 NOTE — PROGRESS NOTES
Chief Complaint   Patient presents with   • Annual Wellness Visit         HPI:  Cecilia is a 86 y.o. here for Medicare Annual Wellness Visit    Medicare annual wellness visit, subsequent  Preventive measures and chronic medical issues reviewed.    Moderate episode of recurrent major depressive disorder (HCC)  Patient stating that her mood has worsened, her PHQ questionnaire discussed, she score is 14.  However she denies suicidal ideation.  Currently on trazodone for sleep, but has not been helping her mood.    Essential hypertension  Has been adequately controlled on current medication. Denies headache, chest pain, and SOB.  Has been doing fine no carvedilol 3.125 mg twice a day.  No side effects.    H/O: stroke  Patient has history of minor stroke 2 years ago, has no residual effect.  She has been on atorvastatin 20 mg daily.    Mixed hyperlipidemia  She has been tolerating the statin. Denies muscle pain LFTs has been normal, currently on atorvastatin 20 mg daily.    Primary osteoarthritis involving multiple joints  Pain is fluctuating, and migrating from joint to joint.  However she has been doing fine on Voltaren cream, sometimes takes Motrin.  Patient requested a refill of Voltaren cream.    Chronic back pain, S/P lumbar fusion  Chronic pain that comes and goes, usually localized to the lower back does not radiate to the legs, denies any leg weakness, denies any numbness or pain around the anal area, she has normal bowel and urine control    Chronic insomnia  She has been doing fine on trazodone    Due for bone density scan and flu shot.    Patient Active Problem List    Diagnosis Date Noted   • Mixed hyperlipidemia 03/12/2019   • Bronchitis 05/15/2017   • Primary osteoarthritis of right knee 02/01/2017   • Essential hypertension 02/29/2016   • H/O: stroke 02/29/2016   • Primary osteoarthritis involving multiple joints 02/29/2016   • Depression 02/29/2016   • Primary localized osteoarthrosis, pelvic region and  thigh 03/02/2015   • S/P lumbar fusion 05/14/2013       Current Outpatient Medications   Medication Sig Dispense Refill   • carvedilol (COREG) 3.125 MG Tab Take 1 Tab by mouth 2 times a day, with meals. 180 Tab 2   • traZODone (DESYREL) 100 MG Tab Take 1 Tab by mouth every bedtime. 90 Tab 3   • traZODone (DESYREL) 100 MG Tab TAKE 1 TABLET AT BEDTIME 90 Tab 3   • ibuprofen (MOTRIN) 400 MG Tab Take 1 Tab by mouth every 6 hours as needed for Moderate Pain. 30 Tab 0   • DEXILANT 60 MG CAPSULE DELAYED RELEASE delayed-release capsule TAKE 1 CAPSULE EVERY       MORNING BEFORE BREAKFAST   FOR GASTROESOPHAGEAL REFLUXDISEASE 90 Cap 3   • atorvastatin (LIPITOR) 20 MG Tab Take 1 Tab by mouth every day. 90 Tab 3   • calcium citrate (CALCITRATE) 950 MG Tab Take 950 mg by mouth every day.     • Cholecalciferol (VITAMIN D3) 1000 units Cap Take  by mouth.     • Probiotic Product (PROBIOTIC-10 PO) Take  by mouth.     • Colostrum 500 MG Cap Take  by mouth.     • Omega-3 1000 MG Cap Take  by mouth.     • Pyridoxine HCl (B-6) 100 MG Tab Take  by mouth.     • Biotin 10 MG Cap Take  by mouth.     • coenzyme Q-10 30 MG capsule Take 60 mg by mouth every day.     • Multiple Vitamins-Minerals (OCUVITE-LUTEIN) Tab Take 1 tablet by mouth every day.     • DEXILANT 60 MG CAPSULE DELAYED RELEASE delayed-release capsule TAKE 1 CAPSULE EVERY       MORNING BEFORE BREAKFAST   FOR GASTROESOPHAGEAL REFLUXDISEASE 90 Cap 1   • acetaminophen (TYLENOL) 500 MG TABS Take 1 Tab by mouth every 6 hours as needed for Mild Pain. 30 Tab 0     No current facility-administered medications for this visit.         Patient is taking medications as noted in medication list.  Current supplements as per medication list.     Allergies: Codeine; Latex; Lisinopril; Other drug; Soap; and Tape    Current social contact/activities: PT states she visits with family and friends and she does not exercise due to knee and hip problems.     Is patient current with immunizations? No,  due for TDap and flu. Patient is interested in receiving NONE today.     She  reports that she quit smoking about 47 years ago. Her smoking use included cigarettes. She has a 17.00 pack-year smoking history. She has never used smokeless tobacco. She reports that she drinks alcohol. She reports that she does not use drugs.  Counseling given: Not Answered  Comment: Started smoking at age 22        DPA/Advanced directive: Patient has Advanced Directive on file.     ROS:    Gait: Uses a walker at home   Ostomy: No   Other tubes: No   Amputations: No   Chronic oxygen use No   Last eye exam this year   Wears hearing aids: No   : Reports urinary leakage during the last 6 months that has not interfered at all with their daily activities or sleep.  Depression Screening    Little interest or pleasure in doing things?  1 - several days  Feeling down, depressed, or hopeless? 0 - not at all  Patient Health Questionnaire Score: 1    If depressive symptoms identified deferred to follow up visit unless specifically addressed in assessment and plan.    Interpretation of PHQ-9 Total Score   Score Severity   1-4 No Depression   5-9 Mild Depression   10-14 Moderate Depression   15-19 Moderately Severe Depression   20-27 Severe Depression    Screening for Cognitive Impairment    Three Minute Recall (village, kitchen, baby)  3/3    Mehrdad clock face with all 12 numbers and set the hands to show 10 past 10.  Yes 5/5  If cognitive concerns identified, deferred for follow up unless specifically addressed in assessment and plan.    Fall Risk Assessment    Has the patient had two or more falls in the last year or any fall with injury in the last year?  No  If fall risk identified, deferred for follow up unless specifically addressed in assessment and plan.    Safety Assessment    Throw rugs on floor.  No  Handrails on all stairs.  Yes  Good lighting in all hallways.  Yes  Difficulty hearing.  No  Patient counseled about all safety risks that  were identified.    Functional Assessment ADLs    Are there any barriers preventing you from cooking for yourself or meeting nutritional needs?  No.    Are there any barriers preventing you from driving safely or obtaining transportation?  No.    Are there any barriers preventing you from using a telephone or calling for help?  No.    Are there any barriers preventing you from shopping?  No.    Are there any barriers preventing you from taking care of your own finances?  No.    Are there any barriers preventing you from managing your medications?  No.    Are there any barriers preventing you from showering, bathing or dressing yourself?  No.    Are you currently engaging in any exercise or physical activity?  Yes.     What is your perception of your health?   .    Health Maintenance Summary                BONE DENSITY Overdue 1998     IMM ZOSTER VACCINES Overdue 2012      Done 2012 Imm Admin: Zoster Vaccine Live (ZVL) (Zostavax)    IMM DTaP/Tdap/Td Vaccine Overdue 10/18/2016      Done 10/18/2006 Imm Admin: Tdap Vaccine     Patient has more history with this topic...    Annual Wellness Visit Overdue 2019      Done 2018 Visit Dx: Medicare annual wellness visit, initial    IMM INFLUENZA Overdue 2019      Done 2018 Imm Admin: Influenza Vaccine Adult HD     Patient has more history with this topic...          Patient Care Team:  Luz Contreras M.D. as PCP - General (Geriatrics)  Tripp Rodriguez M.D. as Consulting Physician (Ophthalmology)    Social History     Tobacco Use   • Smoking status: Former Smoker     Packs/day: 1.00     Years: 17.00     Pack years: 17.00     Types: Cigarettes     Last attempt to quit: 1972     Years since quittin.7   • Smokeless tobacco: Never Used   • Tobacco comment: Started smoking at age 22   Substance Use Topics   • Alcohol use: Yes     Comment: 0-1 per month   • Drug use: No     Family History   Problem Relation Age of Onset   • Stroke  Father    • Cancer Mother      She  has a past medical history of Acid reflux, Arthritis, Cancer (HCC), CATARACT, Dental disorder, Heart burn, Hiatus hernia syndrome, High cholesterol, Hypertension, Indigestion, Intracerebral bleed (HCC) (12/16/2015), Other specified disorder of intestines, Pain (9/2016), Psychiatric problem, Seasonal allergies, Shoulder injury, Urinary bladder disorder, and Urinary incontinence (2017).   Past Surgical History:   Procedure Laterality Date   • KNEE ARTHROPLASTY TOTAL Right 2/1/2017    Procedure: KNEE ARTHROPLASTY TOTAL;  Surgeon: Aaron Burton M.D.;  Location: [facility];  Service:    • HIP ARTHROPLASTY TOTAL  3/2/2015    Performed by Aaron Burton M.D. at [facility]   • LUMBAR FUSION POSTERIOR  5/9/2013    Performed by Sancho Rosales M.D. at [facility]   • LUMBAR DECOMPRESSION  5/9/2013    Performed by Sancho Rosales M.D. at [facility]   • OTHER ORTHOPEDIC SURGERY  2007    knee right   • OTHER  2004    face lift , tummy tuck   • OTHER ORTHOPEDIC SURGERY  1995    carpal tunnel, right   • OTHER  1948    tonsillectomy   • APPENDECTOMY             Exam:     Pulse 79   Temp 37.2 °C (99 °F) (Temporal)   Wt 72.8 kg (160 lb 6.4 oz)   SpO2 99%  Body mass index is 28.41 kg/m².    Hearing , good  Dentition, good   Alert, oriented in no acute distress.  Eye contact is good, speech goal directed, affect calm      Assessment and Plan. The following treatment and monitoring plan is recommended:    86 y.o. female     1. Medicare annual wellness visit, subsequent  Preventive measures and chronic medical issues reviewed.    - Subsequent Annual Wellness Visit - Includes PPPS ()    2. Moderate episode of recurrent major depressive disorder (HCC)  Mood has worsened, PHQ score is 14 however denies suicidal ideation.  We will start patient on Zoloft 25 mg daily.  We will send patient to a psychiatrist..    - sertraline (ZOLOFT) 25 MG tablet; Take  1 Tab by mouth every day.  Dispense: 30 Tab; Refill: 3  - REFERRAL TO PSYCHIATRY    3. Essential hypertension  Adequately controlled.  Continue on carvedilol 3.125 mg twice a day.    - Subsequent Annual Wellness Visit - Includes PPPS ()    4. H/O: stroke  Stable.  No residual effect.  Continue on atorvastatin 20 mg daily.    - Subsequent Annual Wellness Visit - Includes PPPS ()    5. Mixed hyperlipidemia  He has been tolerating the statin. Denies muscle pain LFTs has been normal  Continue atorvastatin 20 mg daily.    - Subsequent Annual Wellness Visit - Includes PPPS ()    6. Primary osteoarthritis involving multiple joints  Pain is fluctuating, and migrating from joint to joint.  However she has been doing fine on Voltaren cream, sometimes takes Motrin.    - Subsequent Annual Wellness Visit - Includes PPPS ()  - Diclofenac Sodium 1 % Gel; Apply 1 Application to skin as directed 2 Times a Day.  Dispense: 1 Tube; Refill: 3    7. S/P lumbar fusion  Stable.  Voltaren and Motrin has been helping.    - Subsequent Annual Wellness Visit - Includes PPPS ()    8. Asymptomatic postmenopausal state  Due for bone density scan.    - DS-BONE DENSITY STUDY (DEXA); Future  - Subsequent Annual Wellness Visit - Includes PPPS ()    - Subsequent Annual Wellness Visit - Includes PPPS ()    9. Chronic insomnia  She has been doing fine on trazodone    - Subsequent Annual Wellness Visit - Includes PPPS ()    10. Need for vaccination    - Subsequent Annual Wellness Visit - Includes PPPS ()  - INFLUENZA VACCINE, HIGH DOSE (65+ ONLY)        Services suggested:   Health Care Screening recommendations as per orders if indicated.  Referrals offered: PT/OT/Nutrition counseling/Behavioral Health/Smoking cessation as per orders if indicated.    Discussion today about general wellness and lifestyle habits:    · Prevent falls and reduce trip hazards; Cautioned about securing or removing rugs.  · Have a  working fire alarm and carbon monoxide detector;   · Engage in regular physical activity and social activities       Follow-up: No follow-ups on file.

## 2019-09-16 DIAGNOSIS — F33.1 MODERATE EPISODE OF RECURRENT MAJOR DEPRESSIVE DISORDER (HCC): ICD-10-CM

## 2019-09-16 RX ORDER — SERTRALINE HYDROCHLORIDE 25 MG/1
25 TABLET, FILM COATED ORAL DAILY
Qty: 90 TAB | Refills: 3 | Status: CANCELLED | OUTPATIENT
Start: 2019-09-16

## 2019-09-16 RX ORDER — SERTRALINE HYDROCHLORIDE 25 MG/1
25 TABLET, FILM COATED ORAL DAILY
Qty: 30 TAB | Refills: 3 | Status: SHIPPED | OUTPATIENT
Start: 2019-09-16 | End: 2019-12-12 | Stop reason: SDUPTHER

## 2019-09-19 DIAGNOSIS — E78.5 HYPERLIPIDEMIA, UNSPECIFIED HYPERLIPIDEMIA TYPE: ICD-10-CM

## 2019-09-19 RX ORDER — ATORVASTATIN CALCIUM 20 MG/1
20 TABLET, FILM COATED ORAL DAILY
Qty: 90 TAB | Refills: 3 | Status: SHIPPED | OUTPATIENT
Start: 2019-09-19 | End: 2020-08-24

## 2019-10-01 ENCOUNTER — HOSPITAL ENCOUNTER (OUTPATIENT)
Dept: RADIOLOGY | Facility: MEDICAL CENTER | Age: 84
End: 2019-10-01
Attending: FAMILY MEDICINE
Payer: MEDICARE

## 2019-10-01 DIAGNOSIS — Z78.0 ASYMPTOMATIC POSTMENOPAUSAL STATE: ICD-10-CM

## 2019-10-01 PROCEDURE — 77080 DXA BONE DENSITY AXIAL: CPT

## 2019-10-09 ENCOUNTER — APPOINTMENT (RX ONLY)
Dept: URBAN - METROPOLITAN AREA CLINIC 20 | Facility: CLINIC | Age: 84
Setting detail: DERMATOLOGY
End: 2019-10-09

## 2019-10-09 DIAGNOSIS — L29.8 OTHER PRURITUS: ICD-10-CM

## 2019-10-09 DIAGNOSIS — L29.89 OTHER PRURITUS: ICD-10-CM

## 2019-10-09 DIAGNOSIS — L91.8 OTHER HYPERTROPHIC DISORDERS OF THE SKIN: ICD-10-CM

## 2019-10-09 DIAGNOSIS — L57.0 ACTINIC KERATOSIS: ICD-10-CM

## 2019-10-09 PROCEDURE — ? COUNSELING

## 2019-10-09 PROCEDURE — ? ADDITIONAL NOTES

## 2019-10-09 PROCEDURE — ? LIQUID NITROGEN

## 2019-10-09 PROCEDURE — 17110 DESTRUCTION B9 LES UP TO 14: CPT

## 2019-10-09 PROCEDURE — 99213 OFFICE O/P EST LOW 20 MIN: CPT | Mod: 25

## 2019-10-09 ASSESSMENT — LOCATION ZONE DERM
LOCATION ZONE: AXILLAE
LOCATION ZONE: SCALP

## 2019-10-09 ASSESSMENT — LOCATION SIMPLE DESCRIPTION DERM
LOCATION SIMPLE: SCALP
LOCATION SIMPLE: RIGHT AXILLARY VAULT
LOCATION SIMPLE: LEFT AXILLARY VAULT

## 2019-10-09 ASSESSMENT — LOCATION DETAILED DESCRIPTION DERM
LOCATION DETAILED: LEFT SUPERIOR PARIETAL SCALP
LOCATION DETAILED: RIGHT AXILLARY VAULT
LOCATION DETAILED: LEFT AXILLARY VAULT

## 2019-10-09 NOTE — PROCEDURE: ADDITIONAL NOTES
Additional Notes: Recommended Cera Ve anti itch cream
Detail Level: Simple
Additional Notes: OTC cera ve anti itch lotion

## 2019-12-01 DIAGNOSIS — K21.9 GERD WITHOUT ESOPHAGITIS: ICD-10-CM

## 2019-12-02 RX ORDER — DEXLANSOPRAZOLE 60 MG/1
CAPSULE, DELAYED RELEASE ORAL
Qty: 90 CAP | Refills: 3 | Status: SHIPPED | OUTPATIENT
Start: 2019-12-02 | End: 2020-11-16

## 2019-12-04 NOTE — TELEPHONE ENCOUNTER
Was the patient seen in the last year in this department? Yes    Does patient have an active prescription for medications requested? No     Received Request Via: Pharmacy  
English

## 2019-12-10 ENCOUNTER — APPOINTMENT (RX ONLY)
Dept: URBAN - METROPOLITAN AREA CLINIC 20 | Facility: CLINIC | Age: 84
Setting detail: DERMATOLOGY
End: 2019-12-10

## 2019-12-10 DIAGNOSIS — L57.0 ACTINIC KERATOSIS: ICD-10-CM | Status: RESOLVED

## 2019-12-10 DIAGNOSIS — I83.9 ASYMPTOMATIC VARICOSE VEINS OF LOWER EXTREMITIES: ICD-10-CM

## 2019-12-10 PROBLEM — I83.91 ASYMPTOMATIC VARICOSE VEINS OF RIGHT LOWER EXTREMITY: Status: ACTIVE | Noted: 2019-12-10

## 2019-12-10 PROCEDURE — ? COUNSELING

## 2019-12-10 PROCEDURE — 99213 OFFICE O/P EST LOW 20 MIN: CPT

## 2019-12-10 ASSESSMENT — LOCATION ZONE DERM
LOCATION ZONE: SCALP
LOCATION ZONE: LEG

## 2019-12-10 ASSESSMENT — LOCATION DETAILED DESCRIPTION DERM
LOCATION DETAILED: RIGHT ANTERIOR PROXIMAL THIGH
LOCATION DETAILED: RIGHT SUPERIOR PARIETAL SCALP

## 2019-12-10 ASSESSMENT — LOCATION SIMPLE DESCRIPTION DERM
LOCATION SIMPLE: SCALP
LOCATION SIMPLE: RIGHT THIGH

## 2019-12-12 ENCOUNTER — OFFICE VISIT (OUTPATIENT)
Dept: MEDICAL GROUP | Facility: MEDICAL CENTER | Age: 84
End: 2019-12-12
Payer: MEDICARE

## 2019-12-12 VITALS
DIASTOLIC BLOOD PRESSURE: 70 MMHG | BODY MASS INDEX: 28 KG/M2 | WEIGHT: 158 LBS | HEART RATE: 73 BPM | SYSTOLIC BLOOD PRESSURE: 120 MMHG | TEMPERATURE: 97.2 F | OXYGEN SATURATION: 92 % | HEIGHT: 63 IN

## 2019-12-12 DIAGNOSIS — I10 ESSENTIAL HYPERTENSION: ICD-10-CM

## 2019-12-12 DIAGNOSIS — E78.2 MIXED HYPERLIPIDEMIA: ICD-10-CM

## 2019-12-12 DIAGNOSIS — Z86.73 HISTORY OF STROKE: ICD-10-CM

## 2019-12-12 DIAGNOSIS — F33.1 MODERATE EPISODE OF RECURRENT MAJOR DEPRESSIVE DISORDER (HCC): ICD-10-CM

## 2019-12-12 PROCEDURE — 99214 OFFICE O/P EST MOD 30 MIN: CPT | Performed by: FAMILY MEDICINE

## 2019-12-12 RX ORDER — SERTRALINE HYDROCHLORIDE 25 MG/1
25 TABLET, FILM COATED ORAL DAILY
Qty: 90 TAB | Refills: 3 | Status: SHIPPED | OUTPATIENT
Start: 2019-12-12 | End: 2020-07-14 | Stop reason: SDUPTHER

## 2019-12-12 NOTE — PROGRESS NOTES
CC: Hypertension, history of stroke, hyperlipidemia, depression.    HPI:   Cecilia presents today discuss the following medical issues:    Essential hypertension  Has been adequately controlled on current medication. Denies headache, chest pain, and SOB.   Patient has been on carvedilol 3.125 mg twice a day.  No side effects.     H/O: stroke  Patient has history of minor stroke 2 years ago, has no residual effect.  She has been on atorvastatin 20 mg daily.     Mixed hyperlipidemia  She has been tolerating the statin. Denies muscle pain LFTs has been normal, currently on atorvastatin 20 mg daily.    Moderate episode of recurrent major depressive disorder (HCC)  Patient stated that her mood has improved a lot since she started sertraline 25 mg, denies any suicidal ideation, denies any side effects from medication.  Has been using trazodone only as needed for sleep and it has been helping.    Patient Active Problem List    Diagnosis Date Noted   • Chronic insomnia 09/12/2019   • Mixed hyperlipidemia 03/12/2019   • Bronchitis 05/15/2017   • Primary osteoarthritis of right knee 02/01/2017   • Essential hypertension 02/29/2016   • H/O: stroke 02/29/2016   • Primary osteoarthritis involving multiple joints 02/29/2016   • Depression 02/29/2016   • Primary localized osteoarthrosis, pelvic region and thigh 03/02/2015   • S/P lumbar fusion 05/14/2013       Current Outpatient Medications   Medication Sig Dispense Refill   • DEXILANT 60 MG CAPSULE DELAYED RELEASE delayed-release capsule TAKE 1 CAPSULE EVERY       MORNING BEFORE BREAKFAST   FOR GASTROESOPHAGEAL REFLUXDISEASE 90 Cap 3   • atorvastatin (LIPITOR) 20 MG Tab Take 1 Tab by mouth every day. 90 Tab 3   • sertraline (ZOLOFT) 25 MG tablet Take 1 Tab by mouth every day. 30 Tab 3   • Diclofenac Sodium 1 % Gel Apply 1 Application to skin as directed 2 Times a Day. 1 Tube 3   • carvedilol (COREG) 3.125 MG Tab Take 1 Tab by mouth 2 times a day, with meals. 180 Tab 2   • traZODone  "(DESYREL) 100 MG Tab Take 1 Tab by mouth every bedtime. 90 Tab 3   • ibuprofen (MOTRIN) 400 MG Tab Take 1 Tab by mouth every 6 hours as needed for Moderate Pain. 30 Tab 0   • DEXILANT 60 MG CAPSULE DELAYED RELEASE delayed-release capsule TAKE 1 CAPSULE EVERY       MORNING BEFORE BREAKFAST   FOR GASTROESOPHAGEAL REFLUXDISEASE 90 Cap 3   • calcium citrate (CALCITRATE) 950 MG Tab Take 950 mg by mouth every day.     • Cholecalciferol (VITAMIN D3) 1000 units Cap Take  by mouth.     • Probiotic Product (PROBIOTIC-10 PO) Take  by mouth.     • Colostrum 500 MG Cap Take  by mouth.     • Omega-3 1000 MG Cap Take  by mouth.     • Pyridoxine HCl (B-6) 100 MG Tab Take  by mouth.     • Biotin 10 MG Cap Take  by mouth.     • coenzyme Q-10 30 MG capsule Take 60 mg by mouth every day.     • Multiple Vitamins-Minerals (OCUVITE-LUTEIN) Tab Take 1 tablet by mouth every day.     • DEXILANT 60 MG CAPSULE DELAYED RELEASE delayed-release capsule TAKE 1 CAPSULE EVERY       MORNING BEFORE BREAKFAST   FOR GASTROESOPHAGEAL REFLUXDISEASE 90 Cap 1   • acetaminophen (TYLENOL) 500 MG TABS Take 1 Tab by mouth every 6 hours as needed for Mild Pain. 30 Tab 0   • traZODone (DESYREL) 100 MG Tab TAKE 1 TABLET AT BEDTIME (Patient not taking: Reported on 9/12/2019) 90 Tab 3     No current facility-administered medications for this visit.          Allergies as of 12/12/2019 - Reviewed 12/12/2019   Allergen Reaction Noted   • Codeine  02/01/2013   • Latex  01/19/2017   • Lisinopril Cough 02/29/2016   • Other drug  01/22/2019   • Soap Rash and Itching 02/20/2015   • Tape Rash 02/01/2013        ROS: Denies any chest pain, Shortness of breath, Changes bowel or bladder, Lower extremity edema.    Physical Exam:  /70 (BP Location: Right arm, Patient Position: Sitting)   Pulse 73   Temp 36.2 °C (97.2 °F)   Ht 1.6 m (5' 3\")   Wt 71.7 kg (158 lb)   SpO2 92%   BMI 27.99 kg/m²   Gen.: Well-developed, well-nourished, no apparent distress,pleasant and " cooperative with the examination  Skin:  Warm and dry with good turgor. No rashes or suspicious lesions in visible areas  HEENT:Sinuses nontender with palpation, TMs clear, nares patent with pink mucosa and clear rhinorrhea,no septal deviation ,polyps or lesions. lips without lesions, oropharynx clear.  Neck: Trachea midline,no masses or adenopathy. No JVD.  Cor: Regular rate and rhythm without murmur, gallop or rub.  Lungs: Respirations unlabored.Clear to auscultation with equal breath sounds bilaterally. No wheezes, rhonchi.  Extremities: No cyanosis, clubbing or edema.        Assessment and Plan.   86 y.o. female     1. Essential hypertension  Controlled.  Continue on carvedilol 3.125 mg daily.    2. History of stroke  Stable.  No residual effect.  Continue statin.    3. Mixed hyperlipidemia  He has been tolerating the statin. Denies muscle pain LFTs has been normal  Continue on atorvastatin 20 mg daily.    4. Moderate episode of recurrent major depressive disorder (HCC)  Mood has improved since she started the sertraline.  Continue on sertraline 25 mg daily.

## 2020-06-09 DIAGNOSIS — I10 ESSENTIAL HYPERTENSION: ICD-10-CM

## 2020-06-09 RX ORDER — CARVEDILOL 3.12 MG/1
3.12 TABLET ORAL 2 TIMES DAILY WITH MEALS
Qty: 180 TAB | Refills: 2 | Status: SHIPPED | OUTPATIENT
Start: 2020-06-09 | End: 2021-03-08

## 2020-06-22 ENCOUNTER — OFFICE VISIT (OUTPATIENT)
Dept: MEDICAL GROUP | Facility: MEDICAL CENTER | Age: 85
End: 2020-06-22
Payer: MEDICARE

## 2020-06-22 VITALS
HEART RATE: 76 BPM | WEIGHT: 159 LBS | TEMPERATURE: 98.8 F | BODY MASS INDEX: 28.17 KG/M2 | DIASTOLIC BLOOD PRESSURE: 84 MMHG | HEIGHT: 63 IN | SYSTOLIC BLOOD PRESSURE: 136 MMHG | RESPIRATION RATE: 16 BRPM | OXYGEN SATURATION: 92 %

## 2020-06-22 DIAGNOSIS — Z86.73 H/O: STROKE: ICD-10-CM

## 2020-06-22 DIAGNOSIS — I10 ESSENTIAL HYPERTENSION: ICD-10-CM

## 2020-06-22 DIAGNOSIS — E78.2 MIXED HYPERLIPIDEMIA: ICD-10-CM

## 2020-06-22 DIAGNOSIS — K21.9 GASTROESOPHAGEAL REFLUX DISEASE WITHOUT ESOPHAGITIS: ICD-10-CM

## 2020-06-22 DIAGNOSIS — F33.1 MODERATE EPISODE OF RECURRENT MAJOR DEPRESSIVE DISORDER (HCC): ICD-10-CM

## 2020-06-22 DIAGNOSIS — F51.04 CHRONIC INSOMNIA: ICD-10-CM

## 2020-06-22 PROCEDURE — 99214 OFFICE O/P EST MOD 30 MIN: CPT | Performed by: FAMILY MEDICINE

## 2020-06-22 ASSESSMENT — FIBROSIS 4 INDEX: FIB4 SCORE: 1.2

## 2020-06-22 NOTE — PROGRESS NOTES
CC: h/o stroke, HLD, insomnia, depression, GERD    HPI:   Cecilia presents today to discuss the following:    H/O: stroke/ Mixed hyperlipidemia  Patient has history of minor stroke 2 years ago, has no residual effect.  She has been on atorvastatin 20 mg daily, and aspirin 81 mg daily.She has been tolerating the statin. Denies muscle pain LFTs has been normal.Most recent lipid panel was:    Cholesterol,Tot  100 - 199 mg/dL  146     Triglycerides  0 - 149 mg/dL  62     HDL  >39 mg/dL  64     VLDL Cholesterol Calc  5 - 40 mg/dL  12     LDL  0 - 99 mg/dL  70      Essential hypertension  Has been adequately controlled on current medication. Denies headache, chest pain, and SOB.   Patient has been on carvedilol 3.125 mg twice a day.  No side effects.    Chronic insomnia  Patient has been having problems falling on standing sleep, trazodone 50 mg has been working for her.    Moderate episode of recurrent major depressive disorder (HCC)  Patient stated that her mood has improved a lot since she started sertraline 25 mg, denies any suicidal ideation, denies any side effects from medication.  Has been using trazodone only as needed for sleep and it has been helping.    Gastroesophageal reflux disease without esophagitis  Denies epigastric pain, heartburn, nausea, vomiting.  She has been doing fine on Dexilant.  No side effects      Patient Active Problem List    Diagnosis Date Noted   • Chronic insomnia 09/12/2019   • Mixed hyperlipidemia 03/12/2019   • Bronchitis 05/15/2017   • Primary osteoarthritis of right knee 02/01/2017   • Essential hypertension 02/29/2016   • H/O: stroke 02/29/2016   • Primary osteoarthritis involving multiple joints 02/29/2016   • Depression 02/29/2016   • Primary localized osteoarthrosis, pelvic region and thigh 03/02/2015   • S/P lumbar fusion 05/14/2013       Current Outpatient Medications   Medication Sig Dispense Refill   • carvedilol (COREG) 3.125 MG Tab Take 1 Tab by mouth 2 times a day, with  meals. 180 Tab 2   • sertraline (ZOLOFT) 25 MG tablet Take 1 Tab by mouth every day. 90 Tab 3   • atorvastatin (LIPITOR) 20 MG Tab Take 1 Tab by mouth every day. 90 Tab 3   • Diclofenac Sodium 1 % Gel Apply 1 Application to skin as directed 2 Times a Day. 1 Tube 3   • traZODone (DESYREL) 100 MG Tab TAKE 1 TABLET AT BEDTIME 90 Tab 3   • ibuprofen (MOTRIN) 400 MG Tab Take 1 Tab by mouth every 6 hours as needed for Moderate Pain. 30 Tab 0   • calcium citrate (CALCITRATE) 950 MG Tab Take 950 mg by mouth every day.     • Cholecalciferol (VITAMIN D3) 1000 units Cap Take  by mouth.     • Probiotic Product (PROBIOTIC-10 PO) Take  by mouth.     • Colostrum 500 MG Cap Take  by mouth.     • Omega-3 1000 MG Cap Take  by mouth.     • Pyridoxine HCl (B-6) 100 MG Tab Take  by mouth.     • Biotin 10 MG Cap Take  by mouth.     • coenzyme Q-10 30 MG capsule Take 60 mg by mouth every day.     • Multiple Vitamins-Minerals (OCUVITE-LUTEIN) Tab Take 1 tablet by mouth every day.     • DEXILANT 60 MG CAPSULE DELAYED RELEASE delayed-release capsule TAKE 1 CAPSULE EVERY       MORNING BEFORE BREAKFAST   FOR GASTROESOPHAGEAL REFLUXDISEASE 90 Cap 1   • acetaminophen (TYLENOL) 500 MG TABS Take 1 Tab by mouth every 6 hours as needed for Mild Pain. 30 Tab 0   • DEXILANT 60 MG CAPSULE DELAYED RELEASE delayed-release capsule TAKE 1 CAPSULE EVERY       MORNING BEFORE BREAKFAST   FOR GASTROESOPHAGEAL REFLUXDISEASE 90 Cap 3   • traZODone (DESYREL) 100 MG Tab Take 1 Tab by mouth every bedtime. 90 Tab 3   • DEXILANT 60 MG CAPSULE DELAYED RELEASE delayed-release capsule TAKE 1 CAPSULE EVERY       MORNING BEFORE BREAKFAST   FOR GASTROESOPHAGEAL REFLUXDISEASE 90 Cap 3     No current facility-administered medications for this visit.          Allergies as of 06/22/2020 - Reviewed 06/22/2020   Allergen Reaction Noted   • Codeine  02/01/2013   • Latex  01/19/2017   • Lisinopril Cough 02/29/2016   • Other drug  01/22/2019   • Soap Rash and Itching 02/20/2015   •  "Tape Rash 02/01/2013        ROS: Denies any chest pain, Shortness of breath, Changes bowel or bladder, Lower extremity edema.    Physical Exam:  /84 (BP Location: Right arm, Patient Position: Sitting)   Pulse 76   Temp 37.1 °C (98.8 °F) (Temporal)   Resp 16   Ht 1.6 m (5' 3\")   Wt 72.1 kg (159 lb)   SpO2 92%   BMI 28.17 kg/m²   Gen.: Well-developed, well-nourished, no apparent distress,pleasant and cooperative with the examination  Skin:  Warm and dry with good turgor. No rashes or suspicious lesions in visible areas  HEENT:Sinuses nontender with palpation, TMs clear, nares patent with pink mucosa and clear rhinorrhea,no septal deviation ,polyps or lesions. lips without lesions, oropharynx clear.  Neck: Trachea midline,no masses or adenopathy. No JVD.  Cor: Regular rate and rhythm without murmur, gallop or rub.  Lungs: Respirations unlabored.Clear to auscultation with equal breath sounds bilaterally. No wheezes, rhonchi.  Extremities: No cyanosis, clubbing or edema.      Assessment and Plan.   87 y.o. female     1. H/O: stroke  Stable.  No residual.  Continue on statin and aspirin    2. Mixed hyperlipidemia  He has been tolerating the statin. Denies muscle pain LFTs has been normal  Continue atorvastatin 20 mg daily    - Lipid Profile; Future  - TSH; Future    3. Essential hypertension  Controlled.  Continue carvedilol 3.125 mg twice a day    - CBC WITH DIFFERENTIAL; Future  - Comp Metabolic Panel; Future    4. Chronic insomnia  Trazodone 50 mg has been working.    5. Moderate episode of recurrent major depressive disorder (HCC)  Stable.  No suicidal ideation.  Continue on sertraline 25 mg daily    6. Gastroesophageal reflux disease without esophagitis  She has been doing fine on Dexilant           "

## 2020-07-14 DIAGNOSIS — F33.1 MODERATE EPISODE OF RECURRENT MAJOR DEPRESSIVE DISORDER (HCC): ICD-10-CM

## 2020-07-14 RX ORDER — SERTRALINE HYDROCHLORIDE 25 MG/1
25 TABLET, FILM COATED ORAL DAILY
Qty: 90 TAB | Refills: 3 | Status: SHIPPED | OUTPATIENT
Start: 2020-07-14 | End: 2020-12-03

## 2020-07-22 LAB
ALBUMIN SERPL-MCNC: 3.8 G/DL (ref 3.6–4.6)
ALBUMIN/GLOB SERPL: 2 {RATIO} (ref 1.2–2.2)
ALP SERPL-CCNC: 66 IU/L (ref 39–117)
ALT SERPL-CCNC: 14 IU/L (ref 0–32)
AST SERPL-CCNC: 17 IU/L (ref 0–40)
BASOPHILS # BLD AUTO: 0 X10E3/UL (ref 0–0.2)
BASOPHILS NFR BLD AUTO: 1 %
BILIRUB SERPL-MCNC: 0.3 MG/DL (ref 0–1.2)
BUN SERPL-MCNC: 23 MG/DL (ref 8–27)
BUN/CREAT SERPL: 27 (ref 12–28)
CALCIUM SERPL-MCNC: 9.3 MG/DL (ref 8.7–10.3)
CHLORIDE SERPL-SCNC: 105 MMOL/L (ref 96–106)
CHOLEST SERPL-MCNC: 154 MG/DL (ref 100–199)
CO2 SERPL-SCNC: 22 MMOL/L (ref 20–29)
CREAT SERPL-MCNC: 0.84 MG/DL (ref 0.57–1)
EOSINOPHIL # BLD AUTO: 0.2 X10E3/UL (ref 0–0.4)
EOSINOPHIL NFR BLD AUTO: 3 %
ERYTHROCYTE [DISTWIDTH] IN BLOOD BY AUTOMATED COUNT: 12.9 % (ref 11.7–15.4)
GLOBULIN SER CALC-MCNC: 1.9 G/DL (ref 1.5–4.5)
GLUCOSE SERPL-MCNC: 91 MG/DL (ref 65–99)
HCT VFR BLD AUTO: 37.3 % (ref 34–46.6)
HDLC SERPL-MCNC: 57 MG/DL
HGB BLD-MCNC: 12.4 G/DL (ref 11.1–15.9)
IMM GRANULOCYTES # BLD AUTO: 0 X10E3/UL (ref 0–0.1)
IMM GRANULOCYTES NFR BLD AUTO: 0 %
IMMATURE CELLS  115398: NORMAL
LABORATORY COMMENT REPORT: NORMAL
LDLC SERPL CALC-MCNC: 82 MG/DL (ref 0–99)
LYMPHOCYTES # BLD AUTO: 1.6 X10E3/UL (ref 0.7–3.1)
LYMPHOCYTES NFR BLD AUTO: 27 %
MCH RBC QN AUTO: 30.2 PG (ref 26.6–33)
MCHC RBC AUTO-ENTMCNC: 33.2 G/DL (ref 31.5–35.7)
MCV RBC AUTO: 91 FL (ref 79–97)
MONOCYTES # BLD AUTO: 0.6 X10E3/UL (ref 0.1–0.9)
MONOCYTES NFR BLD AUTO: 11 %
MORPHOLOGY BLD-IMP: NORMAL
NEUTROPHILS # BLD AUTO: 3.4 X10E3/UL (ref 1.4–7)
NEUTROPHILS NFR BLD AUTO: 58 %
NRBC BLD AUTO-RTO: NORMAL %
PLATELET # BLD AUTO: 278 X10E3/UL (ref 150–450)
POTASSIUM SERPL-SCNC: 4.5 MMOL/L (ref 3.5–5.2)
PROT SERPL-MCNC: 5.7 G/DL (ref 6–8.5)
RBC # BLD AUTO: 4.1 X10E6/UL (ref 3.77–5.28)
SODIUM SERPL-SCNC: 143 MMOL/L (ref 134–144)
TRIGL SERPL-MCNC: 74 MG/DL (ref 0–149)
TSH SERPL DL<=0.005 MIU/L-ACNC: 4.08 UIU/ML (ref 0.45–4.5)
VLDLC SERPL CALC-MCNC: 15 MG/DL (ref 5–40)
WBC # BLD AUTO: 5.8 X10E3/UL (ref 3.4–10.8)

## 2020-07-26 DIAGNOSIS — F51.04 CHRONIC INSOMNIA: ICD-10-CM

## 2020-07-27 RX ORDER — TRAZODONE HYDROCHLORIDE 100 MG/1
TABLET ORAL
Qty: 90 TAB | Refills: 3 | Status: SHIPPED | OUTPATIENT
Start: 2020-07-27 | End: 2021-07-09

## 2020-08-23 DIAGNOSIS — E78.5 HYPERLIPIDEMIA, UNSPECIFIED HYPERLIPIDEMIA TYPE: ICD-10-CM

## 2020-08-24 RX ORDER — ATORVASTATIN CALCIUM 20 MG/1
20 TABLET, FILM COATED ORAL DAILY
Qty: 90 TAB | Refills: 3 | Status: SHIPPED | OUTPATIENT
Start: 2020-08-24 | End: 2021-08-11

## 2020-11-10 ENCOUNTER — APPOINTMENT (RX ONLY)
Dept: URBAN - METROPOLITAN AREA CLINIC 20 | Facility: CLINIC | Age: 85
Setting detail: DERMATOLOGY
End: 2020-11-10

## 2020-11-10 DIAGNOSIS — L82.1 OTHER SEBORRHEIC KERATOSIS: ICD-10-CM

## 2020-11-10 DIAGNOSIS — L57.8 OTHER SKIN CHANGES DUE TO CHRONIC EXPOSURE TO NONIONIZING RADIATION: ICD-10-CM

## 2020-11-10 DIAGNOSIS — D22 MELANOCYTIC NEVI: ICD-10-CM

## 2020-11-10 DIAGNOSIS — L81.4 OTHER MELANIN HYPERPIGMENTATION: ICD-10-CM

## 2020-11-10 DIAGNOSIS — D18.0 HEMANGIOMA: ICD-10-CM

## 2020-11-10 DIAGNOSIS — Z85.828 PERSONAL HISTORY OF OTHER MALIGNANT NEOPLASM OF SKIN: ICD-10-CM

## 2020-11-10 PROBLEM — D22.5 MELANOCYTIC NEVI OF TRUNK: Status: ACTIVE | Noted: 2020-11-10

## 2020-11-10 PROBLEM — D18.01 HEMANGIOMA OF SKIN AND SUBCUTANEOUS TISSUE: Status: ACTIVE | Noted: 2020-11-10

## 2020-11-10 PROCEDURE — 99214 OFFICE O/P EST MOD 30 MIN: CPT

## 2020-11-10 PROCEDURE — ? COUNSELING

## 2020-11-10 ASSESSMENT — LOCATION SIMPLE DESCRIPTION DERM
LOCATION SIMPLE: RIGHT CHEEK
LOCATION SIMPLE: RIGHT NOSE
LOCATION SIMPLE: RIGHT ANKLE
LOCATION SIMPLE: RIGHT HAND
LOCATION SIMPLE: LEFT UPPER BACK
LOCATION SIMPLE: LEFT FOREHEAD
LOCATION SIMPLE: RIGHT ANTERIOR NECK
LOCATION SIMPLE: UPPER BACK
LOCATION SIMPLE: RIGHT UPPER BACK
LOCATION SIMPLE: LEFT HAND

## 2020-11-10 ASSESSMENT — LOCATION DETAILED DESCRIPTION DERM
LOCATION DETAILED: LEFT INFERIOR LATERAL FOREHEAD
LOCATION DETAILED: RIGHT INFERIOR MEDIAL UPPER BACK
LOCATION DETAILED: RIGHT INFERIOR CENTRAL MALAR CHEEK
LOCATION DETAILED: SUPERIOR THORACIC SPINE
LOCATION DETAILED: RIGHT RADIAL DORSAL HAND
LOCATION DETAILED: RIGHT NASAL ALA
LOCATION DETAILED: RIGHT ANKLE
LOCATION DETAILED: LEFT RADIAL DORSAL HAND
LOCATION DETAILED: RIGHT INFERIOR ANTERIOR NECK
LOCATION DETAILED: LEFT SUPERIOR UPPER BACK
LOCATION DETAILED: RIGHT SUPERIOR UPPER BACK

## 2020-11-10 ASSESSMENT — LOCATION ZONE DERM
LOCATION ZONE: NECK
LOCATION ZONE: HAND
LOCATION ZONE: LEG
LOCATION ZONE: NOSE
LOCATION ZONE: TRUNK
LOCATION ZONE: FACE

## 2020-11-14 DIAGNOSIS — K21.9 GERD WITHOUT ESOPHAGITIS: ICD-10-CM

## 2020-11-16 RX ORDER — DEXLANSOPRAZOLE 60 MG/1
CAPSULE, DELAYED RELEASE ORAL
Qty: 90 CAP | Refills: 3 | Status: SHIPPED | OUTPATIENT
Start: 2020-11-16 | End: 2021-11-09

## 2020-12-03 ENCOUNTER — OFFICE VISIT (OUTPATIENT)
Dept: MEDICAL GROUP | Facility: MEDICAL CENTER | Age: 85
End: 2020-12-03
Payer: MEDICARE

## 2020-12-03 VITALS
SYSTOLIC BLOOD PRESSURE: 120 MMHG | HEIGHT: 63 IN | HEART RATE: 55 BPM | BODY MASS INDEX: 28.17 KG/M2 | WEIGHT: 159 LBS | OXYGEN SATURATION: 90 % | DIASTOLIC BLOOD PRESSURE: 80 MMHG | RESPIRATION RATE: 20 BRPM | TEMPERATURE: 98.7 F

## 2020-12-03 DIAGNOSIS — E78.2 MIXED HYPERLIPIDEMIA: ICD-10-CM

## 2020-12-03 DIAGNOSIS — F33.1 MODERATE EPISODE OF RECURRENT MAJOR DEPRESSIVE DISORDER (HCC): ICD-10-CM

## 2020-12-03 DIAGNOSIS — I10 ESSENTIAL HYPERTENSION: ICD-10-CM

## 2020-12-03 PROCEDURE — 99214 OFFICE O/P EST MOD 30 MIN: CPT | Performed by: FAMILY MEDICINE

## 2020-12-03 ASSESSMENT — FIBROSIS 4 INDEX: FIB4 SCORE: 1.42

## 2020-12-03 NOTE — PROGRESS NOTES
CC: Depression/tiredness, hypertension, hyperlipidemia    HPI:   Cecilia presents today to discuss the following    Moderate episode of recurrent major depressive disorder (HCC)/tiredness  Patient stated that she has been feeling tired and weak, she does not want to do anything at home.  She stated that she ran out of her Zoloft a months ago since then her condition has been getting worse, she called the pharmacy they stated that they do not have a refill.  However denies any suicidal ideation.  She used to take Zoloft 25 mg daily.  All blood work from June 2020 was reviewed with the patient, which showed no significant abnormality    Essential hypertension  Has been adequately controlled on current medication. Denies headache, chest pain, and SOB.patient has been on carvedilol 3.125 mg twice a day, also    Mixed hyperlipidemia  She has been tolerating the statin. Denies muscle pain LFTs has been normal, she is currently on atorvastatin 20 mg daily.  No history of diabetes, or CAD, however she has history of stroke.    Patient Active Problem List    Diagnosis Date Noted   • Chronic insomnia 09/12/2019   • Mixed hyperlipidemia 03/12/2019   • Bronchitis 05/15/2017   • Primary osteoarthritis of right knee 02/01/2017   • Essential hypertension 02/29/2016   • H/O: stroke 02/29/2016   • Primary osteoarthritis involving multiple joints 02/29/2016   • Depression 02/29/2016   • Primary localized osteoarthrosis, pelvic region and thigh 03/02/2015   • S/P lumbar fusion 05/14/2013       Current Outpatient Medications   Medication Sig Dispense Refill   • sertraline (ZOLOFT) 50 MG Tab Take 1 Tab by mouth every day. 30 Tab 11   • DEXILANT 60 MG CAPSULE DELAYED RELEASE delayed-release capsule TAKE 1 CAPSULE EVERY       MORNING BEFORE BREAKFAST   FOR GASTROESOPHAGEAL REFLUXDISEASE 90 Cap 3   • atorvastatin (LIPITOR) 20 MG Tab Take 1 Tab by mouth every day. 90 Tab 3   • traZODone (DESYREL) 100 MG Tab TAKE 1 TABLET AT BEDTIME 90 Tab 3  "  • carvedilol (COREG) 3.125 MG Tab Take 1 Tab by mouth 2 times a day, with meals. 180 Tab 2   • Diclofenac Sodium 1 % Gel Apply 1 Application to skin as directed 2 Times a Day. 1 Tube 3   • traZODone (DESYREL) 100 MG Tab TAKE 1 TABLET AT BEDTIME 90 Tab 3   • ibuprofen (MOTRIN) 400 MG Tab Take 1 Tab by mouth every 6 hours as needed for Moderate Pain. 30 Tab 0   • DEXILANT 60 MG CAPSULE DELAYED RELEASE delayed-release capsule TAKE 1 CAPSULE EVERY       MORNING BEFORE BREAKFAST   FOR GASTROESOPHAGEAL REFLUXDISEASE 90 Cap 3   • calcium citrate (CALCITRATE) 950 MG Tab Take 950 mg by mouth every day.     • Cholecalciferol (VITAMIN D3) 1000 units Cap Take  by mouth.     • Probiotic Product (PROBIOTIC-10 PO) Take  by mouth.     • Colostrum 500 MG Cap Take  by mouth.     • Omega-3 1000 MG Cap Take  by mouth.     • Pyridoxine HCl (B-6) 100 MG Tab Take  by mouth.     • Biotin 10 MG Cap Take  by mouth.     • coenzyme Q-10 30 MG capsule Take 60 mg by mouth every day.     • Multiple Vitamins-Minerals (OCUVITE-LUTEIN) Tab Take 1 tablet by mouth every day.     • DEXILANT 60 MG CAPSULE DELAYED RELEASE delayed-release capsule TAKE 1 CAPSULE EVERY       MORNING BEFORE BREAKFAST   FOR GASTROESOPHAGEAL REFLUXDISEASE 90 Cap 1   • acetaminophen (TYLENOL) 500 MG TABS Take 1 Tab by mouth every 6 hours as needed for Mild Pain. 30 Tab 0     No current facility-administered medications for this visit.          Allergies as of 12/03/2020 - Reviewed 12/03/2020   Allergen Reaction Noted   • Codeine  02/01/2013   • Latex  01/19/2017   • Lisinopril Cough 02/29/2016   • Other drug  01/22/2019   • Soap Rash and Itching 02/20/2015   • Tape Rash 02/01/2013        ROS: Denies any chest pain, Shortness of breath, Changes bowel or bladder, Lower extremity edema.    Physical Exam:  /80 (BP Location: Right arm, Patient Position: Sitting, BP Cuff Size: Adult)   Pulse (!) 55   Temp 37.1 °C (98.7 °F) (Temporal)   Resp 20   Ht 1.6 m (5' 3\")   Wt " 72.1 kg (159 lb)   SpO2 90%   BMI 28.17 kg/m²   Gen.: Well-developed, well-nourished, no apparent distress,pleasant and cooperative with the examination  Skin:  Warm and dry with good turgor. No rashes or suspicious lesions in visible areas  Eye: PERRLA, conjunctiva and sclera clear, lids normal  HEENT:Sinuses nontender with palpation, TMs clear, nares patent with pink mucosa, and clear rhinorrhea,no septal deviation ,polyps or lesions. lips without lesions, oropharynx clear.  Neck: Trachea midline,no masses or adenopathy. No JVD.  Thyroid: normal consistency and size. No masses or nodules. Not tender with palpation.  Cor: Regular rate and rhythm without murmur, gallop or rub.  Lungs: Respirations unlabored.Clear to auscultation with equal breath sounds bilaterally. No wheezes, rhonchi.  Abdomen: Soft nontender without hepatosplenomegaly or masses appreciated, normoactive bowel sounds. No hernias.  Extremities: No cyanosis, clubbing or edema, Symmetrical without deformities or malformations. Pulses 2+ and symmetrical both upper and lower extremities  Lymphatic: No abnormal adenopathy of the neck, upper chest, groin or axillae.  Psych: Alert and oriented x 3.Flat affect, judgement,insight and memory.          Assessment and Plan.   87 y.o. female     1. Moderate episode of recurrent major depressive disorder (HCC)  Patient has been feeling tired and weak.  She does not want to do anything.  She ran out of her Zoloft 2 months ago since then her condition has been getting worse.  However denies any suicidal ideation.  We will start her on a higher dose of Zoloft 50 mg medication sent to the pharmacy.    - sertraline (ZOLOFT) 50 MG Tab; Take 1 Tab by mouth every day.  Dispense: 30 Tab; Refill: 11    2. Essential hypertension  Controlled.  Continue on carvedilol 3.125 mg twice a day    3. Mixed hyperlipidemia  He has been tolerating the statin. Denies muscle pain LFTs has been normal  Continue atorvastatin 20 mg  daily

## 2020-12-04 DIAGNOSIS — F33.1 MODERATE EPISODE OF RECURRENT MAJOR DEPRESSIVE DISORDER (HCC): ICD-10-CM

## 2021-01-11 DIAGNOSIS — Z23 NEED FOR VACCINATION: ICD-10-CM

## 2021-01-12 ENCOUNTER — OFFICE VISIT (OUTPATIENT)
Dept: MEDICAL GROUP | Facility: MEDICAL CENTER | Age: 86
End: 2021-01-12
Payer: MEDICARE

## 2021-01-12 VITALS
SYSTOLIC BLOOD PRESSURE: 138 MMHG | RESPIRATION RATE: 16 BRPM | TEMPERATURE: 98 F | HEIGHT: 63 IN | BODY MASS INDEX: 28.35 KG/M2 | WEIGHT: 160 LBS | OXYGEN SATURATION: 96 % | DIASTOLIC BLOOD PRESSURE: 78 MMHG | HEART RATE: 72 BPM

## 2021-01-12 DIAGNOSIS — Z02.4 ENCOUNTER FOR DRIVER'S LICENSE HISTORY AND PHYSICAL: ICD-10-CM

## 2021-01-12 PROCEDURE — 99212 OFFICE O/P EST SF 10 MIN: CPT | Performed by: FAMILY MEDICINE

## 2021-01-12 ASSESSMENT — FIBROSIS 4 INDEX: FIB4 SCORE: 1.42

## 2021-01-12 NOTE — PROGRESS NOTES
CC:  license renewal exam    HPI:   Cecilia presents today for  license renewal physical exam.  Because of patient's age she advised to avoid driving at night and on the freeway.  Otherwise no restrictions.  Denies any history of car accidents.  No history of dementia.  Patient lives alone has been active, and independent with all ADLs.  Need to be checked by ophthalmologist to complete her eye exam.      Patient Active Problem List    Diagnosis Date Noted   • Chronic insomnia 09/12/2019   • Mixed hyperlipidemia 03/12/2019   • Bronchitis 05/15/2017   • Primary osteoarthritis of right knee 02/01/2017   • Essential hypertension 02/29/2016   • H/O: stroke 02/29/2016   • Primary osteoarthritis involving multiple joints 02/29/2016   • Depression 02/29/2016   • Primary localized osteoarthrosis, pelvic region and thigh 03/02/2015   • S/P lumbar fusion 05/14/2013       Current Outpatient Medications   Medication Sig Dispense Refill   • sertraline (ZOLOFT) 50 MG Tab Take 1 Tab by mouth every day. 90 Tab 11   • atorvastatin (LIPITOR) 20 MG Tab Take 1 Tab by mouth every day. 90 Tab 3   • carvedilol (COREG) 3.125 MG Tab Take 1 Tab by mouth 2 times a day, with meals. 180 Tab 2   • Diclofenac Sodium 1 % Gel Apply 1 Application to skin as directed 2 Times a Day. 1 Tube 3   • traZODone (DESYREL) 100 MG Tab TAKE 1 TABLET AT BEDTIME 90 Tab 3   • ibuprofen (MOTRIN) 400 MG Tab Take 1 Tab by mouth every 6 hours as needed for Moderate Pain. 30 Tab 0   • DEXILANT 60 MG CAPSULE DELAYED RELEASE delayed-release capsule TAKE 1 CAPSULE EVERY       MORNING BEFORE BREAKFAST   FOR GASTROESOPHAGEAL REFLUXDISEASE 90 Cap 3   • calcium citrate (CALCITRATE) 950 MG Tab Take 950 mg by mouth every day.     • Cholecalciferol (VITAMIN D3) 1000 units Cap Take  by mouth.     • Probiotic Product (PROBIOTIC-10 PO) Take  by mouth.     • Colostrum 500 MG Cap Take  by mouth.     • Omega-3 1000 MG Cap Take  by mouth.     • Pyridoxine HCl (B-6) 100 MG Tab  "Take  by mouth.     • Biotin 10 MG Cap Take  by mouth.     • coenzyme Q-10 30 MG capsule Take 60 mg by mouth every day.     • Multiple Vitamins-Minerals (OCUVITE-LUTEIN) Tab Take 1 tablet by mouth every day.     • DEXILANT 60 MG CAPSULE DELAYED RELEASE delayed-release capsule TAKE 1 CAPSULE EVERY       MORNING BEFORE BREAKFAST   FOR GASTROESOPHAGEAL REFLUXDISEASE 90 Cap 1   • acetaminophen (TYLENOL) 500 MG TABS Take 1 Tab by mouth every 6 hours as needed for Mild Pain. 30 Tab 0   • DEXILANT 60 MG CAPSULE DELAYED RELEASE delayed-release capsule TAKE 1 CAPSULE EVERY       MORNING BEFORE BREAKFAST   FOR GASTROESOPHAGEAL REFLUXDISEASE 90 Cap 3   • traZODone (DESYREL) 100 MG Tab TAKE 1 TABLET AT BEDTIME 90 Tab 3     No current facility-administered medications for this visit.          Allergies as of 01/12/2021 - Reviewed 01/12/2021   Allergen Reaction Noted   • Codeine  02/01/2013   • Latex  01/19/2017   • Lisinopril Cough 02/29/2016   • Other drug  01/22/2019   • Soap Rash and Itching 02/20/2015   • Tape Rash 02/01/2013        ROS: Denies any chest pain, Shortness of breath, Changes bowel or bladder, Lower extremity edema.    Physical Exam:  /78 (BP Location: Left arm, Patient Position: Sitting)   Pulse 72   Temp 36.7 °C (98 °F) (Temporal)   Resp 16   Ht 1.6 m (5' 3\")   Wt 72.6 kg (160 lb)   SpO2 96%   BMI 28.34 kg/m²   Gen.: Well-developed, well-nourished, no apparent distress,pleasant and cooperative with the examination  Skin:  Warm and dry with good turgor. No rashes or suspicious lesions in visible areas  Eye: PERRLA, conjunctiva and sclera clear, lids normal  HEENT:Sinuses nontender with palpation, TMs clear, nares patent with pink mucosa, and clear rhinorrhea,no septal deviation ,polyps or lesions. lips without lesions, oropharynx clear.  Neck: Trachea midline,no masses or adenopathy. No JVD.  Thyroid: normal consistency and size. No masses or nodules. Not tender with palpation.  Cor: Regular rate " and rhythm without murmur, gallop or rub.  Lungs: Respirations unlabored.Clear to auscultation with equal breath sounds bilaterally. No wheezes, rhonchi.  Abdomen: Soft nontender without hepatosplenomegaly or masses appreciated, normoactive bowel sounds. No hernias.  Extremities: No cyanosis, clubbing or edema, Symmetrical without deformities or malformations. Pulses 2+ and symmetrical both upper and lower extremities  Psych: Alert and oriented x 3.Normal affect, judgement,insight and memory.          Assessment and Plan.   87 y.o. female     1. Encounter for 's license history and physical  Advised to avoid driving in the freeway and at night.  A formed for  license renewal filled.  However patient advised to be checked by ophthalmologist to complete her eye exam.

## 2021-02-03 ENCOUNTER — IMMUNIZATION (OUTPATIENT)
Dept: FAMILY PLANNING/WOMEN'S HEALTH CLINIC | Facility: IMMUNIZATION CENTER | Age: 86
End: 2021-02-03
Attending: INTERNAL MEDICINE
Payer: MEDICARE

## 2021-02-03 DIAGNOSIS — Z23 NEED FOR VACCINATION: ICD-10-CM

## 2021-02-03 DIAGNOSIS — Z23 ENCOUNTER FOR VACCINATION: Primary | ICD-10-CM

## 2021-02-03 PROCEDURE — 91300 PFIZER SARS-COV-2 VACCINE: CPT

## 2021-02-03 PROCEDURE — 0001A PFIZER SARS-COV-2 VACCINE: CPT

## 2021-02-11 ENCOUNTER — OFFICE VISIT (OUTPATIENT)
Dept: MEDICAL GROUP | Facility: MEDICAL CENTER | Age: 86
End: 2021-02-11

## 2021-02-11 VITALS
RESPIRATION RATE: 16 BRPM | SYSTOLIC BLOOD PRESSURE: 112 MMHG | WEIGHT: 162 LBS | HEART RATE: 74 BPM | DIASTOLIC BLOOD PRESSURE: 70 MMHG | HEIGHT: 63 IN | TEMPERATURE: 99.2 F | BODY MASS INDEX: 28.7 KG/M2 | OXYGEN SATURATION: 94 %

## 2021-02-11 DIAGNOSIS — Z02.4 ENCOUNTER FOR DRIVER'S LICENSE HISTORY AND PHYSICAL: ICD-10-CM

## 2021-02-11 PROCEDURE — 7105 PR DMV PHYSICAL: Performed by: FAMILY MEDICINE

## 2021-02-11 ASSESSMENT — PATIENT HEALTH QUESTIONNAIRE - PHQ9
SUM OF ALL RESPONSES TO PHQ9 QUESTIONS 1 AND 2: 0
4. FEELING TIRED OR HAVING LITTLE ENERGY: NOT AT ALL
6. FEELING BAD ABOUT YOURSELF - OR THAT YOU ARE A FAILURE OR HAVE LET YOURSELF OR YOUR FAMILY DOWN: NOT AL ALL
3. TROUBLE FALLING OR STAYING ASLEEP OR SLEEPING TOO MUCH: NOT AT ALL
7. TROUBLE CONCENTRATING ON THINGS, SUCH AS READING THE NEWSPAPER OR WATCHING TELEVISION: NOT AT ALL
9. THOUGHTS THAT YOU WOULD BE BETTER OFF DEAD, OR OF HURTING YOURSELF: NOT AT ALL
1. LITTLE INTEREST OR PLEASURE IN DOING THINGS: NOT AT ALL
5. POOR APPETITE OR OVEREATING: NOT AT ALL
2. FEELING DOWN, DEPRESSED, IRRITABLE, OR HOPELESS: NOT AT ALL

## 2021-02-11 ASSESSMENT — FIBROSIS 4 INDEX: FIB4 SCORE: 1.44

## 2021-02-11 NOTE — PROGRESS NOTES
CC:  license renewal exam/DMV form    HPI:   Cecilia presents today for  license renewal letter.  Patient was seen for  license physical exam last month.  Advised to avoid driving at night and on the freeway.  Patient's form sent to [employer], they want explanation why the patient cannot drive at night and on the freeway, although her eye exam showed no significant abnormality.  Explanation letter sent to [employer] that patient's age is 88 years old, and he has slow reaction time based on her age.  So she was advised to avoid driving on the freeway and at night.      Patient Active Problem List    Diagnosis Date Noted   • Chronic insomnia 09/12/2019   • Mixed hyperlipidemia 03/12/2019   • Bronchitis 05/15/2017   • Primary osteoarthritis of right knee 02/01/2017   • Essential hypertension 02/29/2016   • H/O: stroke 02/29/2016   • Primary osteoarthritis involving multiple joints 02/29/2016   • Depression 02/29/2016   • Primary localized osteoarthrosis, pelvic region and thigh 03/02/2015   • S/P lumbar fusion 05/14/2013       Current Outpatient Medications   Medication Sig Dispense Refill   • sertraline (ZOLOFT) 50 MG Tab Take 1 Tab by mouth every day. 90 Tab 11   • atorvastatin (LIPITOR) 20 MG Tab Take 1 Tab by mouth every day. 90 Tab 3   • traZODone (DESYREL) 100 MG Tab TAKE 1 TABLET AT BEDTIME 90 Tab 3   • carvedilol (COREG) 3.125 MG Tab Take 1 Tab by mouth 2 times a day, with meals. 180 Tab 2   • ibuprofen (MOTRIN) 400 MG Tab Take 1 Tab by mouth every 6 hours as needed for Moderate Pain. 30 Tab 0   • calcium citrate (CALCITRATE) 950 MG Tab Take 950 mg by mouth every day.     • Cholecalciferol (VITAMIN D3) 1000 units Cap Take  by mouth.     • Probiotic Product (PROBIOTIC-10 PO) Take  by mouth.     • Colostrum 500 MG Cap Take  by mouth.     • Omega-3 1000 MG Cap Take  by mouth.     • Pyridoxine HCl (B-6) 100 MG Tab Take  by mouth.     • Biotin 10 MG Cap Take  by mouth.     • coenzyme Q-10 30 MG capsule Take 60 mg  "by mouth every day.     • Multiple Vitamins-Minerals (OCUVITE-LUTEIN) Tab Take 1 tablet by mouth every day.     • DEXILANT 60 MG CAPSULE DELAYED RELEASE delayed-release capsule TAKE 1 CAPSULE EVERY       MORNING BEFORE BREAKFAST   FOR GASTROESOPHAGEAL REFLUXDISEASE 90 Cap 1   • acetaminophen (TYLENOL) 500 MG TABS Take 1 Tab by mouth every 6 hours as needed for Mild Pain. 30 Tab 0   • DEXILANT 60 MG CAPSULE DELAYED RELEASE delayed-release capsule TAKE 1 CAPSULE EVERY       MORNING BEFORE BREAKFAST   FOR GASTROESOPHAGEAL REFLUXDISEASE (Patient not taking: Reported on 2/11/2021) 90 Cap 3   • Diclofenac Sodium 1 % Gel Apply 1 Application to skin as directed 2 Times a Day. (Patient not taking: Reported on 2/11/2021) 1 Tube 3   • traZODone (DESYREL) 100 MG Tab TAKE 1 TABLET AT BEDTIME 90 Tab 3   • DEXILANT 60 MG CAPSULE DELAYED RELEASE delayed-release capsule TAKE 1 CAPSULE EVERY       MORNING BEFORE BREAKFAST   FOR GASTROESOPHAGEAL REFLUXDISEASE (Patient not taking: Reported on 2/11/2021) 90 Cap 3     No current facility-administered medications for this visit.         Allergies as of 02/11/2021 - Reviewed 02/11/2021   Allergen Reaction Noted   • Codeine  02/01/2013   • Latex  01/19/2017   • Lisinopril Cough 02/29/2016   • Other drug  01/22/2019   • Soap Rash and Itching 02/20/2015   • Tape Rash 02/01/2013        ROS: Denies any chest pain, Shortness of breath, Changes bowel or bladder, Lower extremity edema.    Physical Exam:  /70 (BP Location: Right arm, Patient Position: Sitting, BP Cuff Size: Adult)   Pulse 74   Temp 37.3 °C (99.2 °F) (Temporal)   Resp 16   Ht 1.6 m (5' 3\")   Wt 73.5 kg (162 lb)   SpO2 94%   BMI 28.70 kg/m²   Gen.: Well-developed, well-nourished, no apparent distress,pleasant and cooperative with the examination  No other physical exam is needed          Assessment and Plan.   88 y.o. female     1. Encounter for 's license history and physical  Explanation letter sent to [employer] on why " patient can not drive on the freeway or at night: (patient's age is 88 years old, and he has slow reaction time based on her age.  So she was advised to avoid driving on the freeway and at night.)

## 2021-02-11 NOTE — LETTER
February 11, 2021        Cecilia Nguyen Celi  [address]   [place] [address]        To whom it may concern,    Patient is 88 years old so, she is advised to avoid driving at night and on the freeway, due to her reduced reaction time based on her age. Other wise no significant medical problems    If you have any questions or concerns, please don't hesitate to call.        Sincerely,        Luz Contreras M.D.    Electronically Signed

## 2021-02-24 ENCOUNTER — IMMUNIZATION (OUTPATIENT)
Dept: FAMILY PLANNING/WOMEN'S HEALTH CLINIC | Facility: IMMUNIZATION CENTER | Age: 86
End: 2021-02-24
Attending: INTERNAL MEDICINE
Payer: MEDICARE

## 2021-02-24 DIAGNOSIS — Z23 ENCOUNTER FOR VACCINATION: Primary | ICD-10-CM

## 2021-02-24 PROCEDURE — 0002A PFIZER SARS-COV-2 VACCINE: CPT

## 2021-02-24 PROCEDURE — 91300 PFIZER SARS-COV-2 VACCINE: CPT

## 2021-03-05 DIAGNOSIS — I10 ESSENTIAL HYPERTENSION: ICD-10-CM

## 2021-03-08 RX ORDER — CARVEDILOL 3.12 MG/1
TABLET ORAL
Qty: 180 TABLET | Refills: 2 | Status: SHIPPED | OUTPATIENT
Start: 2021-03-08 | End: 2021-11-24

## 2021-06-22 ENCOUNTER — APPOINTMENT (OUTPATIENT)
Dept: MEDICAL GROUP | Facility: MEDICAL CENTER | Age: 86
End: 2021-06-22
Payer: MEDICARE

## 2021-07-08 DIAGNOSIS — F51.04 CHRONIC INSOMNIA: ICD-10-CM

## 2021-07-09 ENCOUNTER — OFFICE VISIT (OUTPATIENT)
Dept: MEDICAL GROUP | Facility: MEDICAL CENTER | Age: 86
End: 2021-07-09
Payer: MEDICARE

## 2021-07-09 VITALS
HEIGHT: 63 IN | OXYGEN SATURATION: 92 % | SYSTOLIC BLOOD PRESSURE: 116 MMHG | DIASTOLIC BLOOD PRESSURE: 78 MMHG | BODY MASS INDEX: 28.71 KG/M2 | WEIGHT: 162.04 LBS | HEART RATE: 72 BPM | TEMPERATURE: 98.9 F | RESPIRATION RATE: 16 BRPM

## 2021-07-09 DIAGNOSIS — Z00.00 MEDICARE ANNUAL WELLNESS VISIT, SUBSEQUENT: ICD-10-CM

## 2021-07-09 DIAGNOSIS — F33.1 MODERATE EPISODE OF RECURRENT MAJOR DEPRESSIVE DISORDER (HCC): ICD-10-CM

## 2021-07-09 DIAGNOSIS — I10 ESSENTIAL HYPERTENSION: ICD-10-CM

## 2021-07-09 DIAGNOSIS — Z86.73 H/O: STROKE: ICD-10-CM

## 2021-07-09 DIAGNOSIS — M25.561 CHRONIC PAIN OF BOTH KNEES: ICD-10-CM

## 2021-07-09 DIAGNOSIS — K21.9 GERD WITHOUT ESOPHAGITIS: ICD-10-CM

## 2021-07-09 DIAGNOSIS — F51.04 CHRONIC INSOMNIA: ICD-10-CM

## 2021-07-09 DIAGNOSIS — M25.562 CHRONIC PAIN OF BOTH KNEES: ICD-10-CM

## 2021-07-09 DIAGNOSIS — E78.2 MIXED HYPERLIPIDEMIA: ICD-10-CM

## 2021-07-09 DIAGNOSIS — G89.29 CHRONIC PAIN OF BOTH KNEES: ICD-10-CM

## 2021-07-09 PROCEDURE — G0439 PPPS, SUBSEQ VISIT: HCPCS | Performed by: FAMILY MEDICINE

## 2021-07-09 RX ORDER — AMOXICILLIN 500 MG/1
CAPSULE ORAL
COMMUNITY
Start: 2021-06-10 | End: 2022-09-15

## 2021-07-09 RX ORDER — TRAZODONE HYDROCHLORIDE 100 MG/1
TABLET ORAL
Qty: 90 TABLET | Refills: 2 | Status: SHIPPED | OUTPATIENT
Start: 2021-07-09 | End: 2022-03-29

## 2021-07-09 ASSESSMENT — ACTIVITIES OF DAILY LIVING (ADL): BATHING_REQUIRES_ASSISTANCE: 0

## 2021-07-09 ASSESSMENT — PATIENT HEALTH QUESTIONNAIRE - PHQ9
7. TROUBLE CONCENTRATING ON THINGS, SUCH AS READING THE NEWSPAPER OR WATCHING TELEVISION: NOT AT ALL
4. FEELING TIRED OR HAVING LITTLE ENERGY: NOT AT ALL
5. POOR APPETITE OR OVEREATING: NOT AT ALL
6. FEELING BAD ABOUT YOURSELF - OR THAT YOU ARE A FAILURE OR HAVE LET YOURSELF OR YOUR FAMILY DOWN: NOT AL ALL
1. LITTLE INTEREST OR PLEASURE IN DOING THINGS: NOT AT ALL
SUM OF ALL RESPONSES TO PHQ QUESTIONS 1-9: 0
8. MOVING OR SPEAKING SO SLOWLY THAT OTHER PEOPLE COULD HAVE NOTICED. OR THE OPPOSITE, BEING SO FIGETY OR RESTLESS THAT YOU HAVE BEEN MOVING AROUND A LOT MORE THAN USUAL: NOT AT ALL
2. FEELING DOWN, DEPRESSED, IRRITABLE, OR HOPELESS: NOT AT ALL
SUM OF ALL RESPONSES TO PHQ9 QUESTIONS 1 AND 2: 0
3. TROUBLE FALLING OR STAYING ASLEEP OR SLEEPING TOO MUCH: NOT AT ALL
9. THOUGHTS THAT YOU WOULD BE BETTER OFF DEAD, OR OF HURTING YOURSELF: NOT AT ALL
CLINICAL INTERPRETATION OF PHQ2 SCORE: 0

## 2021-07-09 ASSESSMENT — FIBROSIS 4 INDEX: FIB4 SCORE: 1.44

## 2021-07-09 NOTE — PROGRESS NOTES
Chief Complaint   Patient presents with   • Annual Exam       HPI:  Cecilia Alatorre is a 88 y.o. here for Medicare Annual Wellness Visit     Medicare annual wellness visit, subsequent  Preventive measures and chronic medical issues reviewed.    Essential hypertension  Blood pressure has been adequately controlled on carvedilol 3.125 mg twice a day.  No side effects    Mixed hyperlipidemia  She has been tolerating the statin. Denies muscle pain LFTs has been normal, has been on atorvastatin 20 mg daily.    GERD without esophagitis  Denies any epigastric pain or heartburn.  Patient has been doing fine on Dexilant 60 mg daily.  No side effects    Moderate episode of recurrent major depressive disorder (HCC)  Mood has been stable, however patient sometimes feels tired.  She is currently on Zoloft 50 mg daily    H/O: stroke  Patient with history of a stroke, has no residual effect.  Has been on statin    Chronic insomnia   The trazodone 100 mg daily has been helping her sleep.    Chronic pain of both knees  Chronic, has been affecting her activity, does not want to go to orthopedist.  Requested physical therapy.    Patient Active Problem List    Diagnosis Date Noted   • Chronic insomnia 09/12/2019   • Mixed hyperlipidemia 03/12/2019   • Bronchitis 05/15/2017   • Primary osteoarthritis of right knee 02/01/2017   • Essential hypertension 02/29/2016   • H/O: stroke 02/29/2016   • Primary osteoarthritis involving multiple joints 02/29/2016   • Depression 02/29/2016   • Primary localized osteoarthrosis, pelvic region and thigh 03/02/2015   • S/P lumbar fusion 05/14/2013       Current Outpatient Medications   Medication Sig Dispense Refill   • traZODone (DESYREL) 100 MG Tab TAKE 1 TABLET AT BEDTIME 90 tablet 2   • amoxicillin (AMOXIL) 500 MG Cap      • carvedilol (COREG) 3.125 MG Tab TAKE 1 TABLET TWICE DAILY  WITH MEALS 180 tablet 2   • sertraline (ZOLOFT) 50 MG Tab Take 1 Tab by mouth every day. 90 Tab 11   • DEXILANT 60  MG CAPSULE DELAYED RELEASE delayed-release capsule TAKE 1 CAPSULE EVERY       MORNING BEFORE BREAKFAST   FOR GASTROESOPHAGEAL REFLUXDISEASE (Patient not taking: Reported on 2/11/2021) 90 Cap 3   • atorvastatin (LIPITOR) 20 MG Tab Take 1 Tab by mouth every day. 90 Tab 3   • Diclofenac Sodium 1 % Gel Apply 1 Application to skin as directed 2 Times a Day. (Patient not taking: Reported on 2/11/2021) 1 Tube 3   • traZODone (DESYREL) 100 MG Tab TAKE 1 TABLET AT BEDTIME 90 Tab 3   • ibuprofen (MOTRIN) 400 MG Tab Take 1 Tab by mouth every 6 hours as needed for Moderate Pain. 30 Tab 0   • DEXILANT 60 MG CAPSULE DELAYED RELEASE delayed-release capsule TAKE 1 CAPSULE EVERY       MORNING BEFORE BREAKFAST   FOR GASTROESOPHAGEAL REFLUXDISEASE (Patient not taking: Reported on 2/11/2021) 90 Cap 3   • calcium citrate (CALCITRATE) 950 MG Tab Take 950 mg by mouth every day.     • Cholecalciferol (VITAMIN D3) 1000 units Cap Take  by mouth.     • Probiotic Product (PROBIOTIC-10 PO) Take  by mouth.     • Colostrum 500 MG Cap Take  by mouth.     • Omega-3 1000 MG Cap Take  by mouth.     • Pyridoxine HCl (B-6) 100 MG Tab Take  by mouth.     • Biotin 10 MG Cap Take  by mouth.     • coenzyme Q-10 30 MG capsule Take 60 mg by mouth every day.     • Multiple Vitamins-Minerals (OCUVITE-LUTEIN) Tab Take 1 tablet by mouth every day.     • DEXILANT 60 MG CAPSULE DELAYED RELEASE delayed-release capsule TAKE 1 CAPSULE EVERY       MORNING BEFORE BREAKFAST   FOR GASTROESOPHAGEAL REFLUXDISEASE 90 Cap 1   • acetaminophen (TYLENOL) 500 MG TABS Take 1 Tab by mouth every 6 hours as needed for Mild Pain. 30 Tab 0     No current facility-administered medications for this visit.          Current supplements as per medication list.     Allergies: Codeine, Latex, Lisinopril, Other drug, Soap, and Tape    Current social contact/activities:      She  reports that she quit smoking about 49 years ago. Her smoking use included cigarettes. She has a 17.00 pack-year  smoking history. She has never used smokeless tobacco. She reports current alcohol use. She reports that she does not use drugs.  Counseling given: Not Answered  Comment: Started smoking at age 22      DPA/Advanced Directive:  Patient does not have an Advanced Directive.  A packet and workshop information was given on Advanced Directives.    ROS:    Gait: Uses no assistive device  Ostomy: No  Other tubes: No  Amputations: No  Chronic oxygen use: No  Last eye exam: 1/2021  Wears hearing aids: No   : Reports urinary leakage during the last 6 months that has somewhat interfered with their daily activities or sleep.    Screening:    Depression Screening    Little interest or pleasure in doing things?  0 - not at all  Feeling down, depressed , or hopeless? 0 - not at all  Patient Health Questionnaire Score: 0     If depressive symptoms identified deferred to follow up visit unless specifically addressed in assessment and plan.    Interpretation of PHQ-9 Total Score   Score Severity   1-4 No Depression   5-9 Mild Depression   10-14 Moderate Depression   15-19 Moderately Severe Depression   20-27 Severe Depression    Screening for Cognitive Impairment    Three Minute Recall (captain, garden, picture) 1/3    Mehrdad clock face with all 12 numbers and set the hands to show 5 past 8.  Yes    Cognitive concerns identified deferred for follow up unless specifically addressed in assessment and plan.    Fall Risk Assessment    Has the patient had two or more falls in the last year or any fall with injury in the last year?  Yes    Safety Assessment    Throw rugs on floor.  No  Handrails on all stairs.  Yes  Good lighting in all hallways.  Yes  Difficulty hearing.  No  Patient counseled about all safety risks that were identified.    Functional Assessment ADLs    Are there any barriers preventing you from cooking for yourself or meeting nutritional needs?  No.    Are there any barriers preventing you from driving safely or obtaining  transportation?  No.    Are there any barriers preventing you from using a telephone or calling for help?  No.    Are there any barriers preventing you from shopping?  No.    Are there any barriers preventing you from taking care of your own finances?  Yes.    Are there any barriers preventing you from managing your medications?  No.    Are there any barriers preventing you from showering, bathing or dressing yourself?  No.    Are you currently engaging in any exercise or physical activity?  No.     What is your perception of your health?   .      Health Maintenance Summary                IMM ZOSTER VACCINES Overdue 2012      Done 2012 Imm Admin: Zoster Vaccine Live (ZVL) (Zostavax) - HISTORICAL DATA    IMM DTaP/Tdap/Td Vaccine Overdue 10/18/2016      Done 10/18/2006 Imm Admin: Tdap Vaccine     Patient has more history with this topic...    Annual Wellness Visit Overdue 2020      Done 2019 SUBSEQUENT ANNUAL WELLNESS VISIT-INCLUDES PPPS ()     Patient has more history with this topic...    IMM INFLUENZA Next Due 2021      Done 10/18/2020 Ext Imm: Influenza Quad Adjuvanted     Patient has more history with this topic...    BONE DENSITY Next Due 10/1/2024      Done 10/1/2019 DS-BONE DENSITY STUDY (DEXA)          Patient Care Team:  Luz Contreras M.D. as PCP - General (Geriatrics)  Tripp Rodriguez M.D. as Consulting Physician (Ophthalmology)        Social History     Tobacco Use   • Smoking status: Former Smoker     Packs/day: 1.00     Years: 17.00     Pack years: 17.00     Types: Cigarettes     Quit date: 1972     Years since quittin.5   • Smokeless tobacco: Never Used   • Tobacco comment: Started smoking at age 22   Substance Use Topics   • Alcohol use: Yes     Comment: 0-1 per month   • Drug use: No     Family History   Problem Relation Age of Onset   • Stroke Father    • Cancer Mother      She  has a past medical history of Acid reflux, Arthritis, Cancer (HCC),  CATARACT, Dental disorder, Heart burn, Hiatus hernia syndrome, High cholesterol, Hypertension, Indigestion, Intracerebral bleed (HCC) (12/16/2015), Other specified disorder of intestines, Pain (9/2016), Psychiatric problem, Seasonal allergies, Shoulder injury, Urinary bladder disorder, and Urinary incontinence (2017).   Past Surgical History:   Procedure Laterality Date   • KNEE ARTHROPLASTY TOTAL Right 2/1/2017    Procedure: KNEE ARTHROPLASTY TOTAL;  Surgeon: Aaron Burton M.D.;  Location: SURGERY [facility];  Service:    • HIP ARTHROPLASTY TOTAL  3/2/2015    Performed by Aaron Burton M.D. at SURGERY [facility]   • LUMBAR FUSION POSTERIOR  5/9/2013    Performed by Sancho Rosales M.D. at SURGERY [facility]   • LUMBAR DECOMPRESSION  5/9/2013    Performed by Sancho Rosales M.D. at SURGERY [facility]   • OTHER ORTHOPEDIC SURGERY  2007    knee right   • OTHER  2004    face lift , tummy tuck   • OTHER ORTHOPEDIC SURGERY  1995    carpal tunnel, right   • OTHER  1948    tonsillectomy   • APPENDECTOMY         Exam:   There were no vitals taken for this visit. There is no height or weight on file to calculate BMI.    Hearing excellent.    Dentition good  Alert, oriented in no acute distress.  Eye contact is good, speech goal directed, affect calm    Assessment and Plan. The following treatment and monitoring plan is recommended:    88 y.o. female     1. Medicare annual wellness visit, subsequent  Preventive measures and chronic medical issues reviewed.    - Subsequent Annual Wellness Visit - Includes PPPS ()    2. Essential hypertension  Controlled.  Continue on carvedilol 3.125 mg twice a day.    - Subsequent Annual Wellness Visit - Includes PPPS ()  - CBC WITH DIFFERENTIAL; Future  - Comp Metabolic Panel; Future  - Lipid Profile; Future  - TSH; Future    3. Mixed hyperlipidemia  He has been tolerating the statin. Denies muscle pain LFTs has been normal  Continue on atorvastatin 20 mg  daily.    - Subsequent Annual Wellness Visit - Includes PPPS ()  - Lipid Profile; Future  - TSH; Future    4. GERD without esophagitis  Patient has been doing fine on Dexilant 60 mg daily    - Subsequent Annual Wellness Visit - Includes PPPS ()    5. Moderate episode of recurrent major depressive disorder (HCC)  Stable.  Continue on Zoloft 50 mg daily  - Subsequent Annual Wellness Visit - Includes PPPS ()    6. H/O: stroke  Stable.  No residual effect.  Continue on statin  - Subsequent Annual Wellness Visit - Includes PPPS ()    7. Chronic insomnia  Has been doing fine on trazodone 100 mg daily  - Subsequent Annual Wellness Visit - Includes PPPS ()    8. Chronic pain of both knees  Chronic, has been affecting her activity, does not want to go to orthopedist.  Requested physical therapy.  - REFERRAL TO PHYSICAL THERAPY        Services suggested: No services needed at this time  Health Care Screening: Age-appropriate preventive services recommended by USPTF and ACIP covered by Medicare were discussed today. Services ordered if indicated and agreed upon by the patient.  Referrals offered: Community-based lifestyle interventions to reduce health risks and promote self-management and wellness, fall prevention, nutrition, physical activity, tobacco-use cessation, weight loss, and mental health services as per orders if indicated.    Discussion today about general wellness and lifestyle habits:    · Prevent falls and reduce trip hazards; Cautioned about securing or removing rugs.  · Have a working fire alarm and carbon monoxide detector;   · Engage in regular physical activity and social activities     Follow-up: No follow-ups on file.

## 2021-07-17 LAB
ALBUMIN SERPL-MCNC: 3.9 G/DL (ref 3.6–4.6)
ALBUMIN/GLOB SERPL: 1.8 {RATIO} (ref 1.2–2.2)
ALP SERPL-CCNC: 72 IU/L (ref 48–121)
ALT SERPL-CCNC: 14 IU/L (ref 0–32)
AST SERPL-CCNC: 20 IU/L (ref 0–40)
BASOPHILS # BLD AUTO: 0.1 X10E3/UL (ref 0–0.2)
BASOPHILS NFR BLD AUTO: 1 %
BILIRUB SERPL-MCNC: 0.3 MG/DL (ref 0–1.2)
BUN SERPL-MCNC: 25 MG/DL (ref 8–27)
BUN/CREAT SERPL: 27 (ref 12–28)
CALCIUM SERPL-MCNC: 9.4 MG/DL (ref 8.7–10.3)
CHLORIDE SERPL-SCNC: 106 MMOL/L (ref 96–106)
CHOLEST SERPL-MCNC: 175 MG/DL (ref 100–199)
CO2 SERPL-SCNC: 24 MMOL/L (ref 20–29)
CREAT SERPL-MCNC: 0.91 MG/DL (ref 0.57–1)
EOSINOPHIL # BLD AUTO: 0.2 X10E3/UL (ref 0–0.4)
EOSINOPHIL NFR BLD AUTO: 3 %
ERYTHROCYTE [DISTWIDTH] IN BLOOD BY AUTOMATED COUNT: 12.8 % (ref 11.7–15.4)
GLOBULIN SER CALC-MCNC: 2.2 G/DL (ref 1.5–4.5)
GLUCOSE SERPL-MCNC: 96 MG/DL (ref 65–99)
HCT VFR BLD AUTO: 38.6 % (ref 34–46.6)
HDLC SERPL-MCNC: 57 MG/DL
HGB BLD-MCNC: 12.7 G/DL (ref 11.1–15.9)
IMM GRANULOCYTES # BLD AUTO: 0 X10E3/UL (ref 0–0.1)
IMM GRANULOCYTES NFR BLD AUTO: 0 %
IMMATURE CELLS  115398: NORMAL
LABORATORY COMMENT REPORT: ABNORMAL
LDLC SERPL CALC-MCNC: 103 MG/DL (ref 0–99)
LYMPHOCYTES # BLD AUTO: 1.3 X10E3/UL (ref 0.7–3.1)
LYMPHOCYTES NFR BLD AUTO: 25 %
MCH RBC QN AUTO: 30.8 PG (ref 26.6–33)
MCHC RBC AUTO-ENTMCNC: 32.9 G/DL (ref 31.5–35.7)
MCV RBC AUTO: 94 FL (ref 79–97)
MONOCYTES # BLD AUTO: 0.5 X10E3/UL (ref 0.1–0.9)
MONOCYTES NFR BLD AUTO: 10 %
MORPHOLOGY BLD-IMP: NORMAL
NEUTROPHILS # BLD AUTO: 3.2 X10E3/UL (ref 1.4–7)
NEUTROPHILS NFR BLD AUTO: 61 %
NRBC BLD AUTO-RTO: NORMAL %
PLATELET # BLD AUTO: 265 X10E3/UL (ref 150–450)
POTASSIUM SERPL-SCNC: 3.9 MMOL/L (ref 3.5–5.2)
PROT SERPL-MCNC: 6.1 G/DL (ref 6–8.5)
RBC # BLD AUTO: 4.13 X10E6/UL (ref 3.77–5.28)
SODIUM SERPL-SCNC: 141 MMOL/L (ref 134–144)
TRIGL SERPL-MCNC: 80 MG/DL (ref 0–149)
TSH SERPL DL<=0.005 MIU/L-ACNC: 2.81 UIU/ML (ref 0.45–4.5)
VLDLC SERPL CALC-MCNC: 15 MG/DL (ref 5–40)
WBC # BLD AUTO: 5.2 X10E3/UL (ref 3.4–10.8)

## 2021-08-11 DIAGNOSIS — E78.5 HYPERLIPIDEMIA, UNSPECIFIED HYPERLIPIDEMIA TYPE: ICD-10-CM

## 2021-08-11 RX ORDER — ATORVASTATIN CALCIUM 20 MG/1
TABLET, FILM COATED ORAL
Qty: 90 TABLET | Refills: 3 | Status: SHIPPED | OUTPATIENT
Start: 2021-08-11 | End: 2022-08-01

## 2021-08-12 ENCOUNTER — OFFICE VISIT (OUTPATIENT)
Dept: MEDICAL GROUP | Facility: MEDICAL CENTER | Age: 86
End: 2021-08-12
Payer: MEDICARE

## 2021-08-12 VITALS
WEIGHT: 162 LBS | TEMPERATURE: 98 F | RESPIRATION RATE: 16 BRPM | BODY MASS INDEX: 28.7 KG/M2 | SYSTOLIC BLOOD PRESSURE: 100 MMHG | HEIGHT: 63 IN | DIASTOLIC BLOOD PRESSURE: 60 MMHG | HEART RATE: 76 BPM

## 2021-08-12 DIAGNOSIS — R53.83 FATIGUE, UNSPECIFIED TYPE: ICD-10-CM

## 2021-08-12 DIAGNOSIS — N18.31 STAGE 3A CHRONIC KIDNEY DISEASE: ICD-10-CM

## 2021-08-12 DIAGNOSIS — R89.9 ABNORMAL LABORATORY TEST: ICD-10-CM

## 2021-08-12 PROCEDURE — 99213 OFFICE O/P EST LOW 20 MIN: CPT | Performed by: FAMILY MEDICINE

## 2021-08-12 RX ORDER — TRETINOIN 0.5 MG/G
CREAM TOPICAL
COMMUNITY
Start: 2021-07-15 | End: 2022-09-15

## 2021-08-12 ASSESSMENT — FIBROSIS 4 INDEX: FIB4 SCORE: 1.78

## 2021-08-12 NOTE — PROGRESS NOTES
CC: Fatigue/discuss blood work    HPI:   Cecilia presents today to discuss her blood work.  Patient was seen a month ago, reported tiredness, so patient was sent for blood work.  She has normal CBC, CMP, and thyroid function.  She has mild decrease in kidney function(GFR is 57, normal creatinine) which is probably due to dehydration.      Patient Active Problem List    Diagnosis Date Noted   • Chronic insomnia 09/12/2019   • Mixed hyperlipidemia 03/12/2019   • Bronchitis 05/15/2017   • Primary osteoarthritis of right knee 02/01/2017   • Essential hypertension 02/29/2016   • H/O: stroke 02/29/2016   • Primary osteoarthritis involving multiple joints 02/29/2016   • Depression 02/29/2016   • Primary localized osteoarthrosis, pelvic region and thigh 03/02/2015   • S/P lumbar fusion 05/14/2013       Current Outpatient Medications   Medication Sig Dispense Refill   • tretinoin (RETIN-A) 0.05 % cream      • atorvastatin (LIPITOR) 20 MG Tab TAKE 1 TABLET DAILY 90 Tablet 3   • traZODone (DESYREL) 100 MG Tab TAKE 1 TABLET AT BEDTIME 90 tablet 2   • amoxicillin (AMOXIL) 500 MG Cap      • carvedilol (COREG) 3.125 MG Tab TAKE 1 TABLET TWICE DAILY  WITH MEALS 180 tablet 2   • sertraline (ZOLOFT) 50 MG Tab Take 1 Tab by mouth every day. 90 Tab 11   • DEXILANT 60 MG CAPSULE DELAYED RELEASE delayed-release capsule TAKE 1 CAPSULE EVERY       MORNING BEFORE BREAKFAST   FOR GASTROESOPHAGEAL REFLUXDISEASE (Patient not taking: Reported on 2/11/2021) 90 Cap 3   • Diclofenac Sodium 1 % Gel Apply 1 Application to skin as directed 2 Times a Day. (Patient not taking: Reported on 2/11/2021) 1 Tube 3   • traZODone (DESYREL) 100 MG Tab TAKE 1 TABLET AT BEDTIME 90 Tab 3   • ibuprofen (MOTRIN) 400 MG Tab Take 1 Tab by mouth every 6 hours as needed for Moderate Pain. 30 Tab 0   • DEXILANT 60 MG CAPSULE DELAYED RELEASE delayed-release capsule TAKE 1 CAPSULE EVERY       MORNING BEFORE BREAKFAST   FOR GASTROESOPHAGEAL REFLUXDISEASE (Patient not taking:  "Reported on 2/11/2021) 90 Cap 3   • calcium citrate (CALCITRATE) 950 MG Tab Take 950 mg by mouth every day.     • Cholecalciferol (VITAMIN D3) 1000 units Cap Take  by mouth.     • Probiotic Product (PROBIOTIC-10 PO) Take  by mouth.     • Colostrum 500 MG Cap Take  by mouth.     • Omega-3 1000 MG Cap Take  by mouth.     • Pyridoxine HCl (B-6) 100 MG Tab Take  by mouth.     • Biotin 10 MG Cap Take  by mouth.     • coenzyme Q-10 30 MG capsule Take 60 mg by mouth every day.     • Multiple Vitamins-Minerals (OCUVITE-LUTEIN) Tab Take 1 tablet by mouth every day.     • DEXILANT 60 MG CAPSULE DELAYED RELEASE delayed-release capsule TAKE 1 CAPSULE EVERY       MORNING BEFORE BREAKFAST   FOR GASTROESOPHAGEAL REFLUXDISEASE 90 Cap 1   • acetaminophen (TYLENOL) 500 MG TABS Take 1 Tab by mouth every 6 hours as needed for Mild Pain. 30 Tab 0     No current facility-administered medications for this visit.         Allergies as of 08/12/2021 - Reviewed 08/12/2021   Allergen Reaction Noted   • Codeine  02/01/2013   • Latex  01/19/2017   • Lisinopril Cough 02/29/2016   • Other drug  01/22/2019   • Soap Rash and Itching 02/20/2015   • Tape Rash 02/01/2013        ROS: Denies any chest pain, Shortness of breath, Changes bowel or bladder, Lower extremity edema.    Physical Exam:  /60 (BP Location: Right arm, Patient Position: Sitting, BP Cuff Size: Adult)   Pulse 76   Temp 36.7 °C (98 °F)   Resp 16   Ht 1.588 m (5' 2.5\")   Wt 73.5 kg (162 lb)   BMI 29.16 kg/m²   Gen.: Well-developed, well-nourished, no apparent distress,pleasant and cooperative with the examination  Skin:  Warm and dry with good turgor.   No other physical exam is needed.  Patient was seen a month ago.          Assessment and Plan.   88 y.o. female     1. Fatigue, unspecified type/ Abnormal laboratory test  Mild decrease in GFR, possibly dehydration  Patient admitted that he has not been drinking enough water  Recommend hydration and avoiding nephrotoxic " medication  Recommend age-appropriate exercise    2. Stage 3a chronic kidney disease (HCC)  GFR 57, creatinine normal  Recommend hydration  Continue monitor kidney function.

## 2021-10-07 ENCOUNTER — APPOINTMENT (RX ONLY)
Dept: URBAN - METROPOLITAN AREA CLINIC 20 | Facility: CLINIC | Age: 86
Setting detail: DERMATOLOGY
End: 2021-10-07

## 2021-10-07 DIAGNOSIS — L82.1 OTHER SEBORRHEIC KERATOSIS: ICD-10-CM

## 2021-10-07 DIAGNOSIS — L57.8 OTHER SKIN CHANGES DUE TO CHRONIC EXPOSURE TO NONIONIZING RADIATION: ICD-10-CM

## 2021-10-07 DIAGNOSIS — L81.4 OTHER MELANIN HYPERPIGMENTATION: ICD-10-CM

## 2021-10-07 DIAGNOSIS — Z85.828 PERSONAL HISTORY OF OTHER MALIGNANT NEOPLASM OF SKIN: ICD-10-CM

## 2021-10-07 DIAGNOSIS — D18.0 HEMANGIOMA: ICD-10-CM

## 2021-10-07 DIAGNOSIS — D22 MELANOCYTIC NEVI: ICD-10-CM

## 2021-10-07 PROBLEM — D18.01 HEMANGIOMA OF SKIN AND SUBCUTANEOUS TISSUE: Status: ACTIVE | Noted: 2021-10-07

## 2021-10-07 PROBLEM — D22.5 MELANOCYTIC NEVI OF TRUNK: Status: ACTIVE | Noted: 2021-10-07

## 2021-10-07 PROBLEM — D48.5 NEOPLASM OF UNCERTAIN BEHAVIOR OF SKIN: Status: ACTIVE | Noted: 2021-10-07

## 2021-10-07 PROCEDURE — 99213 OFFICE O/P EST LOW 20 MIN: CPT | Mod: 25

## 2021-10-07 PROCEDURE — 11102 TANGNTL BX SKIN SINGLE LES: CPT

## 2021-10-07 PROCEDURE — ? BIOPSY BY SHAVE METHOD

## 2021-10-07 PROCEDURE — ? COUNSELING

## 2021-10-07 PROCEDURE — 11103 TANGNTL BX SKIN EA SEP/ADDL: CPT

## 2021-10-07 ASSESSMENT — LOCATION ZONE DERM
LOCATION ZONE: NOSE
LOCATION ZONE: FACE
LOCATION ZONE: TRUNK
LOCATION ZONE: NECK
LOCATION ZONE: HAND
LOCATION ZONE: LEG
LOCATION ZONE: ARM

## 2021-10-07 ASSESSMENT — LOCATION DETAILED DESCRIPTION DERM
LOCATION DETAILED: LEFT SUPERIOR UPPER BACK
LOCATION DETAILED: LEFT RADIAL DORSAL HAND
LOCATION DETAILED: LEFT PROXIMAL DORSAL FOREARM
LOCATION DETAILED: RIGHT INFERIOR MEDIAL UPPER BACK
LOCATION DETAILED: RIGHT NASAL ALA
LOCATION DETAILED: LEFT INFERIOR LATERAL FOREHEAD
LOCATION DETAILED: LEFT ANTERIOR DISTAL THIGH
LOCATION DETAILED: RIGHT RADIAL DORSAL HAND
LOCATION DETAILED: LEFT DISTAL CALF
LOCATION DETAILED: RIGHT SUPERIOR UPPER BACK
LOCATION DETAILED: RIGHT DISTAL CALF
LOCATION DETAILED: RIGHT ANKLE
LOCATION DETAILED: RIGHT INFERIOR ANTERIOR NECK
LOCATION DETAILED: SUPERIOR THORACIC SPINE
LOCATION DETAILED: RIGHT INFERIOR CENTRAL MALAR CHEEK

## 2021-10-07 ASSESSMENT — LOCATION SIMPLE DESCRIPTION DERM
LOCATION SIMPLE: RIGHT UPPER BACK
LOCATION SIMPLE: RIGHT HAND
LOCATION SIMPLE: RIGHT ANTERIOR NECK
LOCATION SIMPLE: LEFT THIGH
LOCATION SIMPLE: LEFT FOREARM
LOCATION SIMPLE: RIGHT NOSE
LOCATION SIMPLE: RIGHT CALF
LOCATION SIMPLE: LEFT CALF
LOCATION SIMPLE: RIGHT ANKLE
LOCATION SIMPLE: LEFT FOREHEAD
LOCATION SIMPLE: UPPER BACK
LOCATION SIMPLE: RIGHT CHEEK
LOCATION SIMPLE: LEFT HAND
LOCATION SIMPLE: LEFT UPPER BACK

## 2021-11-09 DIAGNOSIS — F33.1 MODERATE EPISODE OF RECURRENT MAJOR DEPRESSIVE DISORDER (HCC): ICD-10-CM

## 2021-11-09 DIAGNOSIS — K21.9 GERD WITHOUT ESOPHAGITIS: ICD-10-CM

## 2021-11-09 RX ORDER — DEXLANSOPRAZOLE 60 MG/1
CAPSULE, DELAYED RELEASE ORAL
Qty: 90 CAPSULE | Refills: 3 | Status: SHIPPED | OUTPATIENT
Start: 2021-11-09 | End: 2022-08-08

## 2021-11-15 ENCOUNTER — TELEPHONE (OUTPATIENT)
Dept: MEDICAL GROUP | Facility: MEDICAL CENTER | Age: 86
End: 2021-11-15

## 2021-11-17 ENCOUNTER — TELEPHONE (OUTPATIENT)
Dept: MEDICAL GROUP | Facility: MEDICAL CENTER | Age: 86
End: 2021-11-17

## 2021-11-17 DIAGNOSIS — H92.09 OTALGIA, UNSPECIFIED LATERALITY: ICD-10-CM

## 2021-11-24 DIAGNOSIS — I10 ESSENTIAL HYPERTENSION: ICD-10-CM

## 2021-11-24 RX ORDER — CARVEDILOL 3.12 MG/1
TABLET ORAL
Qty: 180 TABLET | Refills: 2 | Status: SHIPPED | OUTPATIENT
Start: 2021-11-24 | End: 2022-08-22

## 2021-12-02 ENCOUNTER — APPOINTMENT (RX ONLY)
Dept: URBAN - METROPOLITAN AREA CLINIC 20 | Facility: CLINIC | Age: 86
Setting detail: DERMATOLOGY
End: 2021-12-02

## 2021-12-02 DIAGNOSIS — L57.0 ACTINIC KERATOSIS: ICD-10-CM | Status: RESOLVED

## 2021-12-02 DIAGNOSIS — K13.0 DISEASES OF LIPS: ICD-10-CM

## 2021-12-02 PROCEDURE — 99213 OFFICE O/P EST LOW 20 MIN: CPT

## 2021-12-02 PROCEDURE — ? COUNSELING

## 2021-12-02 PROCEDURE — ? PRESCRIPTION

## 2021-12-02 RX ORDER — NYSTATIN AND TRIAMCINOLONE ACETONIDE 100000; 1 [USP'U]/G; MG/G
OINTMENT TOPICAL BID
Qty: 15 | Refills: 4 | Status: ERX | COMMUNITY
Start: 2021-12-02

## 2021-12-02 RX ADMIN — NYSTATIN AND TRIAMCINOLONE ACETONIDE 1: 100000; 1 OINTMENT TOPICAL at 00:00

## 2021-12-02 ASSESSMENT — LOCATION DETAILED DESCRIPTION DERM
LOCATION DETAILED: RIGHT SUPERIOR VERMILION BORDER
LOCATION DETAILED: RIGHT CENTRAL MALAR CHEEK

## 2021-12-02 ASSESSMENT — LOCATION ZONE DERM
LOCATION ZONE: LIP
LOCATION ZONE: FACE

## 2021-12-02 ASSESSMENT — LOCATION SIMPLE DESCRIPTION DERM
LOCATION SIMPLE: RIGHT UPPER LIP
LOCATION SIMPLE: RIGHT CHEEK

## 2021-12-21 ENCOUNTER — OFFICE VISIT (OUTPATIENT)
Dept: MEDICAL GROUP | Facility: MEDICAL CENTER | Age: 86
End: 2021-12-21
Payer: MEDICARE

## 2021-12-21 ENCOUNTER — APPOINTMENT (OUTPATIENT)
Dept: MEDICAL GROUP | Facility: MEDICAL CENTER | Age: 86
End: 2021-12-21
Payer: MEDICARE

## 2021-12-21 VITALS
BODY MASS INDEX: 29.23 KG/M2 | HEART RATE: 76 BPM | RESPIRATION RATE: 16 BRPM | OXYGEN SATURATION: 95 % | DIASTOLIC BLOOD PRESSURE: 80 MMHG | TEMPERATURE: 98.1 F | WEIGHT: 165 LBS | HEIGHT: 63 IN | SYSTOLIC BLOOD PRESSURE: 130 MMHG

## 2021-12-21 DIAGNOSIS — M25.473 ANKLE SWELLING, UNSPECIFIED LATERALITY: ICD-10-CM

## 2021-12-21 DIAGNOSIS — M15.9 PRIMARY OSTEOARTHRITIS INVOLVING MULTIPLE JOINTS: ICD-10-CM

## 2021-12-21 PROCEDURE — 99214 OFFICE O/P EST MOD 30 MIN: CPT | Performed by: FAMILY MEDICINE

## 2021-12-21 RX ORDER — NYSTATIN AND TRIAMCINOLONE ACETONIDE 100000; 1 [USP'U]/G; MG/G
OINTMENT TOPICAL
COMMUNITY
Start: 2021-12-02 | End: 2022-06-13

## 2021-12-21 ASSESSMENT — FIBROSIS 4 INDEX: FIB4 SCORE: 1.78

## 2021-12-21 NOTE — PROGRESS NOTES
CC: Bilateral l ankle swelling, osteoarthritis    HPI:   Cecilia presents today to discuss the following:    Ankle swelling, unspecified laterality  Patient reported bilateral ankle swelling.  Denies any pain or tenderness at the area.  Denies any shortness of breath.     Primary osteoarthritis involving multiple joints  Patient has had chronic osteoarthritis involving multiple joints including knees and hip joints, and the lower back.  She has been using a walker to help with her ambulation.  Physical therapy in the past has helped, she wants a referral      Patient Active Problem List    Diagnosis Date Noted   • Chronic insomnia 09/12/2019   • Mixed hyperlipidemia 03/12/2019   • Bronchitis 05/15/2017   • Primary osteoarthritis of right knee 02/01/2017   • Essential hypertension 02/29/2016   • H/O: stroke 02/29/2016   • Primary osteoarthritis involving multiple joints 02/29/2016   • Depression 02/29/2016   • Primary localized osteoarthrosis, pelvic region and thigh 03/02/2015   • S/P lumbar fusion 05/14/2013       Current Outpatient Medications   Medication Sig Dispense Refill   • nystatin-triamcinalone (MYCOLOG) 852118-6.1 UNIT/GM-% Ointment      • carvedilol (COREG) 3.125 MG Tab TAKE 1 TABLET TWICE DAILY  WITH MEALS 180 Tablet 2   • sertraline (ZOLOFT) 50 MG Tab TAKE 1 TABLET DAILY 90 Tablet 3   • DEXILANT 60 MG CAPSULE DELAYED RELEASE delayed-release capsule TAKE 1 CAPSULE EVERY       MORNING BEFORE BREAKFAST   FOR GASTROESOPHAGEAL REFLUXDISEASE 90 Capsule 3   • tretinoin (RETIN-A) 0.05 % cream      • atorvastatin (LIPITOR) 20 MG Tab TAKE 1 TABLET DAILY 90 Tablet 3   • traZODone (DESYREL) 100 MG Tab TAKE 1 TABLET AT BEDTIME 90 tablet 2   • amoxicillin (AMOXIL) 500 MG Cap      • Diclofenac Sodium 1 % Gel Apply 1 Application to skin as directed 2 Times a Day. (Patient not taking: Reported on 2/11/2021) 1 Tube 3   • traZODone (DESYREL) 100 MG Tab TAKE 1 TABLET AT BEDTIME 90 Tab 3   • ibuprofen (MOTRIN) 400 MG Tab  "Take 1 Tab by mouth every 6 hours as needed for Moderate Pain. 30 Tab 0   • DEXILANT 60 MG CAPSULE DELAYED RELEASE delayed-release capsule TAKE 1 CAPSULE EVERY       MORNING BEFORE BREAKFAST   FOR GASTROESOPHAGEAL REFLUXDISEASE (Patient not taking: Reported on 2/11/2021) 90 Cap 3   • calcium citrate (CALCITRATE) 950 MG Tab Take 950 mg by mouth every day.     • Cholecalciferol (VITAMIN D3) 1000 units Cap Take  by mouth.     • Probiotic Product (PROBIOTIC-10 PO) Take  by mouth.     • Colostrum 500 MG Cap Take  by mouth.     • Omega-3 1000 MG Cap Take  by mouth.     • Pyridoxine HCl (B-6) 100 MG Tab Take  by mouth.     • Biotin 10 MG Cap Take  by mouth.     • coenzyme Q-10 30 MG capsule Take 60 mg by mouth every day.     • Multiple Vitamins-Minerals (OCUVITE-LUTEIN) Tab Take 1 tablet by mouth every day.     • DEXILANT 60 MG CAPSULE DELAYED RELEASE delayed-release capsule TAKE 1 CAPSULE EVERY       MORNING BEFORE BREAKFAST   FOR GASTROESOPHAGEAL REFLUXDISEASE 90 Cap 1   • acetaminophen (TYLENOL) 500 MG TABS Take 1 Tab by mouth every 6 hours as needed for Mild Pain. 30 Tab 0     No current facility-administered medications for this visit.         Allergies as of 12/21/2021 - Reviewed 12/21/2021   Allergen Reaction Noted   • Codeine  02/01/2013   • Latex  01/19/2017   • Lisinopril Cough 02/29/2016   • Other drug  01/22/2019   • Soap Rash and Itching 02/20/2015   • Tape Rash 02/01/2013        ROS: Denies any chest pain, Shortness of breath, Changes bowel or bladder, Lower extremity edema.    Physical Exam:  /80 (BP Location: Right arm, Patient Position: Sitting, BP Cuff Size: Adult)   Pulse 76   Temp 36.7 °C (98.1 °F) (Temporal)   Resp 16   Ht 1.588 m (5' 2.5\")   Wt 74.8 kg (165 lb)   SpO2 95%   BMI 29.70 kg/m²   Gen.: Well-developed, well-nourished, no apparent distress,pleasant and cooperative with the examination  Skin:  Warm and dry with good turgor. No rashes or suspicious lesions in visible " areas  Extremities: No cyanosis, clubbing or edema.  Bilateral mild ankle swelling, multiple small dilated veins.  No tenderness or redness          Assessment and Plan.   88 y.o. female     1. Ankle swelling, unspecified laterality  Probably venous insufficiency  Patient advised to use compression socks  Advised to raise her leg when she is in a sitting position    2. Primary osteoarthritis involving multiple joints  Affecting knees and hip joints, and the lower back.  Physical therapy in the past has helped, she wants a referral    - Referral to Physical Therapy

## 2022-02-03 ENCOUNTER — APPOINTMENT (RX ONLY)
Dept: URBAN - METROPOLITAN AREA CLINIC 20 | Facility: CLINIC | Age: 87
Setting detail: DERMATOLOGY
End: 2022-02-03

## 2022-02-03 DIAGNOSIS — L57.0 ACTINIC KERATOSIS: ICD-10-CM

## 2022-02-03 DIAGNOSIS — L72.8 OTHER FOLLICULAR CYSTS OF THE SKIN AND SUBCUTANEOUS TISSUE: ICD-10-CM

## 2022-02-03 PROCEDURE — ? LIQUID NITROGEN

## 2022-02-03 PROCEDURE — ? COUNSELING

## 2022-02-03 PROCEDURE — 99212 OFFICE O/P EST SF 10 MIN: CPT | Mod: 25

## 2022-02-03 PROCEDURE — 17000 DESTRUCT PREMALG LESION: CPT

## 2022-02-03 ASSESSMENT — LOCATION DETAILED DESCRIPTION DERM
LOCATION DETAILED: INFERIOR LUMBAR SPINE
LOCATION DETAILED: LEFT ANTERIOR DISTAL THIGH

## 2022-02-03 ASSESSMENT — LOCATION ZONE DERM
LOCATION ZONE: LEG
LOCATION ZONE: TRUNK

## 2022-02-03 ASSESSMENT — LOCATION SIMPLE DESCRIPTION DERM
LOCATION SIMPLE: LEFT THIGH
LOCATION SIMPLE: LOWER BACK

## 2022-02-03 NOTE — PROCEDURE: MIPS QUALITY
Quality 111:Pneumonia Vaccination Status For Older Adults: Pneumococcal Vaccination Previously Received
Quality 431: Preventive Care And Screening: Unhealthy Alcohol Use - Screening: Patient screened for unhealthy alcohol use using a single question and scores less than 2 times per year
Detail Level: Detailed
Quality 402: Tobacco Use And Help With Quitting Among Adolescents: Patient screened for tobacco and never smoked
Quality 130: Documentation Of Current Medications In The Medical Record: Current Medications Documented
Quality 226: Preventive Care And Screening: Tobacco Use: Screening And Cessation Intervention: Patient screened for tobacco use and is an ex/non-smoker

## 2022-02-03 NOTE — PROCEDURE: LIQUID NITROGEN
Render Note In Bullet Format When Appropriate: No
Detail Level: Detailed
Post-Care Instructions: I reviewed with the patient in detail post-care instructions. Patient is to wear sunprotection, and avoid picking at any of the treated lesions. Pt may apply Vaseline to crusted or scabbing areas.
Consent: The patient's consent was obtained including but not limited to risks of crusting, scabbing, blistering, scarring, darker or lighter pigmentary change, recurrence, incomplete removal and infection.
Show Aperture Variable?: Yes
Duration Of Freeze Thaw-Cycle (Seconds): 0

## 2022-02-17 ENCOUNTER — OFFICE VISIT (OUTPATIENT)
Dept: MEDICAL GROUP | Facility: MEDICAL CENTER | Age: 87
End: 2022-02-17
Payer: MEDICARE

## 2022-02-17 VITALS
RESPIRATION RATE: 16 BRPM | BODY MASS INDEX: 29.06 KG/M2 | HEART RATE: 77 BPM | OXYGEN SATURATION: 94 % | SYSTOLIC BLOOD PRESSURE: 110 MMHG | DIASTOLIC BLOOD PRESSURE: 60 MMHG | WEIGHT: 164 LBS | HEIGHT: 63 IN | TEMPERATURE: 98.8 F

## 2022-02-17 DIAGNOSIS — F33.1 MODERATE EPISODE OF RECURRENT MAJOR DEPRESSIVE DISORDER (HCC): ICD-10-CM

## 2022-02-17 PROCEDURE — 99214 OFFICE O/P EST MOD 30 MIN: CPT | Performed by: FAMILY MEDICINE

## 2022-02-17 RX ORDER — SERTRALINE HYDROCHLORIDE 100 MG/1
100 TABLET, FILM COATED ORAL DAILY
Qty: 30 TABLET | Refills: 11 | Status: SHIPPED | OUTPATIENT
Start: 2022-02-17 | End: 2022-02-22 | Stop reason: SDUPTHER

## 2022-02-17 ASSESSMENT — PATIENT HEALTH QUESTIONNAIRE - PHQ9
3. TROUBLE FALLING OR STAYING ASLEEP OR SLEEPING TOO MUCH: 2
4. FEELING TIRED OR HAVING LITTLE ENERGY: 2
SUM OF ALL RESPONSES TO PHQ9 QUESTIONS 1 AND 2: 4
CLINICAL INTERPRETATION OF PHQ2 SCORE: 5
9. THOUGHTS THAT YOU WOULD BE BETTER OFF DEAD, OR OF HURTING YOURSELF: 0
6. FEELING BAD ABOUT YOURSELF - OR THAT YOU ARE A FAILURE OR HAVE LET YOURSELF OR YOUR FAMILY DOWN: 1
SUM OF ALL RESPONSES TO PHQ QUESTIONS 1-9: 15
5. POOR APPETITE OR OVEREATING: 2
7. TROUBLE CONCENTRATING ON THINGS, SUCH AS READING THE NEWSPAPER OR WATCHING TELEVISION: 2
8. MOVING OR SPEAKING SO SLOWLY THAT OTHER PEOPLE COULD HAVE NOTICED. OR THE OPPOSITE, BEING SO FIGETY OR RESTLESS THAT YOU HAVE BEEN MOVING AROUND A LOT MORE THAN USUAL: 2
SUM OF ALL RESPONSES TO PHQ QUESTIONS 1-9: 14
1. LITTLE INTEREST OR PLEASURE IN DOING THINGS: 2
2. FEELING DOWN, DEPRESSED, IRRITABLE, OR HOPELESS: 2
5. POOR APPETITE OR OVEREATING: 2 - MORE THAN HALF THE DAYS

## 2022-02-17 ASSESSMENT — FIBROSIS 4 INDEX: FIB4 SCORE: 1.8

## 2022-02-17 NOTE — PROGRESS NOTES
CC: Depression    HPI:   Cecilia presents today because she has been feeling low.  Patient with history of depression, she is 89 years old she lady, lives alone, in a big house.  She has been  for many years.  Does not have many friends.  Currently on Zoloft 50 mg daily.  Her mood has been fluctuating but mostly down.  However she denies suicidal ideation.  PHQ-9 score is 15.      Patient Active Problem List    Diagnosis Date Noted   • Chronic insomnia 09/12/2019   • Mixed hyperlipidemia 03/12/2019   • Bronchitis 05/15/2017   • Primary osteoarthritis of right knee 02/01/2017   • Essential hypertension 02/29/2016   • H/O: stroke 02/29/2016   • Primary osteoarthritis involving multiple joints 02/29/2016   • Depression 02/29/2016   • Primary localized osteoarthrosis, pelvic region and thigh 03/02/2015   • S/P lumbar fusion 05/14/2013       Current Outpatient Medications   Medication Sig Dispense Refill   • nystatin-triamcinalone (MYCOLOG) 870862-1.1 UNIT/GM-% Ointment      • carvedilol (COREG) 3.125 MG Tab TAKE 1 TABLET TWICE DAILY  WITH MEALS 180 Tablet 2   • sertraline (ZOLOFT) 50 MG Tab TAKE 1 TABLET DAILY 90 Tablet 3   • DEXILANT 60 MG CAPSULE DELAYED RELEASE delayed-release capsule TAKE 1 CAPSULE EVERY       MORNING BEFORE BREAKFAST   FOR GASTROESOPHAGEAL REFLUXDISEASE 90 Capsule 3   • tretinoin (RETIN-A) 0.05 % cream      • atorvastatin (LIPITOR) 20 MG Tab TAKE 1 TABLET DAILY 90 Tablet 3   • traZODone (DESYREL) 100 MG Tab TAKE 1 TABLET AT BEDTIME 90 tablet 2   • amoxicillin (AMOXIL) 500 MG Cap      • Diclofenac Sodium 1 % Gel Apply 1 Application to skin as directed 2 Times a Day. (Patient not taking: Reported on 2/11/2021) 1 Tube 3   • traZODone (DESYREL) 100 MG Tab TAKE 1 TABLET AT BEDTIME 90 Tab 3   • ibuprofen (MOTRIN) 400 MG Tab Take 1 Tab by mouth every 6 hours as needed for Moderate Pain. 30 Tab 0   • DEXILANT 60 MG CAPSULE DELAYED RELEASE delayed-release capsule TAKE 1 CAPSULE EVERY       MORNING  "BEFORE BREAKFAST   FOR GASTROESOPHAGEAL REFLUXDISEASE (Patient not taking: Reported on 2/11/2021) 90 Cap 3   • calcium citrate (CALCITRATE) 950 MG Tab Take 950 mg by mouth every day.     • Cholecalciferol (VITAMIN D3) 1000 units Cap Take  by mouth.     • Probiotic Product (PROBIOTIC-10 PO) Take  by mouth.     • Colostrum 500 MG Cap Take  by mouth.     • Omega-3 1000 MG Cap Take  by mouth.     • Pyridoxine HCl (B-6) 100 MG Tab Take  by mouth.     • Biotin 10 MG Cap Take  by mouth.     • coenzyme Q-10 30 MG capsule Take 60 mg by mouth every day.     • Multiple Vitamins-Minerals (OCUVITE-LUTEIN) Tab Take 1 tablet by mouth every day.     • DEXILANT 60 MG CAPSULE DELAYED RELEASE delayed-release capsule TAKE 1 CAPSULE EVERY       MORNING BEFORE BREAKFAST   FOR GASTROESOPHAGEAL REFLUXDISEASE 90 Cap 1   • acetaminophen (TYLENOL) 500 MG TABS Take 1 Tab by mouth every 6 hours as needed for Mild Pain. 30 Tab 0     No current facility-administered medications for this visit.         Allergies as of 02/17/2022 - Reviewed 12/21/2021   Allergen Reaction Noted   • Codeine  02/01/2013   • Latex  01/19/2017   • Lisinopril Cough 02/29/2016   • Other drug  01/22/2019   • Soap Rash and Itching 02/20/2015   • Tape Rash 02/01/2013        ROS: Denies any chest pain, Shortness of breath, Changes bowel or bladder, Lower extremity edema.    Physical Exam:  /60 (BP Location: Right arm, Patient Position: Sitting, BP Cuff Size: Adult)   Pulse 77   Temp 37.1 °C (98.8 °F) (Temporal)   Resp 16   Ht 1.588 m (5' 2.5\")   Wt 74.4 kg (164 lb)   SpO2 94%   BMI 29.52 kg/m²   Gen.: Well-developed, well-nourished, no apparent distress,pleasant and cooperative with the examination  Psych: Alert and oriented x 3.Flat affect,normal judgement,insight and memory.    PHQ-9 score: 15      Assessment and Plan.   89 y.o. female     1. Moderate episode of recurrent major depressive disorder (HCC)  PHQ -9 is 15.  Patient is nonsuicidal.  Will increased " Zoloft to 100 mg daily.  Consider change medication to Lexapro of no improvement.  Patient advised to call for any concerns or suicidal ideation.  Discussed with patient living in independent assisted living with many people same age, and having a lot of activities.  Patient stating that she will think about it.    - sertraline (ZOLOFT) 100 MG Tab; Take 1 Tablet by mouth every day.  Dispense: 30 Tablet; Refill: 11

## 2022-02-22 DIAGNOSIS — F33.1 MODERATE EPISODE OF RECURRENT MAJOR DEPRESSIVE DISORDER (HCC): ICD-10-CM

## 2022-02-22 RX ORDER — SERTRALINE HYDROCHLORIDE 100 MG/1
100 TABLET, FILM COATED ORAL DAILY
Qty: 90 TABLET | Refills: 3 | Status: SHIPPED | OUTPATIENT
Start: 2022-02-22 | End: 2022-12-19

## 2022-03-24 ENCOUNTER — APPOINTMENT (OUTPATIENT)
Dept: MEDICAL GROUP | Facility: MEDICAL CENTER | Age: 87
End: 2022-03-24
Payer: MEDICARE

## 2022-03-29 DIAGNOSIS — F51.04 CHRONIC INSOMNIA: ICD-10-CM

## 2022-03-29 RX ORDER — TRAZODONE HYDROCHLORIDE 100 MG/1
TABLET ORAL
Qty: 90 TABLET | Refills: 2 | Status: SHIPPED | OUTPATIENT
Start: 2022-03-29 | End: 2022-09-15

## 2022-05-27 NOTE — TELEPHONE ENCOUNTER
Was the patient seen in the last year in this department? Yes     Does patient have an active prescription for medications requested? No     Received Request Via: Patient    This med was supposed to go to local pharmacy   No

## 2022-06-13 ENCOUNTER — OFFICE VISIT (OUTPATIENT)
Dept: MEDICAL GROUP | Facility: MEDICAL CENTER | Age: 87
End: 2022-06-13
Payer: MEDICARE

## 2022-06-13 VITALS
RESPIRATION RATE: 16 BRPM | OXYGEN SATURATION: 98 % | DIASTOLIC BLOOD PRESSURE: 80 MMHG | TEMPERATURE: 97.7 F | BODY MASS INDEX: 30.71 KG/M2 | WEIGHT: 166.89 LBS | HEIGHT: 62 IN | HEART RATE: 74 BPM | SYSTOLIC BLOOD PRESSURE: 130 MMHG

## 2022-06-13 DIAGNOSIS — M54.50 CHRONIC MIDLINE LOW BACK PAIN WITHOUT SCIATICA: ICD-10-CM

## 2022-06-13 DIAGNOSIS — G89.29 CHRONIC MIDLINE LOW BACK PAIN WITHOUT SCIATICA: ICD-10-CM

## 2022-06-13 DIAGNOSIS — E78.2 MIXED HYPERLIPIDEMIA: ICD-10-CM

## 2022-06-13 DIAGNOSIS — I10 ESSENTIAL HYPERTENSION: ICD-10-CM

## 2022-06-13 DIAGNOSIS — N18.31 STAGE 3A CHRONIC KIDNEY DISEASE: ICD-10-CM

## 2022-06-13 DIAGNOSIS — M15.9 PRIMARY OSTEOARTHRITIS INVOLVING MULTIPLE JOINTS: ICD-10-CM

## 2022-06-13 PROCEDURE — 99214 OFFICE O/P EST MOD 30 MIN: CPT | Performed by: FAMILY MEDICINE

## 2022-06-13 RX ORDER — DICLOFENAC SODIUM 25 MG/1
25 TABLET, DELAYED RELEASE ORAL 2 TIMES DAILY PRN
COMMUNITY
Start: 2022-05-29 | End: 2022-06-13

## 2022-06-13 RX ORDER — METHOCARBAMOL 500 MG/1
TABLET, FILM COATED ORAL
COMMUNITY
Start: 2022-05-29 | End: 2022-09-15

## 2022-06-13 RX ORDER — COVID-19 MOLECULAR TEST ASSAY
KIT MISCELLANEOUS
COMMUNITY
Start: 2022-05-01 | End: 2022-09-15

## 2022-06-13 RX ORDER — MELOXICAM 7.5 MG/1
TABLET ORAL
COMMUNITY
Start: 2022-05-11 | End: 2022-06-13

## 2022-06-13 ASSESSMENT — FIBROSIS 4 INDEX: FIB4 SCORE: 1.8

## 2022-06-13 NOTE — PROGRESS NOTES
CC: Hypertension, CKD, hyperlipidemia, joint pain/back pain    HPI:   Cecilia presents today to discuss the following:    Essential hypertension  Blood pressure has been adequately controlled on carvedilol 3.125 mg twice a day.  No side effects.  Denies headache, chest pain, shortness of breath    Stage 3a chronic kidney disease (HCC)  Patient's last GFR was 57, however she has been having normal BUN and creatinine.  He has been trying to drink more water/fluids    Mixed hyperlipidemia  She has been tolerating the statin. Denies muscle pain LFTs has been normal, has been on atorvastatin 20 mg daily.  No side effects    Primary osteoarthritis involving multiple joints/chronic back pain  Patient has been having chronic joint pain all over her body including the back.  However, recently when she was in the bathroom she was about to fall, but she hold on with her hands to the rails on the walls, after which he had a significant shoulder and back pain.  Was seen at [facility], was given diclofenac and methocarbamol, after which she felt better.  Currently on Advil/Tylenol as needed.        Patient Active Problem List    Diagnosis Date Noted   • Chronic insomnia 09/12/2019   • Mixed hyperlipidemia 03/12/2019   • Bronchitis 05/15/2017   • Primary osteoarthritis of right knee 02/01/2017   • Essential hypertension 02/29/2016   • H/O: stroke 02/29/2016   • Primary osteoarthritis involving multiple joints 02/29/2016   • Depression 02/29/2016   • Primary localized osteoarthrosis, pelvic region and thigh 03/02/2015   • S/P lumbar fusion 05/14/2013       Current Outpatient Medications   Medication Sig Dispense Refill   • sertraline (ZOLOFT) 100 MG Tab Take 1 Tablet by mouth every day. 90 Tablet 3   • methocarbamol (ROBAXIN) 500 MG Tab TAKE 1 TABLET BY MOUTH 1 TO 3 TIMES DAILY AS NEEDED     • BINAXNOW COVID-19 AG HOME TEST Kit USE AS DIRECTED     • traZODone (DESYREL) 100 MG Tab TAKE 1 TABLET AT BEDTIME 90 Tablet 2   •  carvedilol (COREG) 3.125 MG Tab TAKE 1 TABLET TWICE DAILY  WITH MEALS 180 Tablet 2   • DEXILANT 60 MG CAPSULE DELAYED RELEASE delayed-release capsule TAKE 1 CAPSULE EVERY       MORNING BEFORE BREAKFAST   FOR GASTROESOPHAGEAL REFLUXDISEASE 90 Capsule 3   • tretinoin (RETIN-A) 0.05 % cream      • atorvastatin (LIPITOR) 20 MG Tab TAKE 1 TABLET DAILY 90 Tablet 3   • amoxicillin (AMOXIL) 500 MG Cap      • Diclofenac Sodium 1 % Gel Apply 1 Application to skin as directed 2 Times a Day. (Patient not taking: Reported on 2/11/2021) 1 Tube 3   • traZODone (DESYREL) 100 MG Tab TAKE 1 TABLET AT BEDTIME 90 Tab 3   • ibuprofen (MOTRIN) 400 MG Tab Take 1 Tab by mouth every 6 hours as needed for Moderate Pain. 30 Tab 0   • DEXILANT 60 MG CAPSULE DELAYED RELEASE delayed-release capsule TAKE 1 CAPSULE EVERY       MORNING BEFORE BREAKFAST   FOR GASTROESOPHAGEAL REFLUXDISEASE (Patient not taking: Reported on 2/11/2021) 90 Cap 3   • calcium citrate (CALCITRATE) 950 MG Tab Take 950 mg by mouth every day.     • Cholecalciferol (VITAMIN D3) 1000 units Cap Take  by mouth.     • Probiotic Product (PROBIOTIC-10 PO) Take  by mouth.     • Colostrum 500 MG Cap Take  by mouth.     • Omega-3 1000 MG Cap Take  by mouth.     • Pyridoxine HCl (B-6) 100 MG Tab Take  by mouth.     • Biotin 10 MG Cap Take  by mouth.     • coenzyme Q-10 30 MG capsule Take 60 mg by mouth every day.     • Multiple Vitamins-Minerals (OCUVITE-LUTEIN) Tab Take 1 tablet by mouth every day.     • DEXILANT 60 MG CAPSULE DELAYED RELEASE delayed-release capsule TAKE 1 CAPSULE EVERY       MORNING BEFORE BREAKFAST   FOR GASTROESOPHAGEAL REFLUXDISEASE 90 Cap 1   • acetaminophen (TYLENOL) 500 MG TABS Take 1 Tab by mouth every 6 hours as needed for Mild Pain. 30 Tab 0     No current facility-administered medications for this visit.         Allergies as of 06/13/2022 - Reviewed 06/13/2022   Allergen Reaction Noted   • Codeine  02/01/2013   • Cortisone  05/11/2022   • Latex  01/19/2017  "  • Lisinopril Cough 02/29/2016   • Other drug  01/22/2019   • Soap Rash and Itching 02/20/2015   • Tape Rash 02/01/2013   • Naeem two-sided adhesive strap [always feminine wipes] Rash 02/01/2013        ROS: Denies any chest pain, Shortness of breath, Changes bowel or bladder, Lower extremity edema.    Physical Exam:  /80 (BP Location: Right arm, Patient Position: Sitting, BP Cuff Size: Adult)   Pulse 74   Temp 36.5 °C (97.7 °F) (Temporal)   Resp 16   Ht 1.575 m (5' 2\")   Wt 75.7 kg (166 lb 14.2 oz)   SpO2 98%   BMI 30.52 kg/m²   Gen.: Well-developed, well-nourished, no apparent distress,pleasant and cooperative with the examination  Skin:  Warm and dry with good turgor. No rashes or suspicious lesions in visible areas  HEENT:Sinuses nontender with palpation, TMs clear, nares patent with pink mucosa and clear rhinorrhea,no septal deviation ,polyps or lesions. lips without lesions, oropharynx clear.  Neck: Trachea midline,no masses or adenopathy. No JVD.  Cor: Regular rate and rhythm without murmur, gallop or rub.  Lungs: Respirations unlabored.Clear to auscultation with equal breath sounds bilaterally. No wheezes, rhonchi.  Extremities: No cyanosis, clubbing or edema.        Assessment and Plan.   89 y.o. female     1. Essential hypertension  Controlled.  Continue on carvedilol 3.125 mg twice a day    - CBC WITH DIFFERENTIAL; Future  - Comp Metabolic Panel; Future  - Lipid Profile; Future  - TSH; Future    2. Stage 3a chronic kidney disease (HCC)  Mild.  Last GFR was 57  Recommend hydration and avoiding nephrotoxic medication  We will continue monitor kidney function    - CBC WITH DIFFERENTIAL; Future  - Comp Metabolic Panel; Future  - Lipid Profile; Future    3. Mixed hyperlipidemia  He has been tolerating the statin. Denies muscle pain LFTs has been normal  Continue on atorvastatin 20 mg daily.    - Lipid Profile; Future  - TSH; Future    4. Primary osteoarthritis involving multiple joints  Chronic.  " Recently when she was in the bathroom she was about to fall, but she hold on with her hands to the rails on the walls, after which he had a significant shoulder and back pain.  Was seen at [facility], was given diclofenac and methocarbamol, after which she felt better.  Currently on Advil/Tylenol as needed.

## 2022-07-14 ENCOUNTER — APPOINTMENT (RX ONLY)
Dept: URBAN - METROPOLITAN AREA CLINIC 20 | Facility: CLINIC | Age: 87
Setting detail: DERMATOLOGY
End: 2022-07-14

## 2022-07-14 DIAGNOSIS — D22 MELANOCYTIC NEVI: ICD-10-CM

## 2022-07-14 DIAGNOSIS — L81.4 OTHER MELANIN HYPERPIGMENTATION: ICD-10-CM

## 2022-07-14 DIAGNOSIS — L82.1 OTHER SEBORRHEIC KERATOSIS: ICD-10-CM

## 2022-07-14 DIAGNOSIS — L57.8 OTHER SKIN CHANGES DUE TO CHRONIC EXPOSURE TO NONIONIZING RADIATION: ICD-10-CM

## 2022-07-14 DIAGNOSIS — D18.0 HEMANGIOMA: ICD-10-CM

## 2022-07-14 DIAGNOSIS — Z85.828 PERSONAL HISTORY OF OTHER MALIGNANT NEOPLASM OF SKIN: ICD-10-CM

## 2022-07-14 PROBLEM — D22.5 MELANOCYTIC NEVI OF TRUNK: Status: ACTIVE | Noted: 2022-07-14

## 2022-07-14 PROBLEM — D18.01 HEMANGIOMA OF SKIN AND SUBCUTANEOUS TISSUE: Status: ACTIVE | Noted: 2022-07-14

## 2022-07-14 PROBLEM — D48.5 NEOPLASM OF UNCERTAIN BEHAVIOR OF SKIN: Status: ACTIVE | Noted: 2022-07-14

## 2022-07-14 PROCEDURE — ? ADDITIONAL NOTES

## 2022-07-14 PROCEDURE — ? PRESCRIPTION

## 2022-07-14 PROCEDURE — ? COUNSELING

## 2022-07-14 PROCEDURE — 99213 OFFICE O/P EST LOW 20 MIN: CPT

## 2022-07-14 RX ORDER — FLUOROURACIL 5 MG/G
CREAM TOPICAL QD
Qty: 40 | Refills: 3 | Status: ERX | COMMUNITY
Start: 2022-07-14

## 2022-07-14 RX ADMIN — FLUOROURACIL 1: 5 CREAM TOPICAL at 00:00

## 2022-07-14 ASSESSMENT — LOCATION ZONE DERM
LOCATION ZONE: NOSE
LOCATION ZONE: ARM
LOCATION ZONE: FACE
LOCATION ZONE: NECK
LOCATION ZONE: HAND
LOCATION ZONE: LEG
LOCATION ZONE: TRUNK

## 2022-07-14 ASSESSMENT — LOCATION SIMPLE DESCRIPTION DERM
LOCATION SIMPLE: RIGHT ANTERIOR NECK
LOCATION SIMPLE: LEFT HAND
LOCATION SIMPLE: LEFT FOREHEAD
LOCATION SIMPLE: RIGHT ANKLE
LOCATION SIMPLE: LEFT CALF
LOCATION SIMPLE: LEFT FOREARM
LOCATION SIMPLE: RIGHT CHEEK
LOCATION SIMPLE: UPPER BACK
LOCATION SIMPLE: RIGHT HAND
LOCATION SIMPLE: RIGHT UPPER BACK
LOCATION SIMPLE: LEFT UPPER BACK
LOCATION SIMPLE: RIGHT CALF
LOCATION SIMPLE: RIGHT NOSE
LOCATION SIMPLE: LEFT THIGH

## 2022-07-14 ASSESSMENT — LOCATION DETAILED DESCRIPTION DERM
LOCATION DETAILED: LEFT PROXIMAL DORSAL FOREARM
LOCATION DETAILED: LEFT DISTAL CALF
LOCATION DETAILED: RIGHT ANKLE
LOCATION DETAILED: SUPERIOR THORACIC SPINE
LOCATION DETAILED: RIGHT SUPERIOR UPPER BACK
LOCATION DETAILED: RIGHT DISTAL CALF
LOCATION DETAILED: LEFT INFERIOR LATERAL FOREHEAD
LOCATION DETAILED: RIGHT RADIAL DORSAL HAND
LOCATION DETAILED: RIGHT INFERIOR MEDIAL UPPER BACK
LOCATION DETAILED: RIGHT INFERIOR CENTRAL MALAR CHEEK
LOCATION DETAILED: RIGHT INFERIOR ANTERIOR NECK
LOCATION DETAILED: LEFT SUPERIOR UPPER BACK
LOCATION DETAILED: RIGHT NASAL ALA
LOCATION DETAILED: LEFT ANTERIOR DISTAL THIGH
LOCATION DETAILED: LEFT RADIAL DORSAL HAND

## 2022-07-14 NOTE — PROCEDURE: ADDITIONAL NOTES
Render Risk Assessment In Note?: no
Additional Notes: Patient will RTC in 4 months and we will discuss treatment options at that OV.
Detail Level: Simple

## 2022-07-30 DIAGNOSIS — E78.5 HYPERLIPIDEMIA, UNSPECIFIED HYPERLIPIDEMIA TYPE: ICD-10-CM

## 2022-07-30 LAB
ALBUMIN SERPL-MCNC: 4 G/DL (ref 3.6–4.6)
ALBUMIN/GLOB SERPL: 1.8 {RATIO} (ref 1.2–2.2)
ALP SERPL-CCNC: 71 IU/L (ref 44–121)
ALT SERPL-CCNC: 12 IU/L (ref 0–32)
AST SERPL-CCNC: 17 IU/L (ref 0–40)
BASOPHILS # BLD AUTO: 0 X10E3/UL (ref 0–0.2)
BASOPHILS NFR BLD AUTO: 1 %
BILIRUB SERPL-MCNC: 0.4 MG/DL (ref 0–1.2)
BUN SERPL-MCNC: 24 MG/DL (ref 8–27)
BUN/CREAT SERPL: 28 (ref 12–28)
CALCIUM SERPL-MCNC: 9.4 MG/DL (ref 8.7–10.3)
CHLORIDE SERPL-SCNC: 107 MMOL/L (ref 96–106)
CHOLEST SERPL-MCNC: 149 MG/DL (ref 100–199)
CO2 SERPL-SCNC: 26 MMOL/L (ref 20–29)
CREAT SERPL-MCNC: 0.86 MG/DL (ref 0.57–1)
EGFRCR SERPLBLD CKD-EPI 2021: 65 ML/MIN/1.73
EOSINOPHIL # BLD AUTO: 0.2 X10E3/UL (ref 0–0.4)
EOSINOPHIL NFR BLD AUTO: 3 %
ERYTHROCYTE [DISTWIDTH] IN BLOOD BY AUTOMATED COUNT: 12.6 % (ref 11.7–15.4)
GLOBULIN SER CALC-MCNC: 2.2 G/DL (ref 1.5–4.5)
GLUCOSE SERPL-MCNC: 91 MG/DL (ref 65–99)
HCT VFR BLD AUTO: 36.5 % (ref 34–46.6)
HDLC SERPL-MCNC: 52 MG/DL
HGB BLD-MCNC: 11.7 G/DL (ref 11.1–15.9)
IMM GRANULOCYTES # BLD AUTO: 0 X10E3/UL (ref 0–0.1)
IMM GRANULOCYTES NFR BLD AUTO: 0 %
IMMATURE CELLS  115398: NORMAL
LABORATORY COMMENT REPORT: NORMAL
LDLC SERPL CALC-MCNC: 83 MG/DL (ref 0–99)
LYMPHOCYTES # BLD AUTO: 0.9 X10E3/UL (ref 0.7–3.1)
LYMPHOCYTES NFR BLD AUTO: 17 %
MCH RBC QN AUTO: 29.4 PG (ref 26.6–33)
MCHC RBC AUTO-ENTMCNC: 32.1 G/DL (ref 31.5–35.7)
MCV RBC AUTO: 92 FL (ref 79–97)
MONOCYTES # BLD AUTO: 0.6 X10E3/UL (ref 0.1–0.9)
MONOCYTES NFR BLD AUTO: 11 %
MORPHOLOGY BLD-IMP: NORMAL
NEUTROPHILS # BLD AUTO: 3.7 X10E3/UL (ref 1.4–7)
NEUTROPHILS NFR BLD AUTO: 68 %
NRBC BLD AUTO-RTO: NORMAL %
PLATELET # BLD AUTO: 282 X10E3/UL (ref 150–450)
POTASSIUM SERPL-SCNC: 4.2 MMOL/L (ref 3.5–5.2)
PROT SERPL-MCNC: 6.2 G/DL (ref 6–8.5)
RBC # BLD AUTO: 3.98 X10E6/UL (ref 3.77–5.28)
SODIUM SERPL-SCNC: 143 MMOL/L (ref 134–144)
TRIGL SERPL-MCNC: 73 MG/DL (ref 0–149)
TSH SERPL DL<=0.005 MIU/L-ACNC: 2.86 UIU/ML (ref 0.45–4.5)
VLDLC SERPL CALC-MCNC: 14 MG/DL (ref 5–40)
WBC # BLD AUTO: 5.5 X10E3/UL (ref 3.4–10.8)

## 2022-08-01 RX ORDER — ATORVASTATIN CALCIUM 20 MG/1
TABLET, FILM COATED ORAL
Qty: 90 TABLET | Refills: 3 | Status: SHIPPED | OUTPATIENT
Start: 2022-08-01 | End: 2023-07-18

## 2022-08-05 DIAGNOSIS — K21.9 GERD WITHOUT ESOPHAGITIS: ICD-10-CM

## 2022-08-08 RX ORDER — DEXLANSOPRAZOLE 60 MG/1
CAPSULE, DELAYED RELEASE ORAL
Qty: 90 CAPSULE | Refills: 3 | Status: SHIPPED | OUTPATIENT
Start: 2022-08-08 | End: 2022-10-03 | Stop reason: SDUPTHER

## 2022-08-21 DIAGNOSIS — I10 ESSENTIAL HYPERTENSION: ICD-10-CM

## 2022-08-22 RX ORDER — CARVEDILOL 3.12 MG/1
TABLET ORAL
Qty: 180 TABLET | Refills: 2 | Status: SHIPPED | OUTPATIENT
Start: 2022-08-22 | End: 2023-05-11

## 2022-09-03 NOTE — ED NOTES
Report received from ABBY Munoz; care assumed.    Subjective   Patient ID: aMson is a 31 year old male.    Chief Complaint   Patient presents with   • Dental Problem     Tooth pain right side. Symptoms started one week ago. Home Covid-19 test two days ago negative.      HPI  Pt is a 32yo male who presents with cc of R tooth pain that began about 1 week ago.  Patient states 2 days ago he started having a sore throat and states that throat has not started feeling better.  He does complain of pain with swallowing.  He denies difficulty swallowing but states that it is very painful to swallow.  He did take a home COVID test 2 days ago but states that it was negative.  Patient states he noted a fever last night of 100.1 and took Tylenol.  Patient has not taken any ibuprofen.  Patient states his throat is still painful.  He states it does feel like his tonsils are swollen.  He is able to drink fluids.  He does mention also having mild rhinorrhea/nasal congestion and a mild dry cough. He denies headache, dizziness, chest pain, SOB, wheezing, abdominal pain, nausea, vomiting, diarrhea.    Patient denies allergies to medications.    Past Medical History:   Diagnosis Date   • Alcohol abuse    • Drug abuse (CMS/[facility])     crack cocaine - smoked       MEDICATIONS:  Current Outpatient Medications   Medication Sig   • terbinafine (LamISIL AT) 1 % cream Apply in between the toes bilateral feet twice daily for 2 weeks     Current Facility-Administered Medications   Medication   • ibuprofen (MOTRIN) tablet 800 mg       ALLERGIES:  ALLERGIES:  No Known Allergies    PAST SURGICAL HISTORY:  Past Surgical History:   Procedure Laterality Date   • No past surgeries         FAMILY HISTORY:  No family history on file.    SOCIAL HISTORY:  Social History     Tobacco Use   • Smoking status: Current Every Day Smoker   • Smokeless tobacco: Never Used   Substance Use Topics   • Alcohol use: Yes   • Drug use: Yes     Types: Cocaine, Methamphetamines         Patient's medications, allergies, past  medical, surgical, and social history  were reviewed and updated as appropriate.    Review of Systems   Constitutional: Positive for fever.   HENT: Positive for congestion, rhinorrhea and sore throat.    Respiratory: Positive for cough.    All other systems reviewed and are negative.      Objective   Physical Exam  Vitals and nursing note reviewed.   Constitutional:       General: He is not in acute distress.     Appearance: Normal appearance. He is well-developed and well-groomed. He is not ill-appearing, toxic-appearing or diaphoretic.   HENT:      Head: Normocephalic and atraumatic.      Right Ear: Hearing, tympanic membrane, ear canal and external ear normal.      Left Ear: Hearing, tympanic membrane, ear canal and external ear normal.      Nose: Nose normal.      Mouth/Throat:      Lips: Pink. No lesions.      Mouth: Mucous membranes are moist. No oral lesions or angioedema.      Tongue: No lesions. Tongue does not deviate from midline.      Palate: No mass and lesions.      Pharynx: Oropharynx is clear. Uvula midline. Posterior oropharyngeal erythema present. No oropharyngeal exudate or uvula swelling.      Tonsils: Tonsillar exudate present. No tonsillar abscesses (no tonsilllar/peritonsillar abscess noted). 2+ on the right. 2+ on the left.      Comments: Pt is talking in full sentences and tolerating secretions     Neck: Normal range of motion and neck supple. No rigidity.   Eyes:      General: No scleral icterus.        Right eye: No discharge.         Left eye: No discharge.      Conjunctiva/sclera: Conjunctivae normal.   Cardiovascular:      Rate and Rhythm: Normal rate and regular rhythm.      Heart sounds: Normal heart sounds.   Pulmonary:      Effort: Pulmonary effort is normal. No respiratory distress.      Breath sounds: Normal breath sounds. No stridor. No wheezing, rhonchi or rales.   Musculoskeletal:         General: Normal range of motion.   Lymphadenopathy:      Head:      Right side of head: No  submental, submandibular, tonsillar, preauricular or posterior auricular adenopathy.      Left side of head: No submental, submandibular, tonsillar, preauricular or posterior auricular adenopathy.      Cervical: Cervical adenopathy present.      Right cervical: Superficial cervical adenopathy present.      Left cervical: Superficial cervical adenopathy present.   Skin:     General: Skin is warm and dry.      Capillary Refill: Capillary refill takes less than 2 seconds.      Coloration: Skin is not ashen, cyanotic, jaundiced, mottled, pale or sallow.      Findings: No abscess or erythema.   Neurological:      General: No focal deficit present.      Mental Status: He is alert and oriented to person, place, and time.   Psychiatric:         Mood and Affect: Mood normal.         Behavior: Behavior normal. Behavior is cooperative.         Thought Content: Thought content normal.         Judgment: Judgment normal.       Visit Vitals  /70 (BP Location: RUE - Right upper extremity, Patient Position: Sitting, Cuff Size: Regular)   Pulse (!) 106   Temp (!) 100.8 °F (38.2 °C) (Temporal)   Resp 18   SpO2 96%       Assessment   Problem List Items Addressed This Visit    None     Visit Diagnoses     Sore throat    -  Primary    Relevant Medications    ibuprofen (MOTRIN) tablet 800 mg (Completed)    Other Relevant Orders    POCT RAPID STREP A (Completed)    STREPTOCOCCUS GROUP A (STREPTOCOCCUS PYOGENES), BACTERIAL CULTURE    POCT SARS-COV-2 ANTIGEN (Completed)    Acute tonsillitis, unspecified etiology        Relevant Medications    acetaminophen-codeine (TYLENOL NO.3) 300-30 MG per tablet    Other Relevant Orders    SERVICE TO FAMILY PRACTICE          This is a 31 year old year-old male who presents with dental pain/sore throat.   Pt is well-appearing, nontoxic and in no acute distress.  Patient's temp and pulse are mildly elevated.  Blood pressure within normal limits.  O2 sat 96% on room air-normal.  Ibuprofen 800mg PO was  ordered  Rapid strep screen was ordered and results negative, so culture was ordered  Rapid COVID swab was ordered and results negative   Upon reexamination, patient states his throat feels much better.  Vital signs were rechecked and vital signs stable.  Temperature and pulse have decreased.  Patient is dynamically stable and stable for discharge    I estimate there is LOW risk for ANAPHYLAXIS, DEEP SPACE INFECTION (e.g. HERNANDO'S ANGINA OR RETROPHARYNGEAL ABSCESS) EPIGLOTTITIS, MENINGITIS, OR AIRWAY COMPROMISE, thus I consider the discharge disposition reasonable.    Pt was informed of all her results and educated on tonsillitis.  The importance of close follow-up with PCP was stressed to the patient.  He was given rx for amoxicillin and T#3 and was instructed how to take them.  He was also instructed to continue Ibuprofen q6-8hrs. he is to drink plenty of fluids. Pt was instructed to go to the Emergency Department immediately if new or worsening symptoms occur. I discussed w/ the pt the symptoms which are most concerning (e.g. Changing or worsening pain, trouble swallowing or breathing, neck stiffness or  fever) that necessitate immediate ER visit.    Pt expressed his understanding and agreed w/the tx plan. All questions were answered.    Instructions provided as documented in the AVS.    Thank you for visiting [facility].

## 2022-09-15 ENCOUNTER — OFFICE VISIT (OUTPATIENT)
Dept: MEDICAL GROUP | Facility: MEDICAL CENTER | Age: 87
End: 2022-09-15
Payer: MEDICARE

## 2022-09-15 VITALS
RESPIRATION RATE: 16 BRPM | TEMPERATURE: 98.6 F | DIASTOLIC BLOOD PRESSURE: 60 MMHG | HEIGHT: 62 IN | HEART RATE: 82 BPM | WEIGHT: 163 LBS | BODY MASS INDEX: 30 KG/M2 | OXYGEN SATURATION: 91 % | SYSTOLIC BLOOD PRESSURE: 120 MMHG

## 2022-09-15 DIAGNOSIS — E78.5 HYPERLIPIDEMIA, UNSPECIFIED HYPERLIPIDEMIA TYPE: ICD-10-CM

## 2022-09-15 DIAGNOSIS — K21.9 GERD WITHOUT ESOPHAGITIS: ICD-10-CM

## 2022-09-15 DIAGNOSIS — F51.04 CHRONIC INSOMNIA: ICD-10-CM

## 2022-09-15 DIAGNOSIS — I10 ESSENTIAL HYPERTENSION: ICD-10-CM

## 2022-09-15 PROCEDURE — 99214 OFFICE O/P EST MOD 30 MIN: CPT | Performed by: FAMILY MEDICINE

## 2022-09-15 RX ORDER — FLUOROURACIL 50 MG/G
CREAM TOPICAL
COMMUNITY
Start: 2022-07-15 | End: 2023-04-17

## 2022-09-15 RX ORDER — ERYTHROMYCIN 5 MG/G
OINTMENT OPHTHALMIC
COMMUNITY
Start: 2022-08-10 | End: 2023-04-17

## 2022-09-15 RX ORDER — TRAZODONE HYDROCHLORIDE 50 MG/1
50 TABLET ORAL NIGHTLY
Qty: 90 TABLET | Refills: 3 | Status: SHIPPED | OUTPATIENT
Start: 2022-09-15 | End: 2023-08-28

## 2022-09-15 ASSESSMENT — FIBROSIS 4 INDEX: FIB4 SCORE: 1.55

## 2022-09-15 NOTE — PROGRESS NOTES
CC: Hypertension, acid reflux, hyperlipidemia, chronic insomnia    HPI:   Cecilia presents today to discuss the following:    Essential hypertension  Blood pressure has been under good controlled on carvedilol 3.125 mg twice a day, no side effects    GERD without esophagitis  Denies any epigastric pain, heartburn, nausea, vomiting.  She has been doing fine on Dexilant 60 mg daily    Hyperlipidemia, unspecified hyperlipidemia type  She has been tolerating the statin. Denies muscle pain LFTs has been normal, has been on atorvastatin 20 mg daily    Chronic insomnia  Has been on trazodone 100 mg daily, that makes her sleepy the whole day.  So she wants to try trazodone 50 mg  Patient Active Problem List    Diagnosis Date Noted    Chronic insomnia 09/12/2019    Mixed hyperlipidemia 03/12/2019    Bronchitis 05/15/2017    Primary osteoarthritis of right knee 02/01/2017    Essential hypertension 02/29/2016    H/O: stroke 02/29/2016    Primary osteoarthritis involving multiple joints 02/29/2016    Depression 02/29/2016    Primary localized osteoarthrosis, pelvic region and thigh 03/02/2015    S/P lumbar fusion 05/14/2013       Current Outpatient Medications   Medication Sig Dispense Refill    traZODone (DESYREL) 50 MG Tab Take 1 Tablet by mouth every evening. 90 Tablet 3    carvedilol (COREG) 3.125 MG Tab TAKE 1 TABLET TWICE DAILY  WITH MEALS 180 Tablet 2    atorvastatin (LIPITOR) 20 MG Tab TAKE 1 TABLET DAILY 90 Tablet 3    sertraline (ZOLOFT) 100 MG Tab Take 1 Tablet by mouth every day. 90 Tablet 3    fluorouracil (EFUDEX) 5 % cream       erythromycin 5 MG/GM Ointment       DEXILANT 60 MG CAPSULE DELAYED RELEASE delayed-release capsule TAKE 1 CAPSULE EVERY       MORNING BEFORE BREAKFAST   FOR GASTROESOPHAGEAL REFLUXDISEASE. 90 Capsule 3    Diclofenac Sodium 1 % Gel Apply 1 Application to skin as directed 2 Times a Day. (Patient not taking: Reported on 2/11/2021) 1 Tube 3    traZODone (DESYREL) 100 MG Tab TAKE 1 TABLET AT  "BEDTIME 90 Tab 3    ibuprofen (MOTRIN) 400 MG Tab Take 1 Tab by mouth every 6 hours as needed for Moderate Pain. 30 Tab 0    DEXILANT 60 MG CAPSULE DELAYED RELEASE delayed-release capsule TAKE 1 CAPSULE EVERY       MORNING BEFORE BREAKFAST   FOR GASTROESOPHAGEAL REFLUXDISEASE (Patient not taking: Reported on 2/11/2021) 90 Cap 3    calcium citrate (CALCITRATE) 950 MG Tab Take 950 mg by mouth every day.      Cholecalciferol (VITAMIN D3) 1000 units Cap Take  by mouth.      Probiotic Product (PROBIOTIC-10 PO) Take  by mouth.      Colostrum 500 MG Cap Take  by mouth.      Omega-3 1000 MG Cap Take  by mouth.      Pyridoxine HCl (B-6) 100 MG Tab Take  by mouth.      Biotin 10 MG Cap Take  by mouth.      coenzyme Q-10 30 MG capsule Take 60 mg by mouth every day.      Multiple Vitamins-Minerals (OCUVITE-LUTEIN) Tab Take 1 tablet by mouth every day.      DEXILANT 60 MG CAPSULE DELAYED RELEASE delayed-release capsule TAKE 1 CAPSULE EVERY       MORNING BEFORE BREAKFAST   FOR GASTROESOPHAGEAL REFLUXDISEASE 90 Cap 1    acetaminophen (TYLENOL) 500 MG TABS Take 1 Tab by mouth every 6 hours as needed for Mild Pain. 30 Tab 0     No current facility-administered medications for this visit.         Allergies as of 09/15/2022 - Reviewed 09/15/2022   Allergen Reaction Noted    Codeine  02/01/2013    Cortisone  05/11/2022    Latex  01/19/2017    Lisinopril Cough 02/29/2016    Other drug  01/22/2019    Soap Rash and Itching 02/20/2015    Tape Rash 02/01/2013    [place] two-sided adhesive strap [always feminine wipes] Rash 02/01/2013        ROS: Denies any chest pain, Shortness of breath, Changes bowel or bladder, Lower extremity edema.    Physical Exam:  /60 (BP Location: Right arm, Patient Position: Sitting, BP Cuff Size: Adult)   Pulse 82   Temp 37 °C (98.6 °F) (Temporal)   Resp 16   Ht 1.575 m (5' 2\")   Wt 73.9 kg (163 lb)   SpO2 91%   BMI 29.81 kg/m²   Gen.: Well-developed, well-nourished, no apparent distress,pleasant and " cooperative with the examination  Skin:  Warm and dry with good turgor. No rashes or suspicious lesions in visible areas  HEENT:Sinuses nontender with palpation, TMs clear, nares patent with pink mucosa and clear rhinorrhea,no septal deviation ,polyps or lesions. lips without lesions, oropharynx clear.  Neck: Trachea midline,no masses or adenopathy. No JVD.  Cor: Regular rate and rhythm without murmur, gallop or rub.  Lungs: Respirations unlabored.Clear to auscultation with equal breath sounds bilaterally. No wheezes, rhonchi.  Extremities: No cyanosis, clubbing or edema.      Assessment and Plan.   89 y.o. female     1. Essential hypertension  Controlled.  Continue on carvedilol 3.125 mg twice a day    2. GERD without esophagitis  She has been doing fine on Dexilant 60 mg daily    3. Hyperlipidemia, unspecified hyperlipidemia type  She has been tolerating the statin. Denies muscle pain LFTs has been normal  Continue atorvastatin 20 mg daily    4. Chronic insomnia  Has been on trazodone 100 mg daily, that makes her sleepy the whole day.  So she wants to try trazodone 50 mg    - traZODone (DESYREL) 50 MG Tab; Take 1 Tablet by mouth every evening.  Dispense: 90 Tablet; Refill: 3

## 2022-09-26 DIAGNOSIS — K21.9 GERD WITHOUT ESOPHAGITIS: ICD-10-CM

## 2022-09-26 RX ORDER — DEXLANSOPRAZOLE 60 MG/1
CAPSULE, DELAYED RELEASE ORAL
Qty: 90 CAPSULE | Refills: 3 | Status: SHIPPED | OUTPATIENT
Start: 2022-09-26 | End: 2023-04-17

## 2022-10-03 DIAGNOSIS — K21.9 GERD WITHOUT ESOPHAGITIS: ICD-10-CM

## 2022-10-03 RX ORDER — DEXLANSOPRAZOLE 60 MG/1
CAPSULE, DELAYED RELEASE ORAL
Qty: 90 CAPSULE | Refills: 3 | Status: SHIPPED | OUTPATIENT
Start: 2022-10-03 | End: 2023-04-17

## 2022-11-02 ENCOUNTER — APPOINTMENT (RX ONLY)
Dept: URBAN - METROPOLITAN AREA CLINIC 20 | Facility: CLINIC | Age: 87
Setting detail: DERMATOLOGY
End: 2022-11-02

## 2022-11-02 PROBLEM — D48.5 NEOPLASM OF UNCERTAIN BEHAVIOR OF SKIN: Status: ACTIVE | Noted: 2022-11-02

## 2022-11-02 PROCEDURE — 99213 OFFICE O/P EST LOW 20 MIN: CPT

## 2022-11-02 PROCEDURE — ? COUNSELING

## 2022-11-02 PROCEDURE — ? ADDITIONAL NOTES

## 2022-11-02 NOTE — PROCEDURE: ADDITIONAL NOTES
Render Risk Assessment In Note?: no
Additional Notes: Patient received 5FU but has not used it.\\nPatient instructed how to use medication handout given patient verbalized understanding
Detail Level: Simple

## 2022-11-02 NOTE — HPI: SKIN LESION
Is This A New Presentation, Or A Follow-Up?: Follow Up Skin Lesion
What Type Of Note Output Would You Prefer (Optional)?: Bullet Format
How Severe Is Your Skin Lesion?: mild
Has Your Skin Lesion Been Treated?: not been treated

## 2022-11-11 ENCOUNTER — PATIENT MESSAGE (OUTPATIENT)
Dept: HEALTH INFORMATION MANAGEMENT | Facility: OTHER | Age: 87
End: 2022-11-11

## 2022-12-13 ENCOUNTER — OFFICE VISIT (OUTPATIENT)
Dept: MEDICAL GROUP | Facility: MEDICAL CENTER | Age: 87
End: 2022-12-13
Payer: MEDICARE

## 2022-12-13 VITALS
WEIGHT: 163 LBS | HEART RATE: 96 BPM | DIASTOLIC BLOOD PRESSURE: 60 MMHG | HEIGHT: 62 IN | TEMPERATURE: 98.2 F | SYSTOLIC BLOOD PRESSURE: 110 MMHG | BODY MASS INDEX: 30 KG/M2 | OXYGEN SATURATION: 91 % | RESPIRATION RATE: 16 BRPM

## 2022-12-13 DIAGNOSIS — M54.41 RIGHT-SIDED LOW BACK PAIN WITH RIGHT-SIDED SCIATICA, UNSPECIFIED CHRONICITY: ICD-10-CM

## 2022-12-13 DIAGNOSIS — Z86.73 H/O: STROKE: ICD-10-CM

## 2022-12-13 DIAGNOSIS — E78.2 MIXED HYPERLIPIDEMIA: ICD-10-CM

## 2022-12-13 DIAGNOSIS — F33.41 RECURRENT MAJOR DEPRESSIVE DISORDER, IN PARTIAL REMISSION (HCC): ICD-10-CM

## 2022-12-13 DIAGNOSIS — I10 ESSENTIAL HYPERTENSION: ICD-10-CM

## 2022-12-13 PROCEDURE — 99214 OFFICE O/P EST MOD 30 MIN: CPT | Performed by: FAMILY MEDICINE

## 2022-12-13 ASSESSMENT — FIBROSIS 4 INDEX: FIB4 SCORE: 1.55

## 2022-12-13 NOTE — PROGRESS NOTES
CC: Lower back/right buttock pain, hypertension, history of stroke, hyperlipidemia, depression    HPI:   Cecilia presents today discuss the following medical issues    Right-sided low back pain with right-sided sciatica, unspecified chronicity  Pain comes and goes, on the lower back and goes down the right buttock.  Today she does not have it, but it is debilitating when it comes.  However she denies any leg pain or weakness, she has been having normal bladder and bowel control.  Denies saddle anesthesia    Essential hypertension  Blood pressure has been adequately controlled on carvedilol 3.125 mg twice a day.  No side effects    H/O: stroke  Patient has no residual effect.  Currently on atorvastatin 20 mg daily,    Mixed hyperlipidemia  He has been tolerating the statin. Denies muscle pain LFTs has been normal.  He has history of stroke, currently no residual effect.  Patient has been on atorvastatin 20 mg daily    Recurrent major depressive disorder, in partial remission (HCC)  Patient's mood has been fluctuating but mostly stable.  Denies any suicidal ideation.  She is currently on sertraline 100 mg daily.    Patient Active Problem List    Diagnosis Date Noted    Chronic insomnia 09/12/2019    Mixed hyperlipidemia 03/12/2019    Bronchitis 05/15/2017    Primary osteoarthritis of right knee 02/01/2017    Essential hypertension 02/29/2016    H/O: stroke 02/29/2016    Primary osteoarthritis involving multiple joints 02/29/2016    Depression 02/29/2016    Primary localized osteoarthrosis, pelvic region and thigh 03/02/2015    S/P lumbar fusion 05/14/2013       Current Outpatient Medications   Medication Sig Dispense Refill    carvedilol (COREG) 3.125 MG Tab TAKE 1 TABLET TWICE DAILY  WITH MEALS 180 Tablet 2    atorvastatin (LIPITOR) 20 MG Tab TAKE 1 TABLET DAILY 90 Tablet 3    sertraline (ZOLOFT) 100 MG Tab Take 1 Tablet by mouth every day. 90 Tablet 3    Dexlansoprazole (DEXILANT) 60 MG CAPSULE DELAYED RELEASE  delayed-release capsule TAKE 1 CAPSULE EVERY       MORNING BEFORE BREAKFAST   FOR GASTROESOPHAGEAL REFLUXDISEASE. 90 Capsule 3    Dexlansoprazole (DEXILANT) 60 MG CAPSULE DELAYED RELEASE delayed-release capsule TAKE 1 CAPSULE EVERY       MORNING BEFORE BREAKFAST   FOR GASTROESOPHAGEAL REFLUXDISEASE 90 Capsule 3    fluorouracil (EFUDEX) 5 % cream       erythromycin 5 MG/GM Ointment       traZODone (DESYREL) 50 MG Tab Take 1 Tablet by mouth every evening. 90 Tablet 3    Diclofenac Sodium 1 % Gel Apply 1 Application to skin as directed 2 Times a Day. (Patient not taking: Reported on 2/11/2021) 1 Tube 3    traZODone (DESYREL) 100 MG Tab TAKE 1 TABLET AT BEDTIME 90 Tab 3    ibuprofen (MOTRIN) 400 MG Tab Take 1 Tab by mouth every 6 hours as needed for Moderate Pain. 30 Tab 0    DEXILANT 60 MG CAPSULE DELAYED RELEASE delayed-release capsule TAKE 1 CAPSULE EVERY       MORNING BEFORE BREAKFAST   FOR GASTROESOPHAGEAL REFLUXDISEASE (Patient not taking: Reported on 2/11/2021) 90 Cap 3    calcium citrate (CALCITRATE) 950 MG Tab Take 950 mg by mouth every day.      Cholecalciferol (VITAMIN D3) 1000 units Cap Take  by mouth.      Probiotic Product (PROBIOTIC-10 PO) Take  by mouth.      Colostrum 500 MG Cap Take  by mouth.      Omega-3 1000 MG Cap Take  by mouth.      Pyridoxine HCl (B-6) 100 MG Tab Take  by mouth.      Biotin 10 MG Cap Take  by mouth.      coenzyme Q-10 30 MG capsule Take 60 mg by mouth every day.      Multiple Vitamins-Minerals (OCUVITE-LUTEIN) Tab Take 1 tablet by mouth every day.      acetaminophen (TYLENOL) 500 MG TABS Take 1 Tab by mouth every 6 hours as needed for Mild Pain. 30 Tab 0     No current facility-administered medications for this visit.         Allergies as of 12/13/2022 - Reviewed 12/13/2022   Allergen Reaction Noted    Codeine  02/01/2013    Cortisone  05/11/2022    Latex  01/19/2017    Lisinopril Cough 02/29/2016    Other drug  01/22/2019    Soap Rash and Itching 02/20/2015    Tape Rash  "02/01/2013    [place] two-sided adhesive strap [always feminine wipes] Rash 02/01/2013        ROS: Denies any chest pain, Shortness of breath, Changes bowel or bladder, Lower extremity edema.    Physical Exam:  /60 (BP Location: Right arm, Patient Position: Sitting, BP Cuff Size: Adult)   Pulse 96   Temp 36.8 °C (98.2 °F) (Temporal)   Resp 16   Ht 1.575 m (5' 2\")   Wt 73.9 kg (163 lb)   SpO2 91%   BMI 29.81 kg/m²   Gen.: Well-developed, well-nourished, no apparent distress,pleasant and cooperative with the examination  Skin:  Warm and dry with good turgor. No rashes or suspicious lesions in visible areas  HEENT:Sinuses nontender with palpation, TMs clear, nares patent with pink mucosa and clear rhinorrhea,no septal deviation ,polyps or lesions. lips without lesions, oropharynx clear.  Neck: Trachea midline,no masses or adenopathy. No JVD.  Cor: Regular rate and rhythm without murmur, gallop or rub.  Lungs: Respirations unlabored.Clear to auscultation with equal breath sounds bilaterally. No wheezes, rhonchi.  Extremities: No cyanosis, clubbing or edema.      Assessment and Plan.   89 y.o. female     1. Essential hypertension  Controlled.  Continue on carvedilol 3.125 mg twice a day    2. H/O: stroke  Stable.  Continue on atorvastatin 20 mg daily,    3. Mixed hyperlipidemia  He has been tolerating the statin. Denies muscle pain LFTs has been normal  Continue atorvastatin 20 mg daily    4. Recurrent major depressive disorder, in partial remission (HCC)  Mood has been fluctuating but mostly stable.  Continue on sertraline 100 mg daily.    5. Right-sided low back pain with right-sided sciatica, unspecified chronicity  Pain comes and goes, on the lower back and goes down the right buttock.  Today she does not have it, but it is debilitating when it comes.  We will try physical therapy    - Referral to Physical Therapy          "

## 2022-12-19 DIAGNOSIS — F33.1 MODERATE EPISODE OF RECURRENT MAJOR DEPRESSIVE DISORDER (HCC): ICD-10-CM

## 2022-12-19 RX ORDER — SERTRALINE HYDROCHLORIDE 100 MG/1
100 TABLET, FILM COATED ORAL DAILY
Qty: 90 TABLET | Refills: 3 | Status: SHIPPED | OUTPATIENT
Start: 2022-12-19 | End: 2023-10-08 | Stop reason: SDUPTHER

## 2023-03-21 ENCOUNTER — APPOINTMENT (RX ONLY)
Dept: URBAN - METROPOLITAN AREA CLINIC 20 | Facility: CLINIC | Age: 88
Setting detail: DERMATOLOGY
End: 2023-03-21

## 2023-03-21 DIAGNOSIS — Z09 ENCOUNTER FOR FOLLOW-UP EXAMINATION AFTER COMPLETED TREATMENT FOR CONDITIONS OTHER THAN MALIGNANT NEOPLASM: ICD-10-CM | Status: RESOLVED

## 2023-03-21 DIAGNOSIS — L82.1 OTHER SEBORRHEIC KERATOSIS: ICD-10-CM

## 2023-03-21 PROCEDURE — 99212 OFFICE O/P EST SF 10 MIN: CPT

## 2023-03-21 PROCEDURE — ? MEDICATION COUNSELING

## 2023-03-21 PROCEDURE — ? ADDITIONAL NOTES

## 2023-03-21 PROCEDURE — ? COUNSELING

## 2023-03-21 ASSESSMENT — LOCATION SIMPLE DESCRIPTION DERM
LOCATION SIMPLE: LEFT FOREARM
LOCATION SIMPLE: RIGHT CHEEK
LOCATION SIMPLE: RIGHT FOREARM

## 2023-03-21 ASSESSMENT — LOCATION DETAILED DESCRIPTION DERM
LOCATION DETAILED: LEFT VENTRAL PROXIMAL FOREARM
LOCATION DETAILED: RIGHT CENTRAL BUCCAL CHEEK
LOCATION DETAILED: RIGHT PROXIMAL DORSAL FOREARM

## 2023-03-21 ASSESSMENT — LOCATION ZONE DERM
LOCATION ZONE: ARM
LOCATION ZONE: FACE

## 2023-03-21 NOTE — PROCEDURE: MEDICATION COUNSELING
Use Enhanced Medication Counseling?: No
Griseofulvin Pregnancy And Lactation Text: This medication is Pregnancy Category X and is known to cause serious birth defects. It is unknown if this medication is excreted in breast milk but breast feeding should be avoided.
Doxepin Counseling:  Patient advised that the medication is sedating and not to drive a car after taking this medication. Patient informed of potential adverse effects including but not limited to dry mouth, urinary retention, and blurry vision.  The patient verbalized understanding of the proper use and possible adverse effects of doxepin.  All of the patient's questions and concerns were addressed.
Doxycycline Pregnancy And Lactation Text: This medication is Pregnancy Category D and not consider safe during pregnancy. It is also excreted in breast milk but is considered safe for shorter treatment courses.
High Dose Vitamin A Counseling: Side effects reviewed, pt to contact office should one occur.
Drysol Counseling:  I discussed with the patient the risks of drysol/aluminum chloride including but not limited to skin rash, itching, irritation, burning.
Glycopyrrolate Counseling:  I discussed with the patient the risks of glycopyrrolate including but not limited to skin rash, drowsiness, dry mouth, difficulty urinating, and blurred vision.
Xolair Counseling:  Patient informed of potential adverse effects including but not limited to fever, muscle aches, rash and allergic reactions.  The patient verbalized understanding of the proper use and possible adverse effects of Xolair.  All of the patient's questions and concerns were addressed.
Enbrel Pregnancy And Lactation Text: This medication is Pregnancy Category B and is considered safe during pregnancy. It is unknown if this medication is excreted in breast milk.
Rifampin Counseling: I discussed with the patient the risks of rifampin including but not limited to liver damage, kidney damage, red-orange body fluids, nausea/vomiting and severe allergy.
Imiquimod Pregnancy And Lactation Text: This medication is Pregnancy Category C. It is unknown if this medication is excreted in breast milk.
Propranolol Pregnancy And Lactation Text: This medication is Pregnancy Category C and it isn't known if it is safe during pregnancy. It is excreted in breast milk.
Winlevi Counseling:  I discussed with the patient the risks of topical clascoterone including but not limited to erythema, scaling, itching, and stinging. Patient voiced their understanding.
Carac Pregnancy And Lactation Text: This medication is Pregnancy Category X and contraindicated in pregnancy and in women who may become pregnant. It is unknown if this medication is excreted in breast milk.
Bactrim Counseling:  I discussed with the patient the risks of sulfa antibiotics including but not limited to GI upset, allergic reaction, drug rash, diarrhea, dizziness, photosensitivity, and yeast infections.  Rarely, more serious reactions can occur including but not limited to aplastic anemia, agranulocytosis, methemoglobinemia, blood dyscrasias, liver or kidney failure, lung infiltrates or desquamative/blistering drug rashes.
Opioid Pregnancy And Lactation Text: These medications can lead to premature delivery and should be avoided during pregnancy. These medications are also present in breast milk in small amounts.
Adbry Counseling: I discussed with the patient the risks of tralokinumab including but not limited to eye infection and irritation, cold sores, injection site reactions, worsening of asthma, allergic reactions and increased risk of parasitic infection.  Live vaccines should be avoided while taking tralokinumab. The patient understands that monitoring is required and they must alert us or the primary physician if symptoms of infection or other concerning signs are noted.
Topical Ketoconazole Pregnancy And Lactation Text: This medication is Pregnancy Category B and is considered safe during pregnancy. It is unknown if it is excreted in breast milk.
Dutasteride Pregnancy And Lactation Text: This medication is absolutely contraindicated in women, especially during pregnancy and breast feeding. Feminization of male fetuses is possible if taking while pregnant.
Olanzapine Counseling- I discussed with the patient the common side effects of olanzapine including but are not limited to: lack of energy, dry mouth, increased appetite, sleepiness, tremor, constipation, dizziness, changes in behavior, or restlessness.  Explained that teenagers are more likely to experience headaches, abdominal pain, pain in the arms or legs, tiredness, and sleepiness.  Serious side effects include but are not limited: increased risk of death in elderly patients who are confused, have memory loss, or dementia-related psychosis; hyperglycemia; increased cholesterol and triglycerides; and weight gain.
Xeljanz Counseling: I discussed with the patient the risks of Xeljanz therapy including increased risk of infection, liver issues, headache, diarrhea, or cold symptoms. Live vaccines should be avoided. They were instructed to call if they have any problems.
Rifampin Pregnancy And Lactation Text: This medication is Pregnancy Category C and it isn't know if it is safe during pregnancy. It is also excreted in breast milk and should not be used if you are breast feeding.
Winlevi Pregnancy And Lactation Text: This medication is considered safe during pregnancy and breastfeeding.
Prednisone Pregnancy And Lactation Text: This medication is Pregnancy Category C and it isn't know if it is safe during pregnancy. This medication is excreted in breast milk.
Humira Counseling:  I discussed with the patient the risks of adalimumab including but not limited to myelosuppression, immunosuppression, autoimmune hepatitis, demyelinating diseases, lymphoma, and serious infections.  The patient understands that monitoring is required including a PPD at baseline and must alert us or the primary physician if symptoms of infection or other concerning signs are noted.
Siliq Pregnancy And Lactation Text: The risk during pregnancy and breastfeeding is uncertain with this medication.
Cellcept Counseling:  I discussed with the patient the risks of mycophenolate mofetil including but not limited to infection/immunosuppression, GI upset, hypokalemia, hypercholesterolemia, bone marrow suppression, lymphoproliferative disorders, malignancy, GI ulceration/bleed/perforation, colitis, interstitial lung disease, kidney failure, progressive multifocal leukoencephalopathy, and birth defects.  The patient understands that monitoring is required including a baseline creatinine and regular CBC testing. In addition, patient must alert us immediately if symptoms of infection or other concerning signs are noted.
Soolantra Counseling: I discussed with the patients the risks of topial Soolantra. This is a medicine which decreases the number of mites and inflammation in the skin. You experience burning, stinging, eye irritation or allergic reactions.  Please call our office if you develop any problems from using this medication.
High Dose Vitamin A Pregnancy And Lactation Text: High dose vitamin A therapy is contraindicated during pregnancy and breast feeding.
Glycopyrrolate Pregnancy And Lactation Text: This medication is Pregnancy Category B and is considered safe during pregnancy. It is unknown if it is excreted breast milk.
Xolair Pregnancy And Lactation Text: This medication is Pregnancy Category B and is considered safe during pregnancy. This medication is excreted in breast milk.
Drysol Pregnancy And Lactation Text: This medication is considered safe during pregnancy and breast feeding.
Topical Metronidazole Counseling: Metronidazole is a topical antibiotic medication. You may experience burning, stinging, redness, or allergic reactions.  Please call our office if you develop any problems from using this medication.
SSKI Counseling:  I discussed with the patient the risks of SSKI including but not limited to thyroid abnormalities, metallic taste, GI upset, fever, headache, acne, arthralgias, paraesthesias, lymphadenopathy, easy bleeding, arrhythmias, and allergic reaction.
Calcipotriene Counseling:  I discussed with the patient the risks of calcipotriene including but not limited to erythema, scaling, itching, and irritation.
Bactrim Pregnancy And Lactation Text: This medication is Pregnancy Category D and is known to cause fetal risk.  It is also excreted in breast milk.
Soolantra Pregnancy And Lactation Text: This medication is Pregnancy Category C. This medication is considered safe during breast feeding.
Adbry Pregnancy And Lactation Text: It is unknown if this medication will adversely affect pregnancy or breast feeding.
Klisyri Counseling:  I discussed with the patient the risks of Klisyri including but not limited to erythema, scaling, itching, weeping, crusting, and pain.
Itraconazole Counseling:  I discussed with the patient the risks of itraconazole including but not limited to liver damage, nausea/vomiting, neuropathy, and severe allergy.  The patient understands that this medication is best absorbed when taken with acidic beverages such as non-diet cola or ginger ale.  The patient understands that monitoring is required including baseline LFTs and repeat LFTs at intervals.  The patient understands that they are to contact us or the primary physician if concerning signs are noted.
Doxepin Pregnancy And Lactation Text: This medication is Pregnancy Category C and it isn't known if it is safe during pregnancy. It is also excreted in breast milk and breast feeding isn't recommended.
Erythromycin Counseling:  I discussed with the patient the risks of erythromycin including but not limited to GI upset, allergic reaction, drug rash, diarrhea, increase in liver enzymes, and yeast infections.
Xelsunitaz Pregnancy And Lactation Text: This medication is Pregnancy Category D and is not considered safe during pregnancy.  The risk during breast feeding is also uncertain.
Cimzia Counseling:  I discussed with the patient the risks of Cimzia including but not limited to immunosuppression, allergic reactions and infections.  The patient understands that monitoring is required including a PPD at baseline and must alert us or the primary physician if symptoms of infection or other concerning signs are noted.
Klisyri Pregnancy And Lactation Text: It is unknown if this medication can harm a developing fetus or if it is excreted in breast milk.
Itraconazole Pregnancy And Lactation Text: This medication is Pregnancy Category C and it isn't know if it is safe during pregnancy. It is also excreted in breast milk.
Olanzapine Pregnancy And Lactation Text: This medication is pregnancy category C.   There are no adequate and well controlled trials with olanzapine in pregnant females.  Olanzapine should be used during pregnancy only if the potential benefit justifies the potential risk to the fetus.   In a study in lactating healthy women, olanzapine was excreted in breast milk.  It is recommended that women taking olanzapine should not breast feed.
Finasteride Male Counseling: Finasteride Counseling:  I discussed with the patient the risks of use of finasteride including but not limited to decreased libido, decreased ejaculate volume, gynecomastia, and depression. Women should not handle medication.  All of the patient's questions and concerns were addressed.
Cephalexin Counseling: I counseled the patient regarding use of cephalexin as an antibiotic for prophylactic and/or therapeutic purposes. Cephalexin (commonly prescribed under brand name Keflex) is a cephalosporin antibiotic which is active against numerous classes of bacteria, including most skin bacteria. Side effects may include nausea, diarrhea, gastrointestinal upset, rash, hives, yeast infections, and in rare cases, hepatitis, kidney disease, seizures, fever, confusion, neurologic symptoms, and others. Patients with severe allergies to penicillin medications are cautioned that there is about a 10% incidence of cross-reactivity with cephalosporins. When possible, patients with penicillin allergies should use alternatives to cephalosporins for antibiotic therapy.
Simponi Counseling:  I discussed with the patient the risks of golimumab including but not limited to myelosuppression, immunosuppression, autoimmune hepatitis, demyelinating diseases, lymphoma, and serious infections.  The patient understands that monitoring is required including a PPD at baseline and must alert us or the primary physician if symptoms of infection or other concerning signs are noted.
Protopic Counseling: Patient may experience a mild burning sensation during topical application. Protopic is not approved in children less than 2 years of age. There have been case reports of hematologic and skin malignancies in patients using topical calcineurin inhibitors although causality is questionable.
Hydroxychloroquine Counseling:  I discussed with the patient that a baseline ophthalmologic exam is needed at the start of therapy and every year thereafter while on therapy. A CBC may also be warranted for monitoring.  The side effects of this medication were discussed with the patient, including but not limited to agranulocytosis, aplastic anemia, seizures, rashes, retinopathy, and liver toxicity. Patient instructed to call the office should any adverse effect occur.  The patient verbalized understanding of the proper use and possible adverse effects of Plaquenil.  All the patient's questions and concerns were addressed.
Arava Counseling:  Patient counseled regarding adverse effects of Arava including but not limited to nausea, vomiting, abnormalities in liver function tests. Patients may develop mouth sores, rash, diarrhea, and abnormalities in blood counts. The patient understands that monitoring is required including LFTs and blood counts.  There is a rare possibility of scarring of the liver and lung problems that can occur when taking methotrexate. Persistent nausea, loss of appetite, pale stools, dark urine, cough, and shortness of breath should be reported immediately. Patient advised to discontinue Arava treatment and consult with a physician prior to attempting conception. The patient will have to undergo a treatment to eliminate Arava from the body prior to conception.
Cibinqo Counseling: I discussed with the patient the risks of Cibinqo therapy including but not limited to common cold, nausea, headache, cold sores, increased blood CPK levels, dizziness, UTIs, fatigue, acne, and vomitting. Live vaccines should be avoided.  This medication has been linked to serious infections; higher rate of mortality; malignancy and lymphoproliferative disorders; major adverse cardiovascular events; thrombosis; thrombocytopenia and lymphopenia; lipid elevations; and retinal detachment.
Cellcept Pregnancy And Lactation Text: This medication is Pregnancy Category D and isn't considered safe during pregnancy. It is unknown if this medication is excreted in breast milk.
Sarecycline Counseling: Patient advised regarding possible photosensitivity and discoloration of the teeth, skin, lips, tongue and gums.  Patient instructed to avoid sunlight, if possible.  When exposed to sunlight, patients should wear protective clothing, sunglasses, and sunscreen.  The patient was instructed to call the office immediately if the following severe adverse effects occur:  hearing changes, easy bruising/bleeding, severe headache, or vision changes.  The patient verbalized understanding of the proper use and possible adverse effects of sarecycline.  All of the patient's questions and concerns were addressed.
VTAMA Counseling: I discussed with the patient that VTAMA is not for use in the eyes, mouth or mouth. They should call the office if they develop any signs of allergic reactions to VTAMA. The patient verbalized understanding of the proper use and possible adverse effects of VTAMA.  All of the patient's questions and concerns were addressed.
Sski Pregnancy And Lactation Text: This medication is Pregnancy Category D and isn't considered safe during pregnancy. It is excreted in breast milk.
Aklief counseling:  Patient advised to apply a pea-sized amount only at bedtime and wait 30 minutes after washing their face before applying.  If too drying, patient may add a non-comedogenic moisturizer.  The most commonly reported side effects including irritation, redness, scaling, dryness, stinging, burning, itching, and increased risk of sunburn.  The patient verbalized understanding of the proper use and possible adverse effects of retinoids.  All of the patient's questions and concerns were addressed.
Protopic Pregnancy And Lactation Text: This medication is Pregnancy Category C. It is unknown if this medication is excreted in breast milk when applied topically.
Arava Pregnancy And Lactation Text: This medication is Pregnancy Category X and is absolutely contraindicated during pregnancy. It is unknown if it is excreted in breast milk.
Calcipotriene Pregnancy And Lactation Text: This medication has not been proven safe during pregnancy. It is unknown if this medication is excreted in breast milk.
Erythromycin Pregnancy And Lactation Text: This medication is Pregnancy Category B and is considered safe during pregnancy. It is also excreted in breast milk.
Elidel Counseling: Patient may experience a mild burning sensation during topical application. Elidel is not approved in children less than 2 years of age. There have been case reports of hematologic and skin malignancies in patients using topical calcineurin inhibitors although causality is questionable.
Oral Minoxidil Counseling- I discussed with the patient the risks of oral minoxidil including but not limited to shortness of breath, swelling of the feet or ankles, dizziness, lightheadedness, unwanted hair growth and allergic reaction.  The patient verbalized understanding of the proper use and possible adverse effects of oral minoxidil.  All of the patient's questions and concerns were addressed.
Topical Metronidazole Pregnancy And Lactation Text: This medication is Pregnancy Category B and considered safe during pregnancy.  It is also considered safe to use while breastfeeding.
Finasteride Pregnancy And Lactation Text: This medication is absolutely contraindicated during pregnancy. It is unknown if it is excreted in breast milk.
Erivedge Counseling- I discussed with the patient the risks of Erivedge including but not limited to nausea, vomiting, diarrhea, constipation, weight loss, changes in the sense of taste, decreased appetite, muscle spasms, and hair loss.  The patient verbalized understanding of the proper use and possible adverse effects of Erivedge.  All of the patient's questions and concerns were addressed.
Topical Retinoid counseling:  Patient advised to apply a pea-sized amount only at bedtime and wait 30 minutes after washing their face before applying.  If too drying, patient may add a non-comedogenic moisturizer. The patient verbalized understanding of the proper use and possible adverse effects of retinoids.  All of the patient's questions and concerns were addressed.
Hydroxyzine Counseling: Patient advised that the medication is sedating and not to drive a car after taking this medication.  Patient informed of potential adverse effects including but not limited to dry mouth, urinary retention, and blurry vision.  The patient verbalized understanding of the proper use and possible adverse effects of hydroxyzine.  All of the patient's questions and concerns were addressed.
Minoxidil Counseling: Minoxidil is a topical medication which can increase blood flow where it is applied. It is uncertain how this medication increases hair growth. Side effects are uncommon and include stinging and allergic reactions.
Hydroxychloroquine Pregnancy And Lactation Text: This medication has been shown to cause fetal harm but it isn't assigned a Pregnancy Risk Category. There are small amounts excreted in breast milk.
Ketoconazole Counseling:   Patient counseled regarding improving absorption with orange juice.  Adverse effects include but are not limited to breast enlargement, headache, diarrhea, nausea, upset stomach, liver function test abnormalities, taste disturbance, and stomach pain.  There is a rare possibility of liver failure that can occur when taking ketoconazole. The patient understands that monitoring of LFTs may be required, especially at baseline. The patient verbalized understanding of the proper use and possible adverse effects of ketoconazole.  All of the patient's questions and concerns were addressed.
Cibinqo Pregnancy And Lactation Text: It is unknown if this medication will adversely affect pregnancy or breast feeding.  You should not take this medication if you are currently pregnant or planning a pregnancy or while breastfeeding.
Metronidazole Counseling:  I discussed with the patient the risks of metronidazole including but not limited to seizures, nausea/vomiting, a metallic taste in the mouth, nausea/vomiting and severe allergy.
Cimzia Pregnancy And Lactation Text: This medication crosses the placenta but can be considered safe in certain situations. Cimzia may be excreted in breast milk.
Topical Steroids Counseling: I discussed with the patient that prolonged use of topical steroids can result in the increased appearance of superficial blood vessels (telangiectasias), lightening (hypopigmentation) and thinning of the skin (atrophy).  Patient understands to avoid using high potency steroids in skin folds, the groin or the face.  The patient verbalized understanding of the proper use and possible adverse effects of topical steroids.  All of the patient's questions and concerns were addressed.
Cyclophosphamide Counseling:  I discussed with the patient the risks of cyclophosphamide including but not limited to hair loss, hormonal abnormalities, decreased fertility, abdominal pain, diarrhea, nausea and vomiting, bone marrow suppression and infection. The patient understands that monitoring is required while taking this medication.
Ilumya Counseling: I discussed with the patient the risks of tildrakizumab including but not limited to immunosuppression, malignancy, posterior leukoencephalopathy syndrome, and serious infections.  The patient understands that monitoring is required including a PPD at baseline and must alert us or the primary physician if symptoms of infection or other concerning signs are noted.
Detail Level: Simple
Acitretin Counseling:  I discussed with the patient the risks of acitretin including but not limited to hair loss, dry lips/skin/eyes, liver damage, hyperlipidemia, depression/suicidal ideation, photosensitivity.  Serious rare side effects can include but are not limited to pancreatitis, pseudotumor cerebri, bony changes, clot formation/stroke/heart attack.  Patient understands that alcohol is contraindicated since it can result in liver toxicity and significantly prolong the elimination of the drug by many years.
Vtama Pregnancy And Lactation Text: It is unknown if this medication can cause problems during pregnancy and breastfeeding.
Cephalexin Pregnancy And Lactation Text: This medication is Pregnancy Category B and considered safe during pregnancy.  It is also excreted in breast milk but can be used safely for shorter doses.
Sarecycline Pregnancy And Lactation Text: This medication is Pregnancy Category D and not consider safe during pregnancy. It is also excreted in breast milk.
Oral Minoxidil Pregnancy And Lactation Text: This medication should only be used when clearly needed if you are pregnant, attempting to become pregnant or breast feeding.
Aklief Pregnancy And Lactation Text: It is unknown if this medication is safe to use during pregnancy.  It is unknown if this medication is excreted in breast milk.  Breastfeeding women should use the topical cream on the smallest area of the skin for the shortest time needed while breastfeeding.  Do not apply to nipple and areola.
Qbrexza Counseling:  I discussed with the patient the risks of Qbrexza including but not limited to headache, mydriasis, blurred vision, dry eyes, nasal dryness, dry mouth, dry throat, dry skin, urinary hesitation, and constipation.  Local skin reactions including erythema, burning, stinging, and itching can also occur.
Zoryve Counseling:  I discussed with the patient that Zoryve is not for use in the eyes, mouth or vagina. The most commonly reported side effects include diarrhea, headache, insomnia, application site pain, upper respiratory tract infections, and urinary tract infections.  All of the patient's questions and concerns were addressed.
Thalidomide Counseling: I discussed with the patient the risks of thalidomide including but not limited to birth defects, anxiety, weakness, chest pain, dizziness, cough and severe allergy.
Hydroxyzine Pregnancy And Lactation Text: This medication is not safe during pregnancy and should not be taken. It is also excreted in breast milk and breast feeding isn't recommended.
Cyclophosphamide Pregnancy And Lactation Text: This medication is Pregnancy Category D and it isn't considered safe during pregnancy. This medication is excreted in breast milk.
Birth Control Pills Counseling: Birth Control Pill Counseling: I discussed with the patient the potential side effects of OCPs including but not limited to increased risk of stroke, heart attack, thrombophlebitis, deep venous thrombosis, hepatic adenomas, breast changes, GI upset, headaches, and depression.  The patient verbalized understanding of the proper use and possible adverse effects of OCPs. All of the patient's questions and concerns were addressed.
Clofazimine Counseling:  I discussed with the patient the risks of clofazimine including but not limited to skin and eye pigmentation, liver damage, nausea/vomiting, gastrointestinal bleeding and allergy.
Skyrizi Counseling: I discussed with the patient the risks of risankizumab-rzaa including but not limited to immunosuppression, and serious infections.  The patient understands that monitoring is required including a PPD at baseline and must alert us or the primary physician if symptoms of infection or other concerning signs are noted.
Olumiant Counseling: I discussed with the patient the risks of Olumiant therapy including but not limited to upper respiratory tract infections, shingles, cold sores, and nausea. Live vaccines should be avoided.  This medication has been linked to serious infections; higher rate of mortality; malignancy and lymphoproliferative disorders; major adverse cardiovascular events; thrombosis; gastrointestinal perforations; neutropenia; lymphopenia; anemia; liver enzyme elevations; and lipid elevations.
Clindamycin Counseling: I counseled the patient regarding use of clindamycin as an antibiotic for prophylactic and/or therapeutic purposes. Clindamycin is active against numerous classes of bacteria, including skin bacteria. Side effects may include nausea, diarrhea, gastrointestinal upset, rash, hives, yeast infections, and in rare cases, colitis.
Low Dose Naltrexone Counseling- I discussed with the patient the potential risks and side effects of low dose naltrexone including but not limited to: more vivid dreams, headaches, nausea, vomiting, abdominal pain, fatigue, dizziness, and anxiety.
Eucrisa Counseling: Patient may experience a mild burning sensation during topical application. Eucrisa is not approved in children less than 2 years of age.
Libtayo Counseling- I discussed with the patient the risks of Libtayo including but not limited to nausea, vomiting, diarrhea, and bone or muscle pain.  The patient verbalized understanding of the proper use and possible adverse effects of Libtayo.  All of the patient's questions and concerns were addressed.
Tetracycline Counseling: Patient counseled regarding possible photosensitivity and increased risk for sunburn.  Patient instructed to avoid sunlight, if possible.  When exposed to sunlight, patients should wear protective clothing, sunglasses, and sunscreen.  The patient was instructed to call the office immediately if the following severe adverse effects occur:  hearing changes, easy bruising/bleeding, severe headache, or vision changes.  The patient verbalized understanding of the proper use and possible adverse effects of tetracycline.  All of the patient's questions and concerns were addressed. Patient understands to avoid pregnancy while on therapy due to potential birth defects.
Minoxidil Pregnancy And Lactation Text: This medication has not been assigned a Pregnancy Risk Category but animal studies failed to show danger with the topical medication. It is unknown if the medication is excreted in breast milk.
Ketoconazole Pregnancy And Lactation Text: This medication is Pregnancy Category C and it isn't know if it is safe during pregnancy. It is also excreted in breast milk and breast feeding isn't recommended.
Colchicine Pregnancy And Lactation Text: This medication is Pregnancy Category C and isn't considered safe during pregnancy. It is excreted in breast milk.
Cantharidin Pregnancy And Lactation Text: The use of this medication during pregnancy or lactation is not recommended as there is insufficient data.
Cosentyx Counseling:  I discussed with the patient the risks of Cosentyx including but not limited to worsening of Crohn's disease, immunosuppression, allergic reactions and infections.  The patient understands that monitoring is required including a PPD at baseline and must alert us or the primary physician if symptoms of infection or other concerning signs are noted.
Metronidazole Pregnancy And Lactation Text: This medication is Pregnancy Category B and considered safe during pregnancy.  It is also excreted in breast milk.
Topical Steroids Applications Pregnancy And Lactation Text: Most topical steroids are considered safe to use during pregnancy and lactation.  Any topical steroid applied to the breast or nipple should be washed off before breastfeeding.
Acitretin Pregnancy And Lactation Text: This medication is Pregnancy Category X and should not be given to women who are pregnant or may become pregnant in the future. This medication is excreted in breast milk.
Birth Control Pills Pregnancy And Lactation Text: This medication should be avoided if pregnant and for the first 30 days post-partum.
Qbrexza Pregnancy And Lactation Text: There is no available data on Qbrexza use in pregnant women.  There is no available data on Qbrexza use in lactation.
Albendazole Counseling:  I discussed with the patient the risks of albendazole including but not limited to cytopenia, kidney damage, nausea/vomiting and severe allergy.  The patient understands that this medication is being used in an off-label manner.
Mirvaso Counseling: Mirvaso is a topical medication which can decrease superficial blood flow where applied. Side effects are uncommon and include stinging, redness and allergic reactions.
Otezla Counseling: The side effects of Otezla were discussed with the patient, including but not limited to worsening or new depression, weight loss, diarrhea, nausea, upper respiratory tract infection, and headache. Patient instructed to call the office should any adverse effect occur.  The patient verbalized understanding of the proper use and possible adverse effects of Otezla.  All the patient's questions and concerns were addressed.
Azelaic Acid Counseling: Patient counseled that medicine may cause skin irritation and to avoid applying near the eyes.  In the event of skin irritation, the patient was advised to reduce the amount of the drug applied or use it less frequently.   The patient verbalized understanding of the proper use and possible adverse effects of azelaic acid.  All of the patient's questions and concerns were addressed.
Clindamycin Pregnancy And Lactation Text: This medication can be used in pregnancy if certain situations. Clindamycin is also present in breast milk.
Low Dose Naltrexone Pregnancy And Lactation Text: Naltrexone is pregnancy category C.  There have been no adequate and well-controlled studies in pregnant women.  It should be used in pregnancy only if the potential benefit justifies the potential risk to the fetus.   Limited data indicates that naltrexone is minimally excreted into breastmilk.
Tazorac Counseling:  Patient advised that medication is irritating and drying.  Patient may need to apply sparingly and wash off after an hour before eventually leaving it on overnight.  The patient verbalized understanding of the proper use and possible adverse effects of tazorac.  All of the patient's questions and concerns were addressed.
Infliximab Counseling:  I discussed with the patient the risks of infliximab including but not limited to myelosuppression, immunosuppression, autoimmune hepatitis, demyelinating diseases, lymphoma, and serious infections.  The patient understands that monitoring is required including a PPD at baseline and must alert us or the primary physician if symptoms of infection or other concerning signs are noted.
Cyclosporine Counseling:  I discussed with the patient the risks of cyclosporine including but not limited to hypertension, gingival hyperplasia,myelosuppression, immunosuppression, liver damage, kidney damage, neurotoxicity, lymphoma, and serious infections. The patient understands that monitoring is required including baseline blood pressure, CBC, CMP, lipid panel and uric acid, and then 1-2 times monthly CMP and blood pressure.
Dapsone Counseling: I discussed with the patient the risks of dapsone including but not limited to hemolytic anemia, agranulocytosis, rashes, methemoglobinemia, kidney failure, peripheral neuropathy, headaches, GI upset, and liver toxicity.  Patients who start dapsone require monitoring including baseline LFTs and weekly CBCs for the first month, then every month thereafter.  The patient verbalized understanding of the proper use and possible adverse effects of dapsone.  All of the patient's questions and concerns were addressed.
Tranexamic Acid Counseling:  Patient advised of the small risk of bleeding problems with tranexamic acid. They were also instructed to call if they developed any nausea, vomiting or diarrhea. All of the patient's questions and concerns were addressed.
Stelara Counseling:  I discussed with the patient the risks of ustekinumab including but not limited to immunosuppression, malignancy, posterior leukoencephalopathy syndrome, and serious infections.  The patient understands that monitoring is required including a PPD at baseline and must alert us or the primary physician if symptoms of infection or other concerning signs are noted.
Bexarotene Counseling:  I discussed with the patient the risks of bexarotene including but not limited to hair loss, dry lips/skin/eyes, liver abnormalities, hyperlipidemia, pancreatitis, depression/suicidal ideation, photosensitivity, drug rash/allergic reactions, hypothyroidism, anemia, leukopenia, infection, cataracts, and teratogenicity.  Patient understands that they will need regular blood tests to check lipid profile, liver function tests, white blood cell count, thyroid function tests and pregnancy test if applicable.
Colchicine Counseling:  Patient counseled regarding adverse effects including but not limited to stomach upset (nausea, vomiting, stomach pain, or diarrhea).  Patient instructed to limit alcohol consumption while taking this medication.  Colchicine may reduce blood counts especially with prolonged use.  The patient understands that monitoring of kidney function and blood counts may be required, especially at baseline. The patient verbalized understanding of the proper use and possible adverse effects of colchicine.  All of the patient's questions and concerns were addressed.
Tazorac Pregnancy And Lactation Text: This medication is not safe during pregnancy. It is unknown if this medication is excreted in breast milk.
Olumiant Pregnancy And Lactation Text: Based on animal studies, Olumiant may cause embryo-fetal harm when administered to pregnant women.  The medication should not be used in pregnancy.  Breastfeeding is not recommended during treatment.
Minocycline Counseling: Patient advised regarding possible photosensitivity and discoloration of the teeth, skin, lips, tongue and gums.  Patient instructed to avoid sunlight, if possible.  When exposed to sunlight, patients should wear protective clothing, sunglasses, and sunscreen.  The patient was instructed to call the office immediately if the following severe adverse effects occur:  hearing changes, easy bruising/bleeding, severe headache, or vision changes.  The patient verbalized understanding of the proper use and possible adverse effects of minocycline.  All of the patient's questions and concerns were addressed.
Topical Sulfur Applications Counseling: Topical Sulfur Counseling: Patient counseled that this medication may cause skin irritation or allergic reactions.  In the event of skin irritation, the patient was advised to reduce the amount of the drug applied or use it less frequently.   The patient verbalized understanding of the proper use and possible adverse effects of topical sulfur application.  All of the patient's questions and concerns were addressed.
Terbinafine Counseling: Patient counseling regarding adverse effects of terbinafine including but not limited to headache, diarrhea, rash, upset stomach, liver function test abnormalities, itching, taste/smell disturbance, nausea, abdominal pain, and flatulence.  There is a rare possibility of liver failure that can occur when taking terbinafine.  The patient understands that a baseline LFT and kidney function test may be required. The patient verbalized understanding of the proper use and possible adverse effects of terbinafine.  All of the patient's questions and concerns were addressed.
Libtayo Pregnancy And Lactation Text: This medication is contraindicated in pregnancy and when breast feeding.
Niacinamide Counseling: I recommended taking niacin or niacinamide, also know as vitamin B3, twice daily. Recent evidence suggests that taking vitamin B3 (500 mg twice daily) can reduce the risk of actinic keratoses and non-melanoma skin cancers. Side effects of vitamin B3 include flushing and headache.
Dupixent Counseling: I discussed with the patient the risks of dupilumab including but not limited to eye infection and irritation, cold sores, injection site reactions, worsening of asthma, allergic reactions and increased risk of parasitic infection.  Live vaccines should be avoided while taking dupilumab. Dupilumab will also interact with certain medications such as warfarin and cyclosporine. The patient understands that monitoring is required and they must alert us or the primary physician if symptoms of infection or other concerning signs are noted.
Otezla Pregnancy And Lactation Text: This medication is Pregnancy Category C and it isn't known if it is safe during pregnancy. It is unknown if it is excreted in breast milk.
Topical Sulfur Applications Pregnancy And Lactation Text: This medication is Pregnancy Category C and has an unknown safety profile during pregnancy. It is unknown if this topical medication is excreted in breast milk.
Terbinafine Pregnancy And Lactation Text: This medication is Pregnancy Category B and is considered safe during pregnancy. It is also excreted in breast milk and breast feeding isn't recommended.
Spironolactone Counseling: Patient advised regarding risks of diarrhea, abdominal pain, hyperkalemia, birth defects (for female patients), liver toxicity and renal toxicity. The patient may need blood work to monitor liver and kidney function and potassium levels while on therapy. The patient verbalized understanding of the proper use and possible adverse effects of spironolactone.  All of the patient's questions and concerns were addressed.
Bexarotene Pregnancy And Lactation Text: This medication is Pregnancy Category X and should not be given to women who are pregnant or may become pregnant. This medication should not be used if you are breast feeding.
Rhofade Counseling: Rhofade is a topical medication which can decrease superficial blood flow where applied. Side effects are uncommon and include stinging, redness and allergic reactions.
Albendazole Pregnancy And Lactation Text: This medication is Pregnancy Category C and it isn't known if it is safe during pregnancy. It is also excreted in breast milk.
Rinvoq Counseling: I discussed with the patient the risks of Rinvoq therapy including but not limited to upper respiratory tract infections, shingles, cold sores, bronchitis, nausea, cough, fever, acne, and headache. Live vaccines should be avoided.  This medication has been linked to serious infections; higher rate of mortality; malignancy and lymphoproliferative disorders; major adverse cardiovascular events; thrombosis; thrombocytopenia, anemia, and neutropenia; lipid elevations; liver enzyme elevations; and gastrointestinal perforations.
Zyclara Counseling:  I discussed with the patient the risks of imiquimod including but not limited to erythema, scaling, itching, weeping, crusting, and pain.  Patient understands that the inflammatory response to imiquimod is variable from person to person and was educated regarded proper titration schedule.  If flu-like symptoms develop, patient knows to discontinue the medication and contact us.
Mirvaso Pregnancy And Lactation Text: This medication has not been assigned a Pregnancy Risk Category. It is unknown if the medication is excreted in breast milk.
Topical Clindamycin Counseling: Patient counseled that this medication may cause skin irritation or allergic reactions.  In the event of skin irritation, the patient was advised to reduce the amount of the drug applied or use it less frequently.   The patient verbalized understanding of the proper use and possible adverse effects of clindamycin.  All of the patient's questions and concerns were addressed.
Benzoyl Peroxide Counseling: Patient counseled that medicine may cause skin irritation and bleach clothing.  In the event of skin irritation, the patient was advised to reduce the amount of the drug applied or use it less frequently.   The patient verbalized understanding of the proper use and possible adverse effects of benzoyl peroxide.  All of the patient's questions and concerns were addressed.
Dapsone Pregnancy And Lactation Text: This medication is Pregnancy Category C and is not considered safe during pregnancy or breast feeding.
Odomzo Counseling- I discussed with the patient the risks of Odomzo including but not limited to nausea, vomiting, diarrhea, constipation, weight loss, changes in the sense of taste, decreased appetite, muscle spasms, and hair loss.  The patient verbalized understanding of the proper use and possible adverse effects of Odomzo.  All of the patient's questions and concerns were addressed.
Tranexamic Acid Pregnancy And Lactation Text: It is unknown if this medication is safe during pregnancy or breast feeding.
Doxycycline Counseling:  Patient counseled regarding possible photosensitivity and increased risk for sunburn.  Patient instructed to avoid sunlight, if possible.  When exposed to sunlight, patients should wear protective clothing, sunglasses, and sunscreen.  The patient was instructed to call the office immediately if the following severe adverse effects occur:  hearing changes, easy bruising/bleeding, severe headache, or vision changes.  The patient verbalized understanding of the proper use and possible adverse effects of doxycycline.  All of the patient's questions and concerns were addressed.
Hydroquinone Counseling:  Patient advised that medication may result in skin irritation, lightening (hypopigmentation), dryness, and burning.  In the event of skin irritation, the patient was advised to reduce the amount of the drug applied or use it less frequently.  Rarely, spots that are treated with hydroquinone can become darker (pseudoochronosis).  Should this occur, patient instructed to stop medication and call the office. The patient verbalized understanding of the proper use and possible adverse effects of hydroquinone.  All of the patient's questions and concerns were addressed.
Methotrexate Counseling:  Patient counseled regarding adverse effects of methotrexate including but not limited to nausea, vomiting, abnormalities in liver function tests. Patients may develop mouth sores, rash, diarrhea, and abnormalities in blood counts. The patient understands that monitoring is required including LFT's and blood counts.  There is a rare possibility of scarring of the liver and lung problems that can occur when taking methotrexate. Persistent nausea, loss of appetite, pale stools, dark urine, cough, and shortness of breath should be reported immediately. Patient advised to discontinue methotrexate treatment at least three months before attempting to become pregnant.  I discussed the need for folate supplements while taking methotrexate.  These supplements can decrease side effects during methotrexate treatment. The patient verbalized understanding of the proper use and possible adverse effects of methotrexate.  All of the patient's questions and concerns were addressed.
Oxybutynin Counseling:  I discussed with the patient the risks of oxybutynin including but not limited to skin rash, drowsiness, dry mouth, difficulty urinating, and blurred vision.
Fluconazole Counseling:  Patient counseled regarding adverse effects of fluconazole including but not limited to headache, diarrhea, nausea, upset stomach, liver function test abnormalities, taste disturbance, and stomach pain.  There is a rare possibility of liver failure that can occur when taking fluconazole.  The patient understands that monitoring of LFTs and kidney function test may be required, especially at baseline. The patient verbalized understanding of the proper use and possible adverse effects of fluconazole.  All of the patient's questions and concerns were addressed.
Spironolactone Pregnancy And Lactation Text: This medication can cause feminization of the male fetus and should be avoided during pregnancy. The active metabolite is also found in breast milk.
Opzelura Counseling:  I discussed with the patient the risks of Opzelura including but not limited to nasopharngitis, bronchitis, ear infection, eosinophila, hives, diarrhea, folliculitis, tonsillitis, and rhinorrhea.  Taken orally, this medication has been linked to serious infections; higher rate of mortality; malignancy and lymphoproliferative disorders; major adverse cardiovascular events; thrombosis; thrombocytopenia, anemia, and neutropenia; and lipid elevations.
Rinvoq Pregnancy And Lactation Text: Based on animal studies, Rinvoq may cause embryo-fetal harm when administered to pregnant women.  The medication should not be used in pregnancy.  Breastfeeding is not recommended during treatment and for 6 days after the last dose.
Quinolones Counseling:  I discussed with the patient the risks of fluoroquinolones including but not limited to GI upset, allergic reaction, drug rash, diarrhea, dizziness, photosensitivity, yeast infections, liver function test abnormalities, tendonitis/tendon rupture.
Wartpeel Counseling:  I discussed with the patient the risks of Wartpeel including but not limited to erythema, scaling, itching, weeping, crusting, and pain.
Niacinamide Pregnancy And Lactation Text: These medications are considered safe during pregnancy.
Azithromycin Counseling:  I discussed with the patient the risks of azithromycin including but not limited to GI upset, allergic reaction, drug rash, diarrhea, and yeast infections.
Rituxan Counseling:  I discussed with the patient the risks of Rituxan infusions. Side effects can include infusion reactions, severe drug rashes including mucocutaneous reactions, reactivation of latent hepatitis and other infections and rarely progressive multifocal leukoencephalopathy.  All of the patient's questions and concerns were addressed.
Isotretinoin Counseling: Patient should get monthly blood tests, not donate blood, not drive at night if vision affected, not share medication, and not undergo elective surgery for 6 months after tx completed. Side effects reviewed, pt to contact office should one occur.
Gabapentin Counseling: I discussed with the patient the risks of gabapentin including but not limited to dizziness, somnolence, fatigue and ataxia.
Tremfya Counseling: I discussed with the patient the risks of guselkumab including but not limited to immunosuppression, serious infections, worsening of inflammatory bowel disease and drug reactions.  The patient understands that monitoring is required including a PPD at baseline and must alert us or the primary physician if symptoms of infection or other concerning signs are noted.
Ivermectin Counseling:  Patient instructed to take medication on an empty stomach with a full glass of water.  Patient informed of potential adverse effects including but not limited to nausea, diarrhea, dizziness, itching, and swelling of the extremities or lymph nodes.  The patient verbalized understanding of the proper use and possible adverse effects of ivermectin.  All of the patient's questions and concerns were addressed.
Valtrex Counseling: I discussed with the patient the risks of valacyclovir including but not limited to kidney damage, nausea, vomiting and severe allergy.  The patient understands that if the infection seems to be worsening or is not improving, they are to call.
Dupixent Pregnancy And Lactation Text: This medication likely crosses the placenta but the risk for the fetus is uncertain. This medication is excreted in breast milk.
5-Fu Counseling: 5-Fluorouracil Counseling:  I discussed with the patient the risks of 5-fluorouracil including but not limited to erythema, scaling, itching, weeping, crusting, and pain.
Rituxan Pregnancy And Lactation Text: This medication is Pregnancy Category C and it isn't know if it is safe during pregnancy. It is unknown if this medication is excreted in breast milk but similar antibodies are known to be excreted.
Benzoyl Peroxide Pregnancy And Lactation Text: This medication is Pregnancy Category C. It is unknown if benzoyl peroxide is excreted in breast milk.
Methotrexate Pregnancy And Lactation Text: This medication is Pregnancy Category X and is known to cause fetal harm. This medication is excreted in breast milk.
Cimetidine Counseling:  I discussed with the patient the risks of Cimetidine including but not limited to gynecomastia, headache, diarrhea, nausea, drowsiness, arrhythmias, pancreatitis, skin rashes, psychosis, bone marrow suppression and kidney toxicity.
Taltz Counseling: I discussed with the patient the risks of ixekizumab including but not limited to immunosuppression, serious infections, worsening of inflammatory bowel disease and drug reactions.  The patient understands that monitoring is required including a PPD at baseline and must alert us or the primary physician if symptoms of infection or other concerning signs are noted.
Solaraze Counseling:  I discussed with the patient the risks of Solaraze including but not limited to erythema, scaling, itching, weeping, crusting, and pain.
Valtrex Pregnancy And Lactation Text: this medication is Pregnancy Category B and is considered safe during pregnancy. This medication is not directly found in breast milk but it's metabolite acyclovir is present.
Opioid Counseling: I discussed with the patient the potential side effects of opioids including but not limited to addiction, altered mental status, and depression. I stressed avoiding alcohol, benzodiazepines, muscle relaxants and sleep aids unless specifically okayed by a physician. The patient verbalized understanding of the proper use and possible adverse effects of opioids. All of the patient's questions and concerns were addressed. They were instructed to flush the remaining pills down the toilet if they did not need them for pain.
Nsaids Counseling: NSAID Counseling: I discussed with the patient that NSAIDs should be taken with food. Prolonged use of NSAIDs can result in the development of stomach ulcers.  Patient advised to stop taking NSAIDs if abdominal pain occurs.  The patient verbalized understanding of the proper use and possible adverse effects of NSAIDs.  All of the patient's questions and concerns were addressed.
Azathioprine Counseling:  I discussed with the patient the risks of azathioprine including but not limited to myelosuppression, immunosuppression, hepatotoxicity, lymphoma, and infections.  The patient understands that monitoring is required including baseline LFTs, Creatinine, possible TPMP genotyping and weekly CBCs for the first month and then every 2 weeks thereafter.  The patient verbalized understanding of the proper use and possible adverse effects of azathioprine.  All of the patient's questions and concerns were addressed.
Topical Ketoconazole Counseling: Patient counseled that this medication may cause skin irritation or allergic reactions.  In the event of skin irritation, the patient was advised to reduce the amount of the drug applied or use it less frequently.   The patient verbalized understanding of the proper use and possible adverse effects of ketoconazole.  All of the patient's questions and concerns were addressed.
Griseofulvin Counseling:  I discussed with the patient the risks of griseofulvin including but not limited to photosensitivity, cytopenia, liver damage, nausea/vomiting and severe allergy.  The patient understands that this medication is best absorbed when taken with a fatty meal (e.g., ice cream or french fries).
Sotyktu Counseling:  I discussed the most common side effects of Sotyktu including: common cold, sore throat, sinus infections, cold sores, canker sores, folliculitis, and acne.? I also discussed more serious side effects of Sotyktu including but not limited to: serious allergic reactions; increased risk for infections such as TB; cancers such as lymphomas; rhabdomyolysis and elevated CPK; and elevated triglycerides and liver enzymes.?
Enbrel Counseling:  I discussed with the patient the risks of etanercept including but not limited to myelosuppression, immunosuppression, autoimmune hepatitis, demyelinating diseases, lymphoma, and infections.  The patient understands that monitoring is required including a PPD at baseline and must alert us or the primary physician if symptoms of infection or other concerning signs are noted.
Opzelura Pregnancy And Lactation Text: There is insufficient data to evaluate drug-associated risk for major birth defects, miscarriage, or other adverse maternal or fetal outcomes.  There is a pregnancy registry that monitors pregnancy outcomes in pregnant persons exposed to the medication during pregnancy.  It is unknown if this medication is excreted in breast milk.  Do not breastfeed during treatment and for about 4 weeks after the last dose.
Carac Counseling:  I discussed with the patient the risks of Carac including but not limited to erythema, scaling, itching, weeping, crusting, and pain.
Azithromycin Pregnancy And Lactation Text: This medication is considered safe during pregnancy and is also secreted in breast milk.
Isotretinoin Pregnancy And Lactation Text: This medication is Pregnancy Category X and is considered extremely dangerous during pregnancy. It is unknown if it is excreted in breast milk.
Imiquimod Counseling:  I discussed with the patient the risks of imiquimod including but not limited to erythema, scaling, itching, weeping, crusting, and pain.  Patient understands that the inflammatory response to imiquimod is variable from person to person and was educated regarded proper titration schedule.  If flu-like symptoms develop, patient knows to discontinue the medication and contact us.
Picato Counseling:  I discussed with the patient the risks of Picato including but not limited to erythema, scaling, itching, weeping, crusting, and pain.
Propranolol Counseling:  I discussed with the patient the risks of propranolol including but not limited to low heart rate, low blood pressure, low blood sugar, restlessness and increased cold sensitivity. They should call the office if they experience any of these side effects.
Nsaids Pregnancy And Lactation Text: These medications are considered safe up to 30 weeks gestation. It is excreted in breast milk.
Dutasteride Male Counseling: Dustasteride Counseling:  I discussed with the patient the risks of use of dutasteride including but not limited to decreased libido, decreased ejaculate volume, and gynecomastia. Women who can become pregnant should not handle medication.  All of the patient's questions and concerns were addressed.
Solaraze Pregnancy And Lactation Text: This medication is Pregnancy Category B and is considered safe. There is some data to suggest avoiding during the third trimester. It is unknown if this medication is excreted in breast milk.
Sotyktu Pregnancy And Lactation Text: There is insufficient data to evaluate whether or not Sotyktu is safe to use during pregnancy.? ?It is not known if Sotyktu passes into breast milk and whether or not it is safe to use when breastfeeding.??
Prednisone Counseling:  I discussed with the patient the risks of prolonged use of prednisone including but not limited to weight gain, insomnia, osteoporosis, mood changes, diabetes, susceptibility to infection, glaucoma and high blood pressure.  In cases where prednisone use is prolonged, patients should be monitored with blood pressure checks, serum glucose levels and an eye exam.  Additionally, the patient may need to be placed on GI prophylaxis, PCP prophylaxis, and calcium and vitamin D supplementation and/or a bisphosphonate.  The patient verbalized understanding of the proper use and the possible adverse effects of prednisone.  All of the patient's questions and concerns were addressed.
Siliq Counseling:  I discussed with the patient the risks of Siliq including but not limited to new or worsening depression, suicidal thoughts and behavior, immunosuppression, malignancy, posterior leukoencephalopathy syndrome, and serious infections.  The patient understands that monitoring is required including a PPD at baseline and must alert us or the primary physician if symptoms of infection or other concerning signs are noted. There is also a special program designed to monitor depression which is required with Siliq.

## 2023-03-21 NOTE — PROCEDURE: MIPS QUALITY
Quality 226: Preventive Care And Screening: Tobacco Use: Screening And Cessation Intervention: Patient screened for tobacco use and is an ex/non-smoker
Detail Level: Detailed
Quality 111:Pneumonia Vaccination Status For Older Adults: Pneumococcal Vaccination Previously Received
Quality 431: Preventive Care And Screening: Unhealthy Alcohol Use - Screening: Patient screened for unhealthy alcohol use using a single question and scores less than 2 times per year
Quality 402: Tobacco Use And Help With Quitting Among Adolescents: Patient screened for tobacco and never smoked
Quality 130: Documentation Of Current Medications In The Medical Record: Current Medications Documented

## 2023-03-21 NOTE — HPI: MEDICATION (5-FLUOROURACIL)
How Severe Are The Lesions?: moderate
Is This A New Presentation, Or A Follow-Up?: Follow Up 5-Fluorouracil Therapy
How Many Weeks Of 5-Fu Have You Completed?: 5

## 2023-04-03 ENCOUNTER — OFFICE VISIT (OUTPATIENT)
Dept: MEDICAL GROUP | Facility: MEDICAL CENTER | Age: 88
End: 2023-04-03
Payer: MEDICARE

## 2023-04-03 VITALS
DIASTOLIC BLOOD PRESSURE: 60 MMHG | WEIGHT: 165.57 LBS | TEMPERATURE: 98.7 F | BODY MASS INDEX: 30.47 KG/M2 | OXYGEN SATURATION: 90 % | HEIGHT: 62 IN | SYSTOLIC BLOOD PRESSURE: 120 MMHG | HEART RATE: 77 BPM | RESPIRATION RATE: 16 BRPM

## 2023-04-03 DIAGNOSIS — F33.41 RECURRENT MAJOR DEPRESSIVE DISORDER, IN PARTIAL REMISSION (HCC): ICD-10-CM

## 2023-04-03 DIAGNOSIS — E78.2 MIXED HYPERLIPIDEMIA: ICD-10-CM

## 2023-04-03 DIAGNOSIS — I77.810 THORACIC AORTIC ECTASIA (HCC): ICD-10-CM

## 2023-04-03 DIAGNOSIS — K21.9 GERD WITHOUT ESOPHAGITIS: ICD-10-CM

## 2023-04-03 DIAGNOSIS — I10 ESSENTIAL HYPERTENSION: ICD-10-CM

## 2023-04-03 PROBLEM — E03.4 HYPOTHYROIDISM DUE TO ACQUIRED ATROPHY OF THYROID: Status: ACTIVE | Noted: 2023-04-03

## 2023-04-03 PROCEDURE — 99214 OFFICE O/P EST MOD 30 MIN: CPT | Performed by: FAMILY MEDICINE

## 2023-04-03 ASSESSMENT — FIBROSIS 4 INDEX: FIB4 SCORE: 1.57

## 2023-04-03 ASSESSMENT — PATIENT HEALTH QUESTIONNAIRE - PHQ9: CLINICAL INTERPRETATION OF PHQ2 SCORE: 0

## 2023-04-03 NOTE — PROGRESS NOTES
CC: Hypertension, hyperlipidemia, depression, GERD, thoracic aortic ectasia    HPI:   Cecilia presents today to discuss the following:    Essential hypertension  Blood pressure has been adequate controlled on carvedilol 3.125 mg twice a day.  No side effects    Mixed hyperlipidemia  She has been tolerating the statin. Denies muscle pain LFTs has been normal.  Patient is currently on atorvastatin 20 mg daily patient's    Recurrent major depressive disorder, in partial remission (HCC)  Mood has been fluctuating but mostly stable.  Denies suicidal ideation.  She has been doing fine. She has been doing fine on sertraline 100 mg daily.  No side effect    GERD without esophagitis  Patient denies epigastric pain, heartburn, nausea, or vomiting.  She has been doing fine on Dexilant 60 mg daily    Thoracic aortic ectasia (HCC)  Last echocardiogram showed: Ascending aorta diameter is 3.5 cm.  Denies any chest pain, or shortness of breath,    Patient Active Problem List    Diagnosis Date Noted    Hypothyroidism due to acquired atrophy of thyroid 04/03/2023    Thoracic aortic ectasia (HCC) 04/03/2023    GERD without esophagitis 04/03/2023    Chronic insomnia 09/12/2019    Mixed hyperlipidemia 03/12/2019    Bronchitis 05/15/2017    Primary osteoarthritis of right knee 02/01/2017    Essential hypertension 02/29/2016    H/O: stroke 02/29/2016    Primary osteoarthritis involving multiple joints 02/29/2016    Depression 02/29/2016    Primary localized osteoarthrosis, pelvic region and thigh 03/02/2015    S/P lumbar fusion 05/14/2013       Current Outpatient Medications   Medication Sig Dispense Refill    carvedilol (COREG) 3.125 MG Tab TAKE 1 TABLET TWICE DAILY  WITH MEALS 180 Tablet 2    atorvastatin (LIPITOR) 20 MG Tab TAKE 1 TABLET DAILY 90 Tablet 3    sertraline (ZOLOFT) 100 MG Tab Take 1 Tablet by mouth every day. 90 Tablet 3    Dexlansoprazole (DEXILANT) 60 MG CAPSULE DELAYED RELEASE delayed-release capsule TAKE 1 CAPSULE EVERY        MORNING BEFORE BREAKFAST   FOR GASTROESOPHAGEAL REFLUXDISEASE. 90 Capsule 3    Dexlansoprazole (DEXILANT) 60 MG CAPSULE DELAYED RELEASE delayed-release capsule TAKE 1 CAPSULE EVERY       MORNING BEFORE BREAKFAST   FOR GASTROESOPHAGEAL REFLUXDISEASE 90 Capsule 3    fluorouracil (EFUDEX) 5 % cream       erythromycin 5 MG/GM Ointment       traZODone (DESYREL) 50 MG Tab Take 1 Tablet by mouth every evening. 90 Tablet 3    Diclofenac Sodium 1 % Gel Apply 1 Application to skin as directed 2 Times a Day. (Patient not taking: Reported on 2/11/2021) 1 Tube 3    traZODone (DESYREL) 100 MG Tab TAKE 1 TABLET AT BEDTIME 90 Tab 3    ibuprofen (MOTRIN) 400 MG Tab Take 1 Tab by mouth every 6 hours as needed for Moderate Pain. 30 Tab 0    DEXILANT 60 MG CAPSULE DELAYED RELEASE delayed-release capsule TAKE 1 CAPSULE EVERY       MORNING BEFORE BREAKFAST   FOR GASTROESOPHAGEAL REFLUXDISEASE (Patient not taking: Reported on 2/11/2021) 90 Cap 3    calcium citrate (CALCITRATE) 950 MG Tab Take 950 mg by mouth every day.      Cholecalciferol (VITAMIN D3) 1000 units Cap Take  by mouth.      Probiotic Product (PROBIOTIC-10 PO) Take  by mouth.      Colostrum 500 MG Cap Take  by mouth.      Omega-3 1000 MG Cap Take  by mouth.      Pyridoxine HCl (B-6) 100 MG Tab Take  by mouth.      Biotin 10 MG Cap Take  by mouth.      coenzyme Q-10 30 MG capsule Take 60 mg by mouth every day.      Multiple Vitamins-Minerals (OCUVITE-LUTEIN) Tab Take 1 tablet by mouth every day.      acetaminophen (TYLENOL) 500 MG TABS Take 1 Tab by mouth every 6 hours as needed for Mild Pain. 30 Tab 0     No current facility-administered medications for this visit.         Allergies as of 04/03/2023 - Reviewed 04/03/2023   Allergen Reaction Noted    Codeine  02/01/2013    Cortisone  05/11/2022    Latex  01/19/2017    Lisinopril Cough 02/29/2016    Other drug  01/22/2019    Soap Rash and Itching 02/20/2015    Tape Rash 02/01/2013    Naeem two-sided adhesive strap [always  "feminine wipes] Rash 02/01/2013        ROS: Denies any chest pain, Shortness of breath, Changes bowel or bladder, Lower extremity edema.    Physical Exam:  /60 (BP Location: Right arm, Patient Position: Sitting, BP Cuff Size: Adult)   Pulse 77   Temp 37.1 °C (98.7 °F) (Temporal)   Resp 16   Ht 1.575 m (5' 2\")   Wt 75.1 kg (165 lb 9.1 oz)   SpO2 90%   BMI 30.28 kg/m²   Gen.: Well-developed, well-nourished, no apparent distress,pleasant and cooperative with the examination  Skin:  Warm and dry with good turgor. No rashes or suspicious lesions in visible areas  HEENT:Sinuses nontender with palpation, TMs clear, nares patent with pink mucosa and clear rhinorrhea,no septal deviation ,polyps or lesions. lips without lesions, oropharynx clear.  Neck: Trachea midline,no masses or adenopathy. No JVD.  Cor: Regular rate and rhythm without murmur, gallop or rub.  Lungs: Respirations unlabored.Clear to auscultation with equal breath sounds bilaterally. No wheezes, rhonchi.  Extremities: No cyanosis, clubbing or edema.        Assessment and Plan.   89 y.o. female     1. Essential hypertension  Controlled.  Continue on carvedilol 3.125 mg twice a day    - CBC WITH DIFFERENTIAL; Future  - Comp Metabolic Panel; Future  - Lipid Profile; Future  - TSH; Future    2. Mixed hyperlipidemia  She has been tolerating the statin. Denies muscle pain LFTs has been normal  Continue atorvastatin 20 mg daily    - Lipid Profile; Future  - TSH; Future    3. Recurrent major depressive disorder, in partial remission (HCC)  Mood has been fluctuating but mostly stable.  Continue on sertraline 100 mg daily    4. GERD without esophagitis  She has been doing fine on Dexilant 60 mg daily    5. Thoracic aortic ectasia (HCC)  Stable.  Asymptomatic.  Last echocardiogram showed: Ascending aorta diameter is 3.5 cm.        "

## 2023-04-11 ENCOUNTER — OFFICE VISIT (OUTPATIENT)
Dept: MEDICAL GROUP | Facility: MEDICAL CENTER | Age: 88
End: 2023-04-11
Payer: MEDICARE

## 2023-04-11 VITALS
SYSTOLIC BLOOD PRESSURE: 120 MMHG | RESPIRATION RATE: 16 BRPM | WEIGHT: 166 LBS | HEIGHT: 62 IN | BODY MASS INDEX: 30.55 KG/M2 | TEMPERATURE: 99.5 F | OXYGEN SATURATION: 92 % | DIASTOLIC BLOOD PRESSURE: 72 MMHG | HEART RATE: 76 BPM

## 2023-04-11 DIAGNOSIS — M25.511 CHRONIC RIGHT SHOULDER PAIN: ICD-10-CM

## 2023-04-11 DIAGNOSIS — G89.29 CHRONIC RIGHT SHOULDER PAIN: ICD-10-CM

## 2023-04-11 PROCEDURE — 99214 OFFICE O/P EST MOD 30 MIN: CPT | Performed by: FAMILY MEDICINE

## 2023-04-11 RX ORDER — METHYLPREDNISOLONE ACETATE 80 MG/ML
80 INJECTION, SUSPENSION INTRA-ARTICULAR; INTRALESIONAL; INTRAMUSCULAR; SOFT TISSUE ONCE
OUTPATIENT
Start: 2023-04-11 | End: 2023-04-12

## 2023-04-11 ASSESSMENT — FIBROSIS 4 INDEX: FIB4 SCORE: 1.57

## 2023-04-11 NOTE — PROGRESS NOTES
CC: Chronic left shoulder pain    HPI:   Cecilia presents today because she has been having chronic right shoulder pain.  Patient has always a problem combing her hair using her right arm.  Patient also reported pain that is present all the time and the intensity has been varying.  Patient refused surgical intervention in the past, wants to try the intra-articular joint injection        Patient Active Problem List    Diagnosis Date Noted    Hypothyroidism due to acquired atrophy of thyroid 04/03/2023    Thoracic aortic ectasia (HCC) 04/03/2023    GERD without esophagitis 04/03/2023    Chronic insomnia 09/12/2019    Mixed hyperlipidemia 03/12/2019    Bronchitis 05/15/2017    Primary osteoarthritis of right knee 02/01/2017    Essential hypertension 02/29/2016    H/O: stroke 02/29/2016    Primary osteoarthritis involving multiple joints 02/29/2016    Depression 02/29/2016    Primary localized osteoarthrosis, pelvic region and thigh 03/02/2015    S/P lumbar fusion 05/14/2013       Current Outpatient Medications   Medication Sig Dispense Refill    sertraline (ZOLOFT) 100 MG Tab Take 1 Tablet by mouth every day. 90 Tablet 3    Dexlansoprazole (DEXILANT) 60 MG CAPSULE DELAYED RELEASE delayed-release capsule TAKE 1 CAPSULE EVERY       MORNING BEFORE BREAKFAST   FOR GASTROESOPHAGEAL REFLUXDISEASE. 90 Capsule 3    Dexlansoprazole (DEXILANT) 60 MG CAPSULE DELAYED RELEASE delayed-release capsule TAKE 1 CAPSULE EVERY       MORNING BEFORE BREAKFAST   FOR GASTROESOPHAGEAL REFLUXDISEASE 90 Capsule 3    fluorouracil (EFUDEX) 5 % cream       erythromycin 5 MG/GM Ointment       traZODone (DESYREL) 50 MG Tab Take 1 Tablet by mouth every evening. 90 Tablet 3    carvedilol (COREG) 3.125 MG Tab TAKE 1 TABLET TWICE DAILY  WITH MEALS 180 Tablet 2    atorvastatin (LIPITOR) 20 MG Tab TAKE 1 TABLET DAILY 90 Tablet 3    Diclofenac Sodium 1 % Gel Apply 1 Application to skin as directed 2 Times a Day. 1 Tube 3    traZODone (DESYREL) 100 MG Tab TAKE  "1 TABLET AT BEDTIME 90 Tab 3    ibuprofen (MOTRIN) 400 MG Tab Take 1 Tab by mouth every 6 hours as needed for Moderate Pain. 30 Tab 0    DEXILANT 60 MG CAPSULE DELAYED RELEASE delayed-release capsule TAKE 1 CAPSULE EVERY       MORNING BEFORE BREAKFAST   FOR GASTROESOPHAGEAL REFLUXDISEASE 90 Cap 3    calcium citrate (CALCITRATE) 950 MG Tab Take 950 mg by mouth every day.      Cholecalciferol (VITAMIN D3) 1000 units Cap Take  by mouth.      Probiotic Product (PROBIOTIC-10 PO) Take  by mouth.      Colostrum 500 MG Cap Take  by mouth.      Omega-3 1000 MG Cap Take  by mouth.      Pyridoxine HCl (B-6) 100 MG Tab Take  by mouth.      Biotin 10 MG Cap Take  by mouth.      coenzyme Q-10 30 MG capsule Take 60 mg by mouth every day.      Multiple Vitamins-Minerals (OCUVITE-LUTEIN) Tab Take 1 tablet by mouth every day.      acetaminophen (TYLENOL) 500 MG TABS Take 1 Tab by mouth every 6 hours as needed for Mild Pain. 30 Tab 0     Current Facility-Administered Medications   Medication Dose Route Frequency Provider Last Rate Last Admin    methylPREDNISolone acetate (DEPO-MEDROL) injection 80 mg  80 mg Intra-articular Once Luz Contreras M.D.             Allergies as of 04/11/2023 - Reviewed 04/11/2023   Allergen Reaction Noted    Codeine  02/01/2013    Latex  01/19/2017    Lisinopril Cough 02/29/2016    Other drug  01/22/2019    Soap Rash and Itching 02/20/2015    Tape Rash 02/01/2013    Naeem two-sided adhesive strap [always feminine wipes] Rash 02/01/2013        ROS: Denies any chest pain, Shortness of breath, Changes bowel or bladder, Lower extremity edema.    Physical Exam:  /72 (BP Location: Left arm, Patient Position: Sitting, BP Cuff Size: Adult)   Pulse 76   Temp 37.5 °C (99.5 °F) (Temporal)   Resp 16   Ht 1.575 m (5' 2\")   Wt 75.3 kg (166 lb)   SpO2 92%   BMI 30.36 kg/m²   Gen.: Well-developed, well-nourished, no apparent distress,pleasant and cooperative with the examination    Right shoulder: " Decreased range of motion, mild tenderness, no swelling      Procedure (shoulder injection);  Patient was consented. Risks and benefits discussed.  Right shoulder joint is exposed. A point on the back of the shoulder joint about one and a half finger breadth inferior, and one and a half finger breadth medial to the acromion process was determined. The skin was sterilized with (2% chlorhexidine , and 70% Isopropyl alcohol) , and 1 ml DepoMedrol 80 mg + 4 ml Lidocaine 1% were injected into the joint.     Assessment and Plan.     1. Chronic left shoulder pain  Intra-articular injection of the right shoulder joint is given.Patient joint movement improved to about 50% immediately after the injection.Precaustion was given to avoid bleeding . Patient advised to to uses worm compresses 2 times daily for 3 days, and RTC if any continuous pain on the joint    - Large Joint Injection / Arthrocentesis  - methylPREDNISolone acetate (DEPO-MEDROL) injection 80 mg

## 2023-04-17 ENCOUNTER — OFFICE VISIT (OUTPATIENT)
Dept: URGENT CARE | Facility: CLINIC | Age: 88
End: 2023-04-17
Payer: MEDICARE

## 2023-04-17 ENCOUNTER — APPOINTMENT (OUTPATIENT)
Dept: RADIOLOGY | Facility: IMAGING CENTER | Age: 88
End: 2023-04-17
Attending: NURSE PRACTITIONER
Payer: MEDICARE

## 2023-04-17 VITALS
TEMPERATURE: 100.4 F | OXYGEN SATURATION: 98 % | WEIGHT: 160 LBS | HEIGHT: 62 IN | SYSTOLIC BLOOD PRESSURE: 104 MMHG | HEART RATE: 78 BPM | DIASTOLIC BLOOD PRESSURE: 68 MMHG | RESPIRATION RATE: 18 BRPM | BODY MASS INDEX: 29.44 KG/M2

## 2023-04-17 DIAGNOSIS — R05.1 ACUTE COUGH: ICD-10-CM

## 2023-04-17 DIAGNOSIS — J06.9 VIRAL UPPER RESPIRATORY TRACT INFECTION: ICD-10-CM

## 2023-04-17 LAB
FLUAV RNA SPEC QL NAA+PROBE: NEGATIVE
FLUBV RNA SPEC QL NAA+PROBE: NEGATIVE
RSV RNA SPEC QL NAA+PROBE: NEGATIVE
SARS-COV-2 RNA RESP QL NAA+PROBE: NEGATIVE

## 2023-04-17 PROCEDURE — 0241U POCT CEPHEID COV-2, FLU A/B, RSV - PCR: CPT | Performed by: NURSE PRACTITIONER

## 2023-04-17 PROCEDURE — 99214 OFFICE O/P EST MOD 30 MIN: CPT | Performed by: NURSE PRACTITIONER

## 2023-04-17 PROCEDURE — 71046 X-RAY EXAM CHEST 2 VIEWS: CPT | Mod: TC,FY | Performed by: NURSE PRACTITIONER

## 2023-04-17 RX ORDER — BENZONATATE 100 MG/1
100 CAPSULE ORAL 3 TIMES DAILY PRN
Qty: 40 CAPSULE | Refills: 0 | Status: SHIPPED | OUTPATIENT
Start: 2023-04-17 | End: 2023-07-28

## 2023-04-17 RX ORDER — COVID-19 MOLECULAR TEST ASSAY
KIT MISCELLANEOUS
COMMUNITY
Start: 2023-04-14 | End: 2023-07-28

## 2023-04-17 ASSESSMENT — FIBROSIS 4 INDEX: FIB4 SCORE: 1.57

## 2023-04-18 NOTE — PROGRESS NOTES
Cecilia Alatorre is a 89 y.o. female who presents for Cough (X4days, Green mucus coming out of nose and chest, if medication is prescribed pt would like a paper copy )      HPI  This is a new problem. Cecilia Alatorre is a 89 y.o. patient who presents to urgent care with c/o: Coughing for 4 days.  Green mucus coming out of her nose and from her chest when she coughs.  The cough is primarily dry.  She coughs worse when she lays down at night.  Sometimes she coughs so hard she just cannot stop coughing.  The cough is exhausting.  She feels fatigued.  Treatments tried: None except resting more  Denies fever, shortness of breath, chest pain, leg swelling, ear pain, sore throat, headache  No other aggravating or alleviating factors.       ROS See HPI    Allergies:       Allergies   Allergen Reactions    Codeine      Mental confusion    Latex      Only to adhesive bandaids and tape; rash, redness, itching    Lisinopril Cough    Other Drug      Steroids raise blood pressure and blood sugar    Soap Rash and Itching     Bathing and laundry soap cause itching and rash non scented soap OK    Tape Rash     Rash-Paper tape OK    Naeem Two-Sided Adhesive Strap [Always Feminine Wipes] Rash     Rash-Paper tape OK       PMSFS Hx:  Past Medical History:   Diagnosis Date    Acid reflux     Arthritis     osteo, generalized    Cancer (HCC)     skin    CATARACT     elijah iol    Dental disorder     partials lower    Heart burn     Hiatus hernia syndrome     High cholesterol     Hypertension     Indigestion     Intracerebral bleed (HCC) 12/16/2015    memory difficulty no residual weakness    Other specified disorder of intestines     constipation w/anesthesia and narcotics    Pain 9/2016    all over    Psychiatric problem     depression    Seasonal allergies     Shoulder injury     right shoulder    Urinary bladder disorder     urgency    Urinary incontinence 2017    urgency     Past Surgical History:   Procedure Laterality Date     KNEE ARTHROPLASTY TOTAL Right 2017    Procedure: KNEE ARTHROPLASTY TOTAL;  Surgeon: Aaron Burton M.D.;  Location: SURGERY [facility];  Service:     HIP ARTHROPLASTY TOTAL  3/2/2015    Performed by Aaron Burton M.D. at SURGERY [facility]    FUSION, SPINE, LUMBAR, PLIF  2013    Performed by Sancho Rosales M.D. at SURGERY [facility]    LUMBAR DECOMPRESSION  2013    Performed by Sancho Rosales M.D. at SURGERY [facility]    OTHER ORTHOPEDIC SURGERY  2007    knee right    OTHER  2004    face lift , tummy tuck    OTHER ORTHOPEDIC SURGERY      carpal tunnel, right    OTHER  89    tonsillectomy    APPENDECTOMY       Family History   Problem Relation Age of Onset    Stroke Father     Cancer Mother      Social History     Tobacco Use    Smoking status: Former     Packs/day: 1.00     Years: 17.00     Pack years: 17.00     Types: Cigarettes     Quit date: 1972     Years since quittin.3    Smokeless tobacco: Never    Tobacco comments:     Started smoking at age 22   Substance Use Topics    Alcohol use: Not Currently     Comment: 0-1 per month       Problems:   Patient Active Problem List   Diagnosis    S/P lumbar fusion    Primary localized osteoarthrosis, pelvic region and thigh    Essential hypertension    H/O: stroke    Primary osteoarthritis involving multiple joints    Depression    Primary osteoarthritis of right knee    Bronchitis    Mixed hyperlipidemia    Chronic insomnia    Hypothyroidism due to acquired atrophy of thyroid    Thoracic aortic ectasia (HCC)    GERD without esophagitis       Medications:   Current Outpatient Medications on File Prior to Visit   Medication Sig Dispense Refill    BINAXNOW COVID-19 AG HOME TEST Kit       sertraline (ZOLOFT) 100 MG Tab Take 1 Tablet by mouth every day. 90 Tablet 3    traZODone (DESYREL) 50 MG Tab Take 1 Tablet by mouth every evening. 90 Tablet 3    carvedilol (COREG) 3.125 MG Tab TAKE 1 TABLET TWICE DAILY  WITH MEALS 180 Tablet  "2    atorvastatin (LIPITOR) 20 MG Tab TAKE 1 TABLET DAILY 90 Tablet 3    ibuprofen (MOTRIN) 400 MG Tab Take 1 Tab by mouth every 6 hours as needed for Moderate Pain. 30 Tab 0    DEXILANT 60 MG CAPSULE DELAYED RELEASE delayed-release capsule TAKE 1 CAPSULE EVERY       MORNING BEFORE BREAKFAST   FOR GASTROESOPHAGEAL REFLUXDISEASE 90 Cap 3    Cholecalciferol (VITAMIN D3) 1000 units Cap Take  by mouth.      Probiotic Product (PROBIOTIC-10 PO) Take  by mouth.      Omega-3 1000 MG Cap Take  by mouth.      Pyridoxine HCl (B-6) 100 MG Tab Take  by mouth.      Biotin 10 MG Cap Take  by mouth.      coenzyme Q-10 30 MG capsule Take 60 mg by mouth every day.      Multiple Vitamins-Minerals (OCUVITE-LUTEIN) Tab Take 1 tablet by mouth every day.       No current facility-administered medications on file prior to visit.          Objective:     /68   Pulse 78   Temp 38 °C (100.4 °F) (Temporal)   Resp 18   Ht 1.575 m (5' 2\")   Wt 72.6 kg (160 lb)   SpO2 98%   BMI 29.26 kg/m²     Physical Exam  Vitals and nursing note reviewed.   Constitutional:       General: She is not in acute distress.     Appearance: Normal appearance. She is well-developed. She is not ill-appearing or toxic-appearing.   HENT:      Head: Normocephalic.      Right Ear: Hearing normal. No middle ear effusion. Tympanic membrane is injected. Tympanic membrane is not erythematous.      Left Ear: Hearing normal.  No middle ear effusion. Tympanic membrane is injected. Tympanic membrane is not erythematous.      Nose: No mucosal edema or rhinorrhea.      Right Sinus: No maxillary sinus tenderness or frontal sinus tenderness.      Left Sinus: No maxillary sinus tenderness or frontal sinus tenderness.      Mouth/Throat:      Pharynx: Uvula midline. No posterior oropharyngeal erythema.      Tonsils: No tonsillar abscesses.   Neck:      Trachea: Trachea normal.   Cardiovascular:      Rate and Rhythm: Normal rate and regular rhythm.      Chest Wall: PMI is not " displaced.      Pulses: Normal pulses.      Heart sounds: Normal heart sounds.   Pulmonary:      Effort: Pulmonary effort is normal. No respiratory distress.      Breath sounds: Normal breath sounds. No decreased breath sounds, wheezing, rhonchi or rales.   Musculoskeletal:         General: Normal range of motion.      Cervical back: Full passive range of motion without pain, normal range of motion and neck supple.   Lymphadenopathy:      Cervical: No cervical adenopathy.      Upper Body:      Right upper body: No supraclavicular adenopathy.      Left upper body: No supraclavicular adenopathy.   Skin:     General: Skin is warm and dry.      Capillary Refill: Capillary refill takes less than 2 seconds.   Neurological:      Mental Status: She is alert and oriented to person, place, and time.      Gait: Gait normal.   Psychiatric:         Mood and Affect: Mood normal.         Speech: Speech normal.         Behavior: Behavior normal. Behavior is cooperative.     Results for orders placed or performed in visit on 04/17/23   POCT CEPHEID COV-2, FLU A/B, RSV - PCR   Result Value Ref Range    SARS-CoV-2 by PCR Negative Negative, Invalid    Influenza virus A RNA Negative Negative, Invalid    Influenza virus B, PCR Negative Negative, Invalid    RSV, PCR Negative Negative, Invalid         Assessment /Associated Orders:      1. Acute cough  DX-CHEST-2 VIEWS    benzonatate (TESSALON) 100 MG Cap    POCT CEPHEID COV-2, FLU A/B, RSV - PCR      2. Viral upper respiratory tract infection  POCT CEPHEID COV-2, FLU A/B, RSV - PCR          Medical Decision Making:    Pt is clinically stable at today's acute urgent care visit.  No acute distress noted. Appropriate for outpatient care at this time.   Acute problem today with uncertain prognosis.   Respiratory Limited panel was negative today.  This appears to be a viral URI without complication.  Medications were given to help to reduce her discomfort from coughing.  She was educated that  the goal is to suppress her cough and not to eliminate it.  She should drink adequate fluids and get adequate rest to help heal.  She should return for reevaluation if her symptoms persist or if they worsen.    Discussed Dx, management options (risks,benefits, and alternatives to planned treatment), natural progression and supportive care.  Expressed understanding and the treatment plan was agreed upon.   Questions were encouraged and answered   Return to urgent care prn if new or worsening sx or if there is no improvement in condition prn.    Educated in Red flags and indications to immediately call 911 or present to the Emergency Department.       Time I spent evaluating Cecilia Alatorre in urgent care today was 34  minutes. This time includes preparing for visit, reviewing any pertinent notes or test results, counseling/education, exam, obtaining HPI, interpretation of lab tests, medication management and documentation as indicated above.Time does not include separately billable procedures noted .       Please note that this dictation was created using voice recognition software. I have worked with consultants from the vendor as well as technical experts from [facility] to optimize the interface. I have made every reasonable attempt to correct obvious errors, but I expect that there are errors of grammar and possibly content that I did not discover before finalizing the note.  This note was electronically signed by provider

## 2023-05-01 ENCOUNTER — OFFICE VISIT (OUTPATIENT)
Dept: MEDICAL GROUP | Facility: MEDICAL CENTER | Age: 88
End: 2023-05-01
Payer: MEDICARE

## 2023-05-01 VITALS
HEART RATE: 78 BPM | OXYGEN SATURATION: 93 % | SYSTOLIC BLOOD PRESSURE: 120 MMHG | BODY MASS INDEX: 29.45 KG/M2 | RESPIRATION RATE: 16 BRPM | TEMPERATURE: 98.1 F | HEIGHT: 62 IN | DIASTOLIC BLOOD PRESSURE: 70 MMHG | WEIGHT: 160.05 LBS

## 2023-05-01 DIAGNOSIS — R05.9 COUGH, UNSPECIFIED TYPE: ICD-10-CM

## 2023-05-01 PROCEDURE — 99213 OFFICE O/P EST LOW 20 MIN: CPT | Performed by: FAMILY MEDICINE

## 2023-05-01 ASSESSMENT — FIBROSIS 4 INDEX: FIB4 SCORE: 1.57

## 2023-05-01 NOTE — PROGRESS NOTES
CC: Cough    HPI:   Cecilia presents today because she has been having cough for 2 weeks.  She was seen at the urgent care in 4/17 had a chest x-ray that was negative, was tested for flu and and was negative.  And was prescribed benzonatate to be used 3 times as needed.  Patient stated that the cough has improved a lot, but it is still there for the past 2 weeks.  Denies any fever, chills, shortness of breath, or chest pain.  Oxygen saturation has been normal.      Patient Active Problem List    Diagnosis Date Noted    Hypothyroidism due to acquired atrophy of thyroid 04/03/2023    Thoracic aortic ectasia (HCC) 04/03/2023    GERD without esophagitis 04/03/2023    Chronic insomnia 09/12/2019    Mixed hyperlipidemia 03/12/2019    Bronchitis 05/15/2017    Primary osteoarthritis of right knee 02/01/2017    Essential hypertension 02/29/2016    H/O: stroke 02/29/2016    Primary osteoarthritis involving multiple joints 02/29/2016    Depression 02/29/2016    Primary localized osteoarthrosis, pelvic region and thigh 03/02/2015    S/P lumbar fusion 05/14/2013       Current Outpatient Medications   Medication Sig Dispense Refill    BINAXNOW COVID-19 AG HOME TEST Kit       benzonatate (TESSALON) 100 MG Cap Take 1 Capsule by mouth 3 times a day as needed for Cough. 40 Capsule 0    sertraline (ZOLOFT) 100 MG Tab Take 1 Tablet by mouth every day. 90 Tablet 3    traZODone (DESYREL) 50 MG Tab Take 1 Tablet by mouth every evening. 90 Tablet 3    carvedilol (COREG) 3.125 MG Tab TAKE 1 TABLET TWICE DAILY  WITH MEALS 180 Tablet 2    atorvastatin (LIPITOR) 20 MG Tab TAKE 1 TABLET DAILY 90 Tablet 3    ibuprofen (MOTRIN) 400 MG Tab Take 1 Tab by mouth every 6 hours as needed for Moderate Pain. 30 Tab 0    DEXILANT 60 MG CAPSULE DELAYED RELEASE delayed-release capsule TAKE 1 CAPSULE EVERY       MORNING BEFORE BREAKFAST   FOR GASTROESOPHAGEAL REFLUXDISEASE 90 Cap 3    Cholecalciferol (VITAMIN D3) 1000 units Cap Take  by mouth.      Probiotic  "Product (PROBIOTIC-10 PO) Take  by mouth.      Omega-3 1000 MG Cap Take  by mouth.      Pyridoxine HCl (B-6) 100 MG Tab Take  by mouth.      Biotin 10 MG Cap Take  by mouth.      coenzyme Q-10 30 MG capsule Take 60 mg by mouth every day.      Multiple Vitamins-Minerals (OCUVITE-LUTEIN) Tab Take 1 tablet by mouth every day.       No current facility-administered medications for this visit.         Allergies as of 05/01/2023 - Reviewed 05/01/2023   Allergen Reaction Noted    Codeine  02/01/2013    Latex  01/19/2017    Lisinopril Cough 02/29/2016    Other drug  01/22/2019    Soap Rash and Itching 02/20/2015    Tape Rash 02/01/2013    Naeem two-sided adhesive strap [always feminine wipes] Rash 02/01/2013        ROS: Denies any chest pain, Shortness of breath, Changes bowel or bladder, Lower extremity edema.    Physical Exam:  /70 (BP Location: Right arm, Patient Position: Sitting, BP Cuff Size: Adult)   Pulse 78   Temp 36.7 °C (98.1 °F) (Temporal)   Resp 16   Ht 1.575 m (5' 2\")   Wt 72.6 kg (160 lb 0.9 oz)   SpO2 93%   BMI 29.27 kg/m²   Gen.: Well-developed, well-nourished, no apparent distress,pleasant and cooperative with the examination  Skin:  Warm and dry with good turgor. No rashes or suspicious lesions in visible areas  HEENT:Sinuses nontender with palpation, TMs clear, nares patent with pink mucosa and clear rhinorrhea,no septal deviation ,polyps or lesions. lips without lesions, oropharynx clear.  Neck: Trachea midline,no masses or adenopathy. No JVD.  Cor: Regular rate and rhythm without murmur, gallop or rub.  Lungs: Respirations unlabored.Clear to auscultation with equal breath sounds bilaterally. No wheezes, rhonchi.  Extremities: No cyanosis, clubbing or edema.        Assessment and Plan.   89 y.o. female     1. Cough, unspecified type  As per patient it has improved she is concerned because the cough is still there.  Possibly viral URI  However patient advised to return to the clinic if the " condition gets worse  Recommend to try Mucinex if she gets phlegm(patient stated that the cough has been dry but sometimes associated with mild phlegm)

## 2023-05-10 DIAGNOSIS — I10 ESSENTIAL HYPERTENSION: ICD-10-CM

## 2023-05-11 RX ORDER — CARVEDILOL 3.12 MG/1
TABLET ORAL
Qty: 180 TABLET | Refills: 2 | Status: SHIPPED | OUTPATIENT
Start: 2023-05-11 | End: 2023-12-06 | Stop reason: SDUPTHER

## 2023-06-02 LAB
ALBUMIN SERPL-MCNC: 3.9 G/DL (ref 3.5–4.6)
ALBUMIN/GLOB SERPL: 1.6 {RATIO} (ref 1.2–2.2)
ALP SERPL-CCNC: 74 IU/L (ref 44–121)
ALT SERPL-CCNC: 10 IU/L (ref 0–32)
AST SERPL-CCNC: 14 IU/L (ref 0–40)
BASOPHILS # BLD AUTO: 0.1 X10E3/UL (ref 0–0.2)
BASOPHILS NFR BLD AUTO: 1 %
BILIRUB SERPL-MCNC: 0.4 MG/DL (ref 0–1.2)
BUN SERPL-MCNC: 28 MG/DL (ref 10–36)
BUN/CREAT SERPL: 31 (ref 12–28)
CALCIUM SERPL-MCNC: 9.7 MG/DL (ref 8.7–10.3)
CHLORIDE SERPL-SCNC: 108 MMOL/L (ref 96–106)
CHOLEST SERPL-MCNC: 151 MG/DL (ref 100–199)
CO2 SERPL-SCNC: 25 MMOL/L (ref 20–29)
CREAT SERPL-MCNC: 0.89 MG/DL (ref 0.57–1)
EGFRCR SERPLBLD CKD-EPI 2021: 62 ML/MIN/1.73
EOSINOPHIL # BLD AUTO: 0.2 X10E3/UL (ref 0–0.4)
EOSINOPHIL NFR BLD AUTO: 3 %
ERYTHROCYTE [DISTWIDTH] IN BLOOD BY AUTOMATED COUNT: 13 % (ref 11.7–15.4)
GLOBULIN SER CALC-MCNC: 2.4 G/DL (ref 1.5–4.5)
GLUCOSE SERPL-MCNC: 96 MG/DL (ref 70–99)
HCT VFR BLD AUTO: 37.6 % (ref 34–46.6)
HDLC SERPL-MCNC: 48 MG/DL
HGB BLD-MCNC: 12.2 G/DL (ref 11.1–15.9)
IMM GRANULOCYTES # BLD AUTO: 0 X10E3/UL (ref 0–0.1)
IMM GRANULOCYTES NFR BLD AUTO: 0 %
IMMATURE CELLS  115398: NORMAL
LABORATORY COMMENT REPORT: NORMAL
LDLC SERPL CALC-MCNC: 88 MG/DL (ref 0–99)
LYMPHOCYTES # BLD AUTO: 1 X10E3/UL (ref 0.7–3.1)
LYMPHOCYTES NFR BLD AUTO: 15 %
MCH RBC QN AUTO: 30.1 PG (ref 26.6–33)
MCHC RBC AUTO-ENTMCNC: 32.4 G/DL (ref 31.5–35.7)
MCV RBC AUTO: 93 FL (ref 79–97)
MONOCYTES # BLD AUTO: 0.5 X10E3/UL (ref 0.1–0.9)
MONOCYTES NFR BLD AUTO: 8 %
MORPHOLOGY BLD-IMP: NORMAL
NEUTROPHILS # BLD AUTO: 5.1 X10E3/UL (ref 1.4–7)
NEUTROPHILS NFR BLD AUTO: 73 %
NRBC BLD AUTO-RTO: NORMAL %
PLATELET # BLD AUTO: 310 X10E3/UL (ref 150–450)
POTASSIUM SERPL-SCNC: 4.3 MMOL/L (ref 3.5–5.2)
PROT SERPL-MCNC: 6.3 G/DL (ref 6–8.5)
RBC # BLD AUTO: 4.05 X10E6/UL (ref 3.77–5.28)
SODIUM SERPL-SCNC: 144 MMOL/L (ref 134–144)
TRIGL SERPL-MCNC: 77 MG/DL (ref 0–149)
TSH SERPL DL<=0.005 MIU/L-ACNC: 2.82 UIU/ML (ref 0.45–4.5)
VLDLC SERPL CALC-MCNC: 15 MG/DL (ref 5–40)
WBC # BLD AUTO: 6.9 X10E3/UL (ref 3.4–10.8)

## 2023-06-20 ENCOUNTER — OFFICE VISIT (OUTPATIENT)
Dept: MEDICAL GROUP | Facility: MEDICAL CENTER | Age: 88
End: 2023-06-20
Payer: MEDICARE

## 2023-06-20 VITALS
DIASTOLIC BLOOD PRESSURE: 62 MMHG | TEMPERATURE: 99.7 F | RESPIRATION RATE: 16 BRPM | HEIGHT: 62 IN | SYSTOLIC BLOOD PRESSURE: 102 MMHG | BODY MASS INDEX: 29.08 KG/M2 | WEIGHT: 158 LBS | OXYGEN SATURATION: 94 % | HEART RATE: 81 BPM

## 2023-06-20 DIAGNOSIS — I10 ESSENTIAL HYPERTENSION: ICD-10-CM

## 2023-06-20 DIAGNOSIS — E78.2 MIXED HYPERLIPIDEMIA: ICD-10-CM

## 2023-06-20 DIAGNOSIS — F33.41 RECURRENT MAJOR DEPRESSIVE DISORDER, IN PARTIAL REMISSION (HCC): ICD-10-CM

## 2023-06-20 PROCEDURE — 3074F SYST BP LT 130 MM HG: CPT | Performed by: FAMILY MEDICINE

## 2023-06-20 PROCEDURE — 99214 OFFICE O/P EST MOD 30 MIN: CPT | Performed by: FAMILY MEDICINE

## 2023-06-20 PROCEDURE — 3078F DIAST BP <80 MM HG: CPT | Performed by: FAMILY MEDICINE

## 2023-06-20 RX ORDER — ERYTHROMYCIN 5 MG/G
1 OINTMENT OPHTHALMIC
Status: ON HOLD | COMMUNITY
Start: 2023-05-04 | End: 2023-07-28

## 2023-06-20 RX ORDER — LIFITEGRAST 50 MG/ML
1 SOLUTION/ DROPS OPHTHALMIC 2 TIMES DAILY
COMMUNITY
End: 2023-09-27

## 2023-06-20 ASSESSMENT — FIBROSIS 4 INDEX: FIB4 SCORE: 1.29

## 2023-06-20 NOTE — PROGRESS NOTES
CC: Review blood work, hypertension, depression, hyperlipidemia.    HPI:   Cecilia presents today to discuss the following:    Lab work:  Lab work reviewed in details with the patient.  Showed no significant abnormality.    Essential hypertension  Patient's blood pressure has been adequate controlled.  Denies any headache, chest pain, or shortness of breath.  She has been on carvedilol 3.125 mg twice a day.  No side effects     Recurrent major depressive disorder, in partial remission (HCC)  Patient stated that her mood has been mostly stable.  Denies suicidal ideation.  She has been doing fine on sertraline 100 mg daily.  No side effect    Mixed hyperlipidemia  Patient has been tolerating the statin. Denies muscle pain LFTs has been normal.  Most recent blood work showed:  Component Ref Range & Units 2 wk ago 10 mo ago 1 yr ago 2 yr ago 4 yr ago 5 yr ago   Cholesterol,Tot 100 - 199 mg/dL 151  149  175  154  146  146    Triglycerides 0 - 149 mg/dL 77  73  80  74  62  88    HDL >39 mg/dL 48  52  57  57  64  53    VLDL Cholesterol Calc 5 - 40 mg/dL 15  14  15  15  12  18    LDL Chol Calc ([facility]) 0 - 99 mg/dL 88  83  103 High          She is currently on atorvastatin 20 mg daily.  No side effects      Patient Active Problem List    Diagnosis Date Noted    Hypothyroidism due to acquired atrophy of thyroid 04/03/2023    Thoracic aortic ectasia (HCC) 04/03/2023    GERD without esophagitis 04/03/2023    Chronic insomnia 09/12/2019    Mixed hyperlipidemia 03/12/2019    Bronchitis 05/15/2017    Primary osteoarthritis of right knee 02/01/2017    Essential hypertension 02/29/2016    H/O: stroke 02/29/2016    Primary osteoarthritis involving multiple joints 02/29/2016    Depression 02/29/2016    Primary localized osteoarthrosis, pelvic region and thigh 03/02/2015    S/P lumbar fusion 05/14/2013       Current Outpatient Medications   Medication Sig Dispense Refill    XIIDRA 5 % Solution       carvedilol (COREG) 3.125 MG Tab TAKE 1  "TABLET TWICE DAILY  WITH MEALS 180 Tablet 2    BINAXNOW COVID-19 AG HOME TEST Kit       benzonatate (TESSALON) 100 MG Cap Take 1 Capsule by mouth 3 times a day as needed for Cough. 40 Capsule 0    sertraline (ZOLOFT) 100 MG Tab Take 1 Tablet by mouth every day. 90 Tablet 3    traZODone (DESYREL) 50 MG Tab Take 1 Tablet by mouth every evening. 90 Tablet 3    atorvastatin (LIPITOR) 20 MG Tab TAKE 1 TABLET DAILY 90 Tablet 3    ibuprofen (MOTRIN) 400 MG Tab Take 1 Tab by mouth every 6 hours as needed for Moderate Pain. 30 Tab 0    DEXILANT 60 MG CAPSULE DELAYED RELEASE delayed-release capsule TAKE 1 CAPSULE EVERY       MORNING BEFORE BREAKFAST   FOR GASTROESOPHAGEAL REFLUXDISEASE 90 Cap 3    Cholecalciferol (VITAMIN D3) 1000 units Cap Take  by mouth.      Probiotic Product (PROBIOTIC-10 PO) Take  by mouth.      Omega-3 1000 MG Cap Take  by mouth.      Pyridoxine HCl (B-6) 100 MG Tab Take  by mouth.      Biotin 10 MG Cap Take  by mouth.      coenzyme Q-10 30 MG capsule Take 60 mg by mouth every day.      Multiple Vitamins-Minerals (OCUVITE-LUTEIN) Tab Take 1 tablet by mouth every day.      erythromycin 5 MG/GM Ointment        No current facility-administered medications for this visit.         Allergies as of 06/20/2023 - Reviewed 06/20/2023   Allergen Reaction Noted    Codeine  02/01/2013    Latex  01/19/2017    Lisinopril Cough 02/29/2016    Other drug  01/22/2019    Soap Rash and Itching 02/20/2015    Tape Rash 02/01/2013    Naeem two-sided adhesive strap [always feminine wipes] Rash 02/01/2013        ROS: Denies any chest pain, Shortness of breath, Changes bowel or bladder, Lower extremity edema.    Physical Exam:  /62   Pulse 81   Temp 37.6 °C (99.7 °F)   Resp 16   Ht 1.575 m (5' 2\")   Wt 71.7 kg (158 lb)   SpO2 94%   BMI 28.90 kg/m²   Gen.: Well-developed, well-nourished, no apparent distress,pleasant and cooperative with the examination  Skin:  Warm and dry with good turgor. No rashes or suspicious " lesions in visible areas  HEENT:Sinuses nontender with palpation, TMs clear, nares patent with pink mucosa and clear rhinorrhea,no septal deviation ,polyps or lesions. lips without lesions, oropharynx clear.  Neck: Trachea midline,no masses or adenopathy. No JVD.  Cor: Regular rate and rhythm without murmur, gallop or rub.  Lungs: Respirations unlabored.Clear to auscultation with equal breath sounds bilaterally. No wheezes, rhonchi.  Extremities: No cyanosis, clubbing or edema.          Assessment and Plan.   89 y.o. female     1. Essential hypertension  Controlled.  Continue on carvedilol 3.125 mg twice a day.    2. Recurrent major depressive disorder, in partial remission (HCC)  Stable.    Continue on sertraline 100 mg daily, no suicidal ideation.    3. Mixed hyperlipidemia  She has been tolerating the statin. Denies muscle pain LFTs has been normal  Continue on atorvastatin 20 mg daily

## 2023-07-13 DIAGNOSIS — K21.9 GERD WITHOUT ESOPHAGITIS: ICD-10-CM

## 2023-07-13 RX ORDER — DEXLANSOPRAZOLE 60 MG/1
CAPSULE, DELAYED RELEASE ORAL
Qty: 90 CAPSULE | Refills: 3 | Status: SHIPPED | OUTPATIENT
Start: 2023-07-13 | End: 2023-07-14 | Stop reason: SDUPTHER

## 2023-07-13 RX ORDER — DEXLANSOPRAZOLE 60 MG/1
CAPSULE, DELAYED RELEASE ORAL
Qty: 90 CAPSULE | Refills: 0 | Status: CANCELLED | OUTPATIENT
Start: 2023-07-13

## 2023-07-14 DIAGNOSIS — K21.9 GERD WITHOUT ESOPHAGITIS: ICD-10-CM

## 2023-07-14 RX ORDER — DEXLANSOPRAZOLE 60 MG/1
CAPSULE, DELAYED RELEASE ORAL
Qty: 30 CAPSULE | Refills: 0 | OUTPATIENT
Start: 2023-07-14

## 2023-07-14 RX ORDER — DEXLANSOPRAZOLE 60 MG/1
CAPSULE, DELAYED RELEASE ORAL
Qty: 90 CAPSULE | Refills: 3 | Status: SHIPPED | OUTPATIENT
Start: 2023-07-14 | End: 2024-01-30 | Stop reason: SDUPTHER

## 2023-07-14 NOTE — TELEPHONE ENCOUNTER
Patients prescription was sent to the wrong pharmacy, looks like the prescription was sent to [facility] mail order pharmacy and not [facility] pharmacy on [address], patient will be out by today and was wondering if a switch in pharmacy could be done by the end of the day, provider had an early out. Please advise.

## 2023-07-17 DIAGNOSIS — E78.5 HYPERLIPIDEMIA, UNSPECIFIED HYPERLIPIDEMIA TYPE: ICD-10-CM

## 2023-07-18 RX ORDER — ATORVASTATIN CALCIUM 20 MG/1
TABLET, FILM COATED ORAL
Qty: 90 TABLET | Refills: 3 | Status: SHIPPED | OUTPATIENT
Start: 2023-07-18 | End: 2023-12-15 | Stop reason: SDUPTHER

## 2023-07-28 ENCOUNTER — HOSPITAL ENCOUNTER (INPATIENT)
Facility: MEDICAL CENTER | Age: 88
LOS: 4 days | DRG: 481 | End: 2023-08-01
Attending: EMERGENCY MEDICINE | Admitting: HOSPITALIST
Payer: MEDICARE

## 2023-07-28 ENCOUNTER — APPOINTMENT (OUTPATIENT)
Dept: RADIOLOGY | Facility: MEDICAL CENTER | Age: 88
DRG: 481 | End: 2023-07-28
Attending: EMERGENCY MEDICINE
Payer: MEDICARE

## 2023-07-28 ENCOUNTER — ANESTHESIA (OUTPATIENT)
Dept: SURGERY | Facility: MEDICAL CENTER | Age: 88
DRG: 481 | End: 2023-07-28
Payer: MEDICARE

## 2023-07-28 ENCOUNTER — ANESTHESIA EVENT (OUTPATIENT)
Dept: SURGERY | Facility: MEDICAL CENTER | Age: 88
DRG: 481 | End: 2023-07-28
Payer: MEDICARE

## 2023-07-28 ENCOUNTER — APPOINTMENT (OUTPATIENT)
Dept: RADIOLOGY | Facility: MEDICAL CENTER | Age: 88
DRG: 481 | End: 2023-07-28
Attending: ORTHOPAEDIC SURGERY
Payer: MEDICARE

## 2023-07-28 DIAGNOSIS — S72.002A CLOSED LEFT HIP FRACTURE, INITIAL ENCOUNTER (HCC): ICD-10-CM

## 2023-07-28 DIAGNOSIS — S72.002A CLOSED FRACTURE OF LEFT HIP, INITIAL ENCOUNTER (HCC): ICD-10-CM

## 2023-07-28 PROBLEM — S72.001A HIP FRACTURE REQUIRING OPERATIVE REPAIR, RIGHT, CLOSED, INITIAL ENCOUNTER (HCC): Status: ACTIVE | Noted: 2023-07-28

## 2023-07-28 PROBLEM — I63.9 STROKE (CEREBRUM) (HCC): Status: ACTIVE | Noted: 2023-07-28

## 2023-07-28 PROBLEM — M19.90 ARTHRITIS: Status: ACTIVE | Noted: 2023-07-28

## 2023-07-28 LAB
ALBUMIN SERPL BCP-MCNC: 4 G/DL (ref 3.2–4.9)
ALBUMIN/GLOB SERPL: 1.5 G/DL
ALP SERPL-CCNC: 68 U/L (ref 30–99)
ALT SERPL-CCNC: 13 U/L (ref 2–50)
ANION GAP SERPL CALC-SCNC: 13 MMOL/L (ref 7–16)
AST SERPL-CCNC: 32 U/L (ref 12–45)
BASOPHILS # BLD AUTO: 0.2 % (ref 0–1.8)
BASOPHILS # BLD: 0.02 K/UL (ref 0–0.12)
BILIRUB SERPL-MCNC: 0.5 MG/DL (ref 0.1–1.5)
BUN SERPL-MCNC: 26 MG/DL (ref 8–22)
CALCIUM ALBUM COR SERPL-MCNC: 9.5 MG/DL (ref 8.5–10.5)
CALCIUM SERPL-MCNC: 9.5 MG/DL (ref 8.5–10.5)
CHLORIDE SERPL-SCNC: 105 MMOL/L (ref 96–112)
CK SERPL-CCNC: 202 U/L (ref 0–154)
CO2 SERPL-SCNC: 23 MMOL/L (ref 20–33)
CREAT SERPL-MCNC: 0.88 MG/DL (ref 0.5–1.4)
EKG IMPRESSION: NORMAL
EOSINOPHIL # BLD AUTO: 0 K/UL (ref 0–0.51)
EOSINOPHIL NFR BLD: 0 % (ref 0–6.9)
ERYTHROCYTE [DISTWIDTH] IN BLOOD BY AUTOMATED COUNT: 44.2 FL (ref 35.9–50)
GFR SERPLBLD CREATININE-BSD FMLA CKD-EPI: 62 ML/MIN/1.73 M 2
GLOBULIN SER CALC-MCNC: 2.7 G/DL (ref 1.9–3.5)
GLUCOSE SERPL-MCNC: 166 MG/DL (ref 65–99)
HCT VFR BLD AUTO: 35.9 % (ref 37–47)
HGB BLD-MCNC: 11.9 G/DL (ref 12–16)
IMM GRANULOCYTES # BLD AUTO: 0.08 K/UL (ref 0–0.11)
IMM GRANULOCYTES NFR BLD AUTO: 0.7 % (ref 0–0.9)
LYMPHOCYTES # BLD AUTO: 0.55 K/UL (ref 1–4.8)
LYMPHOCYTES NFR BLD: 4.6 % (ref 22–41)
MAGNESIUM SERPL-MCNC: 1.8 MG/DL (ref 1.5–2.5)
MCH RBC QN AUTO: 29.8 PG (ref 27–33)
MCHC RBC AUTO-ENTMCNC: 33.1 G/DL (ref 32.2–35.5)
MCV RBC AUTO: 90 FL (ref 81.4–97.8)
MONOCYTES # BLD AUTO: 0.59 K/UL (ref 0–0.85)
MONOCYTES NFR BLD AUTO: 4.9 % (ref 0–13.4)
NEUTROPHILS # BLD AUTO: 10.8 K/UL (ref 1.82–7.42)
NEUTROPHILS NFR BLD: 89.6 % (ref 44–72)
NRBC # BLD AUTO: 0 K/UL
NRBC BLD-RTO: 0 /100 WBC (ref 0–0.2)
PLATELET # BLD AUTO: 269 K/UL (ref 164–446)
PMV BLD AUTO: 9.7 FL (ref 9–12.9)
POTASSIUM SERPL-SCNC: 3.4 MMOL/L (ref 3.6–5.5)
PROT SERPL-MCNC: 6.7 G/DL (ref 6–8.2)
RBC # BLD AUTO: 3.99 M/UL (ref 4.2–5.4)
SODIUM SERPL-SCNC: 141 MMOL/L (ref 135–145)
WBC # BLD AUTO: 12 K/UL (ref 4.8–10.8)

## 2023-07-28 PROCEDURE — 700111 HCHG RX REV CODE 636 W/ 250 OVERRIDE (IP): Performed by: ANESTHESIOLOGY

## 2023-07-28 PROCEDURE — A9270 NON-COVERED ITEM OR SERVICE: HCPCS

## 2023-07-28 PROCEDURE — 502000 HCHG MISC OR IMPLANTS RC 0278: Performed by: ORTHOPAEDIC SURGERY

## 2023-07-28 PROCEDURE — 700102 HCHG RX REV CODE 250 W/ 637 OVERRIDE(OP): Performed by: ANESTHESIOLOGY

## 2023-07-28 PROCEDURE — C1713 ANCHOR/SCREW BN/BN,TIS/BN: HCPCS | Performed by: ORTHOPAEDIC SURGERY

## 2023-07-28 PROCEDURE — 160009 HCHG ANES TIME/MIN: Performed by: ORTHOPAEDIC SURGERY

## 2023-07-28 PROCEDURE — 85025 COMPLETE CBC W/AUTO DIFF WBC: CPT

## 2023-07-28 PROCEDURE — 01230 ANES OPN UPPER 2/3 FEMUR NOS: CPT | Performed by: ANESTHESIOLOGY

## 2023-07-28 PROCEDURE — 700111 HCHG RX REV CODE 636 W/ 250 OVERRIDE (IP): Mod: JZ | Performed by: EMERGENCY MEDICINE

## 2023-07-28 PROCEDURE — 0QS706Z REPOSITION LEFT UPPER FEMUR WITH INTRAMEDULLARY INTERNAL FIXATION DEVICE, OPEN APPROACH: ICD-10-PCS | Performed by: ANESTHESIOLOGY

## 2023-07-28 PROCEDURE — 502240 HCHG MISC OR SUPPLY RC 0272: Performed by: ORTHOPAEDIC SURGERY

## 2023-07-28 PROCEDURE — 73552 X-RAY EXAM OF FEMUR 2/>: CPT | Mod: LT

## 2023-07-28 PROCEDURE — A9270 NON-COVERED ITEM OR SERVICE: HCPCS | Performed by: ANESTHESIOLOGY

## 2023-07-28 PROCEDURE — 160048 HCHG OR STATISTICAL LEVEL 1-5: Performed by: ORTHOPAEDIC SURGERY

## 2023-07-28 PROCEDURE — 30233N1 TRANSFUSION OF NONAUTOLOGOUS RED BLOOD CELLS INTO PERIPHERAL VEIN, PERCUTANEOUS APPROACH: ICD-10-PCS | Performed by: ANESTHESIOLOGY

## 2023-07-28 PROCEDURE — 160035 HCHG PACU - 1ST 60 MINS PHASE I: Performed by: ORTHOPAEDIC SURGERY

## 2023-07-28 PROCEDURE — 93005 ELECTROCARDIOGRAM TRACING: CPT | Performed by: STUDENT IN AN ORGANIZED HEALTH CARE EDUCATION/TRAINING PROGRAM

## 2023-07-28 PROCEDURE — 36415 COLL VENOUS BLD VENIPUNCTURE: CPT

## 2023-07-28 PROCEDURE — 110371 HCHG SHELL REV 272: Performed by: ORTHOPAEDIC SURGERY

## 2023-07-28 PROCEDURE — 72170 X-RAY EXAM OF PELVIS: CPT

## 2023-07-28 PROCEDURE — 700101 HCHG RX REV CODE 250: Performed by: ANESTHESIOLOGY

## 2023-07-28 PROCEDURE — 83735 ASSAY OF MAGNESIUM: CPT

## 2023-07-28 PROCEDURE — 700102 HCHG RX REV CODE 250 W/ 637 OVERRIDE(OP): Performed by: HOSPITALIST

## 2023-07-28 PROCEDURE — 700105 HCHG RX REV CODE 258: Mod: JZ

## 2023-07-28 PROCEDURE — 93010 ELECTROCARDIOGRAM REPORT: CPT | Performed by: INTERNAL MEDICINE

## 2023-07-28 PROCEDURE — 160041 HCHG SURGERY MINUTES - EA ADDL 1 MIN LEVEL 4: Performed by: ORTHOPAEDIC SURGERY

## 2023-07-28 PROCEDURE — 96374 THER/PROPH/DIAG INJ IV PUSH: CPT

## 2023-07-28 PROCEDURE — 700111 HCHG RX REV CODE 636 W/ 250 OVERRIDE (IP): Mod: JZ

## 2023-07-28 PROCEDURE — 99100 ANES PT EXTEME AGE<1 YR&>70: CPT | Performed by: ANESTHESIOLOGY

## 2023-07-28 PROCEDURE — 99223 1ST HOSP IP/OBS HIGH 75: CPT | Mod: AI,GC | Performed by: HOSPITALIST

## 2023-07-28 PROCEDURE — 770001 HCHG ROOM/CARE - MED/SURG/GYN PRIV*

## 2023-07-28 PROCEDURE — 71045 X-RAY EXAM CHEST 1 VIEW: CPT

## 2023-07-28 PROCEDURE — A9270 NON-COVERED ITEM OR SERVICE: HCPCS | Performed by: HOSPITALIST

## 2023-07-28 PROCEDURE — 80053 COMPREHEN METABOLIC PANEL: CPT

## 2023-07-28 PROCEDURE — 99285 EMERGENCY DEPT VISIT HI MDM: CPT

## 2023-07-28 PROCEDURE — 160029 HCHG SURGERY MINUTES - 1ST 30 MINS LEVEL 4: Performed by: ORTHOPAEDIC SURGERY

## 2023-07-28 PROCEDURE — 82550 ASSAY OF CK (CPK): CPT

## 2023-07-28 PROCEDURE — 160002 HCHG RECOVERY MINUTES (STAT): Performed by: ORTHOPAEDIC SURGERY

## 2023-07-28 PROCEDURE — 700102 HCHG RX REV CODE 250 W/ 637 OVERRIDE(OP)

## 2023-07-28 DEVICE — LOCKING SCREW DIA 5X35MM: Type: IMPLANTABLE DEVICE | Site: HIP | Status: FUNCTIONAL

## 2023-07-28 DEVICE — IMPLANTABLE DEVICE: Type: IMPLANTABLE DEVICE | Site: HIP | Status: FUNCTIONAL

## 2023-07-28 DEVICE — K-WIRE 3X285MM: Type: IMPLANTABLE DEVICE | Site: HIP | Status: FUNCTIONAL

## 2023-07-28 RX ORDER — MORPHINE SULFATE 4 MG/ML
4 INJECTION INTRAVENOUS ONCE
Status: COMPLETED | OUTPATIENT
Start: 2023-07-28 | End: 2023-07-28

## 2023-07-28 RX ORDER — OXYCODONE HYDROCHLORIDE 5 MG/1
5 TABLET ORAL
Status: DISCONTINUED | OUTPATIENT
Start: 2023-07-28 | End: 2023-07-30

## 2023-07-28 RX ORDER — EPHEDRINE SULFATE 50 MG/ML
5 INJECTION, SOLUTION INTRAVENOUS
Status: DISCONTINUED | OUTPATIENT
Start: 2023-07-28 | End: 2023-07-28 | Stop reason: HOSPADM

## 2023-07-28 RX ORDER — ONDANSETRON 2 MG/ML
4 INJECTION INTRAMUSCULAR; INTRAVENOUS
Status: DISCONTINUED | OUTPATIENT
Start: 2023-07-28 | End: 2023-07-28 | Stop reason: HOSPADM

## 2023-07-28 RX ORDER — BISACODYL 10 MG
10 SUPPOSITORY, RECTAL RECTAL
Status: DISCONTINUED | OUTPATIENT
Start: 2023-07-28 | End: 2023-08-01 | Stop reason: HOSPADM

## 2023-07-28 RX ORDER — M-VIT,TX,IRON,MINS/CALC/FOLIC 27MG-0.4MG
1 TABLET ORAL DAILY
Status: DISCONTINUED | OUTPATIENT
Start: 2023-07-29 | End: 2023-08-01 | Stop reason: HOSPADM

## 2023-07-28 RX ORDER — VIT A/VIT C/VIT E/ZINC/COPPER 4296-226
1 CAPSULE ORAL DAILY
COMMUNITY
End: 2024-03-15

## 2023-07-28 RX ORDER — POTASSIUM CHLORIDE 7.45 MG/ML
10 INJECTION INTRAVENOUS
Status: DISPENSED | OUTPATIENT
Start: 2023-07-28 | End: 2023-07-28

## 2023-07-28 RX ORDER — ATORVASTATIN CALCIUM 20 MG/1
20 TABLET, FILM COATED ORAL EVERY EVENING
Status: DISCONTINUED | OUTPATIENT
Start: 2023-07-29 | End: 2023-08-01 | Stop reason: HOSPADM

## 2023-07-28 RX ORDER — HYDROMORPHONE HYDROCHLORIDE 1 MG/ML
0.4 INJECTION, SOLUTION INTRAMUSCULAR; INTRAVENOUS; SUBCUTANEOUS
Status: DISCONTINUED | OUTPATIENT
Start: 2023-07-28 | End: 2023-07-28 | Stop reason: HOSPADM

## 2023-07-28 RX ORDER — HYDRALAZINE HYDROCHLORIDE 20 MG/ML
5 INJECTION INTRAMUSCULAR; INTRAVENOUS
Status: DISCONTINUED | OUTPATIENT
Start: 2023-07-28 | End: 2023-07-28 | Stop reason: HOSPADM

## 2023-07-28 RX ORDER — HYDROMORPHONE HYDROCHLORIDE 1 MG/ML
0.1 INJECTION, SOLUTION INTRAMUSCULAR; INTRAVENOUS; SUBCUTANEOUS
Status: DISCONTINUED | OUTPATIENT
Start: 2023-07-28 | End: 2023-07-28 | Stop reason: HOSPADM

## 2023-07-28 RX ORDER — POTASSIUM CHLORIDE 7.45 MG/ML
10 INJECTION INTRAVENOUS
Status: DISCONTINUED | OUTPATIENT
Start: 2023-07-28 | End: 2023-07-28

## 2023-07-28 RX ORDER — AMOXICILLIN 875 MG/1
875 TABLET, COATED ORAL 2 TIMES DAILY
Status: ON HOLD | COMMUNITY
Start: 2023-07-14 | End: 2023-07-28

## 2023-07-28 RX ORDER — HYDROMORPHONE HYDROCHLORIDE 1 MG/ML
0.2 INJECTION, SOLUTION INTRAMUSCULAR; INTRAVENOUS; SUBCUTANEOUS
Status: DISCONTINUED | OUTPATIENT
Start: 2023-07-28 | End: 2023-07-28 | Stop reason: HOSPADM

## 2023-07-28 RX ORDER — DEXLANSOPRAZOLE 60 MG/1
60 CAPSULE, DELAYED RELEASE ORAL
Status: DISCONTINUED | OUTPATIENT
Start: 2023-07-28 | End: 2023-07-28

## 2023-07-28 RX ORDER — SODIUM CHLORIDE, SODIUM LACTATE, POTASSIUM CHLORIDE, CALCIUM CHLORIDE 600; 310; 30; 20 MG/100ML; MG/100ML; MG/100ML; MG/100ML
INJECTION, SOLUTION INTRAVENOUS CONTINUOUS
Status: DISCONTINUED | OUTPATIENT
Start: 2023-07-28 | End: 2023-07-28 | Stop reason: HOSPADM

## 2023-07-28 RX ORDER — POLYETHYLENE GLYCOL 3350 17 G/17G
1 POWDER, FOR SOLUTION ORAL
Status: DISCONTINUED | OUTPATIENT
Start: 2023-07-28 | End: 2023-08-01 | Stop reason: HOSPADM

## 2023-07-28 RX ORDER — ACETAMINOPHEN 500 MG
1000 TABLET ORAL EVERY 6 HOURS
Status: DISCONTINUED | OUTPATIENT
Start: 2023-07-28 | End: 2023-08-01 | Stop reason: HOSPADM

## 2023-07-28 RX ORDER — TRAZODONE HYDROCHLORIDE 50 MG/1
50 TABLET ORAL NIGHTLY
Status: DISCONTINUED | OUTPATIENT
Start: 2023-07-28 | End: 2023-08-01 | Stop reason: HOSPADM

## 2023-07-28 RX ORDER — OXYCODONE HYDROCHLORIDE 10 MG/1
10 TABLET ORAL
Status: DISCONTINUED | OUTPATIENT
Start: 2023-07-28 | End: 2023-07-30

## 2023-07-28 RX ORDER — LIDOCAINE HYDROCHLORIDE 20 MG/ML
INJECTION, SOLUTION EPIDURAL; INFILTRATION; INTRACAUDAL; PERINEURAL PRN
Status: DISCONTINUED | OUTPATIENT
Start: 2023-07-28 | End: 2023-07-28 | Stop reason: SURG

## 2023-07-28 RX ORDER — CEFAZOLIN SODIUM 1 G/3ML
INJECTION, POWDER, FOR SOLUTION INTRAMUSCULAR; INTRAVENOUS PRN
Status: DISCONTINUED | OUTPATIENT
Start: 2023-07-28 | End: 2023-07-28 | Stop reason: SURG

## 2023-07-28 RX ORDER — CARVEDILOL 3.12 MG/1
3.12 TABLET ORAL 2 TIMES DAILY WITH MEALS
Status: DISCONTINUED | OUTPATIENT
Start: 2023-07-28 | End: 2023-08-01 | Stop reason: HOSPADM

## 2023-07-28 RX ORDER — OMEPRAZOLE 20 MG/1
40 CAPSULE, DELAYED RELEASE ORAL DAILY
Status: DISCONTINUED | OUTPATIENT
Start: 2023-07-29 | End: 2023-08-01 | Stop reason: HOSPADM

## 2023-07-28 RX ORDER — SERTRALINE HYDROCHLORIDE 100 MG/1
100 TABLET, FILM COATED ORAL DAILY
Status: DISCONTINUED | OUTPATIENT
Start: 2023-07-29 | End: 2023-08-01 | Stop reason: HOSPADM

## 2023-07-28 RX ORDER — SODIUM CHLORIDE, SODIUM LACTATE, POTASSIUM CHLORIDE, CALCIUM CHLORIDE 600; 310; 30; 20 MG/100ML; MG/100ML; MG/100ML; MG/100ML
INJECTION, SOLUTION INTRAVENOUS CONTINUOUS
Status: DISCONTINUED | OUTPATIENT
Start: 2023-07-28 | End: 2023-08-01 | Stop reason: HOSPADM

## 2023-07-28 RX ORDER — MAGNESIUM SULFATE HEPTAHYDRATE 40 MG/ML
2 INJECTION, SOLUTION INTRAVENOUS ONCE
Status: COMPLETED | OUTPATIENT
Start: 2023-07-28 | End: 2023-07-28

## 2023-07-28 RX ORDER — AMOXICILLIN 250 MG
2 CAPSULE ORAL 2 TIMES DAILY
Status: DISCONTINUED | OUTPATIENT
Start: 2023-07-28 | End: 2023-08-01 | Stop reason: HOSPADM

## 2023-07-28 RX ORDER — HYDROMORPHONE HYDROCHLORIDE 1 MG/ML
0.5 INJECTION, SOLUTION INTRAMUSCULAR; INTRAVENOUS; SUBCUTANEOUS
Status: DISCONTINUED | OUTPATIENT
Start: 2023-07-28 | End: 2023-07-30

## 2023-07-28 RX ORDER — DEXAMETHASONE SODIUM PHOSPHATE 4 MG/ML
INJECTION, SOLUTION INTRA-ARTICULAR; INTRALESIONAL; INTRAMUSCULAR; INTRAVENOUS; SOFT TISSUE PRN
Status: DISCONTINUED | OUTPATIENT
Start: 2023-07-28 | End: 2023-07-28 | Stop reason: SURG

## 2023-07-28 RX ORDER — DIPHENHYDRAMINE HYDROCHLORIDE 50 MG/ML
12.5 INJECTION INTRAMUSCULAR; INTRAVENOUS
Status: DISCONTINUED | OUTPATIENT
Start: 2023-07-28 | End: 2023-07-28 | Stop reason: HOSPADM

## 2023-07-28 RX ORDER — ACETAMINOPHEN 500 MG
1000 TABLET ORAL EVERY 6 HOURS PRN
Status: DISCONTINUED | OUTPATIENT
Start: 2023-08-02 | End: 2023-08-01 | Stop reason: HOSPADM

## 2023-07-28 RX ADMIN — HYDROMORPHONE HYDROCHLORIDE 0.2 MG: 1 INJECTION, SOLUTION INTRAMUSCULAR; INTRAVENOUS; SUBCUTANEOUS at 22:14

## 2023-07-28 RX ADMIN — SODIUM CHLORIDE, POTASSIUM CHLORIDE, SODIUM LACTATE AND CALCIUM CHLORIDE: 600; 310; 30; 20 INJECTION, SOLUTION INTRAVENOUS at 20:42

## 2023-07-28 RX ADMIN — HYDROMORPHONE HYDROCHLORIDE 0.2 MG: 1 INJECTION, SOLUTION INTRAMUSCULAR; INTRAVENOUS; SUBCUTANEOUS at 21:53

## 2023-07-28 RX ADMIN — HYDROCODONE BITARTRATE AND ACETAMINOPHEN 7.5 MG: 7.5; 325 SOLUTION ORAL at 21:50

## 2023-07-28 RX ADMIN — PROPOFOL 20 MG: 10 INJECTION, EMULSION INTRAVENOUS at 20:57

## 2023-07-28 RX ADMIN — PROPOFOL 40 MG: 10 INJECTION, EMULSION INTRAVENOUS at 20:58

## 2023-07-28 RX ADMIN — ACETAMINOPHEN 1000 MG: 500 TABLET, FILM COATED ORAL at 17:27

## 2023-07-28 RX ADMIN — DEXAMETHASONE SODIUM PHOSPHATE 4 MG: 4 INJECTION INTRA-ARTICULAR; INTRALESIONAL; INTRAMUSCULAR; INTRAVENOUS; SOFT TISSUE at 21:00

## 2023-07-28 RX ADMIN — POTASSIUM CHLORIDE 10 MEQ: 7.46 INJECTION, SOLUTION INTRAVENOUS at 17:22

## 2023-07-28 RX ADMIN — SODIUM CHLORIDE, POTASSIUM CHLORIDE, SODIUM LACTATE AND CALCIUM CHLORIDE: 600; 310; 30; 20 INJECTION, SOLUTION INTRAVENOUS at 15:39

## 2023-07-28 RX ADMIN — CARVEDILOL 3.12 MG: 3.12 TABLET, FILM COATED ORAL at 18:55

## 2023-07-28 RX ADMIN — PROPOFOL 20 MG: 10 INJECTION, EMULSION INTRAVENOUS at 20:56

## 2023-07-28 RX ADMIN — SODIUM CHLORIDE, POTASSIUM CHLORIDE, SODIUM LACTATE AND CALCIUM CHLORIDE: 600; 310; 30; 20 INJECTION, SOLUTION INTRAVENOUS at 23:32

## 2023-07-28 RX ADMIN — POTASSIUM CHLORIDE 10 MEQ: 7.46 INJECTION, SOLUTION INTRAVENOUS at 18:24

## 2023-07-28 RX ADMIN — MAGNESIUM SULFATE HEPTAHYDRATE 2 G: 2 INJECTION, SOLUTION INTRAVENOUS at 17:04

## 2023-07-28 RX ADMIN — POTASSIUM CHLORIDE 10 MEQ: 7.46 INJECTION, SOLUTION INTRAVENOUS at 16:28

## 2023-07-28 RX ADMIN — FENTANYL CITRATE 50 MCG: 50 INJECTION, SOLUTION INTRAMUSCULAR; INTRAVENOUS at 20:55

## 2023-07-28 RX ADMIN — LIDOCAINE HYDROCHLORIDE 40 MG: 20 INJECTION, SOLUTION EPIDURAL; INFILTRATION; INTRACAUDAL at 20:56

## 2023-07-28 RX ADMIN — FENTANYL CITRATE 25 MCG: 50 INJECTION, SOLUTION INTRAMUSCULAR; INTRAVENOUS at 21:08

## 2023-07-28 RX ADMIN — HYDROMORPHONE HYDROCHLORIDE 0.2 MG: 1 INJECTION, SOLUTION INTRAMUSCULAR; INTRAVENOUS; SUBCUTANEOUS at 21:03

## 2023-07-28 RX ADMIN — MORPHINE SULFATE 4 MG: 4 INJECTION, SOLUTION INTRAMUSCULAR; INTRAVENOUS at 12:28

## 2023-07-28 RX ADMIN — CEFAZOLIN 2 G: 1 INJECTION, POWDER, FOR SOLUTION INTRAMUSCULAR; INTRAVENOUS at 20:58

## 2023-07-28 ASSESSMENT — FIBROSIS 4 INDEX: FIB4 SCORE: 1.29

## 2023-07-28 ASSESSMENT — PAIN DESCRIPTION - PAIN TYPE
TYPE: ACUTE PAIN;SURGICAL PAIN
TYPE: ACUTE PAIN
TYPE: ACUTE PAIN

## 2023-07-28 ASSESSMENT — ENCOUNTER SYMPTOMS
SHORTNESS OF BREATH: 0
WEAKNESS: 0
HEADACHES: 0
SEIZURES: 0
DIZZINESS: 1
ABDOMINAL PAIN: 0
PALPITATIONS: 0
DIARRHEA: 0
TINGLING: 0
NAUSEA: 0
COUGH: 0
FALLS: 1
CONSTIPATION: 0
SORE THROAT: 0
SENSORY CHANGE: 0
EYES NEGATIVE: 1
WEIGHT LOSS: 0
ORTHOPNEA: 0
VOMITING: 0
FOCAL WEAKNESS: 0
CHILLS: 0
FEVER: 0

## 2023-07-28 ASSESSMENT — LIFESTYLE VARIABLES: DO YOU DRINK ALCOHOL: NO

## 2023-07-28 NOTE — PROGRESS NOTES
Pt arrived from ED, transferred to bed. Pt is A&Ox4, VSS, on 2L NC, pt seen at bedside by Dr. Perry. PT is NPO for possible surgery tonight still.

## 2023-07-28 NOTE — PROGRESS NOTES
4 Eyes Skin Assessment Completed by ABBY Walker and ABBY Esposito.    Head WDL  Ears WDL  Nose WDL  Mouth WDL  Neck WDL  Breast/Chest WDL  Shoulder Blades WDL  Spine WDL  (R) Arm/Elbow/Hand WDL  (L) Arm/Elbow/Hand Bruising  Abdomen WDL  Groin WDL  Scrotum/Coccyx/Buttocks WDL  (R) Leg WDL  (L) Leg Swelling  (R) Heel/Foot/Toe WDL  (L) Heel/Foot/Toe WDL          Devices In Places Nasal Cannula      Interventions In Place N/A    Possible Skin Injury No    Pictures Uploaded Into Epic N/A  Wound Consult Placed N/A  RN Wound Prevention Protocol Ordered No

## 2023-07-28 NOTE — H&P
[facility] Internal Medicine History & Physical Note    Date of Service  7/28/2023    [facility] Team: R ESTEBAN Antonio Team   Attending: LARRY Gruber M.d.  Senior Resident: Dr. WILL Swain  Intern:  Dr. MARYJO Hooper  Contact Number: [phone]    Primary Care Physician  Luz Contreras M.D.    Consultants  orthopedics    Specialist Names:  Hardy Menendezens    Code Status  Full code    Chief Complaint  Chief Complaint   Patient presents with    T-5000 FALL    Hip Pain     left       History of Presenting Illness (HPI):   Cecilia Alatorre is a 89 y.o. female who presented on 7/28/2023 with PMH of HTN, Major Depressive Disorder, Mixed Hyperlipidemia, GERD and Thoracic Aortic Ectasia who was brought because of left hip pain. Patient reports that she was walking from the kitchen to the dinning room when suddenly she tripped and fell to the floor. She denies prodromes, visual or auditory hallucinations, dizziness, tongue biting or seizures. Due to the left hip pain she was not able to stand up, reason why she spend all night in the floor. Next day her  found her in the floor and called EMS. She has been having normal bladder and bowel control.  Denies saddle anesthesia. Reports left hip pain 8/10 when moving, not irradiating anywhere else. External rotation and shortening of left leg.     I discussed the plan of care with patient.    Review of Systems  Review of Systems   Constitutional:  Negative for chills, fever, malaise/fatigue and weight loss.   HENT:  Positive for hearing loss (Progressively hearing loss). Negative for sore throat.    Eyes: Negative.    Respiratory:  Negative for cough and shortness of breath.    Cardiovascular:  Negative for chest pain, palpitations, orthopnea and leg swelling.   Gastrointestinal:  Negative for abdominal pain, constipation, diarrhea, nausea and vomiting.   Genitourinary:  Negative for dysuria, frequency, hematuria and urgency.   Musculoskeletal:  Positive for falls and joint  pain (Left hip pain).   Neurological:  Positive for dizziness. Negative for tingling, sensory change, focal weakness, seizures, weakness and headaches.       Past Medical History   has a past medical history of Acid reflux, Arthritis, Cancer (HCC), CATARACT, Dental disorder, Heart burn, Hiatus hernia syndrome, High cholesterol, Hypertension, Indigestion, Intracerebral bleed (HCC) (12/16/2015), Other specified disorder of intestines, Pain (9/2016), Psychiatric problem, Seasonal allergies, Shoulder injury, Urinary bladder disorder, and Urinary incontinence (2017).    Surgical History   has a past surgical history that includes other (2004); other (1948); fusion, spine, lumbar, plif (5/9/2013); lumbar decompression (5/9/2013); hip arthroplasty total (3/2/2015); appendectomy; other orthopedic surgery (1995); other orthopedic surgery (2007); and knee arthroplasty total (Right, 2/1/2017).     Family History  family history includes Cancer in her mother; Stroke in her father.   Family history reviewed with patient.     Social History  Tobacco: denies  Alcohol: denies  Recreational drugs (illegal or prescription): denies  Living Situation: At home, lives alone  Primary Care Provider: Reviewed Dr. Luz Jerry    Allergies  Allergies   Allergen Reactions    Codeine Unspecified     Mental confusion    Latex Unspecified     Only to adhesive bandaids and tape; rash, redness, itching    Lisinopril Cough    Other Drug Unspecified     Steroids raise blood pressure and blood sugar    [place] Two-Sided Adhesive Strap [Always Feminine Wipes] Rash     Rash-Paper tape OK    Soap Rash and Itching     Bathing and laundry soap cause itching and rash non scented soap OK    Tape Rash     Rash-Paper tape OK       Medications  Prior to Admission Medications   Prescriptions Last Dose Informant Patient Reported? Taking?   Dexlansoprazole (DEXILANT) 60 MG CAPSULE DELAYED RELEASE delayed-release capsule 7/27/2023 at AM Patient, Patient's Home  Pharmacy No No   Sig: TAKE 1 CAPSULE EVERY       MORNING BEFORE BREAKFAST   FOR GASTROESOPHAGEAL REFLUXDISEASE   Patient taking differently: Take 60 mg by mouth every morning before breakfast. TAKE 1 CAPSULE EVERY       MORNING BEFORE BREAKFAST   FOR GASTROESOPHAGEAL REFLUXDISEASE   XIIDRA 5 % Solution 7/27/2023 at AM Patient Yes No   Sig: Administer 1 Drop into both eyes 2 times a day.   amoxicillin (AMOXIL) 875 MG tablet UNK. at UNK. Patient's Home Pharmacy Yes No   Sig: Take 875 mg by mouth 2 times a day. 7 day course prescribed 7/14/2023.   atorvastatin (LIPITOR) 20 MG Tab 7/26/2023 at PM Patient, Patient's Home Pharmacy No No   Sig: TAKE 1 TABLET DAILY   Patient taking differently: Take 20 mg by mouth every evening.   carvedilol (COREG) 3.125 MG Tab 7/26/2023 at PM Patient, Patient's Home Pharmacy No No   Sig: TAKE 1 TABLET TWICE DAILY  WITH MEALS   Patient taking differently: Take 3.125 mg by mouth 2 times a day with meals.   erythromycin 5 MG/GM Ointment 7/26/2023 at PM Patient, Patient's Home Pharmacy Yes No   Sig: Apply 1 Application to both eyes at bedtime. Apply to eyelids.   sertraline (ZOLOFT) 100 MG Tab 7/27/2023 at AM Patient, Patient's Home Pharmacy No No   Sig: Take 1 Tablet by mouth every day.   traZODone (DESYREL) 50 MG Tab 7/26/2023 at PM Patient, Patient's Home Pharmacy No No   Sig: Take 1 Tablet by mouth every evening.      Facility-Administered Medications: None       Physical Exam  Temp:  [36.6 °C (97.9 °F)-36.8 °C (98.2 °F)] 36.6 °C (97.9 °F)  Pulse:  [] 83  Resp:  [14-18] 16  BP: (116-150)/(60-98) 126/88  SpO2:  [91 %-96 %] 96 %  Blood Pressure : (!) 124/98   Temperature: 36.8 °C (98.2 °F)   Pulse: 80   Respiration: 18   Pulse Oximetry: 91 %       Physical Exam  Constitutional:       General: She is not in acute distress.     Appearance: Normal appearance. She is not ill-appearing.   HENT:      Head: Normocephalic and atraumatic.      Nose: Nose normal.      Mouth/Throat:      Mouth:  Mucous membranes are moist.   Eyes:      Extraocular Movements: Extraocular movements intact.      Conjunctiva/sclera: Conjunctivae normal.      Pupils: Pupils are equal, round, and reactive to light.   Cardiovascular:      Rate and Rhythm: Normal rate and regular rhythm.      Pulses: Normal pulses.      Heart sounds: Normal heart sounds.   Pulmonary:      Effort: Pulmonary effort is normal. No respiratory distress.      Breath sounds: Normal breath sounds.   Chest:      Chest wall: No tenderness.   Abdominal:      General: Bowel sounds are normal. There is no distension.      Palpations: Abdomen is soft.      Tenderness: There is no abdominal tenderness. There is no right CVA tenderness or left CVA tenderness.   Musculoskeletal:         General: Deformity (External rotation and shortening of left leg.) present. No swelling or tenderness.      Cervical back: Normal range of motion and neck supple.      Right lower leg: No edema.      Left lower leg: No edema.   Skin:     General: Skin is dry.      Capillary Refill: Capillary refill takes less than 2 seconds.      Coloration: Skin is not jaundiced or pale.   Neurological:      General: No focal deficit present.      Mental Status: She is alert and oriented to person, place, and time. Mental status is at baseline.      Cranial Nerves: No cranial nerve deficit.      Sensory: No sensory deficit.      Motor: No weakness.   Psychiatric:         Mood and Affect: Mood normal.         Laboratory:  Recent Labs     07/28/23  1135   WBC 12.0*   RBC 3.99*   HEMOGLOBIN 11.9*   HEMATOCRIT 35.9*   MCV 90.0   MCH 29.8   MCHC 33.1   RDW 44.2   PLATELETCT 269   MPV 9.7     Recent Labs     07/28/23  1135   SODIUM 141   POTASSIUM 3.4*   CHLORIDE 105   CO2 23   GLUCOSE 166*   BUN 26*   CREATININE 0.88   CALCIUM 9.5     Recent Labs     07/28/23  1135   ALTSGPT 13   ASTSGOT 32   ALKPHOSPHAT 68   TBILIRUBIN 0.5   GLUCOSE 166*         No results for input(s): NTPROBNP in the last 72 hours.       No results for input(s): TROPONINT in the last 72 hours.    Imaging:  DX-CHEST-LIMITED (1 VIEW)   Final Result      1.  Hypoinflation and possible mild pulmonary edema.   2.  No pneumonia or pneumothorax.      DX-FEMUR-2+ LEFT   Final Result      Comminuted and displaced LEFT proximal femur intratrochanteric fracture with varus angulation.      DX-PELVIS-1 OR 2 VIEWS   Final Result      1.  Comminuted intertrochanteric left femoral fracture.   2.  Osteopenia.          X-Ray:  I have personally reviewed the images and compared with prior images.    Assessment/Plan:  Problem Representation:   I anticipate this patient will require at least two midnights for appropriate medical management, necessitating inpatient admission because Left hip fracture    Patient will need a Med/Surg bed on MEDICAL service .  The need is secondary to Left hip fracture.    Cecilia Alatorre is a 89 y.o. female who presents to the Emergency Department after a fall.  She stepped awkwardly, caught her leg on something on the ground and fell, she denies any head trauma. Ended up being in left hip fracture.    * Closed left hip fracture (HCC)  Assessment & Plan  Patient had a ground level fall. Currently with left hip pain, external rotation and shortening of left leg.   - Ortho recommended surgery  - LR 100mL/hr while NPO  - Pain management with opioids      Stroke (cerebrum) (HCC)  Assessment & Plan  Past medical history of stroke 20 years ago  - No focal or residual issues.    Arthritis  Assessment & Plan  Chronic Stable Disease  - On NSAID's at home  - Will be cover with pain medication      Mixed hyperlipidemia- (present on admission)  Assessment & Plan  Chronic Stable disease  - Continue Statin    Major depressive disorder  Assessment & Plan  Chronic Stable disease  - Continue Sertraline and Trazodone    Essential hypertension  Assessment & Plan  Chronic Stable Disease  - Continue Carvedilol         VTE prophylaxis:  SCDs/TEDs    Holley Hooper MD  Internal Medicine PGY-1

## 2023-07-28 NOTE — ED PROVIDER NOTES
ED Provider Note    Primary care provider: Luz Contreras M.D.    CHIEF COMPLAINT  Chief Complaint   Patient presents with    T-5000 FALL    Hip Pain     left     HPI  Cecilia Alatorre is a 89 y.o. female who presents to the Emergency Department after a fall.  She stepped awkwardly, caught her leg on something on the ground and fell, she denies any head trauma.  Unfortunately, she fell yesterday evening, was unable to get up off the floor and was found by one of the local [employer] in the morning.    She denies any head trauma or loss of conscious, she denies any numbness or focal weakness.  She notes some moderate pain in the left hip region.  She has had prior surgery on the right hip, no prior surgery on the left.      External Record Review: Last follow-up was 4 medication refills with Dr. Jerry in  of this year.  Patient with history of depression, essential hypertension, hyperlipidemia.    REVIEW OF SYSTEMS  See HPI.     PAST MEDICAL HISTORY   has a past medical history of Acid reflux, Arthritis, Cancer (HCC), CATARACT, Dental disorder, Heart burn, Hiatus hernia syndrome, High cholesterol, Hypertension, Indigestion, Intracerebral bleed (HCC) (2015), Other specified disorder of intestines, Pain (2016), Psychiatric problem, Seasonal allergies, Shoulder injury, Urinary bladder disorder, and Urinary incontinence ().    SURGICAL HISTORY   has a past surgical history that includes other (); other (); fusion, spine, lumbar, plif (2013); lumbar decompression (2013); hip arthroplasty total (3/2/2015); appendectomy; other orthopedic surgery (); other orthopedic surgery (); and knee arthroplasty total (Right, 2017).    SOCIAL HISTORY  Social History     Tobacco Use    Smoking status: Former     Packs/day: 1.00     Years: 17.00     Pack years: 17.00     Types: Cigarettes     Quit date: 1972     Years since quittin.6    Smokeless tobacco: Never    Tobacco  "comments:     Started smoking at age 22   Vaping Use    Vaping Use: Never used   Substance Use Topics    Alcohol use: Not Currently     Comment: 0-1 per month    Drug use: No      Social History     Substance and Sexual Activity   Drug Use No       FAMILY HISTORY  Family History   Problem Relation Age of Onset    Stroke Father     Cancer Mother        CURRENT MEDICATIONS  Reviewed.  See Encounter Summary.     ALLERGIES  Allergies   Allergen Reactions    Codeine Unspecified     Mental confusion    Latex Unspecified     Only to adhesive bandaids and tape; rash, redness, itching    Lisinopril Cough    Other Drug Unspecified     Steroids raise blood pressure and blood sugar    [place] Two-Sided Adhesive Strap [Always Feminine Wipes] Rash     Rash-Paper tape OK    Soap Rash and Itching     Bathing and laundry soap cause itching and rash non scented soap OK    Tape Rash     Rash-Paper tape OK       PHYSICAL EXAM  VITAL SIGNS: /88   Pulse 83   Temp 36.6 °C (97.9 °F) (Temporal)   Resp 16   Ht 1.575 m (5' 2\")   Wt 71.7 kg (158 lb)   SpO2 96%   BMI 28.90 kg/m²   Constitutional: Awake, alert in no apparent distress.  HENT: Normocephalic, Bilateral external ears normal. Nose normal.   Eyes: Conjunctiva normal, non-icteric, EOMI.    Thorax & Lungs: Easy unlabored respirations, Clear to ascultation bilaterally.  Cardiovascular: Regular rate, Regular rhythm, No murmurs, rubs or gallops. Bilateral pulses symmetrical.   Abdomen:  Soft, nontender, nondistended, normal active bowel sounds.   :    Skin: Visualized skin is  Dry, No erythema, No rash.   Musculoskeletal:   Pelvis is stable, patient has tenderness over the left lateral hip region, the left lower extremity is shortened and slightly rotated externally  Neurologic: Alert, Grossly non-focal.  Sensation intact distally  Psychiatric: Normal affect, Normal mood  Lymphatic:            RADIOLOGY  I have independently interpreted the diagnostic imaging associated with " this visit and am waiting the final reading from the radiologist.   My preliminary interpretation is as follows: Left greater trochanteric fracture    Radiologist interpretation:   DX-CHEST-LIMITED (1 VIEW)   Final Result      1.  Hypoinflation and possible mild pulmonary edema.   2.  No pneumonia or pneumothorax.      DX-FEMUR-2+ LEFT   Final Result      Comminuted and displaced LEFT proximal femur intratrochanteric fracture with varus angulation.      DX-PELVIS-1 OR 2 VIEWS   Final Result      1.  Comminuted intertrochanteric left femoral fracture.   2.  Osteopenia.          COURSE & MEDICAL DECISION MAKING  Pertinent Labs & Imaging studies reviewed. (See chart for details)    COURSE & MEDICAL DECISION MAKING  Pertinent Labs & Imaging studies reviewed. (See chart for details)    Differential diagnoses include but are not limited to: Likely hip fracture    11:38 AM - Nursing notes reviewed, patient seen and examined at bedside.    1:30 PM Case discussed with the [facility] medicine team who will evaluate the patient for admission.  I also discussed the case with Dr. Perry, orthopedic surgery.  He will try to get to the patient this afternoon, recommends continued n.p.o. status.    Discussion of management with other medical personnel: Internal medicine, orthopedics as above    Decision Making:  This is a pleasant 89 y.o. year old female who presents with left hip fracture.  The patient had a mechanical trip and fall, sustained a fracture, no evidence of compartment syndrome, neurovascularly intact.  No sign of head trauma, she denies any head injury.  She is not anticoagulated.  Case discussed with orthopedics, hospitalist.  The patient will be admitted, undergo ORIF this evening if possible, possibly tomorrow if no or availability.      FINAL IMPRESSION  1. Closed left hip fracture, initial encounter ([facility])

## 2023-07-28 NOTE — ED NOTES
Bedside report from Chinyere MORA. Pt medicated per MAR, reports pain 6/10, requesting BP cuff to be removed d/t discomfort. BP cuff removed

## 2023-07-28 NOTE — ED NOTES
Med rec completed per patient at bedside and patient's pharmacies, [facility] ([phone]), [facility] at [address] [place] [address] ([phone]), and [facility] on [facility] ([phone]).  Allergies reviewed with patient.  Per [facility], on 7/14/2023 patient was dispensed a 7 day course of amoxicillin. UNABLE to verify with patient whether or not she took this antibiotic.

## 2023-07-28 NOTE — CONSULTS
DATE OF SERVICE:  07/28/2023     ORTHOPEDIC CONSULTATION     REQUESTING PHYSICIAN:  López Floyd MD, emergency department.     REASON FOR CONSULTATION:  Left intertrochanteric femur fracture.     CHIEF COMPLAINT:  Left hip pain.     HISTORY OF PRESENT ILLNESS:  The patient is 89 years old.  She reportedly had   a mechanical fall which reportedly happened yesterday.  She was then   subsequently found at home and brought into the emergency department and found   to have left displaced comminuted intertrochanteric femur fracture.  She   denies other injury.  She has a history of hip replacement on the right side   in the past.     PAST MEDICAL HISTORY:  ALLERGIES:  CODEINE, LASIX, LISINOPRIL, ADHESIVE.     PAST MEDICAL DIAGNOSES:  Include acid reflux, skin cancer, cataracts, hiatal   hernia syndrome, high cholesterol, hypertension, history of intracerebral   hemorrhage, depression.     PAST SURGICAL HISTORY:  Appendectomy, posterior lumbar spinal fusion, right   hip arthroplasty, right knee arthroplasty, right carpal tunnel syndrome.     SOCIAL HISTORY:  The patient quit smoking in 1972.  Drinks minimal amount of   alcohol.  Denies illicit drug use.     OUTPATIENT MEDICATIONS:  Include Lipitor, Dexilant, amoxicillin, carvedilol,   erythromycin, Zoloft, trazodone, multivitamins.     PHYSICAL EXAMINATION:  VITAL SIGNS:  Temperature 97.9, heart rate 83, respiratory rate 16, blood   pressure 126/88, pulse oximetry 96% on 2 liters nasal cannula.  GENERAL APPEARANCE:  The patient is quite hard of hearing.  She is otherwise   alert, oriented, in no acute distress, pleasant, cooperative.  MUSCULOSKELETAL:  Left lower extremity is shortened and externally rotated.    She is able to dorsi and plantarflex the foot and flex and extend the toes.    She is nontender to palpation of the left knee.  There is no evidence of   obvious traumatic deformity of the right lower or bilateral upper extremities,   which are grossly  neurovascularly intact.     DIAGNOSTIC IMAGING:  Plain x-rays two views of the left femur and AP pelvis   show a comminuted displaced intertrochanteric femur fracture.     RECOMMENDATIONS:  1.  I discussed these findings with the patient and I recommend surgical   reduction and fixation of her fracture with intramedullary nailing.  We   discussed risks, benefits and alternatives of surgery.  She expressed   understanding and wished to proceed with surgery when possible.  2.  We will try to coordinate getting her to the operating room today for   surgical fixation to help facilitate her recovery depending on operating room   availability.  3.  She should continue to be n.p.o. and bed rest in the meantime. She can   have SCDs for DVT prophylaxis.        ______________________________  MD SHAYNA Alcantar/ANIBAL    DD:  07/28/2023 15:47  DT:  07/28/2023 16:57    Job#:  [id number]

## 2023-07-28 NOTE — SENIOR ADMIT NOTE
Senior Admit Note    Pertinent History  Pt fell the night before admission. She states that she slipped and fell and was found by a  in the AM. She denies any LOC or head trauma. History is difficult to obtain due to pt's [place] language preference.    Pertinent Physical Exam Findings:  -2+ DP pulses in LLE  -sensation and motor intact in the LLE  -no orthopnea  -L hip is shortened and externally rotated     Assessment/Plan  89F with a h/o R hip replacement, ICH (2015), GERD, Depression, HTN, HLD, R Knee surgery, Appendectomy, smoking (1ppd x17 years, quit in 1972).    She presented with a L hip fx.     Hospital Course:  -7/28 - pt was admitted after a mechanical GLF and was found to have a L hip intertrochanteric fx, so Orthopedic surgery was consulted. Pt is pending ORIF of the L hip. PT/OT = pending.    ------------------------------------------------------------------------------------------------------------  #L hip Comminuted and Displaced Intertrochanteric Fx  (Dx by XR. Pt has no signs of neurovascular impairment)  -pending ORIF by Orthopedic surgery soon  -LR = 100 while NPO  -pain control        -DVT ppx = SCDs only due to surgery  -Code Status = Full Code     -Dispo = pending surgery

## 2023-07-28 NOTE — ED NOTES
Pt BIB EMS from home.  Pt lives alone.  Pt had GLF last night and wasn't able to get help until this morning her  found her.  No LOC.  Denies hitting her head.  Pt A/Ox4, [place].  C/o left hip pain with swelling and shortening with rotation  Pt incont of urine /dt being on the floor all night pt cleaned up and pure wick placed.  Pt given fentanyl and zofran by EMS.  ERP to see.

## 2023-07-29 PROBLEM — D64.9 ANEMIA: Status: ACTIVE | Noted: 2023-07-29

## 2023-07-29 LAB
ALBUMIN SERPL BCP-MCNC: 3.1 G/DL (ref 3.2–4.9)
ALBUMIN/GLOB SERPL: 1.3 G/DL
ALP SERPL-CCNC: 47 U/L (ref 30–99)
ALT SERPL-CCNC: 11 U/L (ref 2–50)
ANION GAP SERPL CALC-SCNC: 8 MMOL/L (ref 7–16)
AST SERPL-CCNC: 31 U/L (ref 12–45)
BASOPHILS # BLD AUTO: 0.2 % (ref 0–1.8)
BASOPHILS # BLD AUTO: 0.2 % (ref 0–1.8)
BASOPHILS # BLD: 0.02 K/UL (ref 0–0.12)
BASOPHILS # BLD: 0.02 K/UL (ref 0–0.12)
BILIRUB SERPL-MCNC: 0.3 MG/DL (ref 0.1–1.5)
BUN SERPL-MCNC: 27 MG/DL (ref 8–22)
CALCIUM ALBUM COR SERPL-MCNC: 9.1 MG/DL (ref 8.5–10.5)
CALCIUM SERPL-MCNC: 8.4 MG/DL (ref 8.5–10.5)
CHLORIDE SERPL-SCNC: 107 MMOL/L (ref 96–112)
CO2 SERPL-SCNC: 24 MMOL/L (ref 20–33)
CREAT SERPL-MCNC: 1.05 MG/DL (ref 0.5–1.4)
EOSINOPHIL # BLD AUTO: 0 K/UL (ref 0–0.51)
EOSINOPHIL # BLD AUTO: 0.01 K/UL (ref 0–0.51)
EOSINOPHIL NFR BLD: 0 % (ref 0–6.9)
EOSINOPHIL NFR BLD: 0.1 % (ref 0–6.9)
ERYTHROCYTE [DISTWIDTH] IN BLOOD BY AUTOMATED COUNT: 46.5 FL (ref 35.9–50)
ERYTHROCYTE [DISTWIDTH] IN BLOOD BY AUTOMATED COUNT: 47.3 FL (ref 35.9–50)
GFR SERPLBLD CREATININE-BSD FMLA CKD-EPI: 50 ML/MIN/1.73 M 2
GLOBULIN SER CALC-MCNC: 2.3 G/DL (ref 1.9–3.5)
GLUCOSE SERPL-MCNC: 151 MG/DL (ref 65–99)
HCT VFR BLD AUTO: 25.8 % (ref 37–47)
HCT VFR BLD AUTO: 27.3 % (ref 37–47)
HGB BLD-MCNC: 8.2 G/DL (ref 12–16)
HGB BLD-MCNC: 8.7 G/DL (ref 12–16)
IMM GRANULOCYTES # BLD AUTO: 0.07 K/UL (ref 0–0.11)
IMM GRANULOCYTES # BLD AUTO: 0.1 K/UL (ref 0–0.11)
IMM GRANULOCYTES NFR BLD AUTO: 0.6 % (ref 0–0.9)
IMM GRANULOCYTES NFR BLD AUTO: 0.9 % (ref 0–0.9)
LYMPHOCYTES # BLD AUTO: 0.84 K/UL (ref 1–4.8)
LYMPHOCYTES # BLD AUTO: 1.28 K/UL (ref 1–4.8)
LYMPHOCYTES NFR BLD: 10.2 % (ref 22–41)
LYMPHOCYTES NFR BLD: 7.7 % (ref 22–41)
MCH RBC QN AUTO: 29.1 PG (ref 27–33)
MCH RBC QN AUTO: 29.6 PG (ref 27–33)
MCHC RBC AUTO-ENTMCNC: 31.8 G/DL (ref 32.2–35.5)
MCHC RBC AUTO-ENTMCNC: 31.9 G/DL (ref 32.2–35.5)
MCV RBC AUTO: 91.3 FL (ref 81.4–97.8)
MCV RBC AUTO: 93.1 FL (ref 81.4–97.8)
MONOCYTES # BLD AUTO: 1.01 K/UL (ref 0–0.85)
MONOCYTES # BLD AUTO: 1.43 K/UL (ref 0–0.85)
MONOCYTES NFR BLD AUTO: 11.4 % (ref 0–13.4)
MONOCYTES NFR BLD AUTO: 9.3 % (ref 0–13.4)
NEUTROPHILS # BLD AUTO: 8.91 K/UL (ref 1.82–7.42)
NEUTROPHILS # BLD AUTO: 9.75 K/UL (ref 1.82–7.42)
NEUTROPHILS NFR BLD: 77.5 % (ref 44–72)
NEUTROPHILS NFR BLD: 81.9 % (ref 44–72)
NRBC # BLD AUTO: 0 K/UL
NRBC # BLD AUTO: 0 K/UL
NRBC BLD-RTO: 0 /100 WBC (ref 0–0.2)
NRBC BLD-RTO: 0 /100 WBC (ref 0–0.2)
PLATELET # BLD AUTO: 222 K/UL (ref 164–446)
PLATELET # BLD AUTO: 237 K/UL (ref 164–446)
PMV BLD AUTO: 10.2 FL (ref 9–12.9)
PMV BLD AUTO: 9.6 FL (ref 9–12.9)
POTASSIUM SERPL-SCNC: 4.6 MMOL/L (ref 3.6–5.5)
PROT SERPL-MCNC: 5.4 G/DL (ref 6–8.2)
RBC # BLD AUTO: 2.77 M/UL (ref 4.2–5.4)
RBC # BLD AUTO: 2.99 M/UL (ref 4.2–5.4)
SODIUM SERPL-SCNC: 139 MMOL/L (ref 135–145)
WBC # BLD AUTO: 10.9 K/UL (ref 4.8–10.8)
WBC # BLD AUTO: 12.6 K/UL (ref 4.8–10.8)

## 2023-07-29 PROCEDURE — A9270 NON-COVERED ITEM OR SERVICE: HCPCS | Performed by: HOSPITALIST

## 2023-07-29 PROCEDURE — 85025 COMPLETE CBC W/AUTO DIFF WBC: CPT

## 2023-07-29 PROCEDURE — 700111 HCHG RX REV CODE 636 W/ 250 OVERRIDE (IP)

## 2023-07-29 PROCEDURE — 99232 SBSQ HOSP IP/OBS MODERATE 35: CPT | Mod: GC | Performed by: HOSPITALIST

## 2023-07-29 PROCEDURE — 700111 HCHG RX REV CODE 636 W/ 250 OVERRIDE (IP): Performed by: ORTHOPAEDIC SURGERY

## 2023-07-29 PROCEDURE — 700102 HCHG RX REV CODE 250 W/ 637 OVERRIDE(OP)

## 2023-07-29 PROCEDURE — 700102 HCHG RX REV CODE 250 W/ 637 OVERRIDE(OP): Performed by: HOSPITALIST

## 2023-07-29 PROCEDURE — 97535 SELF CARE MNGMENT TRAINING: CPT

## 2023-07-29 PROCEDURE — 700105 HCHG RX REV CODE 258: Mod: JZ

## 2023-07-29 PROCEDURE — 770001 HCHG ROOM/CARE - MED/SURG/GYN PRIV*

## 2023-07-29 PROCEDURE — 97162 PT EVAL MOD COMPLEX 30 MIN: CPT

## 2023-07-29 PROCEDURE — 97166 OT EVAL MOD COMPLEX 45 MIN: CPT

## 2023-07-29 PROCEDURE — A9270 NON-COVERED ITEM OR SERVICE: HCPCS

## 2023-07-29 PROCEDURE — 36415 COLL VENOUS BLD VENIPUNCTURE: CPT

## 2023-07-29 PROCEDURE — 80053 COMPREHEN METABOLIC PANEL: CPT

## 2023-07-29 PROCEDURE — 700105 HCHG RX REV CODE 258: Performed by: ORTHOPAEDIC SURGERY

## 2023-07-29 RX ORDER — ENOXAPARIN SODIUM 100 MG/ML
30 INJECTION SUBCUTANEOUS EVERY 12 HOURS
Status: DISCONTINUED | OUTPATIENT
Start: 2023-07-29 | End: 2023-08-01

## 2023-07-29 RX ORDER — ENOXAPARIN SODIUM 100 MG/ML
40 INJECTION SUBCUTANEOUS EVERY 12 HOURS
Status: DISCONTINUED | OUTPATIENT
Start: 2023-07-29 | End: 2023-07-29

## 2023-07-29 RX ADMIN — ACETAMINOPHEN 1000 MG: 500 TABLET, FILM COATED ORAL at 17:44

## 2023-07-29 RX ADMIN — SENNOSIDES AND DOCUSATE SODIUM 2 TABLET: 50; 8.6 TABLET ORAL at 17:45

## 2023-07-29 RX ADMIN — CEFAZOLIN 2 G: 2 INJECTION, POWDER, FOR SOLUTION INTRAMUSCULAR; INTRAVENOUS at 05:33

## 2023-07-29 RX ADMIN — ACETAMINOPHEN 1000 MG: 500 TABLET, FILM COATED ORAL at 05:33

## 2023-07-29 RX ADMIN — CARVEDILOL 3.12 MG: 3.12 TABLET, FILM COATED ORAL at 07:58

## 2023-07-29 RX ADMIN — ACETAMINOPHEN 1000 MG: 500 TABLET, FILM COATED ORAL at 23:31

## 2023-07-29 RX ADMIN — ACETAMINOPHEN 1000 MG: 500 TABLET, FILM COATED ORAL at 00:27

## 2023-07-29 RX ADMIN — SODIUM CHLORIDE, POTASSIUM CHLORIDE, SODIUM LACTATE AND CALCIUM CHLORIDE: 600; 310; 30; 20 INJECTION, SOLUTION INTRAVENOUS at 20:42

## 2023-07-29 RX ADMIN — CEFAZOLIN 2 G: 2 INJECTION, POWDER, FOR SOLUTION INTRAMUSCULAR; INTRAVENOUS at 13:49

## 2023-07-29 RX ADMIN — OMEPRAZOLE 40 MG: 20 CAPSULE, DELAYED RELEASE ORAL at 05:33

## 2023-07-29 RX ADMIN — CARVEDILOL 3.12 MG: 3.12 TABLET, FILM COATED ORAL at 17:44

## 2023-07-29 RX ADMIN — ATORVASTATIN CALCIUM 20 MG: 20 TABLET, FILM COATED ORAL at 17:45

## 2023-07-29 RX ADMIN — SENNOSIDES AND DOCUSATE SODIUM 2 TABLET: 50; 8.6 TABLET ORAL at 05:33

## 2023-07-29 RX ADMIN — TRAZODONE HYDROCHLORIDE 50 MG: 50 TABLET ORAL at 20:29

## 2023-07-29 RX ADMIN — SERTRALINE 100 MG: 100 TABLET, FILM COATED ORAL at 05:33

## 2023-07-29 RX ADMIN — ENOXAPARIN SODIUM 30 MG: 100 INJECTION SUBCUTANEOUS at 13:46

## 2023-07-29 RX ADMIN — MULTIPLE VITAMINS W/ MINERALS TAB 1 TABLET: TAB at 05:33

## 2023-07-29 RX ADMIN — OXYCODONE HYDROCHLORIDE 5 MG: 5 TABLET ORAL at 16:00

## 2023-07-29 ASSESSMENT — COGNITIVE AND FUNCTIONAL STATUS - GENERAL
HELP NEEDED FOR BATHING: A LOT
CLIMB 3 TO 5 STEPS WITH RAILING: TOTAL
DRESSING REGULAR LOWER BODY CLOTHING: A LOT
MOVING TO AND FROM BED TO CHAIR: UNABLE
SUGGESTED CMS G CODE MODIFIER DAILY ACTIVITY: CK
MOVING FROM LYING ON BACK TO SITTING ON SIDE OF FLAT BED: A LOT
DRESSING REGULAR UPPER BODY CLOTHING: A LITTLE
STANDING UP FROM CHAIR USING ARMS: A LITTLE
WALKING IN HOSPITAL ROOM: TOTAL
SUGGESTED CMS G CODE MODIFIER MOBILITY: CL
PERSONAL GROOMING: A LITTLE
TOILETING: A LOT
TURNING FROM BACK TO SIDE WHILE IN FLAT BAD: A LOT
DAILY ACTIVITIY SCORE: 16
MOBILITY SCORE: 10

## 2023-07-29 ASSESSMENT — PAIN DESCRIPTION - PAIN TYPE
TYPE: ACUTE PAIN
TYPE: SURGICAL PAIN
TYPE: ACUTE PAIN;SURGICAL PAIN

## 2023-07-29 ASSESSMENT — ACTIVITIES OF DAILY LIVING (ADL): TOILETING: INDEPENDENT

## 2023-07-29 ASSESSMENT — GAIT ASSESSMENTS: GAIT LEVEL OF ASSIST: UNABLE TO PARTICIPATE

## 2023-07-29 NOTE — ASSESSMENT & PLAN NOTE
Chronic Stable Disease  - Currently BP in 100/63  - Will hold Coreg if BP keeps dropping  - Consider IV Bolus if SBP<90

## 2023-07-29 NOTE — ANESTHESIA POSTPROCEDURE EVALUATION
Patient: Cecilia Alatorre    Procedure Summary     Date: 07/28/23 Room / Location: [place] [id number] / SURGERY [facility]    Anesthesia Start: 2042 Anesthesia Stop: 2147    Procedure: INSERTION, INTRAMEDULLARY SHELBY, FEMUR, PROXIMAL (Left: Leg Upper) Diagnosis: (LEFT comminuted displaced intertrochanteric femur fracture)    Surgeons: Hardy Perry M.D. Responsible Provider: Kelton Hollingsworth M.D.    Anesthesia Type: general ASA Status: 3          Final Anesthesia Type: general  Last vitals  BP   Blood Pressure : 113/63    Temp   36.3 °C (97.3 °F)    Pulse   80   Resp   12    SpO2   91 %      Anesthesia Post Evaluation    Patient location during evaluation: PACU  Patient participation: complete - patient participated  Level of consciousness: sleepy but conscious    Airway patency: patent  Anesthetic complications: no  Cardiovascular status: hemodynamically stable  Respiratory status: face mask and acceptable  Hydration status: euvolemic    PONV: none          No notable events documented.     Nurse Pain Score: 5 (NPRS)

## 2023-07-29 NOTE — THERAPY
Physical Therapy   Initial Evaluation     Patient Name: Cecilia Alatorre  Age:  89 y.o., Sex:  female  Medical Record #: [id number]  Today's Date: 7/29/2023     Precautions  Precautions: Fall Risk;Weight Bearing As Tolerated Left Lower Extremity    Assessment  Patient is a 89 y.o. female with hx of HTN, MDD, HLD, stroke, and thoracic aortic ectasia admitted s/p GLF with L displaced intertrochanteric femur fx. Now s/p IMN of L femur fx on 7/28. PT eval complete, pt currently presents below her functional baseline due to pain, dizziness, and impaired strength, balance, activity tolerance, ROM, and mobility. Pt is generally at Min-Mod A with further mobility detailed down below. Pt currently has poor standing activity tolerance and is potentially orthostatic as well. Pt lives alone at baseline and will benefit from post acute placement at this time to maximize functional independence. Pt reports having supportive friends locally, however her dtr lives in [place]. Will follow while admitted for skilled PT intervention.     Plan    Physical Therapy Initial Treatment Plan   Treatment Plan : Bed Mobility, Gait Training, Neuro Re-Education / Balance, Self Care / Home Evaluation, Stair Training, Therapeutic Activities, Therapeutic Exercise  Treatment Frequency: 4 Times per Week  Duration: Until Therapy Goals Met    DC Equipment Recommendations: Unable to determine at this time  Discharge Recommendations: Recommend post-acute placement for additional physical therapy services prior to discharge home         Vitals   Patient BP Position Supine   Blood Pressure  111/59  (98/59 at EOB)   O2 (LPM) 1   O2 Delivery Device Silicone Nasal Cannula   Vitals Comments pt reporting dizziness following standing that improved once sitting. pt said it fully went away once supine. potentially orthostatic, however pt not able to tolerate a standing BP   Pain 0 - 10 Group   Location Hip   Location Orientation Left   Description Aching    Therapist Pain Assessment During Activity   Prior Living Situation   Prior Services Home-Independent   Housing / Facility 1 Story House   Steps Into Home 15  (from garage)   Equipment Owned Front-Wheel Walker   Lives with - Patient's Self Care Capacity Alone and Able to Care For Self   Comments reports having friends locally who can assist if needed   Prior Level of Functional Mobility   Bed Mobility Independent   Transfer Status Independent   Ambulation Independent   Ambulation Distance community distances   Assistive Devices Used Front-Wheel Walker  (only at night in her house)   Stairs Independent   History of Falls   History of Falls Yes   Date of Last Fall   (reason for admit)   Cognition    Cognition / Consciousness WDL   Speech/ Communication Hard of Hearing  (very [place], has hearing aids)   Level of Consciousness Alert   Comments pleasant and participatory   Active ROM Lower Body    Active ROM Lower Body  X   Comments LLE limited by pain, RLE WNL   Strength Lower Body   Lower Body Strength  X   Comments LLE limited by pain, RLE WNL   Sensation Lower Body   Lower Extremity Sensation   WDL   Coordination Lower Body    Comments grossly impaired due to LLE pain, NT formally   Balance Assessment   Sitting Balance (Static) Fair   Sitting Balance (Dynamic) Fair   Standing Balance (Static) Poor +   Standing Balance (Dynamic) Poor   Weight Shift Sitting Fair   Weight Shift Standing Poor   Comments FWW   Bed Mobility    Supine to Sit Moderate Assist   Sit to Supine Moderate Assist   Scooting Moderate Assist  (EOB)   Gait Analysis   Gait Level Of Assist Unable to Participate   Level of Assist with Stairs Unable to Participate   Weight Bearing Status WBAT LLE   Functional Mobility   Sit to Stand Minimal Assist   Mobility STS x2 with FWW   Comments pt able to take a few sidesteps along EOB with cueing for each step. decreased ability to bear weight on her LLE. pt also limited by dizziness and can only stand for 15-20  seconds at a time   How much difficulty does the patient currently have...   Turning over in bed (including adjusting bedclothes, sheets and blankets)? 2   Sitting down on and standing up from a chair with arms (e.g., wheelchair, bedside commode, etc.) 2   Moving from lying on back to sitting on the side of the bed? 1   How much help from another person does the patient currently need...   Moving to and from a bed to a chair (including a wheelchair)? 3   Need to walk in a hospital room? 1   Climbing 3-5 steps with a railing? 1   6 clicks Mobility Score 10   Activity Tolerance   Sitting Edge of Bed 15 min   Standing 1 min   Comments limited by pain and dizziness   Short Term Goals    Short Term Goal # 1 pt will move supine<>eob with spv in 6 tx for bed mobility.   Short Term Goal # 2 pt will complete spt with fww and spv in 6 tx for functional mobility.   Short Term Goal # 3 pt will ambulate 150 ft with fww and spv in 6 tx for household distances.   Short Term Goal # 4 pt will negotiate 5 steps with min a in 6 tx for home access.   Education Group   Education Provided Role of Physical Therapist;Weight Bearing Precautions   Role of Physical Therapist Patient Response Patient;Acceptance;Explanation;Verbal Demonstration   Weight Bearing Precautions Patient Response Patient;Nonacceptance;Explanation;Verbal Demonstration   Additional Comments increased education on safe mobility strategies, orthostatic hypotension, activity progression while admitted, post acute placement   Physical Therapy Initial Treatment Plan    Treatment Plan  Bed Mobility;Gait Training;Neuro Re-Education / Balance;Self Care / Home Evaluation;Stair Training;Therapeutic Activities;Therapeutic Exercise   Treatment Frequency 4 Times per Week   Duration Until Therapy Goals Met   Problem List    Problems Pain;Impaired Bed Mobility;Impaired Transfers;Impaired Ambulation;Functional ROM Deficit;Functional Strength Deficit;Impaired Balance;Impaired  Coordination;Decreased Activity Tolerance   Anticipated Discharge Equipment and Recommendations   DC Equipment Recommendations Unable to determine at this time   Discharge Recommendations Recommend post-acute placement for additional physical therapy services prior to discharge home   Interdisciplinary Plan of Care Collaboration   IDT Collaboration with  Nursing;Occupational Therapist   Patient Position at End of Therapy In Bed;Bed Alarm On;Call Light within Reach;Tray Table within Reach;Phone within Reach;Family / Friend in Room   Collaboration Comments RN updated   Session Information   Date / Session Number  7/29- 1 (1/4, 8/4)

## 2023-07-29 NOTE — OR SURGEON
Immediate Post OP Note    PreOp Diagnosis: Left displaced IT femur fracture      PostOp Diagnosis: same      Procedure(s):  INSERTION, INTRAMEDULLARY SHELBY, FEMUR, PROXIMAL - Wound Class: Clean    Surgeon(s):  Hardy Perry M.D.    Anesthesiologist/Type of Anesthesia:  Anesthesiologist: Kelton Hollingsworth M.D./General    Surgical Staff:  Circulator: Litzy Cheek R.N.  Scrub Person: Rosalind Castillo  Radiology Technologist: Edilberto Cabezas; Kirsten Burnett    Specimens removed if any:  * No specimens in log *    Estimated Blood Loss: 100cc    Findings: see dictation    Complications: none known    PLAN:  --readmit postop  --WBAT LLE  --ancef x 2 doses postop  --PT/OT for mobilization ASAP  --okay to start lovenox or equivalent tomorrow am, continue SCDs  --fu 2 weeks postop         7/28/2023 9:40 PM Hardy Perry M.D.

## 2023-07-29 NOTE — ANESTHESIA PREPROCEDURE EVALUATION
Case: [id number] Date/Time: 23    Procedure: INSERTION, INTRAMEDULLARY SHELBY, FEMUR, PROXIMAL (Left: Leg Upper)    Anesthesia type: General    Pre-op diagnosis: LEFT comminuted displaced intertrochanteric femur fracture    Location: [facility] / SURGERY [facility]    Surgeons: ELY Swanson H&P:  PAST MEDICAL HISTORY:   89 y.o. female who presents for Procedure(s) (LRB):  INSERTION, INTRAMEDULLARY SHELBY, FEMUR, PROXIMAL (Left).  She has current and past medical problems significant for:    Past Medical History:   Diagnosis Date   • Acid reflux    • Arthritis     osteo, generalized   • Cancer (HCC)     skin   • CATARACT     elijah iol   • Dental disorder     partials lower   • Heart burn    • Hiatus hernia syndrome    • High cholesterol    • Hypertension    • Indigestion    • Intracerebral bleed (HCC) 2015    memory difficulty no residual weakness   • Other specified disorder of intestines     constipation w/anesthesia and narcotics   • Pain 2016    all over   • Psychiatric problem     depression   • Seasonal allergies    • Shoulder injury     right shoulder   • Urinary bladder disorder     urgency   • Urinary incontinence 2017    urgency       SMOKING/ALCOHOL/RECREATIONAL DRUG USE:  Social History     Tobacco Use   • Smoking status: Former     Packs/day: 1.00     Years: 17.00     Pack years: 17.00     Types: Cigarettes     Quit date: 1972     Years since quittin.6   • Smokeless tobacco: Never   • Tobacco comments:     Started smoking at age 22   Vaping Use   • Vaping Use: Never used   Substance Use Topics   • Alcohol use: Not Currently     Comment: 0-1 per month   • Drug use: No     Social History     Substance and Sexual Activity   Drug Use No       PAST SURGICAL HISTORY:  Past Surgical History:   Procedure Laterality Date   • KNEE ARTHROPLASTY TOTAL Right 2017    Procedure: KNEE ARTHROPLASTY TOTAL;  Surgeon: Aaron Burton M.D.;  Location: SURGERY [facility];   Service:    • HIP ARTHROPLASTY TOTAL  3/2/2015    Performed by Aaron Burton M.D. at SUR[facility]ERY [facility]   • FUSION, SPINE, LUMBAR, PLIF  5/9/2013    Performed by Sancho Rosales M.D. at SURGERY [facility]   • LUMBAR DECOMPRESSION  5/9/2013    Performed by Sancho Rosales M.D. at SUR[facility]ERY [facility]   • OTHER ORTHOPEDIC SURGERY  2007    knee right   • OTHER  2004    face lift , tummy tuck   • OTHER ORTHOPEDIC SURGERY  1995    carpal tunnel, right   • OTHER  1948    tonsillectomy   • APPENDECTOMY         ALLERGIES:   Allergies   Allergen Reactions   • Codeine Unspecified     Mental confusion   • Latex Unspecified     Only to adhesive bandaids and tape; rash, redness, itching   • Lisinopril Cough   • Other Drug Unspecified     Steroids raise blood pressure and blood sugar   • [place] Two-Sided Adhesive Strap [Always Feminine Wipes] Rash     Rash-Paper tape OK   • Soap Rash and Itching     Bathing and laundry soap cause itching and rash non scented soap OK   • Tape Rash     Rash-Paper tape OK       MEDICATIONS:  No current facility-administered medications on file prior to encounter.     Current Outpatient Medications on File Prior to Encounter   Medication Sig Dispense Refill   • Multiple Vitamins-Minerals (PRESERVISION AREDS) Cap Take 1 Capsule by mouth every day.     • atorvastatin (LIPITOR) 20 MG Tab TAKE 1 TABLET DAILY (Patient taking differently: Take 20 mg by mouth every evening.) 90 Tablet 3   • Dexlansoprazole (DEXILANT) 60 MG CAPSULE DELAYED RELEASE delayed-release capsule TAKE 1 CAPSULE EVERY       MORNING BEFORE BREAKFAST   FOR GASTROESOPHAGEAL REFLUXDISEASE (Patient taking differently: Take 60 mg by mouth every morning before breakfast. TAKE 1 CAPSULE EVERY       MORNING BEFORE BREAKFAST   FOR GASTROESOPHAGEAL REFLUXDISEASE) 90 Capsule 3   • XIIDRA 5 % Solution Administer 1 Drop into both eyes 2 times a day.     • carvedilol (COREG) 3.125 MG Tab TAKE 1 TABLET TWICE DAILY  WITH MEALS (Patient taking  differently: Take 3.125 mg by mouth 2 times a day with meals.) 180 Tablet 2   • sertraline (ZOLOFT) 100 MG Tab Take 1 Tablet by mouth every day. 90 Tablet 3   • traZODone (DESYREL) 50 MG Tab Take 1 Tablet by mouth every evening. 90 Tablet 3       LABS:  Recent Labs     07/28/23  1135   WBC 12.0*   RBC 3.99*   HEMOGLOBIN 11.9*   HEMATOCRIT 35.9*   MCV 90.0   MCH 29.8   RDW 44.2   PLATELETCT 269   MPV 9.7   NEUTSPOLYS 89.60*   LYMPHOCYTES 4.60*   MONOCYTES 4.90   EOSINOPHILS 0.00   BASOPHILS 0.20      Lab Results   Component Value Date/Time    SODIUM 141 07/28/2023 1135    POTASSIUM 3.4 (L) 07/28/2023 1135    CHLORIDE 105 07/28/2023 1135    CO2 23 07/28/2023 1135    GLUCOSE 166 (H) 07/28/2023 1135    BUN 26 (H) 07/28/2023 1135    CALCIUM 9.5 07/28/2023 1135         PREVIOUS ANESTHETICS: See EMR  __________________________________________    Relevant Problems   NEURO   (positive) Stroke (cerebrum) (HCC)      CARDIAC   (positive) Essential hypertension   (positive) Thoracic aortic ectasia (HCC)      GI   (positive) GERD without esophagitis      ENDO   (positive) Hypothyroidism due to acquired atrophy of thyroid      Other   (positive) Arthritis       Physical Exam    Airway   Mallampati: II  TM distance: >3 FB  Neck ROM: full       Cardiovascular - normal exam  Rhythm: regular  Rate: normal  (-) murmur     Dental - normal exam           Pulmonary - normal exam  Breath sounds clear to auscultation     Abdominal    Neurological - normal exam                 Anesthesia Plan    ASA 3   ASA physical status 3 criteria: CVA or TIA - history (> 3 months)    Plan - general       Airway plan will be LMA          Induction: intravenous    Postoperative Plan: Postoperative administration of opioids is intended.    Pertinent diagnostic labs and testing reviewed    Informed Consent:    Anesthetic plan and risks discussed with patient.    Use of blood products discussed with: patient whom consented to blood products.

## 2023-07-29 NOTE — PROGRESS NOTES
Patient returned from PACU alert and oriented x4, able to make needs known. Tolerated sips of ice water. Surgical dressing clean, dry, and intact. Ice pack reapplied post incontinence changing and skin check.     RN four-eyes skin assessment/check completed with charge RN: Shannon    Incontinence care completed, purewick applied, waffle mat applied under patient to promote skin integrity.     Pedal pulses bounding. VSS    Skin assessment findings:     Left leg bruising  Right thigh bruise  Left elbow bruising  Left hip surgical dressing intact, clean, and dry. VASU wound/surgical site at this time

## 2023-07-29 NOTE — HOSPITAL COURSE
Cecilia Alatorre is a 89 y.o. female who presented on 7/28/2023 with PMH of HTN, Major Depressive Disorder, Mixed Hyperlipidemia, GERD and Thoracic Aortic Ectasia who was brought because of left hip pain after a ground level fall. Patient arrived with external rotation and shortening of left leg. Going into surgery for left hip repair.

## 2023-07-29 NOTE — ASSESSMENT & PLAN NOTE
Hb dropped from 11.9 to 8.2  -s/p PRBC x1 on 07/30/23  - Probably dilutional from IV fluids in addition to postop blood loss  - Will repeat h&h at 1400pm

## 2023-07-29 NOTE — CARE PLAN
The patient is Stable - Low risk of patient condition declining or worsening    Shift Goals  Clinical Goals: pain control, comfort, surgical site care  Patient Goals: rest, comfort, pain control  Family Goals: VASU    Progress made toward(s) clinical / shift goals:      Patient alert and oriented and able to make needs known. IVF infusing per MD order. IV antibiotics to be infused per MD order. Patient updated on plan of care.     Problem: Pain - Standard  Goal: Alleviation of pain or a reduction in pain to the patient’s comfort goal  Outcome: Progressing  Note: Prescribed scheduled tylenol given for pain along with ice pack on left hip. Patient resting, asleep through the shift post arrival to floor from PACU.      Problem: Skin Integrity  Goal: Skin integrity is maintained or improved  Outcome: Progressing  Note: Surgical dressing remains clean, dry, and intact. Pillow under heels to promote skin integrity. Waffle mattress placed under patient to promote skin integrity, along with purewick.       Patient is not progressing towards the following goals:

## 2023-07-29 NOTE — PROGRESS NOTES
Pt awake and oriented x4. Medicated per MAR for pain. No nausea and tolerating water. Dressing site CDI, 2+ pedal pulse, ice to site. Repositioned pt and provided warm blankets for comfort.    3

## 2023-07-29 NOTE — THERAPY
Occupational Therapy   Initial Evaluation     Patient Name: Cecilia Alatorre  Age:  89 y.o., Sex:  female  Medical Record #: [id number]  Today's Date: 7/29/2023     Precautions  Precautions: Fall Risk, Weight Bearing As Tolerated Left Lower Extremity    Assessment  Patient is 89 y.o. female admitted after GLF s/p L femur IMN. PMHx of Thoracic Aortic Ectasia, CVA, and HTN. Pt fearful of pain req encouragement; improved during session. Pt with c/o dizziness after standing, improved with sitting BP 98/59, returned to supine w/ /59 (unable to tolerate standing BP); unclear if orthostatic HoTN. Reports has friends who live locally and can assist PRN with IADLs. Currently limited by decreased functional mobility, activity tolerance, cognition, strength, balance, and pain which are currently affecting pt's ability to complete ADLs/IADLs at baseline. Will continue to follow.     Plan    Occupational Therapy Initial Treatment Plan   Treatment Interventions: Self Care / Activities of Daily Living, Adaptive Equipment, Neuro Re-Education / Balance, Therapeutic Exercises, Therapeutic Activity  Treatment Frequency: 4 Times per Week  Duration: Until Therapy Goals Met    DC Equipment Recommendations: Unable to determine at this time  Discharge Recommendations: Recommend post-acute placement for additional occupational therapy services prior to discharge home      Objective     07/29/23 1038   Prior Living Situation   Prior Services Home-Independent;[employer] Alert Services   Housing / Facility 1 Story House   Steps Into Home 15   Equipment Owned Front-Wheel Walker   Lives with - Patient's Self Care Capacity Alone and Able to Care For Self   Comments Reports has friends who live locally and can assist PRN   Prior Level of ADL Function   Self Feeding Independent   Grooming / Hygiene Independent   Bathing Independent   Dressing Independent   Toileting Independent   Prior Level of IADL Function   Medication Management Independent    Laundry Independent   Kitchen Mobility Independent   Finances Independent   Home Management Independent   Shopping Independent   Prior Level Of Mobility Independent With Device in Home  (at night)   History of Falls   History of Falls Yes   Date of Last Fall   (reason for admit; reports has life alert, but doesn't wear it)   Precautions   Precautions Fall Risk;Weight Bearing As Tolerated Left Lower Extremity   Vitals   O2 (LPM) 1   O2 Delivery Device Silicone Nasal Cannula   Vitals Comments c/o dizziness after standing, improved with sitting BP 98/59, returned to supine /59 (unable to tolerate standing BP), unclear if orthostatic HoTN   Pain 0 - 10 Group   Location Hip;Leg  (thigh)   Location Orientation Left;Lateral   Therapist Pain Assessment Post Activity;During Activity;Nurse Notified  (not quantified)   Cognition    Cognition / Consciousness X   Speech/ Communication Hard of Hearing  (has hearing aids)   Level of Consciousness Alert   New Learning Impaired   Comments pleasant and cooperative with encouragement; very fearful of pain, but improved during session.   Passive ROM Upper Body   Passive ROM Upper Body WDL   Active ROM Upper Body   Active ROM Upper Body  WDL   Strength Upper Body   Upper Body Strength  X   Gross Strength Generalized Weakness, Equal Bilaterally.    Comments fatigues quickly with offloading LLE   Balance Assessment   Sitting Balance (Static) Fair +   Sitting Balance (Dynamic) Fair   Standing Balance (Static) Poor +   Standing Balance (Dynamic) Poor   Weight Shift Sitting Fair   Weight Shift Standing Poor   Comments w/FWW; as fatigued pt with heavy R lateral lean (appears related to pain). sitting req B/UUE support   Bed Mobility    Supine to Sit Maximal Assist   Sit to Supine Maximal Assist   Scooting Moderate Assist   Comments HOB elevated   ADL Assessment   Eating Supervision   Grooming Minimal Assist;Seated   Lower Body Dressing Maximal Assist   Toileting Maximal  Assist  (bedpan/purewick)   Comments Educated on WBAT, safety/hand placement with FWW, home safety/wearing life alert, benefits of sitting EOB for meals/grooming, and importance of continued EOB/OOB activity and AROM of LLE.   Functional Mobility   Sit to Stand Minimal Assist   Mobility w/ FWW; EOB>STS x2 with 3 steps toward EOB   Edema / Skin Assessment   Edema / Skin  Not Assessed   Activity Tolerance   Comments limited by pain, fear of pain, and dizziness in standing   Patient / Family Goals   Patient / Family Goal #1 to not hurt   Short Term Goals   Short Term Goal # 1 LB dressing with min A and AE PRN   Short Term Goal # 2 BSC txf with min A   Short Term Goal # 3 seated g/h with SPV using BUEs   Short Term Goal # 4 toileting with min A   Education Group   Education Provided Weight Bearing Precautions;Pathology of bedrest;Role of Occupational Therapist   Role of Occupational Therapist Patient Response Patient;Acceptance;Explanation;Verbal Demonstration;Reinforcement Needed   Weight Bearing Precautions Patient Response Patient;Acceptance;Explanation;Reinforcement Needed;Action Demonstration   Pathology of Bedrest Patient Response Patient;Family;Acceptance;Explanation;Verbal Demonstration;Reinforcement Needed   Occupational Therapy Initial Treatment Plan    Treatment Interventions Self Care / Activities of Daily Living;Adaptive Equipment;Neuro Re-Education / Balance;Therapeutic Exercises;Therapeutic Activity   Treatment Frequency 4 Times per Week   Duration Until Therapy Goals Met   Problem List   Problem List Decreased Active Daily Living Skills;Decreased Homemaking Skills;Decreased Upper Extremity Strength Right;Decreased Upper Extremity Strength Left;Decreased Functional Mobility;Decreased Activity Tolerance;Safety Awareness Deficits / Cognition;Impaired Postural Control / Balance;Limited Knowledge of Post Op Precautions

## 2023-07-29 NOTE — CARE PLAN
Problem: Pain - Standard  Goal: Alleviation of pain or a reduction in pain to the patient’s comfort goal  Outcome: Progressing     Problem: Knowledge Deficit - Standard  Goal: Patient and family/care givers will demonstrate understanding of plan of care, disease process/condition, diagnostic tests and medications  Outcome: Progressing     Problem: Skin Integrity  Goal: Skin integrity is maintained or improved  Outcome: Progressing     Problem: Fall Risk  Goal: Patient will remain free from falls  Outcome: Progressing   The patient is Stable - Low risk of patient condition declining or worsening    Shift Goals  Clinical Goals: Surgery, pain control  Patient Goals: Surgery, pain control  Family Goals: VASU    Progress made toward(s) clinical / shift goals:      Pt educated on plan of care, pt verbalizes understanding, reinforcement needed. Pt educated on pain/anxiety scales, alleviating factors, pain/anxiety medications and side effects, and need for pain/anxiety reassessment, Pt pain/anxiety controlled at this time, reinforcement needed. Pt educated on the need to reposition frequently and remain dry to avoid skin breakdown, reinforcement needed, no skin breakdown during shift. Fall risk education provided to pt, pt educated about the need to call for assistance while attempting to ambulate, reinforcement needed, pt remains fall free this shift.

## 2023-07-29 NOTE — PROGRESS NOTES
89yoF with left displaced IT femur fracture s/p IMN today.    S: Somnolent but wakes and responds in PACU    O:    Vitals:    07/28/23 2018   BP: 119/70   Pulse: 67   Resp: 15   Temp: 36.7 °C (98 °F)   SpO2: 95%     Exam:  General-NAD, wakes and follows commands, hard of hearing  LLE-hip dressing c/d/I, palp dp pulse, flexing/extending toes    A: 89yoF with left displaced IT femur fracture s/p IMN 7/28.    Recs:  --readmit postop  --WBAT LLE  --ancef x 2 doses postop  --PT/OT for mobilization ASAP  --okay to start lovenox or equivalent tomorrow am, continue SCDs  --fu 2 weeks postop

## 2023-07-29 NOTE — ANESTHESIA PROCEDURE NOTES
Airway    Date/Time: 7/28/2023 8:59 PM    Performed by: Kelton Hollingsworth M.D.  Authorized by: Kelton Hollingsworth M.D.    Location:  OR  Urgency:  Elective  Indications for Airway Management:  Anesthesia      Spontaneous Ventilation: absent    Sedation Level:  Deep  Preoxygenated: Yes    Final Airway Type:  Supraglottic airway  Final Supraglottic Airway:  Standard LMA    SGA Size:  3  Number of Attempts at Approach:  1   atraumatic

## 2023-07-29 NOTE — PROGRESS NOTES
This RN spoke with daughter: Marianne Alatorre (Patty) via phone with updates on patient status.     Daughter currently in [facility], but requests to be updated as necessary via cell phone.     Cell Phone: [phone]

## 2023-07-29 NOTE — OP REPORT
DATE OF SERVICE:  07/28/2023     PREOPERATIVE DIAGNOSIS:  Left displaced intertrochanteric femur fracture.     POSTOPERATIVE DIAGNOSIS:  Left displaced intertrochanteric femur fracture.     PROCEDURE PERFORMED:  Open treatment with intramedullary nailing, left   intertrochanteric femur fracture.     SURGEON:  Hardy Perry MD     ANESTHESIOLOGIST:  Kelton Hollingsworth MD     ANESTHESIA:  General.     ESTIMATED BLOOD LOSS:  100 mL.     IMPLANTS:  Jaime Gamma 4, 10 x 170 mm 125-degree nail with a 90 mm lag screw   and a single 5.0 mm distal interlocking screw.     INDICATIONS FOR PROCEDURE:  The patient is 89 years old.  She had a fall and   sustained a left displaced intertrochanteric femur fracture.  I evaluated her   in the emergency department, and she was admitted ultimately to the medical   service, felt to be optimized for surgical management.  I discussed with her   treatment recommendations and recommended surgical reduction and fixation with   intramedullary nailing.  She signed informed consent preoperatively and   wished to proceed with surgery as outlined above.     DESCRIPTION OF PROCEDURE:  The patient was met in the preoperative holding   area.  Her surgical site was signed.  Her consent was confirmed to be   accurate.  She was taken back to the operating room and general anesthesia was   induced.  She was placed on the fracture table in supine position across the   perineal post.  Traction, internal rotation, and adduction was applied to the   left lower extremity.  The right lower extremity was just suspended in   extension.  Fluoroscopic imaging confirmed acceptable improved alignment of   her fracture and traction alone.  Left thigh was then prepped and draped in   the usual sterile fashion.  A formal timeout was performed to confirm   patient's correct name, correct surgical site, correct procedure, and correct   laterality.  A percutaneous starting point with a guide pin at the tip of  the   greater trochanter was obtained, and I advanced the guide pin in the proximal   femur and confirmed on AP and lateral fluoroscopic imaging that the guide pin   was well positioned.  I then incised the skin over the guide pin, used the   entry reamer to enter the proximal femur and inserted a 10 x 170 mm 125-degree   gamma 4 nail to the appropriate depth and using the insertion   instrumentation, was able to insert a guide pin to achieve an appropriate tip   to apex distance, confirmed again on AP and lateral fluoroscopic imaging.  I   then prepared for and inserted a 90 mm lag screw and then was able to achieve   compression across the fracture site after releasing traction of the lower   extremity through the instrumentation.  I then inserted one lateral to medial   interlocking screw distally using the insertion instrumentation as well.  All   the insertion instrumentation was then removed and final fluoroscopic imaging   confirmed overall acceptable alignment of the fracture and acceptable position   of the implants.  The wounds were thoroughly irrigated with normal saline.    The subcutaneous tissue layers were reapproximated with Vicryl suture and the   skin edges with staples.  The wounds were thoroughly cleansed and dried, and a   sterile dressing was applied.  She was taken out of traction, transferred on   the [facility], woken up from anesthesia and taken to the postanesthesia care unit   in stable condition.     PLAN:    1.  The patient will be readmitted to the medical service postop.  2.  She can weightbear as tolerated to the left lower extremity.  3.  She will need Ancef for 2 doses postop for infection prophylaxis.  4.  She can work with physical and occupational therapy as soon as possible   for mobility, gait training, and secondary fall prevention.  5.  She will be started on Lovenox or equivalent tomorrow morning and should   continue SCDs for DVT prophylaxis in the  inderjit.        ______________________________  MD SHAYNA Alcantar/FILIBERTO/JOHN    DD:  07/28/2023 21:45  DT:  07/28/2023 22:11    Job#:  [id number]

## 2023-07-29 NOTE — ANESTHESIA TIME REPORT
Anesthesia Start and Stop Event Times     Date Time Event    7/28/2023 2042 Anesthesia Start     2046 Ready for Procedure     2147 Anesthesia Stop        Responsible Staff  07/28/23    Name Role Begin End    Kelton Hollingsworth M.D. Anesth 2042 2147        Overtime Reason:  no overtime (within assigned shift)    Comments:

## 2023-07-29 NOTE — PROGRESS NOTES
[facility] Internal Medicine Daily Progress Note    Date of Service  7/29/2023    [facility] Team: [facility] Team   Attending: LARRY Gruber M.d.  Senior Resident: Dr. WILL Swain  Intern:  Dr. MARYJO Hooper  Contact Number: [phone]    Chief Complaint  Cecilia Alatorre is a 89 y.o. female admitted 7/28/2023 with left hip pain.    Hospital Course  Cecilia Alatorre is a 89 y.o. female who presented on 7/28/2023 with PMH of HTN, Major Depressive Disorder, Mixed Hyperlipidemia, GERD and Thoracic Aortic Ectasia who was brought because of left hip pain after a ground level fall. Patient arrived with external rotation and shortening of left leg. Going into surgery for left hip repair.    Interval Problem Update  No acute events overnight. Patient was seen and evaluated at bedside this AM, reports have not been feeling pain since she came back from surgery. Denies dizziness or headache. Hb dropped to 8.7, probably dilutional from IV fluids, will repeat h&h at 1400pm. Lovenox ppx started post surgery.    I have discussed this patient's plan of care and discharge plan at IDT rounds today with Case Management, Nursing, Nursing leadership, and other members of the IDT team.    Consultants/Specialty  orthopedics    Code Status  Full Code    Disposition  The patient is not medically cleared for discharge to home or a post-acute facility.      I have placed the appropriate orders for post-discharge needs.    Review of Systems  ROS     Physical Exam  Temp:  [36.3 °C (97.3 °F)-36.7 °C (98 °F)] 36.5 °C (97.7 °F)  Pulse:  [] 83  Resp:  [12-18] 16  BP: ()/(60-98) 100/63  SpO2:  [91 %-99 %] 96 %    Physical Exam    Fluids    Intake/Output Summary (Last 24 hours) at 7/29/2023 1203  Last data filed at 7/29/2023 0100  Gross per 24 hour   Intake 654.2 ml   Output 20 ml   Net 634.2 ml       Laboratory  Recent Labs     07/28/23  1135 07/29/23  0801   WBC 12.0* 10.9*   RBC 3.99* 2.99*   HEMOGLOBIN 11.9* 8.7*   HEMATOCRIT 35.9* 27.3*    MCV 90.0 91.3   MCH 29.8 29.1   MCHC 33.1 31.9*   RDW 44.2 46.5   PLATELETCT 269 237   MPV 9.7 9.6     Recent Labs     07/28/23  1135 07/29/23  0801   SODIUM 141 139   POTASSIUM 3.4* 4.6   CHLORIDE 105 107   CO2 23 24   GLUCOSE 166* 151*   BUN 26* 27*   CREATININE 0.88 1.05   CALCIUM 9.5 8.4*                   Imaging  DX-CHEST-LIMITED (1 VIEW)   Final Result      1.  Hypoinflation and possible mild pulmonary edema.   2.  No pneumonia or pneumothorax.      DX-FEMUR-2+ LEFT   Final Result      Comminuted and displaced LEFT proximal femur intratrochanteric fracture with varus angulation.      DX-PELVIS-1 OR 2 VIEWS   Final Result      1.  Comminuted intertrochanteric left femoral fracture.   2.  Osteopenia.      DX-PORTABLE FLUORO > 1 HOUR    (Results Pending)   DX-FEMUR-2+ LEFT    (Results Pending)        Assessment/Plan  Problem Representation:    * Closed left hip fracture (HCC)  Assessment & Plan  Patient had a ground level fall. Currently with left hip pain, external rotation and shortening of left leg.   - Ortho did surgery on 7/28  - LR 100mL/hr   - Pain management with opioids  - Started Lovenox ppx 30mg q12hrs      Anemia  Assessment & Plan  Hb dropped from 11.9 to 8.7  - Probably dilutional from IV fluids  - Will repeat h&h at 1400pm    Stroke (cerebrum) ([facility])  Assessment & Plan  Past medical history of stroke 20 years ago  - No focal or residual issues.    Arthritis  Assessment & Plan  Chronic Stable Disease  - On NSAID's at home  - Will be cover with pain medication      Mixed hyperlipidemia- (present on admission)  Assessment & Plan  Chronic Stable disease  - Continue Statin    Major depressive disorder  Assessment & Plan  Chronic Stable disease  - Continue Sertraline and Trazodone    Essential hypertension  Assessment & Plan  Chronic Stable Disease  - Currently BP in 100/63  - Will hold Coreg if BP keeps dropping  - Consider IV Bolus if SBP<90         VTE prophylaxis: SCDs/TEDs and lovenox    I have  performed a physical exam and reviewed and updated ROS and Plan today (7/29/2023). In review of yesterday's note (7/28/2023), there are no changes except as documented above.

## 2023-07-30 LAB
ABO GROUP BLD: NORMAL
ALBUMIN SERPL BCP-MCNC: 3 G/DL (ref 3.2–4.9)
ALBUMIN/GLOB SERPL: 1.4 G/DL
ALP SERPL-CCNC: 41 U/L (ref 30–99)
ALT SERPL-CCNC: 5 U/L (ref 2–50)
ANION GAP SERPL CALC-SCNC: 8 MMOL/L (ref 7–16)
AST SERPL-CCNC: 24 U/L (ref 12–45)
BARCODED ABORH UBTYP: 9500
BARCODED PRD CODE UBPRD: NORMAL
BARCODED UNIT NUM UBUNT: NORMAL
BASOPHILS # BLD AUTO: 0.5 % (ref 0–1.8)
BASOPHILS # BLD AUTO: 0.5 % (ref 0–1.8)
BASOPHILS # BLD: 0.04 K/UL (ref 0–0.12)
BASOPHILS # BLD: 0.05 K/UL (ref 0–0.12)
BILIRUB SERPL-MCNC: 0.2 MG/DL (ref 0.1–1.5)
BLD GP AB SCN SERPL QL: NORMAL
BUN SERPL-MCNC: 25 MG/DL (ref 8–22)
CALCIUM ALBUM COR SERPL-MCNC: 8.9 MG/DL (ref 8.5–10.5)
CALCIUM SERPL-MCNC: 8.1 MG/DL (ref 8.5–10.5)
CHLORIDE SERPL-SCNC: 107 MMOL/L (ref 96–112)
CO2 SERPL-SCNC: 24 MMOL/L (ref 20–33)
COMPONENT R 8504R: NORMAL
CREAT SERPL-MCNC: 1.06 MG/DL (ref 0.5–1.4)
EOSINOPHIL # BLD AUTO: 0.09 K/UL (ref 0–0.51)
EOSINOPHIL # BLD AUTO: 0.14 K/UL (ref 0–0.51)
EOSINOPHIL NFR BLD: 1 % (ref 0–6.9)
EOSINOPHIL NFR BLD: 1.4 % (ref 0–6.9)
ERYTHROCYTE [DISTWIDTH] IN BLOOD BY AUTOMATED COUNT: 46.8 FL (ref 35.9–50)
ERYTHROCYTE [DISTWIDTH] IN BLOOD BY AUTOMATED COUNT: 47.9 FL (ref 35.9–50)
GFR SERPLBLD CREATININE-BSD FMLA CKD-EPI: 50 ML/MIN/1.73 M 2
GLOBULIN SER CALC-MCNC: 2.1 G/DL (ref 1.9–3.5)
GLUCOSE SERPL-MCNC: 126 MG/DL (ref 65–99)
HCT VFR BLD AUTO: 22.7 % (ref 37–47)
HCT VFR BLD AUTO: 26 % (ref 37–47)
HGB BLD-MCNC: 7.1 G/DL (ref 12–16)
HGB BLD-MCNC: 8.5 G/DL (ref 12–16)
IMM GRANULOCYTES # BLD AUTO: 0.07 K/UL (ref 0–0.11)
IMM GRANULOCYTES # BLD AUTO: 0.12 K/UL (ref 0–0.11)
IMM GRANULOCYTES NFR BLD AUTO: 0.8 % (ref 0–0.9)
IMM GRANULOCYTES NFR BLD AUTO: 1.2 % (ref 0–0.9)
LYMPHOCYTES # BLD AUTO: 1.17 K/UL (ref 1–4.8)
LYMPHOCYTES # BLD AUTO: 1.29 K/UL (ref 1–4.8)
LYMPHOCYTES NFR BLD: 12.9 % (ref 22–41)
LYMPHOCYTES NFR BLD: 13.6 % (ref 22–41)
MCH RBC QN AUTO: 29.1 PG (ref 27–33)
MCH RBC QN AUTO: 29.7 PG (ref 27–33)
MCHC RBC AUTO-ENTMCNC: 31.3 G/DL (ref 32.2–35.5)
MCHC RBC AUTO-ENTMCNC: 32.7 G/DL (ref 32.2–35.5)
MCV RBC AUTO: 90.9 FL (ref 81.4–97.8)
MCV RBC AUTO: 93 FL (ref 81.4–97.8)
MONOCYTES # BLD AUTO: 0.94 K/UL (ref 0–0.85)
MONOCYTES # BLD AUTO: 1.3 K/UL (ref 0–0.85)
MONOCYTES NFR BLD AUTO: 10.9 % (ref 0–13.4)
MONOCYTES NFR BLD AUTO: 13 % (ref 0–13.4)
NEUTROPHILS # BLD AUTO: 6.31 K/UL (ref 1.82–7.42)
NEUTROPHILS # BLD AUTO: 7.08 K/UL (ref 1.82–7.42)
NEUTROPHILS NFR BLD: 71 % (ref 44–72)
NEUTROPHILS NFR BLD: 73.2 % (ref 44–72)
NRBC # BLD AUTO: 0 K/UL
NRBC # BLD AUTO: 0 K/UL
NRBC BLD-RTO: 0 /100 WBC (ref 0–0.2)
NRBC BLD-RTO: 0 /100 WBC (ref 0–0.2)
PLATELET # BLD AUTO: 187 K/UL (ref 164–446)
PLATELET # BLD AUTO: 190 K/UL (ref 164–446)
PMV BLD AUTO: 9.8 FL (ref 9–12.9)
PMV BLD AUTO: 9.8 FL (ref 9–12.9)
POTASSIUM SERPL-SCNC: 3.7 MMOL/L (ref 3.6–5.5)
PRODUCT TYPE UPROD: NORMAL
PROT SERPL-MCNC: 5.1 G/DL (ref 6–8.2)
RBC # BLD AUTO: 2.44 M/UL (ref 4.2–5.4)
RBC # BLD AUTO: 2.86 M/UL (ref 4.2–5.4)
RH BLD: NORMAL
SODIUM SERPL-SCNC: 139 MMOL/L (ref 135–145)
UNIT STATUS USTAT: NORMAL
WBC # BLD AUTO: 10 K/UL (ref 4.8–10.8)
WBC # BLD AUTO: 8.6 K/UL (ref 4.8–10.8)

## 2023-07-30 PROCEDURE — A9270 NON-COVERED ITEM OR SERVICE: HCPCS

## 2023-07-30 PROCEDURE — 86923 COMPATIBILITY TEST ELECTRIC: CPT

## 2023-07-30 PROCEDURE — 770001 HCHG ROOM/CARE - MED/SURG/GYN PRIV*

## 2023-07-30 PROCEDURE — 700102 HCHG RX REV CODE 250 W/ 637 OVERRIDE(OP)

## 2023-07-30 PROCEDURE — 86901 BLOOD TYPING SEROLOGIC RH(D): CPT

## 2023-07-30 PROCEDURE — 99222 1ST HOSP IP/OBS MODERATE 55: CPT | Performed by: PHYSICAL MEDICINE & REHABILITATION

## 2023-07-30 PROCEDURE — 80053 COMPREHEN METABOLIC PANEL: CPT

## 2023-07-30 PROCEDURE — 99233 SBSQ HOSP IP/OBS HIGH 50: CPT | Mod: GC | Performed by: HOSPITALIST

## 2023-07-30 PROCEDURE — 36430 TRANSFUSION BLD/BLD COMPNT: CPT

## 2023-07-30 PROCEDURE — A9270 NON-COVERED ITEM OR SERVICE: HCPCS | Mod: JZ | Performed by: STUDENT IN AN ORGANIZED HEALTH CARE EDUCATION/TRAINING PROGRAM

## 2023-07-30 PROCEDURE — P9016 RBC LEUKOCYTES REDUCED: HCPCS

## 2023-07-30 PROCEDURE — 700102 HCHG RX REV CODE 250 W/ 637 OVERRIDE(OP): Mod: JZ | Performed by: STUDENT IN AN ORGANIZED HEALTH CARE EDUCATION/TRAINING PROGRAM

## 2023-07-30 PROCEDURE — 85025 COMPLETE CBC W/AUTO DIFF WBC: CPT

## 2023-07-30 PROCEDURE — 86850 RBC ANTIBODY SCREEN: CPT

## 2023-07-30 PROCEDURE — 700102 HCHG RX REV CODE 250 W/ 637 OVERRIDE(OP): Performed by: HOSPITALIST

## 2023-07-30 PROCEDURE — 36415 COLL VENOUS BLD VENIPUNCTURE: CPT

## 2023-07-30 PROCEDURE — 86900 BLOOD TYPING SEROLOGIC ABO: CPT

## 2023-07-30 PROCEDURE — A9270 NON-COVERED ITEM OR SERVICE: HCPCS | Performed by: HOSPITALIST

## 2023-07-30 PROCEDURE — 700111 HCHG RX REV CODE 636 W/ 250 OVERRIDE (IP)

## 2023-07-30 PROCEDURE — 700105 HCHG RX REV CODE 258: Mod: JZ

## 2023-07-30 RX ORDER — POTASSIUM CHLORIDE 20 MEQ/1
20 TABLET, EXTENDED RELEASE ORAL ONCE
Status: COMPLETED | OUTPATIENT
Start: 2023-07-30 | End: 2023-07-30

## 2023-07-30 RX ORDER — OXYCODONE HYDROCHLORIDE 10 MG/1
20 TABLET ORAL
Status: DISCONTINUED | OUTPATIENT
Start: 2023-07-30 | End: 2023-08-01 | Stop reason: HOSPADM

## 2023-07-30 RX ORDER — HYDROMORPHONE HYDROCHLORIDE 1 MG/ML
0.5 INJECTION, SOLUTION INTRAMUSCULAR; INTRAVENOUS; SUBCUTANEOUS
Status: DISCONTINUED | OUTPATIENT
Start: 2023-07-30 | End: 2023-08-01 | Stop reason: HOSPADM

## 2023-07-30 RX ORDER — OXYCODONE HYDROCHLORIDE 10 MG/1
10 TABLET ORAL
Status: DISCONTINUED | OUTPATIENT
Start: 2023-07-30 | End: 2023-08-01 | Stop reason: HOSPADM

## 2023-07-30 RX ORDER — CARBOXYMETHYLCELLULOSE SODIUM 5 MG/ML
1 SOLUTION/ DROPS OPHTHALMIC PRN
Status: DISCONTINUED | OUTPATIENT
Start: 2023-07-30 | End: 2023-08-01 | Stop reason: HOSPADM

## 2023-07-30 RX ADMIN — ENOXAPARIN SODIUM 30 MG: 100 INJECTION SUBCUTANEOUS at 05:45

## 2023-07-30 RX ADMIN — TRAZODONE HYDROCHLORIDE 50 MG: 50 TABLET ORAL at 22:18

## 2023-07-30 RX ADMIN — ACETAMINOPHEN 1000 MG: 500 TABLET, FILM COATED ORAL at 05:45

## 2023-07-30 RX ADMIN — MULTIPLE VITAMINS W/ MINERALS TAB 1 TABLET: TAB at 05:45

## 2023-07-30 RX ADMIN — OMEPRAZOLE 40 MG: 20 CAPSULE, DELAYED RELEASE ORAL at 05:45

## 2023-07-30 RX ADMIN — ACETAMINOPHEN 1000 MG: 500 TABLET, FILM COATED ORAL at 14:07

## 2023-07-30 RX ADMIN — SENNOSIDES AND DOCUSATE SODIUM 2 TABLET: 50; 8.6 TABLET ORAL at 18:25

## 2023-07-30 RX ADMIN — ACETAMINOPHEN 1000 MG: 500 TABLET, FILM COATED ORAL at 18:25

## 2023-07-30 RX ADMIN — SODIUM CHLORIDE, POTASSIUM CHLORIDE, SODIUM LACTATE AND CALCIUM CHLORIDE: 600; 310; 30; 20 INJECTION, SOLUTION INTRAVENOUS at 07:18

## 2023-07-30 RX ADMIN — ENOXAPARIN SODIUM 30 MG: 100 INJECTION SUBCUTANEOUS at 18:25

## 2023-07-30 RX ADMIN — CARVEDILOL 3.12 MG: 3.12 TABLET, FILM COATED ORAL at 08:38

## 2023-07-30 RX ADMIN — ATORVASTATIN CALCIUM 20 MG: 20 TABLET, FILM COATED ORAL at 18:25

## 2023-07-30 RX ADMIN — CARVEDILOL 3.12 MG: 3.12 TABLET, FILM COATED ORAL at 18:26

## 2023-07-30 RX ADMIN — OXYCODONE HYDROCHLORIDE 10 MG: 10 TABLET ORAL at 10:39

## 2023-07-30 RX ADMIN — POTASSIUM CHLORIDE 20 MEQ: 1500 TABLET, EXTENDED RELEASE ORAL at 14:07

## 2023-07-30 RX ADMIN — SENNOSIDES AND DOCUSATE SODIUM 2 TABLET: 50; 8.6 TABLET ORAL at 05:45

## 2023-07-30 RX ADMIN — SERTRALINE 100 MG: 100 TABLET, FILM COATED ORAL at 05:45

## 2023-07-30 ASSESSMENT — PAIN DESCRIPTION - PAIN TYPE
TYPE: SURGICAL PAIN
TYPE: ACUTE PAIN
TYPE: SURGICAL PAIN
TYPE: ACUTE PAIN
TYPE: SURGICAL PAIN

## 2023-07-30 NOTE — CONSULTS
Physical Medicine and Rehabilitation Consultation              Date of initial consultation: 7/30/2023  Consulting provider: Yimi Stevens MD  Reason for consultation: assess for acute inpatient rehab appropriateness  LOS: 2 Day(s)    Chief complaint: Left hip pain    HPI: The patient is a 89 y.o. right hand dominant female with a past medical history of hypertension, major depressive disorder, mixed hyperlipidemia, GERD, thoracic aortic ectasia;  who presented on 7/28/2023 11:20 AM with left hip pain after mechanical ground-level fall.  Patient was walking from the kitchen to the dining room when she suddenly tripped and fell, landing on her left side.  Patient lives alone, spent the night in the floor and was found by a local  in the morning.  Evaluation in the ED found left intertrochanteric femur fracture.  Thankfully, patient CPK was only 202, no rhabdomyolysis.  Patient was seen by orthopedics, and taken to the OR by Hardy Perry MD on 7/28/2023 for left intramedullary nail repair.  Postoperatively, patient is WBAT LLE.  Medically, patient has severe anemia with hemoglobin 7.1 today, hypoalbuminemia and reduced renal function.  Functionally, patient has deficits with mobility and ADLs.  Therefore, PMR was consulted to help guide placement.    The patient currently reports left hip pain with movement, otherwise she is doing well.  Patient endorses some shortness of breath, currently on 1 L oxygen.  Patient received blood today for severe anemia.  Patient reports her last bowel movement was 7/28.  Patient has 15 stairs to get into her home, where she lives alone.  Patient is having great difficulty moving her hip due to pain.    ROS  Pertinent positives are mentioned in the HPI, all others reviewed and are negative.    Social Hx:  1 SH  15 KARUNA  With: Alone    THERAPY:  Restrictions: WBAT   PT: Functional mobility   7/29: Unable to participate in gait, min assist sit to stand x2 with front wheel  walker    OT: ADLs  7/29: Max assist toileting and lower body dressing    SLP:   None    IMAGING:  XR left femur 7/28/2023  Comminuted and displaced LEFT proximal femur intratrochanteric fracture with varus angulation.    PROCEDURES:  Hardy Perry MD 7/28/2023  Open treatment with intramedullary nailing, left   intertrochanteric femur fracture.    PMH:  Past Medical History:   Diagnosis Date    Acid reflux     Arthritis     osteo, generalized    Cancer (HCC)     skin    CATARACT     elijah iol    Dental disorder     partials lower    Heart burn     Hiatus hernia syndrome     High cholesterol     Hypertension     Indigestion     Intracerebral bleed (HCC) 12/16/2015    memory difficulty no residual weakness    Other specified disorder of intestines     constipation w/anesthesia and narcotics    Pain 9/2016    all over    Psychiatric problem     depression    Seasonal allergies     Shoulder injury     right shoulder    Urinary bladder disorder     urgency    Urinary incontinence 2017    urgency       PSH:  Past Surgical History:   Procedure Laterality Date    KNEE ARTHROPLASTY TOTAL Right 2/1/2017    Procedure: KNEE ARTHROPLASTY TOTAL;  Surgeon: Aaron Burton M.D.;  Location: SURGERY [facility];  Service:     HIP ARTHROPLASTY TOTAL  3/2/2015    Performed by Aaron Burton M.D. at SURGERY [facility]    FUSION, SPINE, LUMBAR, PLIF  5/9/2013    Performed by Sancho Rosales M.D. at SURGERY [facility]    LUMBAR DECOMPRESSION  5/9/2013    Performed by Sancho Rosales M.D. at SURGERY [facility]    OTHER ORTHOPEDIC SURGERY  2007    knee right    OTHER  2004    face lift , tummy tuck    OTHER ORTHOPEDIC SURGERY  1995    carpal tunnel, right    OTHER  1948    tonsillectomy    APPENDECTOMY         FHX:\  Family History   Problem Relation Age of Onset    Stroke Father     Cancer Mother        Medications:  Current Facility-Administered Medications   Medication Dose    carboxymethylcellulose (Refresh Tears) 0.5 %  "ophthalmic drops 1 Drop  1 Drop    oxyCODONE immediate release (Roxicodone) tablet 10 mg  10 mg    Or    oxyCODONE immediate release (Roxicodone) tablet 20 mg  20 mg    Or    HYDROmorphone (Dilaudid) injection 0.5 mg  0.5 mg    enoxaparin (Lovenox) inj 30 mg  30 mg    lactated ringers infusion      senna-docusate (Pericolace Or Senokot S) 8.6-50 MG per tablet 2 Tablet  2 Tablet    And    polyethylene glycol/lytes (Miralax) PACKET 1 Packet  1 Packet    And    magnesium hydroxide (Milk Of Magnesia) suspension 30 mL  30 mL    And    bisacodyl (Dulcolax) suppository 10 mg  10 mg    Pharmacy Consult Request ...Pain Management Review 1 Each  1 Each    acetaminophen (Tylenol) tablet 1,000 mg  1,000 mg    Followed by    [START ON 8/2/2023] acetaminophen (Tylenol) tablet 1,000 mg  1,000 mg    atorvastatin (Lipitor) tablet 20 mg  20 mg    carvedilol (Coreg) tablet 3.125 mg  3.125 mg    therapeutic multivitamin-minerals (Theragran-M) tablet 1 Tablet  1 Tablet    sertraline (Zoloft) tablet 100 mg  100 mg    traZODone (Desyrel) tablet 50 mg  50 mg    omeprazole (PriLOSEC) capsule 40 mg  40 mg       Allergies:  Allergies   Allergen Reactions    Codeine Unspecified     Mental confusion    Latex Unspecified     Only to adhesive bandaids and tape; rash, redness, itching    Lisinopril Cough    Other Drug Unspecified     Steroids raise blood pressure and blood sugar    Naeem Two-Sided Adhesive Strap [Always Feminine Wipes] Rash     Rash-Paper tape OK    Soap Rash and Itching     Bathing and laundry soap cause itching and rash non scented soap OK    Tape Rash     Rash-Paper tape OK         Physical Exam:  Vitals: /62   Pulse 91   Temp 36.5 °C (97.7 °F) (Temporal)   Resp 16   Ht 1.575 m (5' 2\")   Wt 71.7 kg (158 lb)   SpO2 93%   Gen: NAD  Head: NC/AT  Eyes/ Nose/ Mouth: PERRLA, moist mucous membranes  Cardio: RRR, good distal perfusion, warm extremities  Pulm: normal respiratory effort, no cyanosis   Abd: Soft NTND, negative " borborygmi   Ext: No peripheral edema. No calf tenderness. No clubbing.    Mental status: answers questions appropriately follows commands  Speech: fluent, no aphasia or dysarthria    Motor:      Upper Extremity  Myotome R L   Shoulder flexion C5 4/5 4/5   Elbow flexion C5 4/5 4/5   Wrist extension C6 4/5 4/5   Elbow extension C7 4/5 4/5   Finger flexion C8 4/5 4/5   Finger abduction T1 4/5 4/5     Lower Extremity Myotome R L   Hip flexion L2 2/5 1/5   Knee extension L3 3/5 2/5   Ankle dorsiflexion L4 4/5 4/5   Toe extension L5 4/5 4/5   Ankle plantarflexion S1 4/5 4/5     Sensory:   intact to light touch through out    Labs: Reviewed and significant for   Recent Labs     07/29/23  0801 07/29/23  1455 07/30/23  0843   RBC 2.99* 2.77* 2.44*   HEMOGLOBIN 8.7* 8.2* 7.1*   HEMATOCRIT 27.3* 25.8* 22.7*   PLATELETCT 237 222 190     Recent Labs     07/28/23  1135 07/29/23  0801 07/30/23  0843   SODIUM 141 139 139   POTASSIUM 3.4* 4.6 3.7   CHLORIDE 105 107 107   CO2 23 24 24   GLUCOSE 166* 151* 126*   BUN 26* 27* 25*   CREATININE 0.88 1.05 1.06   CALCIUM 9.5 8.4* 8.1*     Recent Results (from the past 24 hour(s))   CBC WITH DIFFERENTIAL    Collection Time: 07/30/23  8:43 AM   Result Value Ref Range    WBC 8.6 4.8 - 10.8 K/uL    RBC 2.44 (L) 4.20 - 5.40 M/uL    Hemoglobin 7.1 (L) 12.0 - 16.0 g/dL    Hematocrit 22.7 (L) 37.0 - 47.0 %    MCV 93.0 81.4 - 97.8 fL    MCH 29.1 27.0 - 33.0 pg    MCHC 31.3 (L) 32.2 - 35.5 g/dL    RDW 47.9 35.9 - 50.0 fL    Platelet Count 190 164 - 446 K/uL    MPV 9.8 9.0 - 12.9 fL    Neutrophils-Polys 73.20 (H) 44.00 - 72.00 %    Lymphocytes 13.60 (L) 22.00 - 41.00 %    Monocytes 10.90 0.00 - 13.40 %    Eosinophils 1.00 0.00 - 6.90 %    Basophils 0.50 0.00 - 1.80 %    Immature Granulocytes 0.80 0.00 - 0.90 %    Nucleated RBC 0.00 0.00 - 0.20 /100 WBC    Neutrophils (Absolute) 6.31 1.82 - 7.42 K/uL    Lymphs (Absolute) 1.17 1.00 - 4.80 K/uL    Monos (Absolute) 0.94 (H) 0.00 - 0.85 K/uL    Eos  (Absolute) 0.09 0.00 - 0.51 K/uL    Baso (Absolute) 0.04 0.00 - 0.12 K/uL    Immature Granulocytes (abs) 0.07 0.00 - 0.11 K/uL    NRBC (Absolute) 0.00 K/uL   Comp Metabolic Panel    Collection Time: 07/30/23  8:43 AM   Result Value Ref Range    Sodium 139 135 - 145 mmol/L    Potassium 3.7 3.6 - 5.5 mmol/L    Chloride 107 96 - 112 mmol/L    Co2 24 20 - 33 mmol/L    Anion Gap 8.0 7.0 - 16.0    Glucose 126 (H) 65 - 99 mg/dL    Bun 25 (H) 8 - 22 mg/dL    Creatinine 1.06 0.50 - 1.40 mg/dL    Calcium 8.1 (L) 8.5 - 10.5 mg/dL    Correct Calcium 8.9 8.5 - 10.5 mg/dL    AST(SGOT) 24 12 - 45 U/L    ALT(SGPT) 5 2 - 50 U/L    Alkaline Phosphatase 41 30 - 99 U/L    Total Bilirubin 0.2 0.1 - 1.5 mg/dL    Albumin 3.0 (L) 3.2 - 4.9 g/dL    Total Protein 5.1 (L) 6.0 - 8.2 g/dL    Globulin 2.1 1.9 - 3.5 g/dL    A-G Ratio 1.4 g/dL   ESTIMATED GFR    Collection Time: 07/30/23  8:43 AM   Result Value Ref Range    GFR (CKD-EPI) 50 (A) >60 mL/min/1.73 m 2   COD - Adult (Type and Screen)    Collection Time: 07/30/23  8:43 AM   Result Value Ref Range    ABO Grouping Only O     Rh Grouping Only NEG     Antibody Screen-Cod NEG    COMPONENT CELLULAR    Collection Time: 07/30/23  8:43 AM   Result Value Ref Range    Component R       R4                  Red Blood Cells     [id number]   issued       07/30/23   12:32      Product Type Red Blood Cells LR Pheresis     Dispense Status issued     Unit Number (Barcoded) [id number]     Product Code (Barcoded) K7542X10     Blood Type (Barcoded) 9500          ASSESSMENT:  Patient is a 89 y.o. female admitted with left hip fracture now s/p Intramedullary nail repair by Dr. Hardy Perry MD on 7/28/2023     Rehabilitation: Impaired ADLs and mobility  Patient is not a candidate for inpatient rehab because she is not expected to have a reasonable amount of improvement in a reasonable amount of time using the combined approach.     All cases are subject to administrative review and recommendations may  change    Disposition recommendations:  -SNF placement  -Patient is unable to tolerate an IPR program, and has no support on discharge.  Patient will need to go to a facility where she can have her needs met until her left hip is more healed and she is able to tolerate more therapy.  -PMR will sign off, please reconsult or reach out via Voalte if further evaluation or medical management is requested    Medical Complexity:    Left hip fracture  -Status post intramedullary nail repair by Dr. Orlando MARTINEZ on 7/28  -WBAT LLE  -Continue PT OT while in-house  -SNF placement on discharge  -Follow-up with Dr. Perry in 2 weeks  -Remove sutures/staples postop day #14  -Pain control per primary team    Acute blood loss anemia with associated hypotension  -After closely with serial labs  -Received 1 unit PRBCs today 7/30      DVT PPX: Lovenox      Thank you for allowing us to participate in the care of this patient.     Patient was seen for >60 minutes on unit/floor of which > 50% of time was spent on counseling and coordination of care regarding the above, including prognosis, risk reduction, benefits of treatment, and options for next stage of care.    Jose Rodriguez, DO   Physical Medicine and Rehabilitation     Please note that this dictation was created using voice recognition software. I have made every reasonable attempt to correct obvious errors, but there may be errors of grammar and possibly content that I did not discover before finalizing the note.

## 2023-07-30 NOTE — PROGRESS NOTES
Received report and assumed care at 0700. Discussed POC with patient. Patient [place] assisted to place hearing aids. Discussed inc mobility as well as getting OOB for meals. Bed locked and in low position, call light in place, hourly rounding in place.

## 2023-07-30 NOTE — CARE PLAN
The patient is Stable - Low risk of patient condition declining or worsening    Shift Goals  Clinical Goals: Pain control, Mobility  Patient Goals: Pain control, Rest  Family Goals: Not present    Progress made toward(s) clinical / shift goals:    Problem: Pain - Standard  Goal: Alleviation of pain or a reduction in pain to the patient’s comfort goal  Outcome: Progressing  Note: Patient educated on pain scale and to notify RN of pain. Medicated per MAR and repositioned        Problem: Knowledge Deficit - Standard  Goal: Patient and family/care givers will demonstrate understanding of plan of care, disease process/condition, diagnostic tests and medications  Outcome: Progressing  Note: Plan of care discussed, verbalized understanding. Questions answered        Problem: Skin Integrity  Goal: Skin integrity is maintained or improved  Outcome: Progressing     Problem: Fall Risk  Goal: Patient will remain free from falls  Outcome: Progressing       Patient is not progressing towards the following goals:

## 2023-07-30 NOTE — PROGRESS NOTES
"     Orthopedic PA Progress Note    Interval changes:  Patient doing well postop.  LLE dressings are CDI  WBAT LLE  Trend Hgb  No pending ortho procedures  DVT Prophylaxis outpatient: ASA 81 mg PO QD x4 weeks  Follow up with Dr. Perry in 2 weeks for repeat XR and staple removal  Cleared for DC from orthopedic standpoint pending therapy recs and medical optimization    ROS - Patient denies any new issues.  Denies any numbness or tingling. Pain well controlled.    /82   Pulse 89   Temp 36.7 °C (98.1 °F) (Temporal)   Resp 18   Ht 1.575 m (5' 2\")   Wt 71.7 kg (158 lb)   SpO2 94%     Patient seen and examined  No acute distress  Breathing non labored  RRR  LLE: Surgical dressing is clean, dry, and intact. Patient clearly fires tibialis anterior, EHL, and gastrocnemius/soleus. Sensation is intact to light touch throughout superficial peroneal, deep peroneal, tibial, saphenous, and sural nerve distributions. Strong and palpable 2+ dorsalis pedis and posterior tibial pulses with capillary refill less than 2 seconds.   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 Perry in 2 weeks for repeat XR and staple removal  Cleared for DC from orthopedic standpoint pending therapy recs and medical optimization    POD#1 S/p  Open treatment with intramedullary nailing, " left  intertrochanteric femur fracture  Wt bearing status - WBAT LLE  Wound care/Drains - Dressings to be changed every other day by nursing. Or PRN for saturation starting POD#2  Future Procedures - None planned   Lovenox: Start 7/29, Duration-until ambulatory > 150'  Sutures/Staples out- 14-21 days post operatively. Removal will completed by ortho ANTON's unless transferred.  PT/OT-initiated  Antibiotics:  Perioperative completed  DVT Prophylaxis- TEDS/SCDs/Foot pumps  Yang-not needed per ortho  Case Coordination for Discharge Planning - Disposition per therapy recs.

## 2023-07-31 ENCOUNTER — HOSPITAL ENCOUNTER (INPATIENT)
Facility: REHABILITATION | Age: 88
End: 2023-07-31
Attending: PHYSICAL MEDICINE & REHABILITATION | Admitting: PHYSICAL MEDICINE & REHABILITATION
Payer: MEDICARE

## 2023-07-31 LAB
ALBUMIN SERPL BCP-MCNC: 2.9 G/DL (ref 3.2–4.9)
ALBUMIN/GLOB SERPL: 1.2 G/DL
ALP SERPL-CCNC: 46 U/L (ref 30–99)
ALT SERPL-CCNC: <5 U/L (ref 2–50)
ANION GAP SERPL CALC-SCNC: 8 MMOL/L (ref 7–16)
AST SERPL-CCNC: 20 U/L (ref 12–45)
BASOPHILS # BLD AUTO: 0.5 % (ref 0–1.8)
BASOPHILS # BLD: 0.04 K/UL (ref 0–0.12)
BILIRUB SERPL-MCNC: 0.4 MG/DL (ref 0.1–1.5)
BUN SERPL-MCNC: 20 MG/DL (ref 8–22)
CALCIUM ALBUM COR SERPL-MCNC: 9 MG/DL (ref 8.5–10.5)
CALCIUM SERPL-MCNC: 8.1 MG/DL (ref 8.5–10.5)
CHLORIDE SERPL-SCNC: 106 MMOL/L (ref 96–112)
CO2 SERPL-SCNC: 24 MMOL/L (ref 20–33)
CREAT SERPL-MCNC: 0.89 MG/DL (ref 0.5–1.4)
EOSINOPHIL # BLD AUTO: 0.14 K/UL (ref 0–0.51)
EOSINOPHIL NFR BLD: 1.8 % (ref 0–6.9)
ERYTHROCYTE [DISTWIDTH] IN BLOOD BY AUTOMATED COUNT: 47.8 FL (ref 35.9–50)
GFR SERPLBLD CREATININE-BSD FMLA CKD-EPI: 61 ML/MIN/1.73 M 2
GLOBULIN SER CALC-MCNC: 2.5 G/DL (ref 1.9–3.5)
GLUCOSE SERPL-MCNC: 114 MG/DL (ref 65–99)
HCT VFR BLD AUTO: 25.7 % (ref 37–47)
HGB BLD-MCNC: 8.5 G/DL (ref 12–16)
IMM GRANULOCYTES # BLD AUTO: 0.11 K/UL (ref 0–0.11)
IMM GRANULOCYTES NFR BLD AUTO: 1.4 % (ref 0–0.9)
LYMPHOCYTES # BLD AUTO: 0.99 K/UL (ref 1–4.8)
LYMPHOCYTES NFR BLD: 12.5 % (ref 22–41)
MAGNESIUM SERPL-MCNC: 2 MG/DL (ref 1.5–2.5)
MCH RBC QN AUTO: 29.7 PG (ref 27–33)
MCHC RBC AUTO-ENTMCNC: 33.1 G/DL (ref 32.2–35.5)
MCV RBC AUTO: 89.9 FL (ref 81.4–97.8)
MONOCYTES # BLD AUTO: 0.89 K/UL (ref 0–0.85)
MONOCYTES NFR BLD AUTO: 11.2 % (ref 0–13.4)
NEUTROPHILS # BLD AUTO: 5.78 K/UL (ref 1.82–7.42)
NEUTROPHILS NFR BLD: 72.6 % (ref 44–72)
NRBC # BLD AUTO: 0 K/UL
NRBC BLD-RTO: 0 /100 WBC (ref 0–0.2)
PHOSPHATE SERPL-MCNC: 1.7 MG/DL (ref 2.5–4.5)
PLATELET # BLD AUTO: 199 K/UL (ref 164–446)
PMV BLD AUTO: 9.7 FL (ref 9–12.9)
POTASSIUM SERPL-SCNC: 4.1 MMOL/L (ref 3.6–5.5)
PROT SERPL-MCNC: 5.4 G/DL (ref 6–8.2)
RBC # BLD AUTO: 2.86 M/UL (ref 4.2–5.4)
SODIUM SERPL-SCNC: 138 MMOL/L (ref 135–145)
WBC # BLD AUTO: 8 K/UL (ref 4.8–10.8)

## 2023-07-31 PROCEDURE — A9270 NON-COVERED ITEM OR SERVICE: HCPCS

## 2023-07-31 PROCEDURE — A9270 NON-COVERED ITEM OR SERVICE: HCPCS | Performed by: STUDENT IN AN ORGANIZED HEALTH CARE EDUCATION/TRAINING PROGRAM

## 2023-07-31 PROCEDURE — 36415 COLL VENOUS BLD VENIPUNCTURE: CPT

## 2023-07-31 PROCEDURE — 97535 SELF CARE MNGMENT TRAINING: CPT

## 2023-07-31 PROCEDURE — 700102 HCHG RX REV CODE 250 W/ 637 OVERRIDE(OP)

## 2023-07-31 PROCEDURE — 85025 COMPLETE CBC W/AUTO DIFF WBC: CPT

## 2023-07-31 PROCEDURE — 84100 ASSAY OF PHOSPHORUS: CPT

## 2023-07-31 PROCEDURE — 80053 COMPREHEN METABOLIC PANEL: CPT

## 2023-07-31 PROCEDURE — 700111 HCHG RX REV CODE 636 W/ 250 OVERRIDE (IP)

## 2023-07-31 PROCEDURE — 97530 THERAPEUTIC ACTIVITIES: CPT

## 2023-07-31 PROCEDURE — A9270 NON-COVERED ITEM OR SERVICE: HCPCS | Performed by: HOSPITALIST

## 2023-07-31 PROCEDURE — 97116 GAIT TRAINING THERAPY: CPT

## 2023-07-31 PROCEDURE — 770001 HCHG ROOM/CARE - MED/SURG/GYN PRIV*

## 2023-07-31 PROCEDURE — 83735 ASSAY OF MAGNESIUM: CPT

## 2023-07-31 PROCEDURE — 700102 HCHG RX REV CODE 250 W/ 637 OVERRIDE(OP): Performed by: HOSPITALIST

## 2023-07-31 PROCEDURE — 700102 HCHG RX REV CODE 250 W/ 637 OVERRIDE(OP): Performed by: STUDENT IN AN ORGANIZED HEALTH CARE EDUCATION/TRAINING PROGRAM

## 2023-07-31 PROCEDURE — 99232 SBSQ HOSP IP/OBS MODERATE 35: CPT | Mod: GC | Performed by: HOSPITALIST

## 2023-07-31 RX ADMIN — OXYCODONE HYDROCHLORIDE 10 MG: 10 TABLET ORAL at 14:05

## 2023-07-31 RX ADMIN — ENOXAPARIN SODIUM 30 MG: 100 INJECTION SUBCUTANEOUS at 17:11

## 2023-07-31 RX ADMIN — OMEPRAZOLE 40 MG: 20 CAPSULE, DELAYED RELEASE ORAL at 06:01

## 2023-07-31 RX ADMIN — CARVEDILOL 3.12 MG: 3.12 TABLET, FILM COATED ORAL at 17:08

## 2023-07-31 RX ADMIN — ACETAMINOPHEN 1000 MG: 500 TABLET, FILM COATED ORAL at 06:01

## 2023-07-31 RX ADMIN — ACETAMINOPHEN 1000 MG: 500 TABLET, FILM COATED ORAL at 13:00

## 2023-07-31 RX ADMIN — OXYCODONE HYDROCHLORIDE 10 MG: 10 TABLET ORAL at 08:01

## 2023-07-31 RX ADMIN — ACETAMINOPHEN 1000 MG: 500 TABLET, FILM COATED ORAL at 22:22

## 2023-07-31 RX ADMIN — POLYETHYLENE GLYCOL 3350 1 PACKET: 17 POWDER, FOR SOLUTION ORAL at 06:01

## 2023-07-31 RX ADMIN — ATORVASTATIN CALCIUM 20 MG: 20 TABLET, FILM COATED ORAL at 17:08

## 2023-07-31 RX ADMIN — SENNOSIDES AND DOCUSATE SODIUM 2 TABLET: 50; 8.6 TABLET ORAL at 06:01

## 2023-07-31 RX ADMIN — ENOXAPARIN SODIUM 30 MG: 100 INJECTION SUBCUTANEOUS at 08:03

## 2023-07-31 RX ADMIN — SENNOSIDES AND DOCUSATE SODIUM 2 TABLET: 50; 8.6 TABLET ORAL at 17:09

## 2023-07-31 RX ADMIN — ACETAMINOPHEN 1000 MG: 500 TABLET, FILM COATED ORAL at 17:07

## 2023-07-31 RX ADMIN — CARVEDILOL 3.12 MG: 3.12 TABLET, FILM COATED ORAL at 08:02

## 2023-07-31 RX ADMIN — SERTRALINE 100 MG: 100 TABLET, FILM COATED ORAL at 06:01

## 2023-07-31 RX ADMIN — MULTIPLE VITAMINS W/ MINERALS TAB 1 TABLET: TAB at 06:01

## 2023-07-31 RX ADMIN — TRAZODONE HYDROCHLORIDE 50 MG: 50 TABLET ORAL at 22:22

## 2023-07-31 ASSESSMENT — ENCOUNTER SYMPTOMS
FEVER: 0
DOUBLE VISION: 0
PALPITATIONS: 0
COUGH: 0
BLURRED VISION: 0
WHEEZING: 0
NAUSEA: 1
HEMOPTYSIS: 0
CHILLS: 0
EYE PAIN: 0

## 2023-07-31 ASSESSMENT — COGNITIVE AND FUNCTIONAL STATUS - GENERAL
DAILY ACTIVITIY SCORE: 16
MOVING TO AND FROM BED TO CHAIR: A LOT
MOBILITY SCORE: 8
MOBILITY SCORE: 12
CLIMB 3 TO 5 STEPS WITH RAILING: A LOT
WALKING IN HOSPITAL ROOM: A LOT
TURNING FROM BACK TO SIDE WHILE IN FLAT BAD: UNABLE
HELP NEEDED FOR BATHING: A LOT
MOVING TO AND FROM BED TO CHAIR: UNABLE
SUGGESTED CMS G CODE MODIFIER MOBILITY: CL
CLIMB 3 TO 5 STEPS WITH RAILING: TOTAL
SUGGESTED CMS G CODE MODIFIER MOBILITY: CM
PERSONAL GROOMING: A LITTLE
SUGGESTED CMS G CODE MODIFIER DAILY ACTIVITY: CK
TURNING FROM BACK TO SIDE WHILE IN FLAT BAD: A LOT
DRESSING REGULAR LOWER BODY CLOTHING: A LOT
STANDING UP FROM CHAIR USING ARMS: A LOT
WALKING IN HOSPITAL ROOM: A LOT
EATING MEALS: A LITTLE
SUGGESTED CMS G CODE MODIFIER DAILY ACTIVITY: CK
MOVING FROM LYING ON BACK TO SITTING ON SIDE OF FLAT BED: A LOT
HELP NEEDED FOR BATHING: A LOT
STANDING UP FROM CHAIR USING ARMS: A LOT
DAILY ACTIVITIY SCORE: 15
MOVING FROM LYING ON BACK TO SITTING ON SIDE OF FLAT BED: UNABLE
DRESSING REGULAR UPPER BODY CLOTHING: A LITTLE
TOILETING: A LOT
DRESSING REGULAR UPPER BODY CLOTHING: A LITTLE
PERSONAL GROOMING: A LITTLE
DRESSING REGULAR LOWER BODY CLOTHING: A LOT
TOILETING: A LOT

## 2023-07-31 ASSESSMENT — PAIN DESCRIPTION - PAIN TYPE
TYPE: ACUTE PAIN;SURGICAL PAIN

## 2023-07-31 ASSESSMENT — GAIT ASSESSMENTS
DISTANCE (FEET): 4
GAIT LEVEL OF ASSIST: MODERATE ASSIST
ASSISTIVE DEVICE: FRONT WHEEL WALKER
DEVIATION: BRADYKINETIC;SHUFFLED GAIT;DECREASED HEEL STRIKE;DECREASED TOE OFF;ANTALGIC

## 2023-07-31 NOTE — CARE PLAN
The patient is Stable - Low risk of patient condition declining or worsening    Shift Goals  Clinical Goals: pain control and inc mobility  Patient Goals: pain control  Family Goals: Not present    Progress made toward(s) clinical / shift goals:  Discussed POC with patient including need for blood transfusion. Discussed plan for pain interventions. Discussed repositioning and highly encouraged patient to get OOB though she was hesitant.     Patient is not progressing towards the following goals:      Problem: Pain - Standard  Goal: Alleviation of pain or a reduction in pain to the patient’s comfort goal of 4 by end of shift.   Outcome: Progressing     Problem: Knowledge Deficit - Standard  Goal: Patient and family/care givers will demonstrate understanding of plan of care, disease process/condition, diagnostic tests and medications, including blood transfusion, pain administration/ interventions, mobility.   Outcome: Progressing

## 2023-07-31 NOTE — CARE PLAN
The patient is Stable - Low risk of patient condition declining or worsening    Shift Goals  Clinical Goals: Pain control, Mobility  Patient Goals: Pain control  Family Goals: Not present    Progress made toward(s) clinical / shift goals:    Problem: Pain - Standard  Goal: Alleviation of pain or a reduction in pain to the patient’s comfort goal  Outcome: Progressing  Note: Patient educated on pain scale and to notify RN of pain. Medicated per MAR and repositioned        Problem: Knowledge Deficit - Standard  Goal: Patient and family/care givers will demonstrate understanding of plan of care, disease process/condition, diagnostic tests and medications  Outcome: Progressing  Note: Plan of care discussed, verbalized understanding. Questions answered       Problem: Skin Integrity  Goal: Skin integrity is maintained or improved  Outcome: Progressing     Problem: Fall Risk  Goal: Patient will remain free from falls  Outcome: Progressing       Patient is not progressing towards the following goals:

## 2023-07-31 NOTE — PROGRESS NOTES
Assumed care of patient. Assessment performed. Medication given for pain in left hip and groin. Purewick put in place and chucks changed. Patient has no concerns or complaints at this time. Patient instructed not to leave bed without assistance. Pleasant demeanor and cooperative. Hearing aids in place with  at the bedside .

## 2023-07-31 NOTE — CARE PLAN
The patient is Stable - Low risk of patient condition declining or worsening    Problem: Pain - Standard  Goal: Alleviation of pain or a reduction in pain to the patient’s comfort goal  Outcome: Progressing  Note: Patient's pain will be maintained at a tolerable level using medication as per MAR and other methods such as repositioning and ice. Patient has been educated on the use of pain scales and has been encouraged to voice concerns related to pain and discomfort.      Problem: Fall Risk  Goal: Patient will remain free from falls  Outcome: Progressing  Note: Patient will remain free from falls and other accidents. Bed alarm is in place and patient has been instructed not to mobilize without staff present. Patient uses call light and assistive devices appropriately.       Shift Goals  Clinical Goals: Pain control. mobility  Patient Goals: Pain control  Family Goals: Not present    Progress made toward(s) clinical / shift goals:  pain control has been effective, patient has not mobilized without staff and uses call light.     Patient is not progressing towards the following goals:

## 2023-07-31 NOTE — DISCHARGE PLANNING
Case Management Discharge Planning    Admission Date: 7/28/2023  GMLOS: 4.4  ALOS: 3    6-Clicks ADL Score: 16  6-Clicks Mobility Score: 12  PT and/or OT Eval ordered: Yes  Post-acute Referrals Ordered: Yes  Post-acute Choice Obtained: Yes  Has referral(s) been sent to post-acute provider:  Yes      Anticipated Discharge Dispo: Discharge Disposition: D/T to SNF with Medicare cert in anticipation of skilled care (03)  Discharge Address: [address] [place] [address] [place] [id number]    DME Needed: No    Action(s) Taken: DC Assessment Complete (See below)    LSW met with patient at bedside to complete dc assessment. Patient confirmed demographic information on facesheet. Patient reported daughter/Mala Alatorre as her DPOA. No ACP docs scanned into chart. Patient reported to live in a single story home with 15 steps to enter. Patient reported being independent with ADLs/IADLs prior to admission. Patient reported her daughter Mala resides in [place] and is on vacation in [place] until 08/09. Patient has her neighbors listed as emergency contacts as backup. Patient confirmed a hx of depression and denies a hx of substance use.     LSW discussed recommendations to dc to SNF. Patient is agreeable. LSW provided list of local SNFs. Patient to review and make a decision tomorrow.     No other CM needs identified at this time.     Escalations Completed: None    Medically Clear: No    Next Steps: Pending medical clearance/SNF placement     Barriers to Discharge: Medical clearance and Pending Placement    Is the patient up for discharge tomorrow: No

## 2023-07-31 NOTE — DISCHARGE PLANNING
[facility] Rehabilitation Transitional Care Coordination    Referral from: Dr Stevens  Insurance Provider on Facesheet: Medicare/[place]  Potential Rehab Diagnosis:  Hip fx    Chart review indicates patient may have on going medical management and may have therapy needs to possibly meet inpatient rehab facility criteria with the goal of returning to community.    D/C support: Lives alone, [address]     Physiatry consultation forwarded per protocol.     Per physiatry patient is not a candidate for IPR, recommending SNF placement. TCC will no longer follow.     Thank you for the referral.

## 2023-07-31 NOTE — PROGRESS NOTES
"     Orthopedic PA Progress Note    Interval changes:  Patient doing well.  LLE dressings changed  WBAT LLE  DVT Prophylaxis outpatient: ASA 81 mg PO QD x4 weeks  Cleared for DC from orthopedic standpoint pending therapy recs and medical optimization    ROS - Patient denies any new issues.  Denies any numbness or tingling. Pain well controlled.    /72   Pulse 87   Temp 36.6 °C (97.9 °F) (Temporal)   Resp 16   Ht 1.575 m (5' 2\")   Wt 71.7 kg (158 lb)   SpO2 93%     Patient seen and examined  No acute distress  Breathing non labored  RRR  LLE: Surgical dressing is clean, dry, and intact. Patient clearly fires tibialis anterior, EHL, and gastrocnemius/soleus. Sensation is intact to light touch throughout superficial peroneal, deep peroneal, tibial, saphenous, and sural nerve distributions. Strong and palpable 2+ dorsalis pedis and posterior tibial pulses with capillary refill less than 2 seconds.   Recent Labs     07/29/23  1455 07/30/23  0843 07/30/23  1603   WBC 12.6* 8.6 10.0   RBC 2.77* 2.44* 2.86*   HEMOGLOBIN 8.2* 7.1* 8.5*   HEMATOCRIT 25.8* 22.7* 26.0*   MCV 93.1 93.0 90.9   MCH 29.6 29.1 29.7   MCHC 31.8* 31.3* 32.7   RDW 47.3 47.9 46.8   PLATELETCT 222 190 187   MPV 10.2 9.8 9.8         Active Hospital Problems    Diagnosis     Anemia [D64.9]     Closed left hip fracture ([facility]) [S72.002A]     Arthritis [M19.90]     Stroke (cerebrum) ([facility]) [I63.9]     Mixed hyperlipidemia [E78.2]     Major depressive disorder [F32.9]        Assessment/Plan:  Patient doing well.  LLE dressings changed  WBAT LLE  DVT Prophylaxis outpatient: ASA 81 mg PO QD x4 weeks  Cleared for DC from orthopedic standpoint pending therapy recs and medical optimization    POD#2 S/p  Open treatment with intramedullary nailing, left  intertrochanteric femur fracture  Wt bearing status - WBAT LLE  Wound care/Drains - Dressings to be changed every other day by nursing. Or PRN for saturation starting POD#2  Future Procedures - None planned "   Lovenox: Start 7/29, Duration-until ambulatory > 150'  Sutures/Staples out- 14-21 days post operatively. Removal will completed by ortho ANTON's unless transferred.  PT/OT-initiated  Antibiotics:  Perioperative completed  DVT Prophylaxis- TEDS/SCDs/Foot pumps  Yang-not needed per ortho  Case Coordination for Discharge Planning - Disposition per therapy recs.

## 2023-07-31 NOTE — PROGRESS NOTES
[facility] Internal Medicine Daily Progress Note    Date of Service  7/30/2023    [facility] Team: [facility] Team   Attending: LARRY Gruber M.d.  Senior Resident: Dr. WILL Swain  Intern:  Dr. MARYJO Hooper  Contact Number: [phone]    Chief Complaint  Cecilia Alatorre is a 89 y.o. female admitted 7/28/2023 with left hip pain.    Hospital Course  Cecilia Alatorre is a 89 y.o. female who presented on 7/28/2023 with PMH of HTN, Major Depressive Disorder, Mixed Hyperlipidemia, GERD and Thoracic Aortic Ectasia who was brought because of left hip pain after a ground level fall. Patient arrived with external rotation and shortening of left leg. Going into surgery for left hip repair.    Interval Problem Update  -pt still endorses hip pain 9/10 with movement    -LR decreased to 42  -PRBC x1 given for postop anemia (Hgb = 7.1 --> 8.5)    I have discussed this patient's plan of care and discharge plan at IDT rounds today with Case Management, Nursing, Nursing leadership, and other members of the IDT team.    Consultants/Specialty  orthopedics    Code Status  Full Code    Disposition  Medically Cleared  I have placed the appropriate orders for post-discharge needs.    Review of Systems  ROS     Physical Exam  Temp:  [36.5 °C (97.7 °F)-36.7 °C (98.1 °F)] 36.6 °C (97.9 °F)  Pulse:  [85-91] 87  Resp:  [16-18] 16  BP: ()/(51-82) 101/72  SpO2:  [92 %-94 %] 93 %    -General: NAD, converses well  -Cardio: no murmurs or gallops  -Resp: lungs CTAB, symmetric expansion  -Abd: soft and nontender, no guarding or rebound  -Neuro: sensation and strength intact in the LLE    Fluids    Intake/Output Summary (Last 24 hours) at 7/30/2023 1719  Last data filed at 7/30/2023 0800  Gross per 24 hour   Intake 240 ml   Output --   Net 240 ml         Laboratory  Recent Labs     07/29/23  1455 07/30/23  0843 07/30/23  1603   WBC 12.6* 8.6 10.0   RBC 2.77* 2.44* 2.86*   HEMOGLOBIN 8.2* 7.1* 8.5*   HEMATOCRIT 25.8* 22.7* 26.0*   MCV 93.1 93.0 90.9    MCH 29.6 29.1 29.7   MCHC 31.8* 31.3* 32.7   RDW 47.3 47.9 46.8   PLATELETCT 222 190 187   MPV 10.2 9.8 9.8       Recent Labs     07/28/23  1135 07/29/23  0801 07/30/23  0843   SODIUM 141 139 139   POTASSIUM 3.4* 4.6 3.7   CHLORIDE 105 107 107   CO2 23 24 24   GLUCOSE 166* 151* 126*   BUN 26* 27* 25*   CREATININE 0.88 1.05 1.06   CALCIUM 9.5 8.4* 8.1*                     Imaging  DX-CHEST-LIMITED (1 VIEW)   Final Result      1.  Hypoinflation and possible mild pulmonary edema.   2.  No pneumonia or pneumothorax.      DX-FEMUR-2+ LEFT   Final Result      Comminuted and displaced LEFT proximal femur intratrochanteric fracture with varus angulation.      DX-PELVIS-1 OR 2 VIEWS   Final Result      1.  Comminuted intertrochanteric left femoral fracture.   2.  Osteopenia.      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 Pending PMR recs and stable Hgb.    * Closed left hip fracture (HCC)  Assessment & Plan  Patient had a ground level fall. Currently with left hip pain, external rotation and shortening of left leg.   - Ortho did surgery on 7/28  - LR 100mL/hr   - Pain management with opioids  - Started Lovenox ppx 30mg q12hrs      Anemia  Assessment & Plan  Hb dropped from 11.9 to 8.7  - Probably dilutional from IV fluids  - Will repeat h&h at 1400pm    Stroke (cerebrum) (HCC)  Assessment & Plan  Past medical history of stroke 20 years ago  - No focal or residual issues.    Arthritis  Assessment & Plan  Chronic Stable Disease  - On NSAID's at home  - Will be cover with pain medication      Mixed hyperlipidemia- (present on admission)  Assessment & Plan  Chronic Stable disease  - Continue Statin    Major depressive disorder  Assessment & Plan  Chronic Stable disease  - Continue Sertraline and Trazodone    Essential hypertension  Assessment & Plan  Chronic Stable  Disease  - Currently BP in 100/63  - Will hold Coreg if BP keeps dropping  - Consider IV Bolus if SBP<90         VTE prophylaxis: SCDs/TEDs and lovenox    I have performed a physical exam and reviewed and updated ROS and Plan today (7/30/2023). In review of yesterday's note (7/29/2023), there are no changes except as documented above.

## 2023-07-31 NOTE — THERAPY
"Occupational Therapy  Daily Treatment     Patient Name: Cecilia Alatorre  Age:  89 y.o., Sex:  female  Medical Record #: [id number]  Today's Date: 7/31/2023     Precautions  Precautions: Fall Risk, Weight Bearing As Tolerated Left Lower Extremity    Assessment    Pt motivated to work on getting onto BSC today. Needed assistance for weight shifting due to pain, but was able to get there with modA. Required assistance for dressing and pericare. Assisted back to recliner. Remains limited by weakness, fatigue, impaired balance, pain.    Plan    Treatment Plan Status: Continue Current Treatment Plan  Type of Treatment: Self Care / Activities of Daily Living, Adaptive Equipment, Neuro Re-Education / Balance, Therapeutic Exercises, Therapeutic Activity  Treatment Frequency: 4 Times per Week  Treatment Duration: Until Therapy Goals Met    DC Equipment Recommendations: Unable to determine at this time  Discharge Recommendations: Recommend post-acute placement for additional occupational therapy services prior to discharge home    Subjective    \"I can't use the bedpan, it's too uncomfortable.\"     Objective       07/31/23 1402   Cognition    Cognition / Consciousness WDL   Speech/ Communication Hard of Hearing   Level of Consciousness Alert   Comments pleasant and cooperative   Active ROM Upper Body   Active ROM Upper Body  WDL   Strength Upper Body   Upper Body Strength  X   Gross Strength Generalized Weakness, Equal Bilaterally.    Balance   Sitting Balance (Static) Fair   Sitting Balance (Dynamic) Fair -   Standing Balance (Static) Poor +   Standing Balance (Dynamic) Poor -   Weight Shift Sitting Fair   Weight Shift Standing Poor   Skilled Intervention Verbal Cuing;Tactile Cuing;Sequencing   Comments w/ FWW; assistance needed to help weight shift while standing   Bed Mobility    Comments in chair pre/post   Activities of Daily Living   Grooming Supervision;Seated   Lower Body Dressing Maximal Assist  (socks)   Toileting " Maximal Assist  (pericare)   Skilled Intervention Verbal Cuing;Tactile Cuing;Sequencing   How much help from another person does the patient currently need...   Putting on and taking off regular lower body clothing? 2   Bathing (including washing, rinsing, and drying)? 2   Toileting, which includes using a toilet, bedpan, or urinal? 2   Putting on and taking off regular upper body clothing? 3   Taking care of personal grooming such as brushing teeth? 3   Eating meals? 3   6 Clicks Daily Activity Score 15   Functional Mobility   Sit to Stand Moderate Assist   Toilet Transfers Moderate Assist  (BSC)   Transfer Method Stand Step   Mobility chair>BSC>Chair   Skilled Intervention Verbal Cuing;Tactile Cuing;Sequencing   Comments w/ FWW   Patient / Family Goals   Patient / Family Goal #1 to not hurt   Short Term Goals   Short Term Goal # 1 LB dressing with min A and AE PRN   Goal Outcome # 1 Goal not met   Short Term Goal # 2 BSC txf with min A   Goal Outcome # 2 Progressing as expected   Short Term Goal # 3 seated g/h with SPV using BUEs   Goal Outcome # 3 Progressing as expected   Short Term Goal # 4 toileting with min A   Goal Outcome # 4 Goal not met

## 2023-08-01 ENCOUNTER — PATIENT OUTREACH (OUTPATIENT)
Dept: SCHEDULING | Facility: IMAGING CENTER | Age: 88
End: 2023-08-01
Payer: MEDICARE

## 2023-08-01 VITALS
RESPIRATION RATE: 15 BRPM | BODY MASS INDEX: 29.08 KG/M2 | HEART RATE: 85 BPM | WEIGHT: 158 LBS | HEIGHT: 62 IN | TEMPERATURE: 98.1 F | SYSTOLIC BLOOD PRESSURE: 106 MMHG | DIASTOLIC BLOOD PRESSURE: 71 MMHG | OXYGEN SATURATION: 92 %

## 2023-08-01 LAB
ALBUMIN SERPL BCP-MCNC: 2.8 G/DL (ref 3.2–4.9)
ALBUMIN/GLOB SERPL: 1.1 G/DL
ALP SERPL-CCNC: 51 U/L (ref 30–99)
ALT SERPL-CCNC: 7 U/L (ref 2–50)
ANION GAP SERPL CALC-SCNC: 8 MMOL/L (ref 7–16)
AST SERPL-CCNC: 26 U/L (ref 12–45)
BILIRUB SERPL-MCNC: 0.4 MG/DL (ref 0.1–1.5)
BUN SERPL-MCNC: 17 MG/DL (ref 8–22)
CALCIUM ALBUM COR SERPL-MCNC: 9.2 MG/DL (ref 8.5–10.5)
CALCIUM SERPL-MCNC: 8.2 MG/DL (ref 8.5–10.5)
CHLORIDE SERPL-SCNC: 105 MMOL/L (ref 96–112)
CO2 SERPL-SCNC: 25 MMOL/L (ref 20–33)
CREAT SERPL-MCNC: 0.85 MG/DL (ref 0.5–1.4)
ERYTHROCYTE [DISTWIDTH] IN BLOOD BY AUTOMATED COUNT: 51.1 FL (ref 35.9–50)
GFR SERPLBLD CREATININE-BSD FMLA CKD-EPI: 65 ML/MIN/1.73 M 2
GLOBULIN SER CALC-MCNC: 2.5 G/DL (ref 1.9–3.5)
GLUCOSE SERPL-MCNC: 122 MG/DL (ref 65–99)
HCT VFR BLD AUTO: 25.3 % (ref 37–47)
HGB BLD-MCNC: 8 G/DL (ref 12–16)
MCH RBC QN AUTO: 29.9 PG (ref 27–33)
MCHC RBC AUTO-ENTMCNC: 31.6 G/DL (ref 32.2–35.5)
MCV RBC AUTO: 94.4 FL (ref 81.4–97.8)
PLATELET # BLD AUTO: 230 K/UL (ref 164–446)
PMV BLD AUTO: 9.5 FL (ref 9–12.9)
POTASSIUM SERPL-SCNC: 3.8 MMOL/L (ref 3.6–5.5)
PROT SERPL-MCNC: 5.3 G/DL (ref 6–8.2)
RBC # BLD AUTO: 2.68 M/UL (ref 4.2–5.4)
SODIUM SERPL-SCNC: 138 MMOL/L (ref 135–145)
WBC # BLD AUTO: 7.1 K/UL (ref 4.8–10.8)

## 2023-08-01 PROCEDURE — 99239 HOSP IP/OBS DSCHRG MGMT >30: CPT | Mod: GC | Performed by: INTERNAL MEDICINE

## 2023-08-01 PROCEDURE — 700105 HCHG RX REV CODE 258

## 2023-08-01 PROCEDURE — 700102 HCHG RX REV CODE 250 W/ 637 OVERRIDE(OP): Performed by: STUDENT IN AN ORGANIZED HEALTH CARE EDUCATION/TRAINING PROGRAM

## 2023-08-01 PROCEDURE — 700111 HCHG RX REV CODE 636 W/ 250 OVERRIDE (IP): Mod: JZ

## 2023-08-01 PROCEDURE — A9270 NON-COVERED ITEM OR SERVICE: HCPCS | Performed by: STUDENT IN AN ORGANIZED HEALTH CARE EDUCATION/TRAINING PROGRAM

## 2023-08-01 PROCEDURE — A9270 NON-COVERED ITEM OR SERVICE: HCPCS | Performed by: HOSPITALIST

## 2023-08-01 PROCEDURE — 700111 HCHG RX REV CODE 636 W/ 250 OVERRIDE (IP)

## 2023-08-01 PROCEDURE — 700102 HCHG RX REV CODE 250 W/ 637 OVERRIDE(OP)

## 2023-08-01 PROCEDURE — 700102 HCHG RX REV CODE 250 W/ 637 OVERRIDE(OP): Performed by: HOSPITALIST

## 2023-08-01 PROCEDURE — A9270 NON-COVERED ITEM OR SERVICE: HCPCS

## 2023-08-01 PROCEDURE — 36415 COLL VENOUS BLD VENIPUNCTURE: CPT

## 2023-08-01 PROCEDURE — 80053 COMPREHEN METABOLIC PANEL: CPT

## 2023-08-01 PROCEDURE — 700101 HCHG RX REV CODE 250

## 2023-08-01 PROCEDURE — 85027 COMPLETE CBC AUTOMATED: CPT

## 2023-08-01 RX ORDER — OXYCODONE HYDROCHLORIDE 10 MG/1
10 TABLET ORAL
Qty: 28 TABLET | Refills: 0 | Status: SHIPPED
Start: 2023-08-01 | End: 2023-08-01 | Stop reason: SDUPTHER

## 2023-08-01 RX ORDER — ASPIRIN 81 MG/1
81 TABLET ORAL DAILY
Status: DISCONTINUED | OUTPATIENT
Start: 2023-08-01 | End: 2023-08-01 | Stop reason: HOSPADM

## 2023-08-01 RX ORDER — OXYCODONE HYDROCHLORIDE 10 MG/1
10 TABLET ORAL
Qty: 28 TABLET | Refills: 0 | Status: SHIPPED | OUTPATIENT
Start: 2023-08-01 | End: 2023-08-05

## 2023-08-01 RX ORDER — ASPIRIN 81 MG/1
81 TABLET ORAL DAILY
Qty: 100 TABLET | Refills: 0 | Status: SHIPPED
Start: 2023-08-02 | End: 2023-09-12

## 2023-08-01 RX ORDER — CARBOXYMETHYLCELLULOSE SODIUM 5 MG/ML
1 SOLUTION/ DROPS OPHTHALMIC PRN
Qty: 15 ML | Refills: 0 | Status: SHIPPED | OUTPATIENT
Start: 2023-08-01 | End: 2023-09-27

## 2023-08-01 RX ORDER — ACETAMINOPHEN 500 MG
1000 TABLET ORAL EVERY 6 HOURS
Qty: 30 TABLET | Refills: 0 | Status: SHIPPED
Start: 2023-08-01 | End: 2024-03-15

## 2023-08-01 RX ORDER — FUROSEMIDE 10 MG/ML
40 INJECTION INTRAMUSCULAR; INTRAVENOUS ONCE
Status: COMPLETED | OUTPATIENT
Start: 2023-08-01 | End: 2023-08-01

## 2023-08-01 RX ADMIN — SENNOSIDES AND DOCUSATE SODIUM 2 TABLET: 50; 8.6 TABLET ORAL at 05:14

## 2023-08-01 RX ADMIN — MULTIPLE VITAMINS W/ MINERALS TAB 1 TABLET: TAB at 05:14

## 2023-08-01 RX ADMIN — POTASSIUM PHOSPHATE, MONOBASIC AND POTASSIUM PHOSPHATE, DIBASIC 15 MMOL: 224; 236 INJECTION, SOLUTION, CONCENTRATE INTRAVENOUS at 09:54

## 2023-08-01 RX ADMIN — ACETAMINOPHEN 1000 MG: 500 TABLET, FILM COATED ORAL at 05:14

## 2023-08-01 RX ADMIN — CARVEDILOL 3.12 MG: 3.12 TABLET, FILM COATED ORAL at 07:55

## 2023-08-01 RX ADMIN — ENOXAPARIN SODIUM 30 MG: 100 INJECTION SUBCUTANEOUS at 05:14

## 2023-08-01 RX ADMIN — OXYCODONE HYDROCHLORIDE 10 MG: 10 TABLET ORAL at 09:14

## 2023-08-01 RX ADMIN — SERTRALINE 100 MG: 100 TABLET, FILM COATED ORAL at 05:14

## 2023-08-01 RX ADMIN — FUROSEMIDE 40 MG: 10 INJECTION INTRAMUSCULAR; INTRAVENOUS at 09:53

## 2023-08-01 RX ADMIN — ACETAMINOPHEN 1000 MG: 500 TABLET, FILM COATED ORAL at 12:46

## 2023-08-01 RX ADMIN — OMEPRAZOLE 40 MG: 20 CAPSULE, DELAYED RELEASE ORAL at 05:14

## 2023-08-01 RX ADMIN — OXYCODONE HYDROCHLORIDE 10 MG: 10 TABLET ORAL at 12:46

## 2023-08-01 RX ADMIN — ASPIRIN 81 MG: 81 TABLET, COATED ORAL at 09:53

## 2023-08-01 ASSESSMENT — PAIN DESCRIPTION - PAIN TYPE
TYPE: ACUTE PAIN;SURGICAL PAIN

## 2023-08-01 NOTE — DISCHARGE PLANNING
Followed up with pt for SNF choice, medical team present. Pt states she has not done research on SNFs but [facility] is closest to her home. Gave verbal consent to refer to all local SNFs.

## 2023-08-01 NOTE — DISCHARGE PLANNING
DC Transport Scheduled    Received request at: 1133    Transport Company Scheduled:  [employer]    Scheduled Date: 8/1/2023  Scheduled Time: 1500    Destination: [facility]  [address] [place]     Notified care team of scheduled transport via [employer].     If there are any changes needed to the DC transportation scheduled, please contact [facility] at ext. [phone] between the hours of 3103-9712 Mon-Fri. If outside those hours, contact the ED Case Manager at ext. [phone].

## 2023-08-01 NOTE — THERAPY
Physical Therapy   Daily Treatment     Patient Name: Cecilia Alatorre  Age:  89 y.o., Sex:  female  Medical Record #: [id number]  Today's Date: 7/31/2023     Precautions  Precautions: Fall Risk;Weight Bearing As Tolerated Left Lower Extremity    Assessment    Pt required mod A with all mobility today due to weakness, pain and decreased activity tolerance. CNA assisted pt with pericare while standing. Very slow from chair to bed, limited amb distance due to pain. Pt will continue to benefit from acute physical therapy to assist towards established goals. Anticipate pt will benefit from post acute placement upon DC from hospital. Pt agreed.          Plan    Treatment Plan Status: Continue Current Treatment Plan  Type of Treatment: Bed Mobility, Gait Training, Neuro Re-Education / Balance, Self Care / Home Evaluation, Stair Training, Therapeutic Activities, Therapeutic Exercise  Treatment Frequency: 4 Times per Week  Treatment Duration: Until Therapy Goals Met    DC Equipment Recommendations: Unable to determine at this time (probably FWW)  Discharge Recommendations: Recommend post-acute placement for additional physical therapy services prior to discharge home      Subjective    Up in recliner, asleep. Woke to her name. Pt ready for back to bed.     Objective       07/31/23 1552   Precautions   Precautions Fall Risk;Weight Bearing As Tolerated Left Lower Extremity   Vitals   O2 (LPM) 1   O2 Delivery Device Silicone Nasal Cannula   Pain 0 - 10 Group   Location Hip   Location Orientation Left;Lateral   Therapist Pain Assessment Post Activity Pain Same as Prior to Activity;Nurse Notified   Cognition    Cognition / Consciousness WDL   Speech/ Communication Hard of Hearing   Level of Consciousness Alert   Other Treatments   Other Treatments Provided chair>stood for pericare at FWW, transfer to EOB, back to bed   Balance   Sitting Balance (Static) Fair +   Sitting Balance (Dynamic) Fair   Standing Balance (Static) Fair -    Standing Balance (Dynamic) Fair -   Weight Shift Sitting Fair   Weight Shift Standing Poor   Skilled Intervention Verbal Cuing;Tactile Cuing   Comments standing assessed with FWW, heavy reliance on FWW with UEs, forward flexed posture with initial STS and required assitance for upright posture   Bed Mobility    Sit to Supine Moderate Assist   Scooting Moderate Assist  (pt able to scoot in chair and at EOB with CGA, mod Ax2 to scoot up in bed)   Skilled Intervention Verbal Cuing;Facilitation   Gait Analysis   Gait Level Of Assist Moderate Assist   Assistive Device Front Wheel Walker   Distance (Feet) 4   Deviation Bradykinetic;Shuffled Gait;Decreased Heel Strike;Decreased Toe Off;Antalgic  (very slow, required extra time, multiple standing rests with FWW)   Weight Bearing Status WBAT L LE   Skilled Intervention Verbal Cuing;Tactile Cuing;Facilitation;Postural Facilitation   Functional Mobility   Sit to Stand Moderate Assist   Bed, Chair, Wheelchair Transfer Moderate Assist  (CGA to EOB)   Transfer Method Stand Step   Mobility with FWW   Skilled Intervention Postural Facilitation;Tactile Cuing;Verbal Cuing;Facilitation   Comments required 2 attempts with sit to stand from chair   How much difficulty does the patient currently have...   Turning over in bed (including adjusting bedclothes, sheets and blankets)? 1   Sitting down on and standing up from a chair with arms (e.g., wheelchair, bedside commode, etc.) 1   Moving from lying on back to sitting on the side of the bed? 1   How much help from another person does the patient currently need...   Moving to and from a bed to a chair (including a wheelchair)? 2   Need to walk in a hospital room? 2   Climbing 3-5 steps with a railing? 1   6 clicks Mobility Score 8   Activity Tolerance   Sitting in Chair >1 hour per nurse   Comments mobility limited by pain   Short Term Goals    Short Term Goal # 1 pt will move supine<>eob with spv in 6 tx for bed mobility.   Goal  Outcome # 1 Progressing as expected   Short Term Goal # 2 pt will complete spt with fww and spv in 6 tx for functional mobility.   Goal Outcome # 2 Progressing as expected   Short Term Goal # 3 pt will ambulate 150 ft with fww and spv in 6 tx for household distances.   Goal Outcome # 3 Progressing as expected   Short Term Goal # 4 pt will negotiate 5 steps with min a in 6 tx for home access.   Goal Outcome # 4 Goal not met   Education Group   Education Provided Role of Physical Therapist;Gait Training;Use of Assistive Device;Weight Bearing Precautions   Role of Physical Therapist Patient Response Patient;Acceptance;Explanation;Verbal Demonstration   Gait Training Patient Response Patient;Acceptance;Explanation;Action Demonstration   Use of Assistive Device Patient Response Patient;Acceptance;Demonstration;Action Demonstration;Explanation   Weight Bearing Precautions Patient Response Patient;Nonacceptance;Explanation;Verbal Demonstration   Physical Therapy Treatment Plan   Physical Therapy Treatment Plan Continue Current Treatment Plan   Treatment Plan  Bed Mobility;Gait Training;Neuro Re-Education / Balance;Self Care / Home Evaluation;Stair Training;Therapeutic Activities;Therapeutic Exercise   Treatment Frequency 4 Times per Week   Duration Until Therapy Goals Met   Anticipated Discharge Equipment and Recommendations   DC Equipment Recommendations Unable to determine at this time  (probably FWW)   Discharge Recommendations Recommend post-acute placement for additional physical therapy services prior to discharge home   Interdisciplinary Plan of Care Collaboration   IDT Collaboration with  Nursing   Patient Position at End of Therapy In Bed;Tray Table within Reach;Call Light within Reach;Phone within Reach

## 2023-08-01 NOTE — PROGRESS NOTES
Assumed care of patient this AM. Assessment performed. Medicated for pain as per MAR. Purewick in place and lolita care completed in AM. Planning on discharge today with SNF after decision and approvals completed. Physicians in this AM, plan made to get excess fluid off lungs.    Patient up to chair after lunch. Brushed teeth. Neighbors/friends in to visit for a couple of hours. Steady with walker and two person assist. Reports urinary urgency. No other needs at this time.

## 2023-08-01 NOTE — DISCHARGE PLANNING
Received Choice form @: [id number]  Agency/Facility Name: [facility]   Referral sent per Choice form @: [id number]

## 2023-08-01 NOTE — PROGRESS NOTES
"     Orthopedic PA Progress Note    Interval changes:  Patient doing well.  LLE dressings changed  WBAT LLE  DVT Prophylaxis outpatient: ASA 81 mg PO QD x4 weeks  Cleared for DC from orthopedic standpoint pending therapy recs and medical optimization    ROS - Patient denies any new issues.  Denies any numbness or tingling. Pain well controlled.    /83   Pulse 87   Temp 36.5 °C (97.7 °F) (Temporal)   Resp 16   Ht 1.575 m (5' 2\")   Wt 71.7 kg (158 lb)   SpO2 91%     Patient seen and examined  No acute distress  Breathing non labored  RRR  LLE: Dressing is clean, dry, and intact. Patient clearly fires tibialis anterior, EHL, and gastrocnemius/soleus. Sensation is intact to light touch throughout superficial peroneal, deep peroneal, tibial, saphenous, and sural nerve distributions. Strong and palpable 2+ dorsalis pedis and posterior tibial pulses with capillary refill less than 2 seconds.   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  LLE dressings changed  WBAT LLE  DVT Prophylaxis outpatient: ASA 81 mg PO QD x4 weeks  Cleared for DC from orthopedic standpoint pending therapy recs and medical optimization    POD#3 S/p  Open treatment with intramedullary nailing, left  intertrochanteric femur fracture  Wt bearing status - WBAT LLE  Wound care/Drains - Dressings to be changed every other day by nursing. Or PRN for saturation starting POD#2  Future Procedures - None planned   Lovenox: " Start 7/29, Duration-until ambulatory > 150'  Sutures/Staples out- 14-21 days post operatively. Removal will completed by ortho ANTON's unless transferred.  PT/OT-initiated  Antibiotics:  Perioperative completed  DVT Prophylaxis- TEDS/SCDs/Foot pumps  Yang-not needed per ortho  Case Coordination for Discharge Planning - Disposition per therapy recs.

## 2023-08-01 NOTE — DISCHARGE SUMMARY
[facility] Internal Medicine Discharge Summary    Attending: Kartik Naranjo M.d.  Senior Resident: Dr. Jenniffer Walker MD  Intern:  Dr. Ruben Prater MD  Contact Number: [phone]    CHIEF COMPLAINT ON ADMISSION  Chief Complaint   Patient presents with    T-5000 FALL    Hip Pain     left       Reason for Admission  ems     Admission Date  7/28/2023    Discharge Date  08/01/23     CODE STATUS  Full Code    HPI & HOSPITAL COURSE  Cecilia Alatorre is a 89 y.o. female who presented on 7/28/2023 with PMH of HTN, Major Depressive Disorder, Mixed Hyperlipidemia, GERD and Thoracic Aortic Ectasia who was brought because of left hip pain after a ground level fall. Patient arrived with external rotation and shortening of the left leg. She was found to have  a left intertrochanteric femur fracture. Orthopedics was consulted and she underwent open reduction with intramedullary nailing the same day. Surgery was uncomplicated, however over the course of the next several days her hemoglobin decreased from 11 to 7.1 and she was transfused 1U pRBCs for post-op anemia. Her hemoglobin was subsequently monitored and she was stable, so DVT prophylaxis was continued (orthopedics recommending 4 weeks of DVT prophylaxis with aspirin). Pain has been well-controlled with Tylenol and oxycodone as needed.     Therefore, she is discharged in fair and stable condition to skilled nursing facility.    The patient met 2-midnight criteria for an inpatient stay at the time of discharge.      Physical Exam on Day of Discharge  Physical Exam  Vitals reviewed.   Constitutional:       General: She is not in acute distress.     Appearance: She is not ill-appearing, toxic-appearing or diaphoretic.   HENT:      Head: Normocephalic and atraumatic.      Mouth/Throat:      Mouth: Mucous membranes are moist.      Pharynx: Oropharynx is clear.   Eyes:      Extraocular Movements: Extraocular movements intact.   Cardiovascular:      Rate and Rhythm: Normal rate and  regular rhythm.      Heart sounds: No murmur heard.  Pulmonary:      Effort: Pulmonary effort is normal.      Breath sounds: Rales present.   Abdominal:      General: Abdomen is flat. Bowel sounds are normal.      Palpations: Abdomen is soft.   Musculoskeletal:      Comments: R thigh somewhat edematous  Bandages to left lateral thigh without oozing or drainage  Tenderness to palpation of surgical area   Neurological:      General: No focal deficit present.      Mental Status: She is alert and oriented to person, place, and time.   Psychiatric:         Mood and Affect: Mood normal.         Behavior: Behavior normal.         FOLLOW UP ITEMS POST DISCHARGE  #Left displaced IT femur fracture s/p IMN 7/28  -Weight bearing as tolerated to the left lower extremity  -PT/OT  mobilization  -DVT prophylaxis with aspirin 81 mg daily for 4 weeks (end 8/29/23)  -Follow up orthopedics 2 weeks post-op (around 8/11/23) with Dr. Hardy Perry (staple removal at that time)  -Pain control with as needed oxycodone and Tylenol    #Post-op anemia  -Recommend repeat CBC around 8/2/23 or 8/3/23 to ensure stability (8.0 on day of discharge)    DISCHARGE DIAGNOSES  Principal Problem:    Closed left hip fracture (HCC) (POA: Unknown)  Active Problems:    Major depressive disorder (POA: Unknown)    Mixed hyperlipidemia (POA: Yes)    Arthritis (POA: Unknown)    Stroke (cerebrum) (HCC) (POA: Unknown)    Anemia (POA: Unknown)  Resolved Problems:    * No resolved hospital problems. *      FOLLOW UP  Future Appointments   Date Time Provider Department Center   9/21/2023  2:00 PM Luz Contreras M.D. [facility] SHAHZAD Contreras M.D.  [address]  [facility] [address] [place] [id number]  [phone]    Follow up  Please call your primary care provider to schedule a hospital follow up. Thank you.    [facility]  [address] [address]  [phone]          MEDICATIONS ON DISCHARGE     Medication List         START taking these medications        Instructions   acetaminophen 500 MG Tabs  Commonly known as: Tylenol   Take 2 Tablets by mouth every 6 hours.  Dose: 1,000 mg     aspirin 81 MG EC tablet  Start taking on: August 2, 2023   Take 1 Tablet by mouth every day for 41 days.  Dose: 81 mg     carboxymethylcellulose 0.5 % Soln  Commonly known as: Refresh Tears   Administer 1 Drop into both eyes as needed (dry eyes).  Dose: 1 Drop     oxyCODONE immediate release 10 MG immediate release tablet  Commonly known as: Roxicodone   Take 1 Tablet by mouth every 3 hours as needed for Severe Pain for up to 4 days.  Dose: 10 mg            CHANGE how you take these medications        Instructions   atorvastatin 20 MG Tabs  What changed: when to take this  Commonly known as: Lipitor   TAKE 1 TABLET DAILY     Dexlansoprazole 60 MG Cpdr delayed-release capsule  What changed:   how much to take  how to take this  when to take this  Commonly known as: Dexilant   TAKE 1 CAPSULE EVERY       MORNING BEFORE BREAKFAST   FOR GASTROESOPHAGEAL REFLUXDISEASE            CONTINUE taking these medications        Instructions   carvedilol 3.125 MG Tabs  Commonly known as: Coreg   TAKE 1 TABLET TWICE DAILY  WITH MEALS     PreserVision AREDS Caps   Take 1 Capsule by mouth every day.  Dose: 1 Capsule     sertraline 100 MG Tabs  Commonly known as: Zoloft   Take 1 Tablet by mouth every day.  Dose: 100 mg     traZODone 50 MG Tabs  Commonly known as: Desyrel   Take 1 Tablet by mouth every evening.  Dose: 50 mg     Xiidra 5 % Soln  Generic drug: Lifitegrast   Administer 1 Drop into both eyes 2 times a day.  Dose: 1 Drop              Allergies  Allergies   Allergen Reactions    Codeine Unspecified     Mental confusion    Latex Unspecified     Only to adhesive bandaids and tape; rash, redness, itching    Lisinopril Cough    Other Drug Unspecified     Steroids raise blood pressure and blood sugar    [place] Two-Sided Adhesive Strap [Always Feminine Wipes] Rash      Rash-Paper tape OK    Soap Rash and Itching     Bathing and laundry soap cause itching and rash non scented soap OK    Tape Rash     Rash-Paper tape OK       DIET  Orders Placed This Encounter   Procedures    Diet Order Diet: Regular     Standing Status:   Standing     Number of Occurrences:   1     Order Specific Question:   Diet:     Answer:   Regular [1]       ACTIVITY  As tolerated and directed by skilled nursing.  Weight bearing as tolerated to left leg    CONSULTATIONS  Orthopedic surgery: Hardy Perry MD  PM&R: Jose Rodriguez MD    PROCEDURES  7/28/23: Open treatment with intramedullary nailing, left intertrochanteric femur fracture    LABORATORY  Lab Results   Component Value Date    SODIUM 138 08/01/2023    POTASSIUM 3.8 08/01/2023    CHLORIDE 105 08/01/2023    CO2 25 08/01/2023    GLUCOSE 122 (H) 08/01/2023    BUN 17 08/01/2023    CREATININE 0.85 08/01/2023        Lab Results   Component Value Date    WBC 7.1 08/01/2023    HEMOGLOBIN 8.0 (L) 08/01/2023    HEMATOCRIT 25.3 (L) 08/01/2023    PLATELETCT 230 08/01/2023        Total time of the discharge process: 45minutes.

## 2023-08-01 NOTE — DISCHARGE PLANNING
Case Management Discharge Planning    Admission Date: 7/28/2023  GMLOS: 4.4  ALOS: 4    6-Clicks ADL Score: 15  6-Clicks Mobility Score: 8  PT and/or OT Eval ordered: Yes  Post-acute Referrals Ordered: Yes  Post-acute Choice Obtained: Yes  Has referral(s) been sent to post-acute provider:  Yes      Anticipated Discharge Dispo: Discharge Disposition: D/T to SNF with Medicare cert in anticipation of skilled care (03)  Discharge Address: [address] [place] [address] [place] [id number]    DME Needed: No    Action(s) Taken: Updated Provider/Nurse on Discharge Plan    Escalations Completed: None    Medically Clear: Yes    Next Steps: Pt accepted to [facility] and scheduled for transport via [facility] at 1500. Pt and daughter aware of plan and agree.    Barriers to Discharge: None    Is the patient up for discharge tomorrow: No

## 2023-08-01 NOTE — DISCHARGE INSTRUCTIONS
Discharge Instructions:    ACTIVITY: LEFT LEG:   WEIGHT BEARING AS TOLERATED    When you stand or walk, place only as much weight as feels comfortable on your injured leg.  Let pain be your guide. If you feel pain, place less weight on the leg.     ASSISTED DEVICES: You are being discharged with the following special equipment:  Not Applicable    Physical Therapy:  If admitted to a skilled care facility, the patient should continue working with physical therapy on gait training and ambulation and should ambulate a minimum of 3 times daily with nursing or therapy.  If at home, home health or family can assist with ambulation.    Wound Care:  The patient should keep the wound clean and dry.  Daily dry gauze dressing changes should be performed until the wound is entirely dry and may continue to be performed as desired for comfort.  Your wound has been closed with staples, which will be removed 10-14 days after surgery.  Any new drainage from the wound, drainage lasting longer than 3 days post-operatively, wound opening/dehiscence, increased erythema, or other wound concerns should prompt an immediate call to the operative surgeon.  Please DO NOT initiate antibiotics or wound packing for wound concerns without discussing this with the surgeon.    DVT Prophylaxis:  After your injury/surgery, you are at increased risk for the development of deep vein thrombosis (blood clot of deep leg veins) or a pulmonary embolism (blood clot in the lung).      To help prevent these complications, you will be discharged on aspirin 81 mg, once daily.     Medication Refills:  Pay attention to the number of medications - particularly pain medications - you have remaining.

## 2023-08-01 NOTE — CARE PLAN
The patient is Stable - Low risk of patient condition declining or worsening    Problem: Pain - Standard  Goal: Alleviation of pain or a reduction in pain to the patient’s comfort goal  Outcome: Progressing  Note: Pain will be maintained at a tolerable level using medication as per MAR and other methods such as repositioning, ice, and distraction, Patient has been educated in the use of pain scales and pain medications. Encouraged to voice concerns related to pain and discomfort.      Problem: Knowledge Deficit - Standard  Goal: Patient and family/care givers will demonstrate understanding of plan of care, disease process/condition, diagnostic tests and medications  Outcome: Progressing  Note: Patient will verbalize understanding of education provided on safety, medications, mobility, and other nursing activities. Patient has been instructed in the use of the call light and not to mobilize without staff present. She has been encouraged to voice questions, comments, and concerns related to the plan of care.     Problem: Skin Integrity  Goal: Skin integrity is maintained or improved  Outcome: Progressing  Note: Areas at high risk for skin breakdown has been assessed. Patient has been educated on the importance of mobility and frequent repositioning in the prevention of pressure injuries.      Problem: Fall Risk  Goal: Patient will remain free from falls  Outcome: Progressing  Note: Patient will remain free from falls and other accidents. Patient has been educated on fall safety. Has fall risk band, bed alarm, and treaded socks. Appropriate safety devices such as front wheeled walker are available and being used effectively.      Shift Goals  Clinical Goals: Pain control, Mobility  Patient Goals: Pain control,  Family Goals: Not present    Progress made toward(s) clinical / shift goals:  mobility achieved today, calls for help, uses safety devices, pain managed effectively.     Patient is not progressing towards the  following goals:

## 2023-08-01 NOTE — PROGRESS NOTES
89yoF with left displaced IT femur fracture s/p IMN 7/28.    S: some pain in hip but says she is doing better overall    O:    Vitals:    08/01/23 0750   BP: 106/71   Pulse: 85   Resp: 15   Temp: 36.7 °C (98.1 °F)   SpO2: 92%     Exam:  General-NAD, alert and following commands, hard of hearing  LLE-hip dressing c/d/I, palp dp pulse, flexing/extending toes    A: 89yoF with left displaced IT femur fracture s/p IMN 7/28.    Recs:  --WBAT LLE  --PT/OT for mobilization   --continue DVT prophylaxis  --fu 2 weeks postop

## 2023-08-01 NOTE — CARE PLAN
The patient is Stable - Low risk of patient condition declining or worsening    Shift Goals  Clinical Goals: Pain control, Mobility  Patient Goals: Pain control,  Family Goals: Not present    Progress made toward(s) clinical / shift goals:    Problem: Pain - Standard  Goal: Alleviation of pain or a reduction in pain to the patient’s comfort goal  Outcome: Progressing  Note: Patient educated on pain scale and to notify RN of pain. Medicated per MAR and repositioned        Problem: Knowledge Deficit - Standard  Goal: Patient and family/care givers will demonstrate understanding of plan of care, disease process/condition, diagnostic tests and medications  Outcome: Progressing  Note: Plan of care discussed, verbalized understanding. Questions answered       Problem: Skin Integrity  Goal: Skin integrity is maintained or improved  Outcome: Progressing     Problem: Fall Risk  Goal: Patient will remain free from falls  Outcome: Progressing       Patient is not progressing towards the following goals:

## 2023-08-01 NOTE — PROGRESS NOTES
[facility] Internal Medicine Daily Progress Note    Date of Service  7/31/2023    [facility] Team: [facility]   Attending: LARRY Gruber M.d.  Senior Resident: Dr. Walker   Intern:  Dr. Moi MARTINEZ  Contact Number: [phone]    Chief Complaint  Cecilia Alatorre is a 89 y.o. female admitted 7/28/2023 with left hip pain after GLF.    Hospital Course  Cecilia Alatorre is a 89 y.o. female who presented on 7/28/2023 with PMH of HTN, Major Depressive Disorder, Mixed Hyperlipidemia, GERD and Thoracic Aortic Ectasia who was brought because of left hip pain after a ground level fall. Patient arrived with external rotation and shortening of left leg. Open treatment with intramedullary nailing, left intertrochanteric femur fracture.    Interval Problem Update  No acute event reported overnight. Pt reported mild nausea in the morning which was resolved. Cramps likely due to low calcium. PT/OT in place. Waiting on 6 click mobility score to decide lovenox Vs Aspirin.     I have discussed this patient's plan of care and discharge plan at IDT rounds today with Case Management, Nursing, Nursing leadership, and other members of the IDT team.    Consultants/Specialty  orthopedics    Code Status  Full Code    Disposition  The patient is not medically cleared for discharge to home or a post-acute facility.      I have placed the appropriate orders for post-discharge needs.    Review of Systems  Review of Systems   Constitutional:  Negative for chills and fever.   Eyes:  Negative for blurred vision, double vision and pain.   Respiratory:  Negative for cough, hemoptysis and wheezing.    Cardiovascular:  Negative for chest pain and palpitations.   Gastrointestinal:  Positive for nausea.   Musculoskeletal:         Cramps in leg   Skin:  Negative for rash.        Physical Exam  Temp:  [36.5 °C (97.7 °F)-36.9 °C (98.4 °F)] 36.5 °C (97.7 °F)  Pulse:  [80-93] 80  Resp:  [16] 16  BP: (111-129)/(67-91) 115/91  SpO2:  [91 %-96 %] 91  %    -General: Alert,, oriented.  -Cardio: no murmurs or gallops  -Resp: lungs CTAB, symmetric expansion  -Abd: soft and nontender, no guarding or rebound  -Neuro: sensation and strength intact in the LLE  Musculoskeltal: Mild tenderness in right thigh    Fluids  No intake or output data in the 24 hours ending 07/31/23 1818      Laboratory  Recent Labs     07/30/23  0843 07/30/23  1603 07/31/23  0710   WBC 8.6 10.0 8.0   RBC 2.44* 2.86* 2.86*   HEMOGLOBIN 7.1* 8.5* 8.5*   HEMATOCRIT 22.7* 26.0* 25.7*   MCV 93.0 90.9 89.9   MCH 29.1 29.7 29.7   MCHC 31.3* 32.7 33.1   RDW 47.9 46.8 47.8   PLATELETCT 190 187 199   MPV 9.8 9.8 9.7       Recent Labs     07/29/23  0801 07/30/23  0843 07/31/23  0710   SODIUM 139 139 138   POTASSIUM 4.6 3.7 4.1   CHLORIDE 107 107 106   CO2 24 24 24   GLUCOSE 151* 126* 114*   BUN 27* 25* 20   CREATININE 1.05 1.06 0.89   CALCIUM 8.4* 8.1* 8.1*                     Imaging  DX-FEMUR-2+ LEFT   Final Result      Digitized intraoperative radiograph is submitted for review. This examination is not for diagnostic purpose but for guidance during a surgical procedure. Please see the patient's chart for full procedural details.         INTERPRETING LOCATION: [address] [place] [address], [id number]      DX-PORTABLE FLUORO > 1 HOUR   Final Result      Portable fluoroscopy utilized for 45 seconds.         INTERPRETING LOCATION: [address] [place] [address], [address]      DX-CHEST-LIMITED (1 VIEW)   Final Result      1.  Hypoinflation and possible mild pulmonary edema.   2.  No pneumonia or pneumothorax.      DX-FEMUR-2+ LEFT   Final Result      Comminuted and displaced LEFT proximal femur intratrochanteric fracture with varus angulation.      DX-PELVIS-1 OR 2 VIEWS   Final Result      1.  Comminuted intertrochanteric left femoral fracture.   2.  Osteopenia.             Assessment/Plan  Problem Representation:    89F with a h/o R hip replacement who presented with a L hip fx. Pending PMR recs and stable Hgb.    * Closed left  hip fracture (HCC)  Assessment & Plan  -s/p L hip IM nail on 7/29  -LR = 42      Anemia  Assessment & Plan  Hb stable on 8.5   Continue to monitor    Stroke (cerebrum) (HCC)  Assessment & Plan  Past medical history of stroke 20 years ago  - No focal or residual issues.    Arthritis  Assessment & Plan  Chronic Stable Disease  - On NSAID's at home  - Will be cover with pain medication      Mixed hyperlipidemia- (present on admission)  Assessment & Plan  Chronic Stable disease  - Continue Statin    Major depressive disorder  Assessment & Plan  Chronic Stable disease  - Continue Sertraline and Trazodone    Essential hypertension  Assessment & Plan  Chronic Stable Disease  - Currently BP in 100/63  - Will hold Coreg if BP keeps dropping  - Consider IV Bolus if SBP<90         VTE prophylaxis: SCDs/TEDs and lovenox    I have performed a physical exam and reviewed and updated ROS and Plan today (7/31/2023). In review of yesterday's note (7/30/2023), there are no changes except as documented above.

## 2023-08-01 NOTE — PROGRESS NOTES
"     Orthopedic PA Progress Note    Interval changes:  Patient doing well.  LLE dressings CDI  WBAT LLE  DVT Prophylaxis outpatient: ASA 81 mg PO QD x4 weeks  Cleared for DC from orthopedic standpoint pending therapy recs and medical optimization    ROS - Patient denies any new issues.  Denies any numbness or tingling. Pain well controlled.    /71   Pulse 85   Temp 36.7 °C (98.1 °F) (Temporal)   Resp 15   Ht 1.575 m (5' 2\")   Wt 71.7 kg (158 lb)   SpO2 92%     Patient seen and examined  No acute distress  Breathing non labored  RRR  LLE: Dressing is clean, dry, and intact. Patient clearly fires tibialis anterior, EHL, and gastrocnemius/soleus. Sensation is intact to light touch throughout superficial peroneal, deep peroneal, tibial, saphenous, and sural nerve distributions. Strong and palpable 2+ dorsalis pedis and posterior tibial pulses with capillary refill less than 2 seconds.   Recent Labs     07/30/23  1603 07/31/23  0710 08/01/23  0858   WBC 10.0 8.0 7.1   RBC 2.86* 2.86* 2.68*   HEMOGLOBIN 8.5* 8.5* 8.0*   HEMATOCRIT 26.0* 25.7* 25.3*   MCV 90.9 89.9 94.4   MCH 29.7 29.7 29.9   MCHC 32.7 33.1 31.6*   RDW 46.8 47.8 51.1*   PLATELETCT 187 199 230   MPV 9.8 9.7 9.5         Active Hospital Problems    Diagnosis     Anemia [D64.9]     Closed left hip fracture ([facility]) [S72.002A]     Arthritis [M19.90]     Stroke (cerebrum) ([facility]) [I63.9]     Mixed hyperlipidemia [E78.2]     Major depressive disorder [F32.9]        Assessment/Plan:  Patient doing well.  LLE dressings changed  WBAT LLE  DVT Prophylaxis outpatient: ASA 81 mg PO QD x4 weeks  Cleared for DC from orthopedic standpoint pending therapy recs and medical optimization    POD#4 S/p  Open treatment with intramedullary nailing, left  intertrochanteric femur fracture  Wt bearing status - WBAT LLE  Wound care/Drains - Dressings to be changed every other day by nursing. Or PRN for saturation starting POD#2  Future Procedures - None planned   Lovenox: " Start 7/29, Duration-until ambulatory > 150'  Sutures/Staples out- 14-21 days post operatively. Removal will completed by ortho ANTON's unless transferred.  PT/OT-initiated  Antibiotics:  Perioperative completed  DVT Prophylaxis- TEDS/SCDs/Foot pumps  Yang-not needed per ortho  Case Coordination for Discharge Planning - Disposition per therapy recs.

## 2023-08-01 NOTE — PROGRESS NOTES
Discharged with medical transport to [facility] @4056. Called [facility] to give report; admitting nurse will call me back to receive report when ready.

## 2023-08-24 ENCOUNTER — HOSPITAL ENCOUNTER (OUTPATIENT)
Facility: MEDICAL CENTER | Age: 88
End: 2023-08-24
Attending: NURSE PRACTITIONER
Payer: MEDICARE

## 2023-08-24 PROCEDURE — 87205 SMEAR GRAM STAIN: CPT

## 2023-08-24 PROCEDURE — 87077 CULTURE AEROBIC IDENTIFY: CPT

## 2023-08-24 PROCEDURE — 87070 CULTURE OTHR SPECIMN AEROBIC: CPT

## 2023-08-24 PROCEDURE — 87186 SC STD MICRODIL/AGAR DIL: CPT | Mod: 91

## 2023-08-25 LAB
GRAM STN SPEC: NORMAL
SIGNIFICANT IND 70042: NORMAL
SITE SITE: NORMAL
SOURCE SOURCE: NORMAL

## 2023-08-27 DIAGNOSIS — F51.04 CHRONIC INSOMNIA: ICD-10-CM

## 2023-08-27 LAB
BACTERIA WND AEROBE CULT: ABNORMAL
GRAM STN SPEC: ABNORMAL
SIGNIFICANT IND 70042: ABNORMAL
SITE SITE: ABNORMAL
SOURCE SOURCE: ABNORMAL

## 2023-08-28 RX ORDER — TRAZODONE HYDROCHLORIDE 50 MG/1
50 TABLET ORAL EVERY EVENING
Qty: 90 TABLET | Refills: 3 | Status: SHIPPED | OUTPATIENT
Start: 2023-08-28 | End: 2023-09-27

## 2023-09-19 ENCOUNTER — HOSPITAL ENCOUNTER (OUTPATIENT)
Facility: MEDICAL CENTER | Age: 88
End: 2023-09-19
Attending: FAMILY MEDICINE
Payer: MEDICARE

## 2023-09-19 ENCOUNTER — TELEPHONE (OUTPATIENT)
Dept: MEDICAL GROUP | Facility: MEDICAL CENTER | Age: 88
End: 2023-09-19

## 2023-09-19 PROCEDURE — 87086 URINE CULTURE/COLONY COUNT: CPT | Mod: GA

## 2023-09-19 NOTE — TELEPHONE ENCOUNTER
Carrie from [facility],  A nurse is going to her home and wanted to know that its ok for her to collect urine to check for a UTI. Please advise   [phone]

## 2023-09-20 ENCOUNTER — TELEPHONE (OUTPATIENT)
Dept: MEDICAL GROUP | Facility: MEDICAL CENTER | Age: 88
End: 2023-09-20
Payer: MEDICARE

## 2023-09-20 NOTE — TELEPHONE ENCOUNTER
[facility] [phone]  Home health Nurse is requesting a front wheeled walker, a wheel chair, and a 360 Commode. I tried letting him know that she will probably need an appointment and he got upset and stated that She already saw the hospital and has a face to face and she doesn't need an appointment with primary. She is unable to come in.

## 2023-09-21 ENCOUNTER — APPOINTMENT (OUTPATIENT)
Dept: MEDICAL GROUP | Facility: MEDICAL CENTER | Age: 88
End: 2023-09-21
Payer: MEDICARE

## 2023-09-21 LAB
BACTERIA UR CULT: NORMAL
SIGNIFICANT IND 70042: NORMAL
SITE SITE: NORMAL
SOURCE SOURCE: NORMAL

## 2023-09-27 PROBLEM — R14.0 BLOATING: Status: ACTIVE | Noted: 2023-09-27

## 2023-09-29 PROBLEM — L89.90 PRESSURE INJURY DUE TO MEDICAL DEVICE: Status: ACTIVE | Noted: 2023-09-29

## 2023-09-29 PROBLEM — F11.20 UNCOMPLICATED OPIOID DEPENDENCE (HCC): Status: ACTIVE | Noted: 2023-09-29

## 2023-09-29 PROBLEM — T85.898A PRESSURE INJURY DUE TO MEDICAL DEVICE: Status: ACTIVE | Noted: 2023-09-29

## 2023-11-01 PROBLEM — L03.116 CELLULITIS AND ABSCESS OF LEFT LEG: Status: ACTIVE | Noted: 2023-11-01

## 2023-11-01 PROBLEM — L02.416 CELLULITIS AND ABSCESS OF LEFT LEG: Status: ACTIVE | Noted: 2023-11-01

## 2023-11-01 PROBLEM — H18.9 UNSPECIFIED DISORDER OF CORNEA: Status: ACTIVE | Noted: 2023-11-01

## 2023-11-15 PROBLEM — T50.Z95A: Status: ACTIVE | Noted: 2023-11-15

## 2023-11-15 PROBLEM — R11.0: Status: ACTIVE | Noted: 2023-11-15

## 2023-11-15 PROBLEM — S29.011A CHEST WALL MUSCLE STRAIN: Status: ACTIVE | Noted: 2023-11-15

## 2023-11-15 PROBLEM — R60.0 PERIPHERAL EDEMA: Status: ACTIVE | Noted: 2023-11-15

## 2023-11-15 PROBLEM — M54.2 CERVICALGIA: Status: ACTIVE | Noted: 2023-11-15

## 2023-11-15 PROBLEM — R60.9 PERIPHERAL EDEMA: Status: ACTIVE | Noted: 2023-11-15

## 2023-11-15 PROBLEM — S29.011A CHEST WALL MUSCLE STRAIN: Chronic | Status: ACTIVE | Noted: 2023-11-15

## 2023-11-29 ENCOUNTER — PATIENT MESSAGE (OUTPATIENT)
Dept: HEALTH INFORMATION MANAGEMENT | Facility: OTHER | Age: 88
End: 2023-11-29

## 2023-11-29 PROBLEM — I70.0 AORTIC ATHEROSCLEROSIS (HCC): Status: ACTIVE | Noted: 2023-11-29

## 2023-11-29 PROBLEM — M85.80 OSTEOPENIA: Status: ACTIVE | Noted: 2023-11-29

## 2023-11-29 PROBLEM — I11.9 HYPERTENSIVE HEART DISEASE: Status: ACTIVE | Noted: 2023-11-29

## 2023-12-13 PROBLEM — T50.Z95A: Status: RESOLVED | Noted: 2023-11-15 | Resolved: 2023-12-13

## 2023-12-13 PROBLEM — L03.116 CELLULITIS AND ABSCESS OF LEFT LEG: Status: RESOLVED | Noted: 2023-11-01 | Resolved: 2023-12-13

## 2023-12-13 PROBLEM — L02.416 CELLULITIS AND ABSCESS OF LEFT LEG: Status: RESOLVED | Noted: 2023-11-01 | Resolved: 2023-12-13

## 2023-12-13 PROBLEM — R11.0: Status: RESOLVED | Noted: 2023-11-15 | Resolved: 2023-12-13

## 2023-12-15 PROBLEM — R60.0 LOCALIZED EDEMA: Status: ACTIVE | Noted: 2023-12-15

## 2023-12-15 PROBLEM — R39.15 URINARY URGENCY: Status: ACTIVE | Noted: 2023-12-15

## 2023-12-15 PROBLEM — K59.03 DRUG-INDUCED CONSTIPATION: Status: ACTIVE | Noted: 2023-12-15

## 2023-12-15 PROBLEM — H61.92 LESION OF LEFT EARLOBE: Status: ACTIVE | Noted: 2023-12-15

## 2024-01-02 ENCOUNTER — HOSPITAL ENCOUNTER (OUTPATIENT)
Facility: MEDICAL CENTER | Age: 89
End: 2024-01-02
Attending: PHYSICIAN ASSISTANT
Payer: MEDICARE

## 2024-01-02 DIAGNOSIS — N39.41 URGE INCONTINENCE OF URINE: ICD-10-CM

## 2024-01-02 DIAGNOSIS — R30.0 DYSURIA: ICD-10-CM

## 2024-01-02 LAB
APPEARANCE UR: CLEAR
BACTERIA #/AREA URNS HPF: NEGATIVE /HPF
BILIRUB UR QL STRIP.AUTO: NEGATIVE
COLOR UR: YELLOW
EPI CELLS #/AREA URNS HPF: ABNORMAL /HPF
GLUCOSE UR STRIP.AUTO-MCNC: NEGATIVE MG/DL
HYALINE CASTS #/AREA URNS LPF: ABNORMAL /LPF
KETONES UR STRIP.AUTO-MCNC: NEGATIVE MG/DL
LEUKOCYTE ESTERASE UR QL STRIP.AUTO: ABNORMAL
MICRO URNS: ABNORMAL
NITRITE UR QL STRIP.AUTO: NEGATIVE
PH UR STRIP.AUTO: 6 [PH] (ref 5–8)
PROT UR QL STRIP: NEGATIVE MG/DL
RBC # URNS HPF: ABNORMAL /HPF
RBC UR QL AUTO: NEGATIVE
SP GR UR STRIP.AUTO: 1.01
UROBILINOGEN UR STRIP.AUTO-MCNC: 0.2 MG/DL
WBC #/AREA URNS HPF: ABNORMAL /HPF

## 2024-01-02 PROCEDURE — 87086 URINE CULTURE/COLONY COUNT: CPT

## 2024-01-02 PROCEDURE — 81001 URINALYSIS AUTO W/SCOPE: CPT

## 2024-01-05 LAB
BACTERIA UR CULT: NORMAL
SIGNIFICANT IND 70042: NORMAL
SITE SITE: NORMAL
SOURCE SOURCE: NORMAL

## 2024-01-09 ENCOUNTER — APPOINTMENT (RX ONLY)
Dept: URBAN - METROPOLITAN AREA CLINIC 20 | Facility: CLINIC | Age: 89
Setting detail: DERMATOLOGY
End: 2024-01-09

## 2024-01-09 DIAGNOSIS — L82.1 OTHER SEBORRHEIC KERATOSIS: ICD-10-CM

## 2024-01-09 DIAGNOSIS — L82.0 INFLAMED SEBORRHEIC KERATOSIS: ICD-10-CM

## 2024-01-09 PROBLEM — D48.5 NEOPLASM OF UNCERTAIN BEHAVIOR OF SKIN: Status: ACTIVE | Noted: 2024-01-09

## 2024-01-09 PROCEDURE — ? LIQUID NITROGEN

## 2024-01-09 PROCEDURE — ? COUNSELING

## 2024-01-09 PROCEDURE — 69100 BIOPSY OF EXTERNAL EAR: CPT

## 2024-01-09 PROCEDURE — 99212 OFFICE O/P EST SF 10 MIN: CPT | Mod: 25

## 2024-01-09 PROCEDURE — 17110 DESTRUCTION B9 LES UP TO 14: CPT

## 2024-01-09 PROCEDURE — 11102 TANGNTL BX SKIN SINGLE LES: CPT | Mod: 59

## 2024-01-09 PROCEDURE — ? BIOPSY BY SHAVE METHOD

## 2024-01-09 ASSESSMENT — LOCATION DETAILED DESCRIPTION DERM
LOCATION DETAILED: LEFT LATERAL SUPERIOR CHEST
LOCATION DETAILED: RIGHT LATERAL SUPERIOR CHEST
LOCATION DETAILED: RIGHT MEDIAL SUPERIOR CHEST
LOCATION DETAILED: LEFT MEDIAL SUPERIOR CHEST

## 2024-01-09 ASSESSMENT — LOCATION SIMPLE DESCRIPTION DERM: LOCATION SIMPLE: CHEST

## 2024-01-09 ASSESSMENT — LOCATION ZONE DERM: LOCATION ZONE: TRUNK

## 2024-01-09 NOTE — PROCEDURE: LIQUID NITROGEN
Show Applicator Variable?: Yes
Include Z78.9 (Other Specified Conditions Influencing Health Status) As An Associated Diagnosis?: No
Post-Care Instructions: I reviewed with the patient in detail post-care instructions. Patient is to wear sunprotection, and avoid picking at any of the treated lesions. Pt may apply Vaseline to crusted or scabbing areas.
Spray Paint Text: The liquid nitrogen was applied to the skin utilizing a spray paint frosting technique.
Consent: The patient's consent was obtained including but not limited to risks of crusting, scabbing, blistering, scarring, darker or lighter pigmentary change, recurrence, incomplete removal and infection.
Medical Necessity Information: It is in your best interest to select a reason for this procedure from the list below. All of these items fulfill various CMS LCD requirements except the new and changing color options.
Medical Necessity Clause: This procedure was medically necessary because the lesions that were treated were:
Detail Level: Detailed

## 2024-01-16 ENCOUNTER — APPOINTMENT (RX ONLY)
Dept: URBAN - METROPOLITAN AREA CLINIC 20 | Facility: CLINIC | Age: 89
Setting detail: DERMATOLOGY
End: 2024-01-16

## 2024-01-16 PROBLEM — C44.629 SQUAMOUS CELL CARCINOMA OF SKIN OF LEFT UPPER LIMB, INCLUDING SHOULDER: Status: ACTIVE | Noted: 2024-01-16

## 2024-01-16 PROCEDURE — ? ADDITIONAL NOTES

## 2024-01-16 PROCEDURE — 12032 INTMD RPR S/A/T/EXT 2.6-7.5: CPT | Mod: 79

## 2024-01-16 PROCEDURE — 11602 EXC TR-EXT MAL+MARG 1.1-2 CM: CPT | Mod: 79

## 2024-01-16 PROCEDURE — ? COUNSELING

## 2024-01-16 PROCEDURE — ? EXCISION

## 2024-01-16 NOTE — HPI: SKIN LESION (SQUAMOUS CELL CARCINOMA)
Is This A New Presentation, Or A Follow-Up?: Squamous Cell Carcinoma
When Was Squamous Cell Carcinoma Biopsied? (Optional): 1/9/24
Accession # (Optional): [id number]

## 2024-01-16 NOTE — PROCEDURE: EXCISION
Biopsy Photograph Reviewed: Yes
Accession #: [id number]
Size Of Lesion In Cm: 0.9
X Size Of Lesion In Cm (Optional): 0
Size Of Margin In Cm: 0.3
Anesthesia Volume In Cc: 6
Was An Eye Clamp Used?: No
Eye Clamp Note Details: An eye clamp was used during the procedure.
Excision Method: Elliptical
Saucerization Depth: dermis and superficial adipose tissue
Repair Type: Intermediate
Intermediate / Complex Repair - Final Wound Length In Cm: 4.5
Suturegard Retention Suture: 2-0 Nylon
Retention Suture Bite Size: 3 mm
Length To Time In Minutes Device Was In Place: 10
Number Of Hemigard Strips Per Side: 1
Undermining Type: Entire Wound
Debridement Text: The wound edges were debrided prior to proceeding with the closure to facilitate wound healing.
Helical Rim Text: The closure involved the helical rim.
Vermilion Border Text: The closure involved the vermilion border.
Nostril Rim Text: The closure involved the nostril rim.
Retention Suture Text: Retention sutures were placed to support the closure and prevent dehiscence.
Suture Removal: 12 days
Lab: [id number]
Lab Facility: 
Graft Donor Site Bandage (Optional-Leave Blank If You Don't Want In Note): Steri-strips and a pressure bandage were applied to the donor site.
Epidermal Closure Graft Donor Site (Optional): simple interrupted
Billing Type: Third-Party Bill
Excision Depth: adipose tissue
Scalpel Size: 15 blade
Anesthesia Type: 1% lidocaine with epinephrine and a 1:10 solution of 8.4% sodium bicarbonate
Hemostasis: Electrocautery
Estimated Blood Loss (Cc): minimal
Detail Level: Detailed
Anesthesia Type: 1% lidocaine with 1:100,000 epinephrine and a 1:10 solution of 8.4% sodium bicarbonate
Deep Sutures: 3-0 Polysorb
Dermal Closure: buried vertical mattress
Epidermal Sutures: 4-0 Nylon
Epidermal Closure: running
Wound Care: Bacitracin
Dressing: dry sterile dressing
Suturegard Intro: Intraoperative tissue expansion was performed, utilizing the SUTUREGARD device, in order to reduce wound tension.
Suturegard Body: The suture ends were repeatedly re-tightened and re-clamped to achieve the desired tissue expansion.
Hemigard Intro: Due to skin fragility and wound tension, it was decided to use HEMIGARD adhesive retention suture devices to permit a linear closure. The skin was cleaned and dried for a 6cm distance away from the wound. Excessive hair, if present, was removed to allow for adhesion.
Hemigard Postcare Instructions: The HEMIGARD strips are to remain completely dry for at least 5-7 days.
Positioning (Leave Blank If You Do Not Want): The patient was placed in a comfortable position exposing the surgical site.
Pre-Excision Curettage Text (Leave Blank If You Do Not Want): Prior to drawing the surgical margin the visible lesion was removed with electrodesiccation and curettage to clearly define the lesion size.
Complex Repair Preamble Text (Leave Blank If You Do Not Want): Extensive wide undermining was performed.
Intermediate Repair Preamble Text (Leave Blank If You Do Not Want): Undermining was performed with blunt dissection.
Curvilinear Excision Additional Text (Leave Blank If You Do Not Want): The margin was drawn around the clinically apparent lesion.  A curvilinear shape was then drawn on the skin incorporating the lesion and margins.  Incisions were then made along these lines to the appropriate tissue plane and the lesion was extirpated.
Fusiform Excision Additional Text (Leave Blank If You Do Not Want): The margin was drawn around the clinically apparent lesion.  A fusiform shape was then drawn on the skin incorporating the lesion and margins.  Incisions were then made along these lines to the appropriate tissue plane and the lesion was extirpated.
Elliptical Excision Additional Text (Leave Blank If You Do Not Want): The margin was drawn around the clinically apparent lesion.  An elliptical shape was then drawn on the skin incorporating the lesion and margins.  Incisions were then made along these lines to the appropriate tissue plane and the lesion was extirpated.
Saucerization Excision Additional Text (Leave Blank If You Do Not Want): The margin was drawn around the clinically apparent lesion.  Incisions were then made along these lines, in a tangential fashion, to the appropriate tissue plane and the lesion was extirpated.
Slit Excision Additional Text (Leave Blank If You Do Not Want): A linear line was drawn on the skin overlying the lesion. An incision was made slowly until the lesion was visualized.  Once visualized, the lesion was removed with blunt dissection.
Excisional Biopsy Additional Text (Leave Blank If You Do Not Want): The margin was drawn around the clinically apparent lesion. An elliptical shape was then drawn on the skin incorporating the lesion and margins.  Incisions were then made along these lines to the appropriate tissue plane and the lesion was extirpated.
Perilesional Excision Additional Text (Leave Blank If You Do Not Want): The margin was drawn around the clinically apparent lesion. Incisions were then made along these lines to the appropriate tissue plane and the lesion was extirpated.
Repair Performed By Another Provider Text (Leave Blank If You Do Not Want): After the tissue was excised the defect was repaired by another provider.
No Repair - Repaired With Adjacent Surgical Defect Text (Leave Blank If You Do Not Want): After the excision the defect was repaired concurrently with another surgical defect which was in close approximation.
Adjacent Tissue Transfer Text: The defect edges were debeveled with a #15 scalpel blade. Given the location of the defect and the proximity to free margins an adjacent tissue transfer was deemed most appropriate. Using a sterile surgical marker, an appropriate flap was drawn incorporating the defect and placing the expected incisions within the relaxed skin tension lines where possible. The area thus outlined was incised deep to adipose tissue with a #15 scalpel blade. The skin margins were undermined to an appropriate distance in all directions utilizing iris scissors and carried over to close the primary defect.
Advancement Flap (Single) Text: The defect edges were debeveled with a #15 scalpel blade.  Given the location of the defect and the proximity to free margins a single advancement flap was deemed most appropriate.  Using a sterile surgical marker, an appropriate advancement flap was drawn incorporating the defect and placing the expected incisions within the relaxed skin tension lines where possible.    The area thus outlined was incised deep to adipose tissue with a #15 scalpel blade.  The skin margins were undermined to an appropriate distance in all directions utilizing iris scissors.
Advancement Flap (Double) Text: The defect edges were debeveled with a #15 scalpel blade.  Given the location of the defect and the proximity to free margins a double advancement flap was deemed most appropriate.  Using a sterile surgical marker, the appropriate advancement flaps were drawn incorporating the defect and placing the expected incisions within the relaxed skin tension lines where possible.    The area thus outlined was incised deep to adipose tissue with a #15 scalpel blade.  The skin margins were undermined to an appropriate distance in all directions utilizing iris scissors.
Burow's Advancement Flap Text: The defect edges were debeveled with a #15 scalpel blade.  Given the location of the defect and the proximity to free margins a Burow's advancement flap was deemed most appropriate.  Using a sterile surgical marker, the appropriate advancement flap was drawn incorporating the defect and placing the expected incisions within the relaxed skin tension lines where possible.    The area thus outlined was incised deep to adipose tissue with a #15 scalpel blade.  The skin margins were undermined to an appropriate distance in all directions utilizing iris scissors.
Chonodrocutaneous Helical Advancement Flap Text: The defect edges were debeveled with a #15 scalpel blade.  Given the location of the defect and the proximity to free margins a chondrocutaneous helical advancement flap was deemed most appropriate.  Using a sterile surgical marker, the appropriate advancement flap was drawn incorporating the defect and placing the expected incisions within the relaxed skin tension lines where possible.    The area thus outlined was incised deep to adipose tissue with a #15 scalpel blade.  The skin margins were undermined to an appropriate distance in all directions utilizing iris scissors.
Crescentic Advancement Flap Text: The defect edges were debeveled with a #15 scalpel blade.  Given the location of the defect and the proximity to free margins a crescentic advancement flap was deemed most appropriate.  Using a sterile surgical marker, the appropriate advancement flap was drawn incorporating the defect and placing the expected incisions within the relaxed skin tension lines where possible.    The area thus outlined was incised deep to adipose tissue with a #15 scalpel blade.  The skin margins were undermined to an appropriate distance in all directions utilizing iris scissors.
A-T Advancement Flap Text: The defect edges were debeveled with a #15 scalpel blade.  Given the location of the defect, shape of the defect and the proximity to free margins an A-T advancement flap was deemed most appropriate.  Using a sterile surgical marker, an appropriate advancement flap was drawn incorporating the defect and placing the expected incisions within the relaxed skin tension lines where possible.    The area thus outlined was incised deep to adipose tissue with a #15 scalpel blade.  The skin margins were undermined to an appropriate distance in all directions utilizing iris scissors.
O-T Advancement Flap Text: The defect edges were debeveled with a #15 scalpel blade.  Given the location of the defect, shape of the defect and the proximity to free margins an O-T advancement flap was deemed most appropriate.  Using a sterile surgical marker, an appropriate advancement flap was drawn incorporating the defect and placing the expected incisions within the relaxed skin tension lines where possible.    The area thus outlined was incised deep to adipose tissue with a #15 scalpel blade.  The skin margins were undermined to an appropriate distance in all directions utilizing iris scissors.
O-L Flap Text: The defect edges were debeveled with a #15 scalpel blade.  Given the location of the defect, shape of the defect and the proximity to free margins an O-L flap was deemed most appropriate.  Using a sterile surgical marker, an appropriate advancement flap was drawn incorporating the defect and placing the expected incisions within the relaxed skin tension lines where possible.    The area thus outlined was incised deep to adipose tissue with a #15 scalpel blade.  The skin margins were undermined to an appropriate distance in all directions utilizing iris scissors.
O-Z Flap Text: The defect edges were debeveled with a #15 scalpel blade.  Given the location of the defect, shape of the defect and the proximity to free margins an O-Z flap was deemed most appropriate.  Using a sterile surgical marker, an appropriate transposition flap was drawn incorporating the defect and placing the expected incisions within the relaxed skin tension lines where possible. The area thus outlined was incised deep to adipose tissue with a #15 scalpel blade.  The skin margins were undermined to an appropriate distance in all directions utilizing iris scissors.
Double O-Z Flap Text: The defect edges were debeveled with a #15 scalpel blade.  Given the location of the defect, shape of the defect and the proximity to free margins a Double O-Z flap was deemed most appropriate.  Using a sterile surgical marker, an appropriate transposition flap was drawn incorporating the defect and placing the expected incisions within the relaxed skin tension lines where possible. The area thus outlined was incised deep to adipose tissue with a #15 scalpel blade.  The skin margins were undermined to an appropriate distance in all directions utilizing iris scissors.
V-Y Flap Text: The defect edges were debeveled with a #15 scalpel blade.  Given the location of the defect, shape of the defect and the proximity to free margins a V-Y flap was deemed most appropriate.  Using a sterile surgical marker, an appropriate advancement flap was drawn incorporating the defect and placing the expected incisions within the relaxed skin tension lines where possible.    The area thus outlined was incised deep to adipose tissue with a #15 scalpel blade.  The skin margins were undermined to an appropriate distance in all directions utilizing iris scissors.
Advancement-Rotation Flap Text: The defect edges were debeveled with a #15 scalpel blade.  Given the location of the defect, shape of the defect and the proximity to free margins an advancement-rotation flap was deemed most appropriate.  Using a sterile surgical marker, an appropriate flap was drawn incorporating the defect and placing the expected incisions within the relaxed skin tension lines where possible. The area thus outlined was incised deep to adipose tissue with a #15 scalpel blade.  The skin margins were undermined to an appropriate distance in all directions utilizing iris scissors.
Mercedes Flap Text: The defect edges were debeveled with a #15 scalpel blade.  Given the location of the defect, shape of the defect and the proximity to free margins a Mercedes flap was deemed most appropriate.  Using a sterile surgical marker, an appropriate advancement flap was drawn incorporating the defect and placing the expected incisions within the relaxed skin tension lines where possible. The area thus outlined was incised deep to adipose tissue with a #15 scalpel blade.  The skin margins were undermined to an appropriate distance in all directions utilizing iris scissors.
Modified Advancement Flap Text: The defect edges were debeveled with a #15 scalpel blade.  Given the location of the defect, shape of the defect and the proximity to free margins a modified advancement flap was deemed most appropriate.  Using a sterile surgical marker, an appropriate advancement flap was drawn incorporating the defect and placing the expected incisions within the relaxed skin tension lines where possible.    The area thus outlined was incised deep to adipose tissue with a #15 scalpel blade.  The skin margins were undermined to an appropriate distance in all directions utilizing iris scissors.
Mucosal Advancement Flap Text: Given the location of the defect, shape of the defect and the proximity to free margins a mucosal advancement flap was deemed most appropriate. Incisions were made with a 15 blade scalpel in the appropriate fashion along the cutaneous vermilion border and the mucosal lip. The remaining actinically damaged mucosal tissue was excised.  The mucosal advancement flap was then elevated to the gingival sulcus with care taken to preserve the neurovascular structures and advanced into the primary defect. Care was taken to ensure that precise realignment of the vermilion border was achieved.
Peng Advancement Flap Text: The defect edges were debeveled with a #15 scalpel blade. Given the location of the defect, shape of the defect and the proximity to free margins a Peng advancement flap was deemed most appropriate. Using a sterile surgical marker, an appropriate advancement flap was drawn incorporating the defect and placing the expected incisions within the relaxed skin tension lines where possible. The area thus outlined was incised deep to adipose tissue with a #15 scalpel blade. The skin margins were undermined to an appropriate distance in all directions utilizing iris scissors. Following this, the designed flap was advanced and carried over into the primary defect and sutured into place.
Hatchet Flap Text: The defect edges were debeveled with a #15 scalpel blade.  Given the location of the defect, shape of the defect and the proximity to free margins a hatchet flap was deemed most appropriate.  Using a sterile surgical marker, an appropriate hatchet flap was drawn incorporating the defect and placing the expected incisions within the relaxed skin tension lines where possible.    The area thus outlined was incised deep to adipose tissue with a #15 scalpel blade.  The skin margins were undermined to an appropriate distance in all directions utilizing iris scissors.
Rotation Flap Text: The defect edges were debeveled with a #15 scalpel blade.  Given the location of the defect, shape of the defect and the proximity to free margins a rotation flap was deemed most appropriate.  Using a sterile surgical marker, an appropriate rotation flap was drawn incorporating the defect and placing the expected incisions within the relaxed skin tension lines where possible.    The area thus outlined was incised deep to adipose tissue with a #15 scalpel blade.  The skin margins were undermined to an appropriate distance in all directions utilizing iris scissors.
Bilateral Rotation Flap Text: The defect edges were debeveled with a #15 scalpel blade. Given the location of the defect, shape of the defect and the proximity to free margins a bilateral rotation flap was deemed most appropriate. Using a sterile surgical marker, an appropriate rotation flap was drawn incorporating the defect and placing the expected incisions within the relaxed skin tension lines where possible. The area thus outlined was incised deep to adipose tissue with a #15 scalpel blade. The skin margins were undermined to an appropriate distance in all directions utilizing iris scissors. Following this, the designed flap was carried over into the primary defect and sutured into place.
Spiral Flap Text: The defect edges were debeveled with a #15 scalpel blade.  Given the location of the defect, shape of the defect and the proximity to free margins a spiral flap was deemed most appropriate.  Using a sterile surgical marker, an appropriate rotation flap was drawn incorporating the defect and placing the expected incisions within the relaxed skin tension lines where possible. The area thus outlined was incised deep to adipose tissue with a #15 scalpel blade.  The skin margins were undermined to an appropriate distance in all directions utilizing iris scissors.
Staged Advancement Flap Text: The defect edges were debeveled with a #15 scalpel blade. Given the location of the defect, shape of the defect and the proximity to free margins a staged advancement flap was deemed most appropriate. Using a sterile surgical marker, an appropriate advancement flap was drawn incorporating the defect and placing the expected incisions within the relaxed skin tension lines where possible. The area thus outlined was incised deep to adipose tissue with a #15 scalpel blade. The skin margins were undermined to an appropriate distance in all directions utilizing iris scissors. Following this, the designed flap was carried over into the primary defect and sutured into place.
Star Wedge Flap Text: The defect edges were debeveled with a #15 scalpel blade.  Given the location of the defect, shape of the defect and the proximity to free margins a star wedge flap was deemed most appropriate.  Using a sterile surgical marker, an appropriate rotation flap was drawn incorporating the defect and placing the expected incisions within the relaxed skin tension lines where possible. The area thus outlined was incised deep to adipose tissue with a #15 scalpel blade.  The skin margins were undermined to an appropriate distance in all directions utilizing iris scissors.
Transposition Flap Text: The defect edges were debeveled with a #15 scalpel blade.  Given the location of the defect and the proximity to free margins a transposition flap was deemed most appropriate.  Using a sterile surgical marker, an appropriate transposition flap was drawn incorporating the defect.    The area thus outlined was incised deep to adipose tissue with a #15 scalpel blade.  The skin margins were undermined to an appropriate distance in all directions utilizing iris scissors.
Muscle Hinge Flap Text: The defect edges were debeveled with a #15 scalpel blade.  Given the size, depth and location of the defect and the proximity to free margins a muscle hinge flap was deemed most appropriate.  Using a sterile surgical marker, an appropriate hinge flap was drawn incorporating the defect. The area thus outlined was incised with a #15 scalpel blade.  The skin margins were undermined to an appropriate distance in all directions utilizing iris scissors.
Mustarde Flap Text: The defect edges were debeveled with a #15 scalpel blade.  Given the size, depth and location of the defect and the proximity to free margins a Mustarde flap was deemed most appropriate. Using a sterile surgical marker, an appropriate flap was drawn incorporating the defect. The area thus outlined was incised with a #15 scalpel blade. The skin margins were undermined to an appropriate distance in all directions utilizing iris scissors. Following this, the designed flap was carried into the primary defect and sutured into place.
Nasal Turnover Hinge Flap Text: The defect edges were debeveled with a #15 scalpel blade.  Given the size, depth, location of the defect and the defect being full thickness a nasal turnover hinge flap was deemed most appropriate. Using a sterile surgical marker, an appropriate hinge flap was drawn incorporating the defect. The area thus outlined was incised with a #15 scalpel blade. The flap was designed to recreate the nasal mucosal lining and the alar rim. The skin margins were undermined to an appropriate distance in all directions utilizing iris scissors. Following this, the designed flap was carried over into the primary defect and sutured into place
Nasalis-Muscle-Based Myocutaneous Island Pedicle Flap Text: Using a #15 blade, an incision was made around the donor flap to the level of the nasalis muscle. Wide lateral undermining was then performed in both the subcutaneous plane above the nasalis muscle, and in a submuscular plane just above periosteum. This allowed the formation of a free nasalis muscle axial pedicle (based on the angular artery) which was still attached to the actual cutaneous flap, increasing its mobility and vascular viability. Hemostasis was obtained with pinpoint electrocoagulation. The flap was mobilized into position and the pivotal anchor points positioned and stabilized with buried interrupted sutures. Subcutaneous and dermal tissues were closed in a multilayered fashion with sutures. Tissue redundancies were excised, and the epidermal edges were apposed without significant tension and sutured with sutures.
Orbicularis Oris Muscle Flap Text: The defect edges were debeveled with a #15 scalpel blade.  Given that the defect affected the competency of the oral sphincter an orbicularis oris muscle flap was deemed most appropriate to restore this competency and normal muscle function.  Using a sterile surgical marker, an appropriate flap was drawn incorporating the defect. The area thus outlined was incised with a #15 scalpel blade. Following this, the designed flap was carried over into the primary defect and sutured into place.
Melolabial Transposition Flap Text: The defect edges were debeveled with a #15 scalpel blade.  Given the location of the defect and the proximity to free margins a melolabial flap was deemed most appropriate.  Using a sterile surgical marker, an appropriate melolabial transposition flap was drawn incorporating the defect.    The area thus outlined was incised deep to adipose tissue with a #15 scalpel blade.  The skin margins were undermined to an appropriate distance in all directions utilizing iris scissors.
Rhombic Flap Text: The defect edges were debeveled with a #15 scalpel blade.  Given the location of the defect and the proximity to free margins a rhombic flap was deemed most appropriate.  Using a sterile surgical marker, an appropriate rhombic flap was drawn incorporating the defect.    The area thus outlined was incised deep to adipose tissue with a #15 scalpel blade.  The skin margins were undermined to an appropriate distance in all directions utilizing iris scissors.
Rhomboid Transposition Flap Text: The defect edges were debeveled with a #15 scalpel blade.  Given the location of the defect and the proximity to free margins a rhomboid transposition flap was deemed most appropriate.  Using a sterile surgical marker, an appropriate rhomboid flap was drawn incorporating the defect.    The area thus outlined was incised deep to adipose tissue with a #15 scalpel blade.  The skin margins were undermined to an appropriate distance in all directions utilizing iris scissors.
Bi-Rhombic Flap Text: The defect edges were debeveled with a #15 scalpel blade.  Given the location of the defect and the proximity to free margins a bi-rhombic flap was deemed most appropriate.  Using a sterile surgical marker, an appropriate rhombic flap was drawn incorporating the defect. The area thus outlined was incised deep to adipose tissue with a #15 scalpel blade.  The skin margins were undermined to an appropriate distance in all directions utilizing iris scissors.
Helical Rim Advancement Flap Text: The defect edges were debeveled with a #15 blade scalpel.  Given the location of the defect and the proximity to free margins (helical rim) a double helical rim advancement flap was deemed most appropriate.  Using a sterile surgical marker, the appropriate advancement flaps were drawn incorporating the defect and placing the expected incisions between the helical rim and antihelix where possible.  The area thus outlined was incised through and through with a #15 scalpel blade.  With a skin hook and iris scissors, the flaps were gently and sharply undermined and freed up.
Bilateral Helical Rim Advancement Flap Text: The defect edges were debeveled with a #15 blade scalpel.  Given the location of the defect and the proximity to free margins (helical rim) a bilateral helical rim advancement flap was deemed most appropriate.  Using a sterile surgical marker, the appropriate advancement flaps were drawn incorporating the defect and placing the expected incisions between the helical rim and antihelix where possible.  The area thus outlined was incised through and through with a #15 scalpel blade.  With a skin hook and iris scissors, the flaps were gently and sharply undermined and freed up.
Ear Star Wedge Flap Text: The defect edges were debeveled with a #15 blade scalpel.  Given the location of the defect and the proximity to free margins (helical rim) an ear star wedge flap was deemed most appropriate.  Using a sterile surgical marker, the appropriate flap was drawn incorporating the defect and placing the expected incisions between the helical rim and antihelix where possible.  The area thus outlined was incised through and through with a #15 scalpel blade.
Banner Transposition Flap Text: The defect edges were debeveled with a #15 scalpel blade.  Given the location of the defect and the proximity to free margins a Banner transposition flap was deemed most appropriate.  Using a sterile surgical marker, an appropriate flap drawn around the defect. The area thus outlined was incised deep to adipose tissue with a #15 scalpel blade.  The skin margins were undermined to an appropriate distance in all directions utilizing iris scissors.
Bilobed Flap Text: The defect edges were debeveled with a #15 scalpel blade.  Given the location of the defect and the proximity to free margins a bilobe flap was deemed most appropriate.  Using a sterile surgical marker, an appropriate bilobe flap drawn around the defect.    The area thus outlined was incised deep to adipose tissue with a #15 scalpel blade.  The skin margins were undermined to an appropriate distance in all directions utilizing iris scissors.
Bilobed Transposition Flap Text: The defect edges were debeveled with a #15 scalpel blade.  Given the location of the defect and the proximity to free margins a bilobed transposition flap was deemed most appropriate.  Using a sterile surgical marker, an appropriate bilobe flap drawn around the defect.    The area thus outlined was incised deep to adipose tissue with a #15 scalpel blade.  The skin margins were undermined to an appropriate distance in all directions utilizing iris scissors.
Trilobed Flap Text: The defect edges were debeveled with a #15 scalpel blade.  Given the location of the defect and the proximity to free margins a trilobed flap was deemed most appropriate.  Using a sterile surgical marker, an appropriate trilobed flap drawn around the defect.    The area thus outlined was incised deep to adipose tissue with a #15 scalpel blade.  The skin margins were undermined to an appropriate distance in all directions utilizing iris scissors.
Dorsal Nasal Flap Text: The defect edges were debeveled with a #15 scalpel blade.  Given the location of the defect and the proximity to free margins a dorsal nasal flap was deemed most appropriate.  Using a sterile surgical marker, an appropriate dorsal nasal flap was drawn around the defect.    The area thus outlined was incised deep to adipose tissue with a #15 scalpel blade.  The skin margins were undermined to an appropriate distance in all directions utilizing iris scissors.
Island Pedicle Flap Text: The defect edges were debeveled with a #15 scalpel blade.  Given the location of the defect, shape of the defect and the proximity to free margins an island pedicle advancement flap was deemed most appropriate.  Using a sterile surgical marker, an appropriate advancement flap was drawn incorporating the defect, outlining the appropriate donor tissue and placing the expected incisions within the relaxed skin tension lines where possible.    The area thus outlined was incised deep to adipose tissue with a #15 scalpel blade.  The skin margins were undermined to an appropriate distance in all directions around the primary defect and laterally outward around the island pedicle utilizing iris scissors.  There was minimal undermining beneath the pedicle flap.
Island Pedicle Flap With Canthal Suspension Text: The defect edges were debeveled with a #15 scalpel blade.  Given the location of the defect, shape of the defect and the proximity to free margins an island pedicle advancement flap was deemed most appropriate.  Using a sterile surgical marker, an appropriate advancement flap was drawn incorporating the defect, outlining the appropriate donor tissue and placing the expected incisions within the relaxed skin tension lines where possible. The area thus outlined was incised deep to adipose tissue with a #15 scalpel blade.  The skin margins were undermined to an appropriate distance in all directions around the primary defect and laterally outward around the island pedicle utilizing iris scissors.  There was minimal undermining beneath the pedicle flap. A suspension suture was placed in the canthal tendon to prevent tension and prevent ectropion.
Alar Island Pedicle Flap Text: The defect edges were debeveled with a #15 scalpel blade.  Given the location of the defect, shape of the defect and the proximity to the alar rim an island pedicle advancement flap was deemed most appropriate.  Using a sterile surgical marker, an appropriate advancement flap was drawn incorporating the defect, outlining the appropriate donor tissue and placing the expected incisions within the nasal ala running parallel to the alar rim. The area thus outlined was incised with a #15 scalpel blade.  The skin margins were undermined minimally to an appropriate distance in all directions around the primary defect and laterally outward around the island pedicle utilizing iris scissors.  There was minimal undermining beneath the pedicle flap.
Double Island Pedicle Flap Text: The defect edges were debeveled with a #15 scalpel blade.  Given the location of the defect, shape of the defect and the proximity to free margins a double island pedicle advancement flap was deemed most appropriate.  Using a sterile surgical marker, an appropriate advancement flap was drawn incorporating the defect, outlining the appropriate donor tissue and placing the expected incisions within the relaxed skin tension lines where possible.    The area thus outlined was incised deep to adipose tissue with a #15 scalpel blade.  The skin margins were undermined to an appropriate distance in all directions around the primary defect and laterally outward around the island pedicle utilizing iris scissors.  There was minimal undermining beneath the pedicle flap.
Island Pedicle Flap-Requiring Vessel Identification Text: The defect edges were debeveled with a #15 scalpel blade.  Given the location of the defect, shape of the defect and the proximity to free margins an island pedicle advancement flap was deemed most appropriate.  Using a sterile surgical marker, an appropriate advancement flap was drawn, based on the axial vessel mentioned above, incorporating the defect, outlining the appropriate donor tissue and placing the expected incisions within the relaxed skin tension lines where possible.    The area thus outlined was incised deep to adipose tissue with a #15 scalpel blade.  The skin margins were undermined to an appropriate distance in all directions around the primary defect and laterally outward around the island pedicle utilizing iris scissors.  There was minimal undermining beneath the pedicle flap.
Keystone Flap Text: The defect edges were debeveled with a #15 scalpel blade.  Given the location of the defect, shape of the defect a keystone flap was deemed most appropriate.  Using a sterile surgical marker, an appropriate keystone flap was drawn incorporating the defect, outlining the appropriate donor tissue and placing the expected incisions within the relaxed skin tension lines where possible. The area thus outlined was incised deep to adipose tissue with a #15 scalpel blade.  The skin margins were undermined to an appropriate distance in all directions around the primary defect and laterally outward around the flap utilizing iris scissors.
O-T Plasty Text: The defect edges were debeveled with a #15 scalpel blade.  Given the location of the defect, shape of the defect and the proximity to free margins an O-T plasty was deemed most appropriate.  Using a sterile surgical marker, an appropriate O-T plasty was drawn incorporating the defect and placing the expected incisions within the relaxed skin tension lines where possible.    The area thus outlined was incised deep to adipose tissue with a #15 scalpel blade.  The skin margins were undermined to an appropriate distance in all directions utilizing iris scissors.
O-Z Plasty Text: The defect edges were debeveled with a #15 scalpel blade.  Given the location of the defect, shape of the defect and the proximity to free margins an O-Z plasty (double transposition flap) was deemed most appropriate.  Using a sterile surgical marker, the appropriate transposition flaps were drawn incorporating the defect and placing the expected incisions within the relaxed skin tension lines where possible.    The area thus outlined was incised deep to adipose tissue with a #15 scalpel blade.  The skin margins were undermined to an appropriate distance in all directions utilizing iris scissors.  Hemostasis was achieved with electrocautery.  The flaps were then transposed into place, one clockwise and the other counterclockwise, and anchored with interrupted buried subcutaneous sutures.
Double O-Z Plasty Text: The defect edges were debeveled with a #15 scalpel blade.  Given the location of the defect, shape of the defect and the proximity to free margins a Double O-Z plasty (double transposition flap) was deemed most appropriate.  Using a sterile surgical marker, the appropriate transposition flaps were drawn incorporating the defect and placing the expected incisions within the relaxed skin tension lines where possible. The area thus outlined was incised deep to adipose tissue with a #15 scalpel blade.  The skin margins were undermined to an appropriate distance in all directions utilizing iris scissors.  Hemostasis was achieved with electrocautery.  The flaps were then transposed into place, one clockwise and the other counterclockwise, and anchored with interrupted buried subcutaneous sutures.
V-Y Plasty Text: The defect edges were debeveled with a #15 scalpel blade.  Given the location of the defect, shape of the defect and the proximity to free margins an V-Y advancement flap was deemed most appropriate.  Using a sterile surgical marker, an appropriate advancement flap was drawn incorporating the defect and placing the expected incisions within the relaxed skin tension lines where possible.    The area thus outlined was incised deep to adipose tissue with a #15 scalpel blade.  The skin margins were undermined to an appropriate distance in all directions utilizing iris scissors.
H Plasty Text: Given the location of the defect, shape of the defect and the proximity to free margins a H-plasty was deemed most appropriate for repair.  Using a sterile surgical marker, the appropriate advancement arms of the H-plasty were drawn incorporating the defect and placing the expected incisions within the relaxed skin tension lines where possible. The area thus outlined was incised deep to adipose tissue with a #15 scalpel blade. The skin margins were undermined to an appropriate distance in all directions utilizing iris scissors.  The opposing advancement arms were then advanced into place in opposite direction and anchored with interrupted buried subcutaneous sutures.
W Plasty Text: The lesion was extirpated to the level of the fat with a #15 scalpel blade.  Given the location of the defect, shape of the defect and the proximity to free margins a W-plasty was deemed most appropriate for repair.  Using a sterile surgical marker, the appropriate transposition arms of the W-plasty were drawn incorporating the defect and placing the expected incisions within the relaxed skin tension lines where possible.    The area thus outlined was incised deep to adipose tissue with a #15 scalpel blade.  The skin margins were undermined to an appropriate distance in all directions utilizing iris scissors.  The opposing transposition arms were then transposed into place in opposite direction and anchored with interrupted buried subcutaneous sutures.
Z Plasty Text: The lesion was extirpated to the level of the fat with a #15 scalpel blade.  Given the location of the defect, shape of the defect and the proximity to free margins a Z-plasty was deemed most appropriate for repair.  Using a sterile surgical marker, the appropriate transposition arms of the Z-plasty were drawn incorporating the defect and placing the expected incisions within the relaxed skin tension lines where possible.    The area thus outlined was incised deep to adipose tissue with a #15 scalpel blade.  The skin margins were undermined to an appropriate distance in all directions utilizing iris scissors.  The opposing transposition arms were then transposed into place in opposite direction and anchored with interrupted buried subcutaneous sutures.
Double Z Plasty Text: The lesion was extirpated to the level of the fat with a #15 scalpel blade. Given the location of the defect, shape of the defect and the proximity to free margins a double Z-plasty was deemed most appropriate for repair. Using a sterile surgical marker, the appropriate transposition arms of the double Z-plasty were drawn incorporating the defect and placing the expected incisions within the relaxed skin tension lines where possible. The area thus outlined was incised deep to adipose tissue with a #15 scalpel blade. The skin margins were undermined to an appropriate distance in all directions utilizing iris scissors. The opposing transposition arms were then transposed and carried over into place in opposite direction and anchored with interrupted buried subcutaneous sutures.
Zygomaticofacial Flap Text: Given the location of the defect, shape of the defect and the proximity to free margins a zygomaticofacial flap was deemed most appropriate for repair. Using a sterile surgical marker, the appropriate flap was drawn incorporating the defect and placing the expected incisions within the relaxed skin tension lines where possible. The area thus outlined was incised deep to adipose tissue with a #15 scalpel blade with preservation of a vascular pedicle.  The skin margins were undermined to an appropriate distance in all directions utilizing iris scissors. The flap was then carried over into the defect and anchored with interrupted buried subcutaneous sutures.
Cheek Interpolation Flap Text: A decision was made to reconstruct the defect utilizing an interpolation axial flap and a staged reconstruction.  A telfa template was made of the defect.  This telfa template was then used to outline the Cheek Interpolation flap.  The donor area for the pedicle flap was then injected with anesthesia.  The flap was excised through the skin and subcutaneous tissue down to the layer of the underlying musculature.  The interpolation flap was carefully excised within this deep plane to maintain its blood supply.  The edges of the donor site were undermined.   The donor site was closed in a primary fashion.  The pedicle was then rotated into position and sutured.  Once the tube was sutured into place, adequate blood supply was confirmed with blanching and refill.  The pedicle was then wrapped with xeroform gauze and dressed appropriately with a telfa and gauze bandage to ensure continued blood supply and protect the attached pedicle.
Cheek-To-Nose Interpolation Flap Text: A decision was made to reconstruct the defect utilizing an interpolation axial flap and a staged reconstruction.  A telfa template was made of the defect.  This telfa template was then used to outline the Cheek-To-Nose Interpolation flap.  The donor area for the pedicle flap was then injected with anesthesia.  The flap was excised through the skin and subcutaneous tissue down to the layer of the underlying musculature.  The interpolation flap was carefully excised within this deep plane to maintain its blood supply.  The edges of the donor site were undermined.   The donor site was closed in a primary fashion.  The pedicle was then rotated into position and sutured.  Once the tube was sutured into place, adequate blood supply was confirmed with blanching and refill.  The pedicle was then wrapped with xeroform gauze and dressed appropriately with a telfa and gauze bandage to ensure continued blood supply and protect the attached pedicle.
Interpolation Flap Text: A decision was made to reconstruct the defect utilizing an interpolation axial flap and a staged reconstruction.  A telfa template was made of the defect.  This telfa template was then used to outline the interpolation flap.  The donor area for the pedicle flap was then injected with anesthesia.  The flap was excised through the skin and subcutaneous tissue down to the layer of the underlying musculature.  The interpolation flap was carefully excised within this deep plane to maintain its blood supply.  The edges of the donor site were undermined.   The donor site was closed in a primary fashion.  The pedicle was then rotated into position and sutured.  Once the tube was sutured into place, adequate blood supply was confirmed with blanching and refill.  The pedicle was then wrapped with xeroform gauze and dressed appropriately with a telfa and gauze bandage to ensure continued blood supply and protect the attached pedicle.
Melolabial Interpolation Flap Text: A decision was made to reconstruct the defect utilizing an interpolation axial flap and a staged reconstruction.  A telfa template was made of the defect.  This telfa template was then used to outline the melolabial interpolation flap.  The donor area for the pedicle flap was then injected with anesthesia.  The flap was excised through the skin and subcutaneous tissue down to the layer of the underlying musculature.  The pedicle flap was carefully excised within this deep plane to maintain its blood supply.  The edges of the donor site were undermined.   The donor site was closed in a primary fashion.  The pedicle was then rotated into position and sutured.  Once the tube was sutured into place, adequate blood supply was confirmed with blanching and refill.  The pedicle was then wrapped with xeroform gauze and dressed appropriately with a telfa and gauze bandage to ensure continued blood supply and protect the attached pedicle.
Mastoid Interpolation Flap Text: A decision was made to reconstruct the defect utilizing an interpolation axial flap and a staged reconstruction.  A telfa template was made of the defect.  This telfa template was then used to outline the mastoid interpolation flap.  The donor area for the pedicle flap was then injected with anesthesia.  The flap was excised through the skin and subcutaneous tissue down to the layer of the underlying musculature.  The pedicle flap was carefully excised within this deep plane to maintain its blood supply.  The edges of the donor site were undermined.   The donor site was closed in a primary fashion.  The pedicle was then rotated into position and sutured.  Once the tube was sutured into place, adequate blood supply was confirmed with blanching and refill.  The pedicle was then wrapped with xeroform gauze and dressed appropriately with a telfa and gauze bandage to ensure continued blood supply and protect the attached pedicle.
Posterior Auricular Interpolation Flap Text: A decision was made to reconstruct the defect utilizing an interpolation axial flap and a staged reconstruction.  A telfa template was made of the defect.  This telfa template was then used to outline the posterior auricular interpolation flap.  The donor area for the pedicle flap was then injected with anesthesia.  The flap was excised through the skin and subcutaneous tissue down to the layer of the underlying musculature.  The pedicle flap was carefully excised within this deep plane to maintain its blood supply.  The edges of the donor site were undermined.   The donor site was closed in a primary fashion.  The pedicle was then rotated into position and sutured.  Once the tube was sutured into place, adequate blood supply was confirmed with blanching and refill.  The pedicle was then wrapped with xeroform gauze and dressed appropriately with a telfa and gauze bandage to ensure continued blood supply and protect the attached pedicle.
Paramedian Forehead Flap Text: A decision was made to reconstruct the defect utilizing an interpolation axial flap and a staged reconstruction.  A telfa template was made of the defect.  This telfa template was then used to outline the paramedian forehead pedicle flap.  The donor area for the pedicle flap was then injected with anesthesia.  The flap was excised through the skin and subcutaneous tissue down to the layer of the underlying musculature.  The pedicle flap was carefully excised within this deep plane to maintain its blood supply.  The edges of the donor site were undermined.   The donor site was closed in a primary fashion.  The pedicle was then rotated into position and sutured.  Once the tube was sutured into place, adequate blood supply was confirmed with blanching and refill.  The pedicle was then wrapped with xeroform gauze and dressed appropriately with a telfa and gauze bandage to ensure continued blood supply and protect the attached pedicle.
Abbe Flap (Upper To Lower Lip) Text: The defect of the lower lip was assessed and measured.  Given the location and size of the defect, an Abbe flap was deemed most appropriate. Using a sterile surgical marker, an appropriate Abbe flap was measured and drawn on the upper lip. Local anesthesia was then infiltrated.  A scalpel was then used to incise the upper lip through and through the skin, vermilion, muscle and mucosa, leaving the flap pedicled on the opposite side.  The flap was then rotated and transferred to the lower lip defect.  The flap was then sutured into place with a three layer technique, closing the orbicularis oris muscle layer with subcutaneous buried sutures, followed by a mucosal layer and an epidermal layer.
Abbe Flap (Lower To Upper Lip) Text: The defect of the upper lip was assessed and measured.  Given the location and size of the defect, an Abbe flap was deemed most appropriate. Using a sterile surgical marker, an appropriate Abbe flap was measured and drawn on the lower lip. Local anesthesia was then infiltrated. A scalpel was then used to incise the upper lip through and through the skin, vermilion, muscle and mucosa, leaving the flap pedicled on the opposite side.  The flap was then rotated and transferred to the lower lip defect.  The flap was then sutured into place with a three layer technique, closing the orbicularis oris muscle layer with subcutaneous buried sutures, followed by a mucosal layer and an epidermal layer.
Estlander Flap (Upper To Lower Lip) Text: The defect of the lower lip was assessed and measured.  Given the location and size of the defect, an Estlander flap was deemed most appropriate. Using a sterile surgical marker, an appropriate Estlander flap was measured and drawn on the upper lip. Local anesthesia was then infiltrated. A scalpel was then used to incise the lateral aspect of the flap, through skin, muscle and mucosa, leaving the flap pedicled medially.  The flap was then rotated and positioned to fill the lower lip defect.  The flap was then sutured into place with a three layer technique, closing the orbicularis oris muscle layer with subcutaneous buried sutures, followed by a mucosal layer and an epidermal layer.
Lip Wedge Excision Repair Text: Given the location of the defect and the proximity to free margins a full thickness wedge repair was deemed most appropriate.  Using a sterile surgical marker, the appropriate repair was drawn incorporating the defect and placing the expected incisions perpendicular to the vermilion border.  The vermilion border was also meticulously outlined to ensure appropriate reapproximation during the repair.  The area thus outlined was incised through and through with a #15 scalpel blade.  The muscularis and dermis were reaproximated with deep sutures following hemostasis. Care was taken to realign the vermilion border before proceeding with the superficial closure.  Once the vermilion was realigned the superfical and mucosal closure was finished.
Ftsg Text: The defect edges were debeveled with a #15 scalpel blade.  Given the location of the defect, shape of the defect and the proximity to free margins a full thickness skin graft was deemed most appropriate.  Using a sterile surgical marker, the primary defect shape was transferred to the donor site. The area thus outlined was incised deep to adipose tissue with a #15 scalpel blade.  The harvested graft was then trimmed of adipose tissue until only dermis and epidermis was left.  The skin margins of the secondary defect were undermined to an appropriate distance in all directions utilizing iris scissors.  The secondary defect was closed with interrupted buried subcutaneous sutures.  The skin edges were then re-apposed with running  sutures.  The skin graft was then placed in the primary defect and oriented appropriately.
Split-Thickness Skin Graft Text: The defect edges were debeveled with a #15 scalpel blade.  Given the location of the defect, shape of the defect and the proximity to free margins a split thickness skin graft was deemed most appropriate.  Using a sterile surgical marker, the primary defect shape was transferred to the donor site. The split thickness graft was then harvested.  The skin graft was then placed in the primary defect and oriented appropriately.
Pinch Graft Text: The defect edges were debeveled with a #15 scalpel blade. Given the location of the defect, shape of the defect and the proximity to free margins a pinch graft was deemed most appropriate. Using a sterile surgical marker, the primary defect shape was transferred to the donor site. The area thus outlined was incised deep to adipose tissue with a #15 scalpel blade.  The harvested graft was then trimmed of adipose tissue until only dermis and epidermis was left. The skin margins of the secondary defect were undermined to an appropriate distance in all directions utilizing iris scissors.  The secondary defect was closed with interrupted buried subcutaneous sutures.  The skin edges were then re-apposed with running  sutures.  The skin graft was then placed in the primary defect and oriented appropriately.
Burow's Graft Text: The defect edges were debeveled with a #15 scalpel blade. Given the location of the defect, shape of the defect, the proximity to free margins and the presence of a standing cone deformity a Burow's skin graft was deemed most appropriate. The standing cone was removed and this tissue was then trimmed to the shape of the primary defect. The adipose tissue was also removed until only dermis and epidermis were left.  The skin margins of the secondary defect were undermined to an appropriate distance in all directions utilizing iris scissors.  The secondary defect was closed with interrupted buried subcutaneous sutures.  The skin edges were then re-apposed with running  sutures.  The skin graft was then placed in the primary defect and oriented appropriately.
Cartilage Graft Text: The defect edges were debeveled with a #15 scalpel blade.  Given the location of the defect, shape of the defect, the fact the defect involved a full thickness cartilage defect a cartilage graft was deemed most appropriate.  An appropriate donor site was identified, cleansed, and anesthetized. The cartilage graft was then harvested and transferred to the recipient site, oriented appropriately and then sutured into place.  The secondary defect was then repaired using a primary closure.
Composite Graft Text: The defect edges were debeveled with a #15 scalpel blade.  Given the location of the defect, shape of the defect, the proximity to free margins and the fact the defect was full thickness a composite graft was deemed most appropriate.  The defect was outline and then transferred to the donor site.  A full thickness graft was then excised from the donor site. The graft was then placed in the primary defect, oriented appropriately and then sutured into place.  The secondary defect was then repaired using a primary closure.
Epidermal Autograft Text: The defect edges were debeveled with a #15 scalpel blade.  Given the location of the defect, shape of the defect and the proximity to free margins an epidermal autograft was deemed most appropriate.  Using a sterile surgical marker, the primary defect shape was transferred to the donor site. The epidermal graft was then harvested.  The skin graft was then placed in the primary defect and oriented appropriately.
Dermal Autograft Text: The defect edges were debeveled with a #15 scalpel blade.  Given the location of the defect, shape of the defect and the proximity to free margins a dermal autograft was deemed most appropriate.  Using a sterile surgical marker, the primary defect shape was transferred to the donor site. The area thus outlined was incised deep to adipose tissue with a #15 scalpel blade.  The harvested graft was then trimmed of adipose and epidermal tissue until only dermis was left.  The skin graft was then placed in the primary defect and oriented appropriately.
Skin Substitute Text: The defect edges were debeveled with a #15 scalpel blade.  Given the location of the defect, shape of the defect and the proximity to free margins a skin substitute graft was deemed most appropriate.  The graft material was trimmed to fit the size of the defect. The graft was then placed in the primary defect and oriented appropriately.
Tissue Cultured Epidermal Autograft Text: The defect edges were debeveled with a #15 scalpel blade.  Given the location of the defect, shape of the defect and the proximity to free margins a tissue cultured epidermal autograft was deemed most appropriate.  The graft was then trimmed to fit the size of the defect.  The graft was then placed in the primary defect and oriented appropriately.
Xenograft Text: The defect edges were debeveled with a #15 scalpel blade.  Given the location of the defect, shape of the defect and the proximity to free margins a xenograft was deemed most appropriate.  The graft was then trimmed to fit the size of the defect.  The graft was then placed in the primary defect and oriented appropriately.
Purse String (Intermediate) Text: Given the location of the defect and the characteristics of the surrounding skin a purse string intermediate closure was deemed most appropriate.  Undermining was performed circumfirentially around the surgical defect.  A purse string suture was then placed and tightened.
Purse String (Simple) Text: Given the location of the defect and the characteristics of the surrounding skin a purse string simple closure was deemed most appropriate.  Undermining was performed circumferentially around the surgical defect.  A purse string suture was then placed and tightened.
Partial Purse String (Intermediate) Text: Given the location of the defect and the characteristics of the surrounding skin an intermediate purse string closure was deemed most appropriate.  Undermining was performed circumferentially around the surgical defect.  A purse string suture was then placed and tightened. Wound tension of the circular defect prevented complete closure of the wound.
Partial Purse String (Simple) Text: Given the location of the defect and the characteristics of the surrounding skin a simple purse string closure was deemed most appropriate.  Undermining was performed circumferentially around the surgical defect.  A purse string suture was then placed and tightened. Wound tension of the circular defect prevented complete closure of the wound.
Complex Repair And Single Advancement Flap Text: The defect edges were debeveled with a #15 scalpel blade.  The primary defect was closed partially with a complex linear closure.  Given the location of the remaining defect, shape of the defect and the proximity to free margins a single advancement flap was deemed most appropriate for complete closure of the defect.  Using a sterile surgical marker, an appropriate advancement flap was drawn incorporating the defect and placing the expected incisions within the relaxed skin tension lines where possible.    The area thus outlined was incised deep to adipose tissue with a #15 scalpel blade.  The skin margins were undermined to an appropriate distance in all directions utilizing iris scissors.
Complex Repair And Double Advancement Flap Text: The defect edges were debeveled with a #15 scalpel blade.  The primary defect was closed partially with a complex linear closure.  Given the location of the remaining defect, shape of the defect and the proximity to free margins a double advancement flap was deemed most appropriate for complete closure of the defect.  Using a sterile surgical marker, an appropriate advancement flap was drawn incorporating the defect and placing the expected incisions within the relaxed skin tension lines where possible.    The area thus outlined was incised deep to adipose tissue with a #15 scalpel blade.  The skin margins were undermined to an appropriate distance in all directions utilizing iris scissors.
Complex Repair And Modified Advancement Flap Text: The defect edges were debeveled with a #15 scalpel blade.  The primary defect was closed partially with a complex linear closure.  Given the location of the remaining defect, shape of the defect and the proximity to free margins a modified advancement flap was deemed most appropriate for complete closure of the defect.  Using a sterile surgical marker, an appropriate advancement flap was drawn incorporating the defect and placing the expected incisions within the relaxed skin tension lines where possible.    The area thus outlined was incised deep to adipose tissue with a #15 scalpel blade.  The skin margins were undermined to an appropriate distance in all directions utilizing iris scissors.
Complex Repair And A-T Advancement Flap Text: The defect edges were debeveled with a #15 scalpel blade.  The primary defect was closed partially with a complex linear closure.  Given the location of the remaining defect, shape of the defect and the proximity to free margins an A-T advancement flap was deemed most appropriate for complete closure of the defect.  Using a sterile surgical marker, an appropriate advancement flap was drawn incorporating the defect and placing the expected incisions within the relaxed skin tension lines where possible.    The area thus outlined was incised deep to adipose tissue with a #15 scalpel blade.  The skin margins were undermined to an appropriate distance in all directions utilizing iris scissors.
Complex Repair And O-T Advancement Flap Text: The defect edges were debeveled with a #15 scalpel blade.  The primary defect was closed partially with a complex linear closure.  Given the location of the remaining defect, shape of the defect and the proximity to free margins an O-T advancement flap was deemed most appropriate for complete closure of the defect.  Using a sterile surgical marker, an appropriate advancement flap was drawn incorporating the defect and placing the expected incisions within the relaxed skin tension lines where possible.    The area thus outlined was incised deep to adipose tissue with a #15 scalpel blade.  The skin margins were undermined to an appropriate distance in all directions utilizing iris scissors.
Complex Repair And O-L Flap Text: The defect edges were debeveled with a #15 scalpel blade.  The primary defect was closed partially with a complex linear closure.  Given the location of the remaining defect, shape of the defect and the proximity to free margins an O-L flap was deemed most appropriate for complete closure of the defect.  Using a sterile surgical marker, an appropriate flap was drawn incorporating the defect and placing the expected incisions within the relaxed skin tension lines where possible.    The area thus outlined was incised deep to adipose tissue with a #15 scalpel blade.  The skin margins were undermined to an appropriate distance in all directions utilizing iris scissors.
Complex Repair And Bilobe Flap Text: The defect edges were debeveled with a #15 scalpel blade.  The primary defect was closed partially with a complex linear closure.  Given the location of the remaining defect, shape of the defect and the proximity to free margins a bilobe flap was deemed most appropriate for complete closure of the defect.  Using a sterile surgical marker, an appropriate advancement flap was drawn incorporating the defect and placing the expected incisions within the relaxed skin tension lines where possible.    The area thus outlined was incised deep to adipose tissue with a #15 scalpel blade.  The skin margins were undermined to an appropriate distance in all directions utilizing iris scissors.
Complex Repair And Melolabial Flap Text: The defect edges were debeveled with a #15 scalpel blade.  The primary defect was closed partially with a complex linear closure.  Given the location of the remaining defect, shape of the defect and the proximity to free margins a melolabial flap was deemed most appropriate for complete closure of the defect.  Using a sterile surgical marker, an appropriate advancement flap was drawn incorporating the defect and placing the expected incisions within the relaxed skin tension lines where possible.    The area thus outlined was incised deep to adipose tissue with a #15 scalpel blade.  The skin margins were undermined to an appropriate distance in all directions utilizing iris scissors.
Complex Repair And Rotation Flap Text: The defect edges were debeveled with a #15 scalpel blade.  The primary defect was closed partially with a complex linear closure.  Given the location of the remaining defect, shape of the defect and the proximity to free margins a rotation flap was deemed most appropriate for complete closure of the defect.  Using a sterile surgical marker, an appropriate advancement flap was drawn incorporating the defect and placing the expected incisions within the relaxed skin tension lines where possible.    The area thus outlined was incised deep to adipose tissue with a #15 scalpel blade.  The skin margins were undermined to an appropriate distance in all directions utilizing iris scissors.
Complex Repair And Rhombic Flap Text: The defect edges were debeveled with a #15 scalpel blade.  The primary defect was closed partially with a complex linear closure.  Given the location of the remaining defect, shape of the defect and the proximity to free margins a rhombic flap was deemed most appropriate for complete closure of the defect.  Using a sterile surgical marker, an appropriate advancement flap was drawn incorporating the defect and placing the expected incisions within the relaxed skin tension lines where possible.    The area thus outlined was incised deep to adipose tissue with a #15 scalpel blade.  The skin margins were undermined to an appropriate distance in all directions utilizing iris scissors.
Complex Repair And Transposition Flap Text: The defect edges were debeveled with a #15 scalpel blade.  The primary defect was closed partially with a complex linear closure.  Given the location of the remaining defect, shape of the defect and the proximity to free margins a transposition flap was deemed most appropriate for complete closure of the defect.  Using a sterile surgical marker, an appropriate advancement flap was drawn incorporating the defect and placing the expected incisions within the relaxed skin tension lines where possible.    The area thus outlined was incised deep to adipose tissue with a #15 scalpel blade.  The skin margins were undermined to an appropriate distance in all directions utilizing iris scissors.
Complex Repair And V-Y Plasty Text: The defect edges were debeveled with a #15 scalpel blade.  The primary defect was closed partially with a complex linear closure.  Given the location of the remaining defect, shape of the defect and the proximity to free margins a V-Y plasty was deemed most appropriate for complete closure of the defect.  Using a sterile surgical marker, an appropriate advancement flap was drawn incorporating the defect and placing the expected incisions within the relaxed skin tension lines where possible.    The area thus outlined was incised deep to adipose tissue with a #15 scalpel blade.  The skin margins were undermined to an appropriate distance in all directions utilizing iris scissors.
Complex Repair And M Plasty Text: The defect edges were debeveled with a #15 scalpel blade.  The primary defect was closed partially with a complex linear closure.  Given the location of the remaining defect, shape of the defect and the proximity to free margins an M plasty was deemed most appropriate for complete closure of the defect.  Using a sterile surgical marker, an appropriate advancement flap was drawn incorporating the defect and placing the expected incisions within the relaxed skin tension lines where possible.    The area thus outlined was incised deep to adipose tissue with a #15 scalpel blade.  The skin margins were undermined to an appropriate distance in all directions utilizing iris scissors.
Complex Repair And Double M Plasty Text: The defect edges were debeveled with a #15 scalpel blade.  The primary defect was closed partially with a complex linear closure.  Given the location of the remaining defect, shape of the defect and the proximity to free margins a double M plasty was deemed most appropriate for complete closure of the defect.  Using a sterile surgical marker, an appropriate advancement flap was drawn incorporating the defect and placing the expected incisions within the relaxed skin tension lines where possible.    The area thus outlined was incised deep to adipose tissue with a #15 scalpel blade.  The skin margins were undermined to an appropriate distance in all directions utilizing iris scissors.
Complex Repair And W Plasty Text: The defect edges were debeveled with a #15 scalpel blade.  The primary defect was closed partially with a complex linear closure.  Given the location of the remaining defect, shape of the defect and the proximity to free margins a W plasty was deemed most appropriate for complete closure of the defect.  Using a sterile surgical marker, an appropriate advancement flap was drawn incorporating the defect and placing the expected incisions within the relaxed skin tension lines where possible.    The area thus outlined was incised deep to adipose tissue with a #15 scalpel blade.  The skin margins were undermined to an appropriate distance in all directions utilizing iris scissors.
Complex Repair And Z Plasty Text: The defect edges were debeveled with a #15 scalpel blade.  The primary defect was closed partially with a complex linear closure.  Given the location of the remaining defect, shape of the defect and the proximity to free margins a Z plasty was deemed most appropriate for complete closure of the defect.  Using a sterile surgical marker, an appropriate advancement flap was drawn incorporating the defect and placing the expected incisions within the relaxed skin tension lines where possible.    The area thus outlined was incised deep to adipose tissue with a #15 scalpel blade.  The skin margins were undermined to an appropriate distance in all directions utilizing iris scissors.
Complex Repair And Dorsal Nasal Flap Text: The defect edges were debeveled with a #15 scalpel blade.  The primary defect was closed partially with a complex linear closure.  Given the location of the remaining defect, shape of the defect and the proximity to free margins a dorsal nasal flap was deemed most appropriate for complete closure of the defect.  Using a sterile surgical marker, an appropriate flap was drawn incorporating the defect and placing the expected incisions within the relaxed skin tension lines where possible.    The area thus outlined was incised deep to adipose tissue with a #15 scalpel blade.  The skin margins were undermined to an appropriate distance in all directions utilizing iris scissors.
Complex Repair And Ftsg Text: The defect edges were debeveled with a #15 scalpel blade.  The primary defect was closed partially with a complex linear closure.  Given the location of the defect, shape of the defect and the proximity to free margins a full thickness skin graft was deemed most appropriate to repair the remaining defect.  The graft was trimmed to fit the size of the remaining defect.  The graft was then placed in the primary defect, oriented appropriately, and sutured into place.
Complex Repair And Burow's Graft Text: The defect edges were debeveled with a #15 scalpel blade.  The primary defect was closed partially with a complex linear closure.  Given the location of the defect, shape of the defect, the proximity to free margins and the presence of a standing cone deformity a Burow's graft was deemed most appropriate to repair the remaining defect.  The graft was trimmed to fit the size of the remaining defect.  The graft was then placed in the primary defect, oriented appropriately, and sutured into place.
Complex Repair And Split-Thickness Skin Graft Text: The defect edges were debeveled with a #15 scalpel blade.  The primary defect was closed partially with a complex linear closure.  Given the location of the defect, shape of the defect and the proximity to free margins a split thickness skin graft was deemed most appropriate to repair the remaining defect.  The graft was trimmed to fit the size of the remaining defect.  The graft was then placed in the primary defect, oriented appropriately, and sutured into place.
Complex Repair And Epidermal Autograft Text: The defect edges were debeveled with a #15 scalpel blade.  The primary defect was closed partially with a complex linear closure.  Given the location of the defect, shape of the defect and the proximity to free margins an epidermal autograft was deemed most appropriate to repair the remaining defect.  The graft was trimmed to fit the size of the remaining defect.  The graft was then placed in the primary defect, oriented appropriately, and sutured into place.
Complex Repair And Dermal Autograft Text: The defect edges were debeveled with a #15 scalpel blade.  The primary defect was closed partially with a complex linear closure.  Given the location of the defect, shape of the defect and the proximity to free margins an dermal autograft was deemed most appropriate to repair the remaining defect.  The graft was trimmed to fit the size of the remaining defect.  The graft was then placed in the primary defect, oriented appropriately, and sutured into place.
Complex Repair And Tissue Cultured Epidermal Autograft Text: The defect edges were debeveled with a #15 scalpel blade.  The primary defect was closed partially with a complex linear closure.  Given the location of the defect, shape of the defect and the proximity to free margins an tissue cultured epidermal autograft was deemed most appropriate to repair the remaining defect.  The graft was trimmed to fit the size of the remaining defect.  The graft was then placed in the primary defect, oriented appropriately, and sutured into place.
Complex Repair And Xenograft Text: The defect edges were debeveled with a #15 scalpel blade.  The primary defect was closed partially with a complex linear closure.  Given the location of the defect, shape of the defect and the proximity to free margins a xenograft was deemed most appropriate to repair the remaining defect.  The graft was trimmed to fit the size of the remaining defect.  The graft was then placed in the primary defect, oriented appropriately, and sutured into place.
Complex Repair And Skin Substitute Graft Text: The defect edges were debeveled with a #15 scalpel blade.  The primary defect was closed partially with a complex linear closure.  Given the location of the remaining defect, shape of the defect and the proximity to free margins a skin substitute graft was deemed most appropriate to repair the remaining defect.  The graft was trimmed to fit the size of the remaining defect.  The graft was then placed in the primary defect, oriented appropriately, and sutured into place.
Path Notes (To The Dermatopathologist): Please check margins.
Consent was obtained from the patient. The risks and benefits to therapy were discussed in detail. Specifically, the risks of infection, scarring, bleeding, prolonged wound healing, incomplete removal, allergy to anesthesia, nerve injury and recurrence were addressed. Prior to the procedure, the treatment site was clearly identified and confirmed by the patient. All components of Universal Protocol/PAUSE Rule completed.
Post-Care Instructions: I reviewed with the patient in detail post-care instructions. Patient is not to engage in any heavy lifting, exercise, or swimming for the next 14 days. Should the patient develop any fevers, chills, bleeding, severe pain patient will contact the office immediately.
Home Suture Removal Text: Patient was provided a home suture removal kit and will remove their sutures at home.  If they have any questions or difficulties they will call the office.
Where Do You Want The Question To Include Opioid Counseling Located?: Case Summary Tab
Information: Selecting Yes will display possible errors in your note based on the variables you have selected. This validation is only offered as a suggestion for you. PLEASE NOTE THAT THE VALIDATION TEXT WILL BE REMOVED WHEN YOU FINALIZE YOUR NOTE. IF YOU WANT TO FAX A PRELIMINARY NOTE YOU WILL NEED TO TOGGLE THIS TO 'NO' IF YOU DO NOT WANT IT IN YOUR FAXED NOTE.

## 2024-01-16 NOTE — PROCEDURE: ADDITIONAL NOTES
Detail Level: Simple
Additional Notes: Plan\\n\\n1. Daily take bandaid off, wash with soap and water, dry.\\n2. Apply vaseline to incision followed by non adhesive gauze and coban.\\n3. Continue above until sutures removed. \\n\\nAppt given for suture removal\\n\\nsigns of infection include pain, drainage, red, hot to the touch call office
Render Risk Assessment In Note?: no

## 2024-01-29 ENCOUNTER — APPOINTMENT (RX ONLY)
Dept: URBAN - METROPOLITAN AREA CLINIC 20 | Facility: CLINIC | Age: 89
Setting detail: DERMATOLOGY
End: 2024-01-29

## 2024-01-31 PROBLEM — T78.40XA ALLERGIES: Status: ACTIVE | Noted: 2024-01-31

## 2024-01-31 PROBLEM — J30.89 ENVIRONMENTAL AND SEASONAL ALLERGIES: Status: ACTIVE | Noted: 2024-01-31

## 2024-01-31 PROBLEM — R32 URINARY INCONTINENCE: Status: ACTIVE | Noted: 2024-01-31

## 2024-01-31 PROBLEM — T78.40XA ALLERGIES: Status: RESOLVED | Noted: 2024-01-31 | Resolved: 2024-01-31

## 2024-02-14 ENCOUNTER — APPOINTMENT (RX ONLY)
Dept: URBAN - METROPOLITAN AREA CLINIC 20 | Facility: CLINIC | Age: 89
Setting detail: DERMATOLOGY
End: 2024-02-14

## 2024-02-14 PROBLEM — D04.22 CARCINOMA IN SITU OF SKIN OF LEFT EAR AND EXTERNAL AURICULAR CANAL: Status: ACTIVE | Noted: 2024-02-14

## 2024-02-14 PROCEDURE — 99213 OFFICE O/P EST LOW 20 MIN: CPT

## 2024-02-14 PROCEDURE — ? ADDITIONAL NOTES

## 2024-02-14 PROCEDURE — ? COUNSELING

## 2024-02-14 NOTE — HPI: SKIN LESION (SQUAMOUS CELL CARCINOMA)
Is This A New Presentation, Or A Follow-Up?: Squamous Cell Carcinoma
When Was Squamous Cell Carcinoma Biopsied? (Optional): 1/9/24
Accession # (Optional): [id number]

## 2024-02-14 NOTE — PROCEDURE: ADDITIONAL NOTES
Detail Level: Simple
Render Risk Assessment In Note?: no
Additional Notes: Bx proven, appears resolved, pt would like to monitor at this time

## 2024-03-15 PROBLEM — Z74.09 IMPAIRED MOBILITY: Status: ACTIVE | Noted: 2024-03-15

## 2024-04-02 ENCOUNTER — OFFICE VISIT (OUTPATIENT)
Dept: URGENT CARE | Facility: CLINIC | Age: 89
End: 2024-04-02
Payer: MEDICARE

## 2024-04-02 VITALS
WEIGHT: 174 LBS | BODY MASS INDEX: 32.02 KG/M2 | RESPIRATION RATE: 20 BRPM | TEMPERATURE: 99 F | SYSTOLIC BLOOD PRESSURE: 140 MMHG | OXYGEN SATURATION: 94 % | DIASTOLIC BLOOD PRESSURE: 80 MMHG | HEIGHT: 62 IN | HEART RATE: 76 BPM

## 2024-04-02 DIAGNOSIS — I87.2 VENOUS STASIS DERMATITIS: ICD-10-CM

## 2024-04-02 DIAGNOSIS — I87.2 VENOUS INSUFFICIENCY: ICD-10-CM

## 2024-04-02 DIAGNOSIS — R60.0 PERIPHERAL EDEMA: ICD-10-CM

## 2024-04-02 PROCEDURE — 3079F DIAST BP 80-89 MM HG: CPT | Performed by: PHYSICIAN ASSISTANT

## 2024-04-02 PROCEDURE — 3077F SYST BP >= 140 MM HG: CPT | Performed by: PHYSICIAN ASSISTANT

## 2024-04-02 PROCEDURE — 99214 OFFICE O/P EST MOD 30 MIN: CPT | Performed by: PHYSICIAN ASSISTANT

## 2024-04-02 RX ORDER — TRIAMCINOLONE ACETONIDE 1 MG/G
CREAM TOPICAL
Qty: 80 G | Refills: 0 | Status: CANCELLED | OUTPATIENT
Start: 2024-04-02

## 2024-04-02 ASSESSMENT — FIBROSIS 4 INDEX: FIB4 SCORE: 3.89

## 2024-04-02 NOTE — PROGRESS NOTES
Subjective:   Cecilia Alatorre is a 89 y.o. female who presents for Rash (Rash on legs and swelling )  This a very pleasant 89-year-old female with 4 to 6-month history of bilateral lower extremity swelling.  She is a somewhat poor historian.  She lives at [facility].  She has lost her primary care physician.  Swelling has been ongoing and she has now developed a erythematous itchy rash to the posterior calves bilaterally.  She denies shortness of breath, PND, orthopnea, chest pain, dizziness.  She is ambulatory with a walker.  She has no history of DVT.  She has not tried anything for the rash.  Review of her chart demonstrates that she has had this complaint for an extended period of time with her primary care physician.  Again at this time symptoms are present bilaterally.  No history of congestive heart failure.    Medications:  acetaminophen Tabs  atorvastatin Tabs  carboxymethylcellulose Soln  carvedilol Tabs  loratadine Tabs  Mi-Acid Gas Relief Chew  [place] 128 Soln  omeprazole  ondansetron Tabs  polyethylene glycol/lytes Pack  sertraline Tabs  traMADol Tabs  traZODone Tabs  Xiidra Soln    Allergies:             Codeine, Latex, Lisinopril, Other drug, [place] two-sided adhesive strap [always feminine wipes], Soap, and Tape    Surgical History:         Past Surgical History:   Procedure Laterality Date    PB OPEN FIX INTER/SUBTROCH FX,IMPLNT Left 7/28/2023    Procedure: INSERTION, INTRAMEDULLARY SHELBY, FEMUR, PROXIMAL;  Surgeon: Hardy Perry M.D.;  Location: [facility];  Service: Orthopedics    KNEE ARTHROPLASTY TOTAL Right 2/1/2017    Procedure: KNEE ARTHROPLASTY TOTAL;  Surgeon: Aaron Burton M.D.;  Location: [facility];  Service:     HIP ARTHROPLASTY TOTAL  3/2/2015    Performed by Aaron Burton M.D. at [facility]    FUSION, SPINE, LUMBAR, PLIF  5/9/2013    Performed by Sancho Rosales M.D. at [facility]    LUMBAR DECOMPRESSION  5/9/2013    Performed by Sancho ANAYA  "SINDI Rosales. at SURGERY [facility]    OTHER ORTHOPEDIC SURGERY  2007    knee right    OTHER  2004    face lift , tummy tuck    OTHER ORTHOPEDIC SURGERY  1995    carpal tunnel, right    OTHER  1948    tonsillectomy    APPENDECTOMY         Past Social Hx:  Cecilia Alatorre  reports that she quit smoking about 52 years ago. Her smoking use included cigarettes. She started smoking about 69 years ago. She has a 17 pack-year smoking history. She has never used smokeless tobacco. She reports that she does not currently use alcohol. She reports that she does not use drugs.     Past Family Hx:   Cecilia Alatorre family history includes Cancer in her mother; Stroke in her father.       Problem list, medications, and allergies reviewed by myself today in Epic.     Objective:     BP (!) 140/80 (BP Location: Left arm, Patient Position: Sitting, BP Cuff Size: Adult)   Pulse 76   Temp 37.2 °C (99 °F) (Temporal)   Resp 20   Ht 1.575 m (5' 2\")   Wt 78.9 kg (174 lb)   SpO2 94%   BMI 31.83 kg/m²     Physical Exam  Skin:     Comments: Bilateral lower extremity peripheral edema from the knee distal to the foot.  2+ pitting edema noted.  There is erythematous slightly papular rash noted to the posterior calves bilaterally consistent with venous stasis dermatitis.  No focal calf tenderness.  Negative Homans.  Distal pulses intact.         Assessment/Plan:     Diagnosis and Associated Orders:     1. Peripheral edema  - Referral to Geriatrics    2. Venous stasis dermatitis        Comments/MDM:  Patient with longstanding history of peripheral edema.  Low suspicion for DVT given bilateral involvement with associated erythematous rash noted to the posterior calf bilaterally, nontender, appears to be consistent with venous stasis dermatitis.  Recommend elevation of legs above heart when possible, compression stockings, trial of triamcinolone, Rx handwritten for nursing home.  Patient does not have a primary care physician or " geriatrician at this time.  Explained the importance of follow-up and workup for peripheral edema.  Did consider ultrasound given bilateral involvement without shortness of breath hypoxia or tachypnea low suspicion at this time also based on duration of symptoms greater than 6 months.  40 minutes was allotted and spent for patient care and coordination of care (not reported separately) including preparing for the visit, obtaining/reviewing history from/with patient, performing an exam/evaluation, ordering Rx, developing a plan of care, counseling/educating the patient, developing the discharge summary for release to [facility], and documentation. This template was updated to summarize care specific to this encounter.    This differential diagnosis is broad and complex, and includes, but is not limited to deep vein thrombosis, heart failure, venous insufficiency, lymphedema, liver disease, renal disease, medication side effect, idiopathic edema.Common medications that can cause edema include antihypertensives such as calcium channel blockers, beta-blockers, corticosteroids, NSAIDs, neuroleptics, hormonal replacement therapy, antiviral medications and antipsychotics.    I personally reviewed prior external notes and test results pertinent to today's visit. Supportive care, natural history, differential diagnoses, and indications for immediate follow-up discussed. Return to clinic or go to ED if symptoms worsen or persist.  Red flag symptoms discussed.  Patient/Parent/Guardian voices understanding. Follow-up with your primary care provider in 3-5 days.  All side effects of medication discussed including allergic response, GI upset, tendon injury, rash, sedation etc    Please note that this dictation was created using voice recognition software. I have made a reasonable attempt to correct obvious errors, but I expect that there are errors of grammar and possibly content that I did not discover before finalizing the  note.    This note was electronically signed by Gwen Martinez PA-C

## 2024-04-05 PROBLEM — G89.29 CHRONIC RIGHT-SIDED LOW BACK PAIN WITHOUT SCIATICA: Status: ACTIVE | Noted: 2024-04-05

## 2024-04-05 PROBLEM — M54.50 CHRONIC RIGHT-SIDED LOW BACK PAIN WITHOUT SCIATICA: Status: ACTIVE | Noted: 2024-04-05

## 2024-04-05 PROBLEM — G89.29 CHRONIC RIGHT-SIDED THORACIC BACK PAIN: Status: ACTIVE | Noted: 2024-04-05

## 2024-04-05 PROBLEM — M54.6 CHRONIC RIGHT-SIDED THORACIC BACK PAIN: Status: ACTIVE | Noted: 2024-04-05

## 2024-04-25 PROBLEM — I87.2 VENOUS INSUFFICIENCY OF BOTH LOWER EXTREMITIES: Status: ACTIVE | Noted: 2024-04-25

## 2024-04-26 PROBLEM — R74.8 ELEVATED VITAMIN B12 LEVEL: Status: ACTIVE | Noted: 2024-04-26

## 2024-04-26 PROBLEM — M71.22 POPLITEAL CYST, LEFT: Status: ACTIVE | Noted: 2024-04-26

## 2024-04-26 PROBLEM — R79.89 ELEVATED VITAMIN B12 LEVEL: Status: ACTIVE | Noted: 2024-04-26

## 2024-04-26 PROBLEM — N18.31 STAGE 3A CHRONIC KIDNEY DISEASE: Status: ACTIVE | Noted: 2024-04-26

## 2024-05-08 ENCOUNTER — APPOINTMENT (RX ONLY)
Dept: URBAN - METROPOLITAN AREA CLINIC 20 | Facility: CLINIC | Age: 89
Setting detail: DERMATOLOGY
End: 2024-05-08

## 2024-05-08 DIAGNOSIS — L30.4 ERYTHEMA INTERTRIGO: ICD-10-CM | Status: INADEQUATELY CONTROLLED

## 2024-05-08 PROCEDURE — ? ADDITIONAL NOTES

## 2024-05-08 PROCEDURE — ? PRESCRIPTION

## 2024-05-08 PROCEDURE — 99214 OFFICE O/P EST MOD 30 MIN: CPT

## 2024-05-08 PROCEDURE — ? COUNSELING

## 2024-05-08 PROCEDURE — ? PRESCRIPTION MEDICATION MANAGEMENT

## 2024-05-08 RX ORDER — TRIAMCINOLONE ACETONIDE 0.25 MG/G
CREAM TOPICAL
Qty: 80 | Refills: 1 | Status: ERX | COMMUNITY
Start: 2024-05-08

## 2024-05-08 RX ORDER — KETOCONAZOLE 20 MG/G
CREAM TOPICAL BID
Qty: 60 | Refills: 3 | Status: ERX | COMMUNITY
Start: 2024-05-08

## 2024-05-08 RX ADMIN — TRIAMCINOLONE ACETONIDE: 0.25 CREAM TOPICAL at 00:00

## 2024-05-08 RX ADMIN — KETOCONAZOLE: 20 CREAM TOPICAL at 00:00

## 2024-05-08 ASSESSMENT — LOCATION DETAILED DESCRIPTION DERM
LOCATION DETAILED: SUBXIPHOID
LOCATION DETAILED: RIGHT RIB CAGE

## 2024-05-08 ASSESSMENT — LOCATION SIMPLE DESCRIPTION DERM: LOCATION SIMPLE: ABDOMEN

## 2024-05-08 ASSESSMENT — LOCATION ZONE DERM: LOCATION ZONE: TRUNK

## 2024-05-08 NOTE — PROCEDURE: ADDITIONAL NOTES
Render Risk Assessment In Note?: no
Detail Level: Simple
Additional Notes: Staff to assist patient with application

## 2024-05-08 NOTE — PROCEDURE: PRESCRIPTION MEDICATION MANAGEMENT
Plan: Follow up as needed
Detail Level: Zone
Render In Strict Bullet Format?: No
Initiate Treatment: Ketoconazole cream under breast 1x daily for for 3 weeks. Triamcinalone for 2x daily for 4 days under breast
Discontinue Regimen: Vaseline

## 2024-05-30 PROBLEM — E73.9 LACTOSE INTOLERANCE: Status: ACTIVE | Noted: 2024-05-30

## 2024-05-30 PROBLEM — B37.2 CUTANEOUS CANDIDIASIS: Status: ACTIVE | Noted: 2024-05-30

## 2024-06-04 PROBLEM — D09.9 SQUAMOUS CELL CARCINOMA IN SITU: Status: ACTIVE | Noted: 2024-06-04

## 2024-07-11 PROBLEM — M25.532 LEFT WRIST PAIN: Status: ACTIVE | Noted: 2024-07-11

## 2024-07-12 PROBLEM — S29.011A CHEST WALL MUSCLE STRAIN: Status: RESOLVED | Noted: 2023-11-15 | Resolved: 2024-07-12

## 2024-07-12 PROBLEM — J40 BRONCHITIS: Status: RESOLVED | Noted: 2017-05-15 | Resolved: 2024-07-12

## 2024-07-16 ENCOUNTER — APPOINTMENT (RX ONLY)
Dept: URBAN - METROPOLITAN AREA CLINIC 20 | Facility: CLINIC | Age: 89
Setting detail: DERMATOLOGY
End: 2024-07-16

## 2024-07-16 DIAGNOSIS — L57.8 OTHER SKIN CHANGES DUE TO CHRONIC EXPOSURE TO NONIONIZING RADIATION: ICD-10-CM

## 2024-07-16 DIAGNOSIS — L57.0 ACTINIC KERATOSIS: ICD-10-CM

## 2024-07-16 DIAGNOSIS — D18.0 HEMANGIOMA: ICD-10-CM

## 2024-07-16 DIAGNOSIS — L82.1 OTHER SEBORRHEIC KERATOSIS: ICD-10-CM

## 2024-07-16 DIAGNOSIS — Z85.828 PERSONAL HISTORY OF OTHER MALIGNANT NEOPLASM OF SKIN: ICD-10-CM

## 2024-07-16 DIAGNOSIS — L81.4 OTHER MELANIN HYPERPIGMENTATION: ICD-10-CM

## 2024-07-16 DIAGNOSIS — D22 MELANOCYTIC NEVI: ICD-10-CM

## 2024-07-16 PROBLEM — D18.01 HEMANGIOMA OF SKIN AND SUBCUTANEOUS TISSUE: Status: ACTIVE | Noted: 2024-07-16

## 2024-07-16 PROBLEM — D22.5 MELANOCYTIC NEVI OF TRUNK: Status: ACTIVE | Noted: 2024-07-16

## 2024-07-16 PROCEDURE — 17000 DESTRUCT PREMALG LESION: CPT

## 2024-07-16 PROCEDURE — ? LIQUID NITROGEN

## 2024-07-16 PROCEDURE — 99213 OFFICE O/P EST LOW 20 MIN: CPT | Mod: 25

## 2024-07-16 PROCEDURE — ? COUNSELING

## 2024-07-16 PROCEDURE — 17003 DESTRUCT PREMALG LES 2-14: CPT

## 2024-07-16 ASSESSMENT — LOCATION SIMPLE DESCRIPTION DERM
LOCATION SIMPLE: LEFT THIGH
LOCATION SIMPLE: LEFT CHEEK
LOCATION SIMPLE: RIGHT POSTERIOR UPPER ARM
LOCATION SIMPLE: RIGHT UPPER BACK
LOCATION SIMPLE: RIGHT CALF
LOCATION SIMPLE: LEFT POSTERIOR THIGH
LOCATION SIMPLE: LEFT FOREARM
LOCATION SIMPLE: ABDOMEN
LOCATION SIMPLE: RIGHT NOSE
LOCATION SIMPLE: LEFT HAND
LOCATION SIMPLE: LEFT OCCIPITAL SCALP
LOCATION SIMPLE: UPPER BACK
LOCATION SIMPLE: LEFT BREAST
LOCATION SIMPLE: CHEST
LOCATION SIMPLE: RIGHT CHEEK
LOCATION SIMPLE: RIGHT HAND
LOCATION SIMPLE: RIGHT ANKLE
LOCATION SIMPLE: LEFT FOREHEAD
LOCATION SIMPLE: RIGHT FOREARM
LOCATION SIMPLE: LEFT CALF
LOCATION SIMPLE: RIGHT ANTERIOR NECK
LOCATION SIMPLE: LEFT UPPER BACK

## 2024-07-16 ASSESSMENT — LOCATION ZONE DERM
LOCATION ZONE: HAND
LOCATION ZONE: SCALP
LOCATION ZONE: LEG
LOCATION ZONE: TRUNK
LOCATION ZONE: ARM
LOCATION ZONE: NOSE
LOCATION ZONE: FACE
LOCATION ZONE: NECK

## 2024-07-16 ASSESSMENT — LOCATION DETAILED DESCRIPTION DERM
LOCATION DETAILED: RIGHT RIB CAGE
LOCATION DETAILED: LEFT RIB CAGE
LOCATION DETAILED: LEFT MEDIAL SUPERIOR CHEST
LOCATION DETAILED: LEFT INFERIOR LATERAL FOREHEAD
LOCATION DETAILED: RIGHT INFERIOR MEDIAL UPPER BACK
LOCATION DETAILED: LEFT DISTAL CALF
LOCATION DETAILED: LEFT SUPERIOR UPPER BACK
LOCATION DETAILED: LEFT ANTERIOR DISTAL THIGH
LOCATION DETAILED: RIGHT PROXIMAL POSTERIOR UPPER ARM
LOCATION DETAILED: RIGHT LATERAL SUPERIOR CHEST
LOCATION DETAILED: LEFT RADIAL DORSAL HAND
LOCATION DETAILED: LEFT PROXIMAL POSTERIOR THIGH
LOCATION DETAILED: LEFT SUPERIOR OCCIPITAL SCALP
LOCATION DETAILED: LEFT DISTAL POSTERIOR THIGH
LOCATION DETAILED: RIGHT DISTAL CALF
LOCATION DETAILED: RIGHT PROXIMAL DORSAL FOREARM
LOCATION DETAILED: RIGHT ANKLE
LOCATION DETAILED: RIGHT INFERIOR ANTERIOR NECK
LOCATION DETAILED: RIGHT RADIAL DORSAL HAND
LOCATION DETAILED: RIGHT SUPERIOR UPPER BACK
LOCATION DETAILED: SUPERIOR THORACIC SPINE
LOCATION DETAILED: LEFT SUPERIOR CENTRAL MALAR CHEEK
LOCATION DETAILED: RIGHT DISTAL RADIAL DORSAL FOREARM
LOCATION DETAILED: LEFT PROXIMAL DORSAL FOREARM
LOCATION DETAILED: LEFT AXILLARY TAIL OF BREAST
LOCATION DETAILED: RIGHT NASAL ALA
LOCATION DETAILED: RIGHT INFERIOR CENTRAL MALAR CHEEK

## 2024-07-22 PROBLEM — S62.102A LEFT WRIST FRACTURE, CLOSED, INITIAL ENCOUNTER: Status: ACTIVE | Noted: 2024-07-22

## 2024-07-27 ENCOUNTER — OFFICE VISIT (OUTPATIENT)
Dept: URGENT CARE | Facility: CLINIC | Age: 89
End: 2024-07-27
Payer: MEDICARE

## 2024-07-27 ENCOUNTER — APPOINTMENT (OUTPATIENT)
Dept: RADIOLOGY | Facility: IMAGING CENTER | Age: 89
End: 2024-07-27
Attending: STUDENT IN AN ORGANIZED HEALTH CARE EDUCATION/TRAINING PROGRAM
Payer: MEDICARE

## 2024-07-27 VITALS
SYSTOLIC BLOOD PRESSURE: 108 MMHG | DIASTOLIC BLOOD PRESSURE: 70 MMHG | OXYGEN SATURATION: 89 % | RESPIRATION RATE: 12 BRPM | HEART RATE: 78 BPM | BODY MASS INDEX: 32.76 KG/M2 | TEMPERATURE: 97.8 F | HEIGHT: 62 IN | WEIGHT: 178 LBS

## 2024-07-27 DIAGNOSIS — R09.02 HYPOXIA: ICD-10-CM

## 2024-07-27 DIAGNOSIS — S51.012A SKIN TEAR OF LEFT ELBOW WITHOUT COMPLICATION, INITIAL ENCOUNTER: ICD-10-CM

## 2024-07-27 PROCEDURE — 71046 X-RAY EXAM CHEST 2 VIEWS: CPT | Mod: TC | Performed by: RADIOLOGY

## 2024-07-27 PROCEDURE — 3078F DIAST BP <80 MM HG: CPT | Performed by: STUDENT IN AN ORGANIZED HEALTH CARE EDUCATION/TRAINING PROGRAM

## 2024-07-27 PROCEDURE — 3074F SYST BP LT 130 MM HG: CPT | Performed by: STUDENT IN AN ORGANIZED HEALTH CARE EDUCATION/TRAINING PROGRAM

## 2024-07-27 PROCEDURE — 99213 OFFICE O/P EST LOW 20 MIN: CPT | Performed by: STUDENT IN AN ORGANIZED HEALTH CARE EDUCATION/TRAINING PROGRAM

## 2024-07-27 RX ORDER — BUPROPION HYDROCHLORIDE 100 MG/1
TABLET ORAL
COMMUNITY

## 2024-07-27 RX ORDER — DEXLANSOPRAZOLE 60 MG/1
CAPSULE, DELAYED RELEASE ORAL
COMMUNITY

## 2024-07-27 ASSESSMENT — FIBROSIS 4 INDEX: FIB4 SCORE: 3.89

## 2024-08-01 PROBLEM — S51.012A SKIN TEAR OF LEFT ELBOW WITHOUT COMPLICATION: Status: ACTIVE | Noted: 2024-08-01

## 2024-08-01 PROBLEM — M65.4 DE QUERVAIN'S TENOSYNOVITIS: Status: ACTIVE | Noted: 2024-08-01

## 2024-08-01 PROBLEM — R09.02 HYPOXIA: Status: ACTIVE | Noted: 2024-08-01

## 2024-08-01 PROBLEM — M19.049 CMC ARTHRITIS: Status: ACTIVE | Noted: 2024-08-01

## 2024-08-15 ENCOUNTER — OFFICE VISIT (OUTPATIENT)
Dept: URGENT CARE | Facility: CLINIC | Age: 89
End: 2024-08-15
Payer: MEDICARE

## 2024-08-15 VITALS
DIASTOLIC BLOOD PRESSURE: 90 MMHG | BODY MASS INDEX: 32.76 KG/M2 | HEIGHT: 62 IN | WEIGHT: 178 LBS | HEART RATE: 78 BPM | OXYGEN SATURATION: 96 % | TEMPERATURE: 98.2 F | SYSTOLIC BLOOD PRESSURE: 130 MMHG | RESPIRATION RATE: 16 BRPM

## 2024-08-15 DIAGNOSIS — S41.112A ARM LACERATION, LEFT, INITIAL ENCOUNTER: ICD-10-CM

## 2024-08-15 PROCEDURE — 16020 DRESS/DEBRID P-THICK BURN S: CPT | Mod: LT | Performed by: FAMILY MEDICINE

## 2024-08-15 ASSESSMENT — FIBROSIS 4 INDEX: FIB4 SCORE: 3.89

## 2024-08-15 ASSESSMENT — ENCOUNTER SYMPTOMS
SORE THROAT: 0
TINGLING: 0
SHORTNESS OF BREATH: 0
NUMBNESS: 0
VOMITING: 0
LOSS OF CONSCIOUSNESS: 0
FEVER: 0
NAUSEA: 0
COUGH: 0
DIZZINESS: 0

## 2024-08-15 NOTE — PROGRESS NOTES
Subjective:   Cecilia Alatorre is a 89 y.o. female who presents for Fall (Fall on Tuesday 08/13/2024, left arm laceration)        Fall  The accident occurred 2 days ago (8/13/2024). The fall occurred while standing (Reports left arm struck the wall when ambulating). The volume of blood lost was minimal. Point of impact: Left arm. The pain is mild. Exacerbated by: Reports intermittent persistent bleeding at the wound site. Pertinent negatives include no fever, loss of consciousness, nausea, numbness, tingling or vomiting. Treatments tried: Wound dressing application. The treatment provided no relief.     PMH:  has a past medical history of Acid reflux, Arthritis, Cancer (HCC), CATARACT, Dental disorder, Heart burn, Hiatus hernia syndrome, High cholesterol, Hypertension, Indigestion, Intracerebral bleed (HCC) (12/16/2015), Nausea after vaccination (11/15/2023), Other specified disorder of intestines, Pain (09/01/2016), Psychiatric problem, Seasonal allergies, Shoulder injury, Urinary bladder disorder, and Urinary incontinence (01/01/2017).  MEDS:   Current Outpatient Medications:     [START ON 8/19/2024] traMADol (ULTRAM) 50 MG Tab, Take 1 Tablet by mouth 2 (two) times a day for 92 days., Disp: 60 Tablet, Rfl: 2    Dexlansoprazole (DEXILANT) 60 MG CAPSULE DELAYED RELEASE delayed-release capsule, Take 1 capsule every day by oral route in the morning for 56 days., Disp: , Rfl:     cetirizine (ZYRTEC) 10 MG Tab, TAKE 1 TABLET BY MOUTH EVERY DAY., Disp: 30 Tablet, Rfl: 11    lactase (LACTAID) 3000 UNIT Tab, Take 1 Tablet by mouth 3 times a day as needed (with meals for lactose intolerance). *Pt may self administer and keep in room*, Disp: 120 Tablet, Rfl: 9    triamcinolone acetonide (KENALOG) 0.1 % Cream, Apply 1 Application topically 2 times a day. Apply to rash on legs twice daily as needed. Okay for patient to apply at bedside., Disp: , Rfl:     polyethylene glycol/lytes (MIRALAX) Pack, Take 1 Packet by mouth 1  time a day as needed (for constipation). Mix into 8 ounces of fluid., Disp: 30 Packet, Rfl: 11    XIIDRA 5 % Solution, INSTILL 1 DROP BOTH EYES TWICE DAILY (8AM AND 8PM), Disp: 60 Each, Rfl: 10    MISC NATURAL PRODUCTS PO, Take 1,000 mg by mouth every morning with breakfast. Lemon Bioflavonoids Dietary Supplement. May keep in room and self-administer., Disp: , Rfl:     ELDERBERRY-VITAMIN C-ZINC PO, Take 1 Capsule by mouth every morning with breakfast. May keep in room and self-administer  Indications: immune support, Disp: , Rfl:     acetaminophen (TYLENOL) 325 MG Tab, Take 650 mg by mouth every 6 hours as needed for Mild Pain., Disp: , Rfl:     carboxymethylcellulose (REFRESH TEARS) 0.5 % Solution, Administer 1 Drop into both eyes every 2 hours as needed (dry eyes). May keep in her room to self admin as needed, Disp: 15 mL, Rfl: 2    omeprazole (PRILOSEC) 20 MG delayed-release capsule, TAKE 1 CAPSULE EVERY MORNING BEFORE BREAKFAST FOR GASTROESOPHAGEAL REFLUX DISEASE, Disp: 30 Capsule, Rfl: 11    MI-ACID GAS RELIEF 80 MG Chew Tab, TAKE 1 TAB BY MOUTH EVERY 6 HOURS AS NEEDED FOR BLOATING, FOR GAS PAIN, Disp: 30 Tablet, Rfl: 10    atorvastatin (LIPITOR) 20 MG Tab, Take 1 Tablet by mouth every day., Disp: 90 Tablet, Rfl: 3    carvedilol (COREG) 3.125 MG Tab, Take 1 Tablet by mouth 2 times a day with meals., Disp: 180 Tablet, Rfl: 3    ondansetron (ZOFRAN) 4 MG Tab tablet, Take 1 Tablet by mouth every four hours as needed for Nausea/Vomiting., Disp: 20 Tablet, Rfl: 0    sodium chloride (HERMAN 128) 2 % ophthalmic solution, INSTILL 1 DROP BOTH EYES THREE TIMES DAILY (10AM, 3PM, 7PM), Disp: 15 mL, Rfl: 11    sertraline (ZOLOFT) 100 MG Tab, TAKE 1 TABLET BY MOUTH EVERY DAY., Disp: 30 Tablet, Rfl: 11    traZODone (DESYREL) 100 MG Tab, Take 1 Tablet by mouth every evening. At 8PM, Disp: 90 Tablet, Rfl: 3  ALLERGIES:   Allergies   Allergen Reactions    Codeine Unspecified     Mental confusion    Latex Unspecified     Only to  adhesive bandaids and tape; rash, redness, itching    Lisinopril Cough    Other Drug Unspecified     Steroids raise blood pressure and blood sugar    [place] Two-Sided Adhesive Strap [Always Feminine Wipes] Rash     Rash-Paper tape OK    Soap Rash and Itching     Bathing and laundry soap cause itching and rash non scented soap OK    Tape Rash     Rash-Paper tape OK     SURGHX:   Past Surgical History:   Procedure Laterality Date    PB OPEN FIX INTER/SUBTROCH FX,IMPLNT Left 7/28/2023    Procedure: INSERTION, INTRAMEDULLARY SHELBY, FEMUR, PROXIMAL;  Surgeon: Hardy Perry M.D.;  Location: SURGERY [facility];  Service: Orthopedics    KNEE ARTHROPLASTY TOTAL Right 2/1/2017    Procedure: KNEE ARTHROPLASTY TOTAL;  Surgeon: Aaron Burton M.D.;  Location: SURGERY [facility];  Service:     HIP ARTHROPLASTY TOTAL  3/2/2015    Performed by Aaron Burton M.D. at SURGERY [facility]    FUSION, SPINE, LUMBAR, PLIF  5/9/2013    Performed by Sancho Rosales M.D. at SURGERY [facility]    LUMBAR DECOMPRESSION  5/9/2013    Performed by Sancho Rosales M.D. at [facility]    OTHER ORTHOPEDIC SURGERY  2007    knee right    OTHER  2004    face lift , tummy tuck    OTHER ORTHOPEDIC SURGERY  1995    carpal tunnel, right    OTHER  1948    tonsillectomy    APPENDECTOMY       SOCHX:  reports that she quit smoking about 52 years ago. Her smoking use included cigarettes. She started smoking about 69 years ago. She has a 17 pack-year smoking history. She has never used smokeless tobacco. She reports that she does not currently use alcohol. She reports that she does not use drugs.  FH:   Family History   Problem Relation Age of Onset    Stroke Father     Cancer Mother      Review of Systems   Constitutional:  Negative for fever.   HENT:  Negative for sore throat.    Respiratory:  Negative for cough and shortness of breath.    Cardiovascular:  Negative for chest pain.   Gastrointestinal:  Negative for nausea and vomiting.  "  Skin:  Negative for rash.   Neurological:  Negative for dizziness, tingling, loss of consciousness and numbness.        Objective:   BP (!) 130/90 (BP Location: Right arm, Patient Position: Sitting, BP Cuff Size: Adult)   Pulse 78   Temp 36.8 °C (98.2 °F) (Temporal)   Resp 16   Ht 1.575 m (5' 2\")   Wt 80.7 kg (178 lb)   SpO2 96%   BMI 32.56 kg/m²   Physical Exam  Vitals and nursing note reviewed.   Constitutional:       General: She is not in acute distress.     Appearance: She is well-developed.   HENT:      Head: Normocephalic and atraumatic.      Right Ear: External ear normal.      Left Ear: External ear normal.      Nose: Nose normal.      Mouth/Throat:      Mouth: Mucous membranes are moist.   Eyes:      Conjunctiva/sclera: Conjunctivae normal.   Cardiovascular:      Rate and Rhythm: Normal rate.   Pulmonary:      Effort: Pulmonary effort is normal. No respiratory distress.      Breath sounds: Normal breath sounds.   Abdominal:      General: There is no distension.   Musculoskeletal:         General: Normal range of motion.      Left shoulder: No bony tenderness. Normal range of motion.      Left upper arm: Laceration present. No swelling, deformity or bony tenderness.      Left elbow: No swelling. Normal range of motion.      Left wrist: No swelling. Normal range of motion.        Arms:    Skin:     General: Skin is warm and dry.   Neurological:      General: No focal deficit present.      Mental Status: She is alert and oriented to person, place, and time. Mental status is at baseline.      Gait: Gait (gait at baseline) normal.   Psychiatric:         Judgment: Judgment normal.           Assessment/Plan:   1. Arm laceration, left, initial encounter  - Slings  - Left arm wound debridement, 8 cm flap laceration left arm        Medical Decision Making/Course:  In the course of preparing for this visit with review of the pertinent past medical history, recent and past clinic visits, current medications, " and performing chart, immunization, medical history and medication reconciliation, and in the further course of obtaining the current history pertinent to the clinic visit today, performing an exam and evaluation, ordering and independently evaluating labs, tests  , and/or procedures including wound debridement and dressing application see procedure note, prescribing any recommended new medications as noted above, providing any pertinent counseling and education and recommending further coordination of care including recommendations for symptomatic and supportive measures and wound care instructions and precautions, at least  52 minutes of total time were spent during this encounter.      Discussed close monitoring, return precautions, and supportive measures of maintaining adequate fluid hydration and caloric intake, relative rest and symptom management as needed for pain and/or fever.    Differential diagnosis, natural history, supportive care, and indications for immediate follow-up discussed.     Advised the patient to follow-up with the primary care physician for recheck, reevaluation, and consideration of further management.    Please note that this dictation was created using voice recognition software. I have worked with consultants from the vendor as well as technical experts from [facility] [employer] to optimize the interface. I have made every reasonable attempt to correct obvious errors, but I expect that there are errors of grammar and possibly content that I did not discover before finalizing the note.

## 2024-08-15 NOTE — PROCEDURES
Left arm wound debridement, 8 cm flap laceration left arm    Date/Time: 8/15/2024 2:25 PM    Performed by: Donnie Hawkins M.D.  Authorized by: Donnie Hawkins M.D.  Preparation: Patient was prepped and draped in the usual sterile fashion.  Local anesthesia used: yes    Anesthesia:  Local anesthesia used: yes  Local Anesthetic: topical anesthetic (2% viscous lidocaine gel)  Anesthetic total: 5 mL  Patient tolerance: patient tolerated the procedure well with no immediate complications  Comments: When it had been assessed that adequate anesthesia had been obtained, the devitalized wound flap was grasped with forceps and gentle traction was applied and devitalized tissue was debrided to the margin of healthy tissue.  There is minimal bleeding.  Spontaneous hemostasis was obtained with pressure.  The wound bed and granulation tissue was gently cleanse with Hibiclens and saline to healthy tissue.  A nonadherent dressing was applied and secured with elastic wrap to avoid touching the skin with any tapes in the context of the noted allergies and skin reaction to tape and adhesive.  A sling was applied to maintain the integrity of the applied dressing.  The patient was given wound care instructions and precautions.

## 2024-08-29 PROBLEM — Z71.89 ADVANCED CARE PLANNING/COUNSELING DISCUSSION: Status: ACTIVE | Noted: 2024-08-29

## 2024-08-29 PROBLEM — S41.112D: Status: ACTIVE | Noted: 2024-08-29
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
## 2024-10-03 PROBLEM — K59.01 SLOW TRANSIT CONSTIPATION: Status: ACTIVE | Noted: 2024-10-03

## 2024-10-03 PROBLEM — R39.15 URINARY URGENCY: Status: RESOLVED | Noted: 2023-12-15 | Resolved: 2024-10-03

## 2024-10-03 PROBLEM — S41.112D: Status: RESOLVED | Noted: 2024-08-29 | Resolved: 2024-10-03

## 2024-10-31 PROBLEM — K59.09 OTHER CONSTIPATION: Status: ACTIVE | Noted: 2023-12-15

## 2024-10-31 PROBLEM — K59.01 SLOW TRANSIT CONSTIPATION: Status: ACTIVE | Noted: 2023-12-15

## 2024-11-14 ENCOUNTER — PRE-ADMISSION TESTING (OUTPATIENT)
Dept: ADMISSIONS | Facility: MEDICAL CENTER | Age: 89
End: 2024-11-14
Payer: MEDICARE

## 2024-11-14 NOTE — OR NURSING
Tele pre-admit appointement completed with patient.  Medications were reviewed with [facility] and medication instructions provided to the nurse at [facility].  Patient declined having medication instructions emailed.     Medication and fasting instructions given per RN pre procedure protocol.     Patient verbalizes understanding of all instructions given. No further questions at this time.

## 2024-11-15 ENCOUNTER — APPOINTMENT (OUTPATIENT)
Dept: RADIOLOGY | Facility: MEDICAL CENTER | Age: 89
End: 2024-11-15
Attending: COLON & RECTAL SURGERY
Payer: MEDICARE

## 2024-11-15 ENCOUNTER — ANESTHESIA (OUTPATIENT)
Dept: SURGERY | Facility: MEDICAL CENTER | Age: 89
End: 2024-11-15
Payer: MEDICARE

## 2024-11-15 ENCOUNTER — ANESTHESIA EVENT (OUTPATIENT)
Dept: SURGERY | Facility: MEDICAL CENTER | Age: 89
End: 2024-11-15
Payer: MEDICARE

## 2024-11-15 ENCOUNTER — HOSPITAL ENCOUNTER (OUTPATIENT)
Facility: MEDICAL CENTER | Age: 89
End: 2024-11-15
Attending: COLON & RECTAL SURGERY | Admitting: COLON & RECTAL SURGERY
Payer: MEDICARE

## 2024-11-15 VITALS
SYSTOLIC BLOOD PRESSURE: 138 MMHG | DIASTOLIC BLOOD PRESSURE: 90 MMHG | WEIGHT: 179.9 LBS | TEMPERATURE: 97.7 F | BODY MASS INDEX: 35.32 KG/M2 | HEART RATE: 62 BPM | RESPIRATION RATE: 15 BRPM | HEIGHT: 60 IN | OXYGEN SATURATION: 91 %

## 2024-11-15 LAB
ANION GAP SERPL CALC-SCNC: 10 MMOL/L (ref 7–16)
BUN SERPL-MCNC: 24 MG/DL (ref 8–22)
CALCIUM SERPL-MCNC: 10 MG/DL (ref 8.5–10.5)
CHLORIDE SERPL-SCNC: 104 MMOL/L (ref 96–112)
CO2 SERPL-SCNC: 24 MMOL/L (ref 20–33)
CREAT SERPL-MCNC: 1.08 MG/DL (ref 0.5–1.4)
EKG IMPRESSION: NORMAL
GFR SERPLBLD CREATININE-BSD FMLA CKD-EPI: 48 ML/MIN/1.73 M 2
GLUCOSE SERPL-MCNC: 101 MG/DL (ref 65–99)
POTASSIUM SERPL-SCNC: 4.2 MMOL/L (ref 3.6–5.5)
SODIUM SERPL-SCNC: 138 MMOL/L (ref 135–145)

## 2024-11-15 PROCEDURE — 80048 BASIC METABOLIC PNL TOTAL CA: CPT

## 2024-11-15 PROCEDURE — 160009 HCHG ANES TIME/MIN: Performed by: COLON & RECTAL SURGERY

## 2024-11-15 PROCEDURE — 160035 HCHG PACU - 1ST 60 MINS PHASE I: Performed by: COLON & RECTAL SURGERY

## 2024-11-15 PROCEDURE — 93005 ELECTROCARDIOGRAM TRACING: CPT | Performed by: COLON & RECTAL SURGERY

## 2024-11-15 PROCEDURE — 36415 COLL VENOUS BLD VENIPUNCTURE: CPT

## 2024-11-15 PROCEDURE — 160025 RECOVERY II MINUTES (STATS): Performed by: COLON & RECTAL SURGERY

## 2024-11-15 PROCEDURE — C1767 GENERATOR, NEURO NON-RECHARG: HCPCS | Performed by: COLON & RECTAL SURGERY

## 2024-11-15 PROCEDURE — 160048 HCHG OR STATISTICAL LEVEL 1-5: Performed by: COLON & RECTAL SURGERY

## 2024-11-15 PROCEDURE — C1897 LEAD, NEUROSTIM TEST KIT: HCPCS | Performed by: COLON & RECTAL SURGERY

## 2024-11-15 PROCEDURE — 72020 X-RAY EXAM OF SPINE 1 VIEW: CPT

## 2024-11-15 PROCEDURE — 93010 ELECTROCARDIOGRAM REPORT: CPT | Performed by: INTERNAL MEDICINE

## 2024-11-15 PROCEDURE — C1778 LEAD, NEUROSTIMULATOR: HCPCS | Performed by: COLON & RECTAL SURGERY

## 2024-11-15 PROCEDURE — 700111 HCHG RX REV CODE 636 W/ 250 OVERRIDE (IP): Performed by: ANESTHESIOLOGY

## 2024-11-15 PROCEDURE — 160028 HCHG SURGERY MINUTES - 1ST 30 MINS LEVEL 3: Performed by: COLON & RECTAL SURGERY

## 2024-11-15 PROCEDURE — 160002 HCHG RECOVERY MINUTES (STAT): Performed by: COLON & RECTAL SURGERY

## 2024-11-15 PROCEDURE — 160046 HCHG PACU - 1ST 60 MINS PHASE II: Performed by: COLON & RECTAL SURGERY

## 2024-11-15 PROCEDURE — 160039 HCHG SURGERY MINUTES - EA ADDL 1 MIN LEVEL 3: Performed by: COLON & RECTAL SURGERY

## 2024-11-15 PROCEDURE — 700101 HCHG RX REV CODE 250: Performed by: COLON & RECTAL SURGERY

## 2024-11-15 DEVICE — NEUROSTIMULATOR IMPLANTABLE AXONICS (1EA): Type: IMPLANTABLE DEVICE | Site: BACK | Status: FUNCTIONAL

## 2024-11-15 DEVICE — LEAD PNE AXONICS (1EA): Type: IMPLANTABLE DEVICE | Site: BACK | Status: FUNCTIONAL

## 2024-11-15 DEVICE — KIT IMPLANT LEAD PNE AXONICS (1EA): Type: IMPLANTABLE DEVICE | Site: BACK | Status: FUNCTIONAL

## 2024-11-15 RX ORDER — HYDROCODONE BITARTRATE AND ACETAMINOPHEN 7.5; 325 MG/15ML; MG/15ML
30 SOLUTION ORAL
Status: DISCONTINUED | OUTPATIENT
Start: 2024-11-15 | End: 2024-11-15 | Stop reason: HOSPADM

## 2024-11-15 RX ORDER — ALBUTEROL SULFATE 5 MG/ML
2.5 SOLUTION RESPIRATORY (INHALATION)
Status: DISCONTINUED | OUTPATIENT
Start: 2024-11-15 | End: 2024-11-15 | Stop reason: HOSPADM

## 2024-11-15 RX ORDER — ONDANSETRON 2 MG/ML
4 INJECTION INTRAMUSCULAR; INTRAVENOUS
Status: DISCONTINUED | OUTPATIENT
Start: 2024-11-15 | End: 2024-11-15 | Stop reason: HOSPADM

## 2024-11-15 RX ORDER — ACETAMINOPHEN 500 MG
1000 TABLET ORAL EVERY 6 HOURS PRN
Status: DISCONTINUED | OUTPATIENT
Start: 2024-11-15 | End: 2024-11-15 | Stop reason: HOSPADM

## 2024-11-15 RX ORDER — HYDROCODONE BITARTRATE AND ACETAMINOPHEN 7.5; 325 MG/15ML; MG/15ML
15 SOLUTION ORAL
Status: DISCONTINUED | OUTPATIENT
Start: 2024-11-15 | End: 2024-11-15 | Stop reason: HOSPADM

## 2024-11-15 RX ORDER — DIPHENHYDRAMINE HYDROCHLORIDE 50 MG/ML
6.25 INJECTION INTRAMUSCULAR; INTRAVENOUS
Status: DISCONTINUED | OUTPATIENT
Start: 2024-11-15 | End: 2024-11-15 | Stop reason: HOSPADM

## 2024-11-15 RX ORDER — BUPIVACAINE HYDROCHLORIDE AND EPINEPHRINE 5; 5 MG/ML; UG/ML
INJECTION, SOLUTION EPIDURAL; INTRACAUDAL; PERINEURAL
Status: DISCONTINUED | OUTPATIENT
Start: 2024-11-15 | End: 2024-11-15 | Stop reason: HOSPADM

## 2024-11-15 RX ORDER — EPHEDRINE SULFATE 50 MG/ML
5 INJECTION, SOLUTION INTRAVENOUS
Status: DISCONTINUED | OUTPATIENT
Start: 2024-11-15 | End: 2024-11-15 | Stop reason: HOSPADM

## 2024-11-15 RX ADMIN — PROPOFOL 30 MG: 10 INJECTION, EMULSION INTRAVENOUS at 07:57

## 2024-11-15 RX ADMIN — PROPOFOL 75 MCG/KG/MIN: 10 INJECTION, EMULSION INTRAVENOUS at 07:58

## 2024-11-15 ASSESSMENT — FIBROSIS 4 INDEX: FIB4 SCORE: 3.89

## 2024-11-15 NOTE — OP REPORT
NAME:  Cecilia Alatorre  MRN:  [id number]  :  1933    DATE OF OPERATION: 11/15/2024     PREOPERATIVE DIAGNOSIS:  Refractory Urge Urinary Incontinence and Overactive Bladder     POSTOPERATIVE DIAGNOSES:  Refractory Urge Urinary Incontinence and Overactive Bladder     SURGEON: Uriel Bueno MD, FACS, FACRS     ASSISTANT:  Joselin Tucker PA-C, PA-C    ANESTHESIOLOGIST:  Anesthesiologist: Jaylon Patino M.D.     ANESTHESIA: conscious sedation      OPERATION PERFORMED:  1. Right and Left S3 foramen needle placement with neurostimulation and testing  2. Intraoperative flouroscopy with interpretation.  3. Placement of trial S3 leads with connection and programming of Vetr trial generator device.     ESTIMATED BLOOD LOSS: <5cc.      INDICATIONS FOR PROCEDURE: The patient is aAGE@SEX@ with a history of Refractory Urge Urinary Incontinence and Overactive Bladder and multiple attempts and prior measures and treatments taken without adequate relief. She is taken to the operating room today for sacral neuromodulation trial system implantation .      Detail of Procedure: After an extensive informed consent discussion and process, the patient was brought to the operating room. She was placed in a prone position on the operating table. After induction of conscious sedation, pillows were placed under the lower abdomen and under the shins to flatten the sacrum and allow the toes to dangle freely.  The patient was prepped and draped in the usual sterile fashion.      After administration of intravenous antibiotics, a local anesthetic mixture of bupivacaine with epinephrine was used to infiltrate the skin sites.  The C-arm was then draped and moved into AP position to provide fluoroscopic mapping of the sacral region which included marking out midline of the sacrum, SI joints, sciatic notches, medial foramenal borders and sacral foramena.  The C-arm was moved to the lateral position to image the area from sacral promontory to  "the coccyx.      The foramen needle was introduced approximately 2 cm above the sciatic notch and 2 cm lateral to the sacral midline, feeling for foramenal margins until the S3 foramen was identified and penetrated.  The depth of the foramen needle was confirmed and adjusted fluoroscopically.  Proper needle position was confirmed by identification of location and sensation, direct observation of the lifting of the perineum or \"bellowing\", and observation of plantar flexion of the great toe utilizing the external test stimulator.     The foramen needle stylet was removed and a trial lead was then placed.  The position was then checked fluoroscopically. The process was repeated for the contralateral lead placement. Each electrode was tested for location and patient sensation, visualization of \"hosea\", and plantar flexion of the great toe.     The neurostimulator was connected and impedances were confirmed to be within normal limits, greater than 50 and less than 4,000. Steristrips and guaze 4x4's were placed over the incisions.      The patient tolerated the procedure well and there were no apparent complications. All sponge, needle, and instrument counts were correct on 2 separate occasions. She was awakened and transferred to the recovery room in satisfactory condition. Using the clinician , the Imagry trial generator was programmed for the trial:  pulse frequency rate, pulse amplitude, pulse duration, cycling, stimulation train duration, train spacing, number of programs and alternating electrode polarities.  The final electrode selections were set.     The total time spent performing programming was approximately 15 min.  The patient was given instructions on utilizing the patient  prior to discharge.              ____________________________________   Uriel Bueno MD  DD: 11/15/2024  8:31 AM    CC:  [facility];    "

## 2024-11-15 NOTE — ANESTHESIA PREPROCEDURE EVALUATION
Case: [id number] Date/Time: 11/15/24 0745    Procedure: SACRAL NEUROMODULATION TRIAL (Back)    Anesthesia type: General    Pre-op diagnosis: URINARY INCONTINENCE    Location: [facility] OR 10 / SURGERY [facility]    Surgeons: Uriel Bueno M.D.            Relevant Problems   CARDIAC   (positive) Aortic atherosclerosis (HCC)   (positive) Essential hypertension   (positive) Thoracic aortic ectasia (HCC)   (positive) Venous insufficiency of both lower extremities      GI   (positive) Gastroesophageal reflux disease without esophagitis         (positive) Stage 3a chronic kidney disease      ENDO   (positive) Hypothyroidism due to acquired atrophy of thyroid      Other   (positive) Arthritis   (positive) H/O: stroke       Physical Exam    Airway   Mallampati: II  TM distance: >3 FB  Neck ROM: full       Cardiovascular - normal exam  Rhythm: regular  Rate: normal  (-) murmur     Dental - normal exam           Pulmonary - normal exam  Breath sounds clear to auscultation     Abdominal    Neurological - normal exam                   Anesthesia Plan    ASA 3   ASA physical status 3 criteria: CVA or TIA - history (> 3 months)    Plan - MAC               Induction: intravenous    Postoperative Plan: Postoperative administration of opioids is intended.    Pertinent diagnostic labs and testing reviewed    Informed Consent:    Anesthetic plan and risks discussed with patient.

## 2024-11-15 NOTE — DISCHARGE INSTRUCTIONS
HOME CARE INSTRUCTIONS    ACTIVITY: Rest and take it easy for the first 24 hours.  A responsible adult is recommended to remain with you during that time.  It is normal to feel sleepy.  We encourage you to not do anything that requires balance, judgment or coordination.    FOR 24 HOURS DO NOT:  Drive, operate machinery or run household appliances.  Drink beer or alcoholic beverages.  Make important decisions or sign legal documents.    SPECIAL INSTRUCTIONS:   Sacral Neuromodulation Trial D/C instructions:  NO HEATING PADS, NO ELECTRIC BLANKETS, NO DIRECT HEAT ON BACK, DEVICE BATTERY CAN HEAT UP, PLEASE ALLOW DEVICE SITE TO GET AIR IF SITTING OR LAYING ON IT FOR LONG PERIOD OF TIME    1. DIET: Upon discharge from the hospital you may resume your normal preoperative diet. Depending on how you are feeling and whether you have nausea or not, you may wish to stay with a bland diet for the first few days. However, you can advance this as quickly as you feel ready.    2. ACTIVITIES: After discharge from the hospital, you may resume full routine activities. However, there should be no strenuous activities until after your follow-up visit. Otherwise, routine activities of daily living are acceptable.    3. DRIVING: You may drive whenever you are able to perform the activities needed to drive, i.e. turning, bending, twisting, etc.    4. BATHING: You should not get the wound or dressing wet after leaving the hospital. Sponge baths only please until your follow-up appointment in the office.      5. BOWEL FUNCTION: Constipation is common after a procedure, especially with pain medications. The combination of pain medication and decreased activity level can cause constipation in otherwise normal patients. If you feel this is occurring, take a laxative (Miralax, Milk of Magnesia, Ex-Lax, Senokot, etc.) until the problem has resolved.    6. PAIN MEDICATION: You can take Tylenol or Ibuprofen for any discomfort or pain. Tylenol 650mg  every 6 hours, not to exceed 4,000mg in a 24 hour period.  It is important to remember not to take medications on an empty stomach as this may cause nausea.    7.CALL IF YOU HAVE: (1) Fevers to more than 101.0 F, (2) Unusual chest or leg pain, (3) Drainage or fluid from wire insertion sites that may be foul smelling, increased tenderness or soreness at the wound or the wound edges are no longer together, redness or swelling at the wire insertion sites. Please do not hesitate to call with any other questions.     8. APPOINTMENT: Contact our office at [phone] for a follow-up appointment in the office in 3-4 days following your procedure for removal of the test wires, unless it have been prescheduled.    If you have any additional questions, please do not hesitate to call the office and speak to either myself or the physician on call.    Office address:  [place] [address] [address] [facility]   [address] [address]     DIET: To avoid nausea, slowly advance diet as tolerated, avoiding spicy or greasy foods for the first day.  Add more substantial food to your diet according to your physician's instructions.  Babies can be fed formula or breast milk as soon as they are hungry.  INCREASE FLUIDS AND FIBER TO AVOID CONSTIPATION.    SURGICAL DRESSING/BATHING: See above.    MEDICATIONS: Resume taking daily medication.  Take prescribed pain medication with food.  If no medication is prescribed, you may take non-aspirin pain medication if needed.  PAIN MEDICATION CAN BE VERY CONSTIPATING.  Take a stool softener or laxative such as senokot, pericolace, or milk of magnesia if needed.    No new prescriptions.     A follow-up appointment should be arranged with your doctor in 3-4 days; call to schedule.    You should CALL YOUR PHYSICIAN if you develop:  Fever greater than 101 degrees F.  Pain not relieved by medication, or persistent nausea or vomiting.  Excessive bleeding (blood soaking through dressing) or  unexpected drainage from the wound.  Extreme redness or swelling around the incision site, drainage of pus or foul smelling drainage.  Inability to urinate or empty your bladder within 8 hours.  Problems with breathing or chest pain.    You should call 911 if you develop problems with breathing or chest pain.  If you are unable to contact your doctor or surgical center, you should go to the nearest emergency room or urgent care center.  Physician's telephone #: Dr. Bueno [phone]    MILD FLU-LIKE SYMPTOMS ARE NORMAL.  YOU MAY EXPERIENCE GENERALIZED MUSCLE ACHES, THROAT IRRITATION, HEADACHE AND/OR SOME NAUSEA.    If any questions arise, call your doctor.  If your doctor is not available, please feel free to call the Surgical Center at [phone].  The Center is open Monday through Friday from 7AM to 7PM.      A registered nurse may call you a few days after your surgery to see how you are doing after your procedure.    You may also receive a survey in the mail within the next two weeks and we ask that you take a few moments to complete the survey and return it to us.  Our goal is to provide you with very good care and we value your comments.     Depression / Suicide Risk    As you are discharged from this [facility], it is important to learn how to keep safe from harming yourself.    Recognize the warning signs:  Abrupt changes in personality, positive or negative- including increase in energy   Giving away possessions  Change in eating patterns- significant weight changes-  positive or negative  Change in sleeping patterns- unable to sleep or sleeping all the time   Unwillingness or inability to communicate  Depression  Unusual sadness, discouragement and loneliness  Talk of wanting to die  Neglect of personal appearance   Rebelliousness- reckless behavior  Withdrawal from people/activities they love  Confusion- inability to concentrate     If you or a loved one observes any of these behaviors or  has concerns about self-harm, here's what you can do:  Talk about it- your feelings and reasons for harming yourself  Remove any means that you might use to hurt yourself (examples: pills, rope, extension cords, firearm)  Get professional help from the community (Mental Health, Substance Abuse, psychological counseling)  Do not be alone:Call your Safe Contact- someone whom you trust who will be there for you.  Call your local CRISIS HOTLINE 720-8678 or 651-919-1778  Call your local Children's Mobile Crisis Response Team [place] (208) 795-0238 or www.[employer]  Call the toll free National Suicide Prevention Hotlines   National Suicide Prevention Lifeline 287-107-CHNM (5081)  National Hope Line Network 800-SUICIDE (369-9845)    I acknowledge receipt and understanding of these Home Care instructions.

## 2024-11-15 NOTE — ANESTHESIA TIME REPORT
Anesthesia Start and Stop Event Times       Date Time Event    11/15/2024 0728 Ready for Procedure     0752 Anesthesia Start     0825 Anesthesia Stop          Responsible Staff  11/15/24      Name Role Begin End    Jaylon Patino M.D. Anesth 0752 0825          Overtime Reason:  no overtime (within assigned shift)    Comments:

## 2024-11-15 NOTE — ANESTHESIA POSTPROCEDURE EVALUATION
Patient: Cecilia Alatorre    Procedure Summary       Date: 11/15/24 Room / Location: [place] [address] / SURGERY [facility]    Anesthesia Start: 0752 Anesthesia Stop: 0825    Procedure: SACRAL NEUROMODULATION TRIAL (Back) Diagnosis: (URINARY INCONTINENCE)    Surgeons: Uriel Bueno M.D. Responsible Provider: Jaylon Patino M.D.    Anesthesia Type: MAC ASA Status: 3            Final Anesthesia Type: MAC  Last vitals  BP   Blood Pressure : (!) 156/74    Temp   36.1 °C (97 °F)    Pulse   80   Resp   14    SpO2   92 %      Anesthesia Post Evaluation    Patient location during evaluation: PACU  Patient participation: complete - patient participated  Level of consciousness: awake and alert    Airway patency: patent  Anesthetic complications: no  Cardiovascular status: hemodynamically stable  Respiratory status: acceptable  Hydration status: euvolemic    PONV: none          No notable events documented.     Nurse Pain Score: 0 (NPRS)

## 2024-11-21 PROBLEM — R41.81 AGE-RELATED COGNITIVE DECLINE: Status: ACTIVE | Noted: 2024-11-21

## 2024-11-26 ENCOUNTER — APPOINTMENT (OUTPATIENT)
Dept: ADMISSIONS | Facility: MEDICAL CENTER | Age: 89
End: 2024-11-26
Attending: COLON & RECTAL SURGERY
Payer: MEDICARE

## 2024-12-02 ENCOUNTER — PRE-ADMISSION TESTING (OUTPATIENT)
Dept: ADMISSIONS | Facility: MEDICAL CENTER | Age: 89
End: 2024-12-02
Attending: COLON & RECTAL SURGERY
Payer: MEDICARE

## 2024-12-02 NOTE — OR NURSING
Preadmit apt.   Patient states that she will call to reschedule her surgery. This RN called and informed the surgeon office.   Preadmit appointment was done with the help of the Director MARK at the assisting living facility where the patient resides.  Patient understood instructions. Transportation will be provided by the Facility assisting leaving .

## 2024-12-04 ENCOUNTER — HOSPITAL ENCOUNTER (OUTPATIENT)
Facility: MEDICAL CENTER | Age: 89
End: 2024-12-04
Attending: COLON & RECTAL SURGERY | Admitting: COLON & RECTAL SURGERY
Payer: MEDICARE

## 2024-12-04 ENCOUNTER — APPOINTMENT (OUTPATIENT)
Dept: RADIOLOGY | Facility: MEDICAL CENTER | Age: 89
End: 2024-12-04
Attending: COLON & RECTAL SURGERY
Payer: MEDICARE

## 2024-12-04 ENCOUNTER — ANESTHESIA (OUTPATIENT)
Dept: SURGERY | Facility: MEDICAL CENTER | Age: 89
End: 2024-12-04
Payer: MEDICARE

## 2024-12-04 ENCOUNTER — ANESTHESIA EVENT (OUTPATIENT)
Dept: SURGERY | Facility: MEDICAL CENTER | Age: 89
End: 2024-12-04
Payer: MEDICARE

## 2024-12-04 VITALS
OXYGEN SATURATION: 92 % | WEIGHT: 179.9 LBS | HEIGHT: 60 IN | DIASTOLIC BLOOD PRESSURE: 97 MMHG | RESPIRATION RATE: 18 BRPM | HEART RATE: 102 BPM | TEMPERATURE: 97.5 F | BODY MASS INDEX: 35.32 KG/M2 | SYSTOLIC BLOOD PRESSURE: 135 MMHG

## 2024-12-04 PROCEDURE — 700102 HCHG RX REV CODE 250 W/ 637 OVERRIDE(OP): Performed by: ANESTHESIOLOGY

## 2024-12-04 PROCEDURE — C1778 LEAD, NEUROSTIMULATOR: HCPCS | Performed by: COLON & RECTAL SURGERY

## 2024-12-04 PROCEDURE — 160028 HCHG SURGERY MINUTES - 1ST 30 MINS LEVEL 3: Performed by: COLON & RECTAL SURGERY

## 2024-12-04 PROCEDURE — 72020 X-RAY EXAM OF SPINE 1 VIEW: CPT

## 2024-12-04 PROCEDURE — 700111 HCHG RX REV CODE 636 W/ 250 OVERRIDE (IP): Mod: JZ | Performed by: ANESTHESIOLOGY

## 2024-12-04 PROCEDURE — 160046 HCHG PACU - 1ST 60 MINS PHASE II: Performed by: COLON & RECTAL SURGERY

## 2024-12-04 PROCEDURE — A9270 NON-COVERED ITEM OR SERVICE: HCPCS | Performed by: ANESTHESIOLOGY

## 2024-12-04 PROCEDURE — 160048 HCHG OR STATISTICAL LEVEL 1-5: Performed by: COLON & RECTAL SURGERY

## 2024-12-04 PROCEDURE — 160036 HCHG PACU - EA ADDL 30 MINS PHASE I: Performed by: COLON & RECTAL SURGERY

## 2024-12-04 PROCEDURE — 160035 HCHG PACU - 1ST 60 MINS PHASE I: Performed by: COLON & RECTAL SURGERY

## 2024-12-04 PROCEDURE — 160025 RECOVERY II MINUTES (STATS): Performed by: COLON & RECTAL SURGERY

## 2024-12-04 PROCEDURE — 160039 HCHG SURGERY MINUTES - EA ADDL 1 MIN LEVEL 3: Performed by: COLON & RECTAL SURGERY

## 2024-12-04 PROCEDURE — 700105 HCHG RX REV CODE 258: Performed by: COLON & RECTAL SURGERY

## 2024-12-04 PROCEDURE — 700101 HCHG RX REV CODE 250: Performed by: COLON & RECTAL SURGERY

## 2024-12-04 PROCEDURE — 160002 HCHG RECOVERY MINUTES (STAT): Performed by: COLON & RECTAL SURGERY

## 2024-12-04 PROCEDURE — C1787 PATIENT PROGR, NEUROSTIM: HCPCS | Performed by: COLON & RECTAL SURGERY

## 2024-12-04 PROCEDURE — 700111 HCHG RX REV CODE 636 W/ 250 OVERRIDE (IP): Performed by: ANESTHESIOLOGY

## 2024-12-04 PROCEDURE — 160009 HCHG ANES TIME/MIN: Performed by: COLON & RECTAL SURGERY

## 2024-12-04 PROCEDURE — C1767 GENERATOR, NEURO NON-RECHARG: HCPCS | Performed by: COLON & RECTAL SURGERY

## 2024-12-04 DEVICE — NEUROSTIMULATOR IMPLANTABLE NON-RECHARGEABLE AXONICS (1/EA): Type: IMPLANTABLE DEVICE | Site: BACK | Status: FUNCTIONAL

## 2024-12-04 DEVICE — KIT IMPLANT LEAD TINED AXONICS (1EA): Type: IMPLANTABLE DEVICE | Site: BACK | Status: FUNCTIONAL

## 2024-12-04 DEVICE — KIT LEAD TINED AXONICS (1EA): Type: IMPLANTABLE DEVICE | Site: BACK | Status: FUNCTIONAL

## 2024-12-04 RX ORDER — ACETAMINOPHEN 500 MG
1000 TABLET ORAL EVERY 6 HOURS PRN
Status: DISCONTINUED | OUTPATIENT
Start: 2024-12-04 | End: 2024-12-04 | Stop reason: HOSPADM

## 2024-12-04 RX ORDER — ONDANSETRON 2 MG/ML
4 INJECTION INTRAMUSCULAR; INTRAVENOUS
Status: DISCONTINUED | OUTPATIENT
Start: 2024-12-04 | End: 2024-12-04 | Stop reason: HOSPADM

## 2024-12-04 RX ORDER — ALBUTEROL SULFATE 5 MG/ML
2.5 SOLUTION RESPIRATORY (INHALATION)
Status: DISCONTINUED | OUTPATIENT
Start: 2024-12-04 | End: 2024-12-04 | Stop reason: HOSPADM

## 2024-12-04 RX ORDER — BUPIVACAINE HYDROCHLORIDE AND EPINEPHRINE 5; 5 MG/ML; UG/ML
INJECTION, SOLUTION EPIDURAL; INTRACAUDAL; PERINEURAL
Status: DISCONTINUED | OUTPATIENT
Start: 2024-12-04 | End: 2024-12-04 | Stop reason: HOSPADM

## 2024-12-04 RX ORDER — HYDROCODONE BITARTRATE AND ACETAMINOPHEN 7.5; 325 MG/15ML; MG/15ML
30 SOLUTION ORAL
Status: COMPLETED | OUTPATIENT
Start: 2024-12-04 | End: 2024-12-04

## 2024-12-04 RX ORDER — HYDROCODONE BITARTRATE AND ACETAMINOPHEN 7.5; 325 MG/15ML; MG/15ML
15 SOLUTION ORAL
Status: COMPLETED | OUTPATIENT
Start: 2024-12-04 | End: 2024-12-04

## 2024-12-04 RX ORDER — DIPHENHYDRAMINE HYDROCHLORIDE 50 MG/ML
6.25 INJECTION INTRAMUSCULAR; INTRAVENOUS
Status: DISCONTINUED | OUTPATIENT
Start: 2024-12-04 | End: 2024-12-04 | Stop reason: HOSPADM

## 2024-12-04 RX ORDER — SODIUM CHLORIDE, SODIUM LACTATE, POTASSIUM CHLORIDE, CALCIUM CHLORIDE 600; 310; 30; 20 MG/100ML; MG/100ML; MG/100ML; MG/100ML
INJECTION, SOLUTION INTRAVENOUS CONTINUOUS
Status: DISCONTINUED | OUTPATIENT
Start: 2024-12-04 | End: 2024-12-04 | Stop reason: HOSPADM

## 2024-12-04 RX ORDER — CEFAZOLIN SODIUM 1 G/3ML
INJECTION, POWDER, FOR SOLUTION INTRAMUSCULAR; INTRAVENOUS PRN
Status: DISCONTINUED | OUTPATIENT
Start: 2024-12-04 | End: 2024-12-04 | Stop reason: SURG

## 2024-12-04 RX ORDER — SODIUM CHLORIDE, SODIUM LACTATE, POTASSIUM CHLORIDE, CALCIUM CHLORIDE 600; 310; 30; 20 MG/100ML; MG/100ML; MG/100ML; MG/100ML
500 INJECTION, SOLUTION INTRAVENOUS CONTINUOUS
Status: DISCONTINUED | OUTPATIENT
Start: 2024-12-04 | End: 2024-12-04 | Stop reason: HOSPADM

## 2024-12-04 RX ORDER — EPHEDRINE SULFATE 50 MG/ML
5 INJECTION, SOLUTION INTRAVENOUS
Status: DISCONTINUED | OUTPATIENT
Start: 2024-12-04 | End: 2024-12-04 | Stop reason: HOSPADM

## 2024-12-04 RX ADMIN — PROPOFOL 100 MCG/KG/MIN: 10 INJECTION, EMULSION INTRAVENOUS at 11:32

## 2024-12-04 RX ADMIN — CEFAZOLIN 2 G: 1 INJECTION, POWDER, FOR SOLUTION INTRAMUSCULAR; INTRAVENOUS at 11:31

## 2024-12-04 RX ADMIN — HYDROCODONE BITARTRATE AND ACETAMINOPHEN 7.5 MG: 7.5; 325 SOLUTION ORAL at 12:32

## 2024-12-04 RX ADMIN — FENTANYL CITRATE 25 MCG: 50 INJECTION, SOLUTION INTRAMUSCULAR; INTRAVENOUS at 12:45

## 2024-12-04 RX ADMIN — FENTANYL CITRATE 25 MCG: 50 INJECTION, SOLUTION INTRAMUSCULAR; INTRAVENOUS at 12:55

## 2024-12-04 RX ADMIN — SODIUM CHLORIDE, POTASSIUM CHLORIDE, SODIUM LACTATE AND CALCIUM CHLORIDE: 600; 310; 30; 20 INJECTION, SOLUTION INTRAVENOUS at 11:28

## 2024-12-04 ASSESSMENT — PAIN DESCRIPTION - PAIN TYPE
TYPE: SURGICAL PAIN

## 2024-12-04 ASSESSMENT — FIBROSIS 4 INDEX: FIB4 SCORE: 3.89

## 2024-12-04 NOTE — DISCHARGE INSTRUCTIONS
Sacral Neuromodulation Permanent Implantation D/C instructions:    1. DIET: Upon discharge from the hospital you may resume your normal preoperative diet. Depending on how you are feeling and whether you have nausea or not, you may wish to stay with a bland diet for the first few days. However, you can advance this as quickly as you feel ready.    2. ACTIVITIES: After discharge from the hospital, you may resume full routine activities. However, there should be no strenuous activities until after your follow-up visit. Otherwise, routine activities of daily living are acceptable.    3. DRIVING: You may drive whenever you are able to perform the activities needed to drive, i.e. turning, bending, twisting, etc.    4. BATHING: You may remove the outer dressing when you get home. You can wash or shower with Steri-Strips in place. Clean the area with mild soap and water and gently pat dry with a clean towel or cloth. Don’t pull, tug or rub Steri-Strips.    5. BOWEL FUNCTION: Constipation is common after a procedure, especially with pain medications. The combination of pain medication and decreased activity level can cause constipation in otherwise normal patients. If you feel this is occurring, take a laxative (Miralax, Milk of Magnesia, Ex-Lax, Senokot, etc.) until the problem has resolved.    6. PAIN MEDICATION: You can take Tylenol or Ibuprofen for any discomfort or pain. Tylenol 650mg every 6 hours, not to exceed 4,000mg in a 24 hour period.  It is important to remember not to take medications on an empty stomach as this may cause nausea. You may use ice packs over the surgical area for 20 minutes at a time.     7.CALL IF YOU HAVE: (1) Fevers to more than 101.0 F, (2) Unusual chest or leg pain, (3) Drainage or fluid from wire insertion sites that may be foul smelling, increased tenderness or soreness at the wound or the wound edges are no longer together, redness or swelling at the wire insertion sites. Please do not  hesitate to call with any other questions.     8. APPOINTMENT: Contact our office at [phone] for a follow-up appointment in the office in 1 week following your procedure for removal of the test wires, unless it have been prescheduled.    If you have any additional questions, please do not hesitate to call the office and speak to either myself or the physician on call.    Office address:  [place] [address]   [address]  [place] [address]         FOR 24 HOURS DO NOT:  Drive, operate machinery or run household appliances.  Drink beer or alcoholic beverages.  Make important decisions or sign legal documents.        DIET: To avoid nausea, slowly advance diet as tolerated, avoiding spicy or greasy foods for the first day.  Add more substantial food to your diet according to your physician's instructions.  Babies can be fed formula or breast milk as soon as they are hungry.  INCREASE FLUIDS AND FIBER TO AVOID CONSTIPATION.        MEDICATIONS: Resume taking daily medication.  Take prescribed pain medication with food.  If no medication is prescribed, you may take non-aspirin pain medication if needed.  PAIN MEDICATION CAN BE VERY CONSTIPATING.  Take a stool softener or laxative such as senokot, pericolace, or milk of magnesia if needed.     Last pain medication given at --[facility] at 12:32---.    A follow-up appointment should be arranged with your doctor in [phone] ; call to schedule.    You should CALL YOUR PHYSICIAN if you develop:  Fever greater than 101 degrees F.  Pain not relieved by medication, or persistent nausea or vomiting.  Excessive bleeding (blood soaking through dressing) or unexpected drainage from the wound.  Extreme redness or swelling around the incision site, drainage of pus or foul smelling drainage.  Inability to urinate or empty your bladder within 8 hours.  Problems with breathing or chest pain.    You should call 911 if you develop problems with breathing or  chest pain.  If you are unable to contact your doctor or surgical center, you should go to the nearest emergency room or urgent care center.  Physician's telephone #: [phone]     MILD FLU-LIKE SYMPTOMS ARE NORMAL.  YOU MAY EXPERIENCE GENERALIZED MUSCLE ACHES, THROAT IRRITATION, HEADACHE AND/OR SOME NAUSEA.    If any questions arise, call your doctor.  If your doctor is not available, please feel free to call the Surgical Center at [phone].  The Center is open Monday through Friday from 7AM to 7PM.      A registered nurse may call you a few days after your surgery to see how you are doing after your procedure.    You may also receive a survey in the mail within the next two weeks and we ask that you take a few moments to complete the survey and return it to us.  Our goal is to provide you with very good care and we value your comments.     Depression / Suicide Risk    As you are discharged from this [facility] facility, it is important to learn how to keep safe from harming yourself.    Recognize the warning signs:  Abrupt changes in personality, positive or negative- including increase in energy   Giving away possessions  Change in eating patterns- significant weight changes-  positive or negative  Change in sleeping patterns- unable to sleep or sleeping all the time   Unwillingness or inability to communicate  Depression  Unusual sadness, discouragement and loneliness  Talk of wanting to die  Neglect of personal appearance   Rebelliousness- reckless behavior  Withdrawal from people/activities they love  Confusion- inability to concentrate     If you or a loved one observes any of these behaviors or has concerns about self-harm, here's what you can do:  Talk about it- your feelings and reasons for harming yourself  Remove any means that you might use to hurt yourself (examples: pills, rope, extension cords, firearm)  Get professional help from the community (Mental Health, Substance Abuse,  psychological counseling)  Do not be alone:Call your Safe Contact- someone whom you trust who will be there for you.  Call your local CRISIS HOTLINE 001-0154 or 681-320-4945  Call your local Children's Mobile Crisis Response Team [place] (322) 878-7205 or www.[employer]  Call the toll free National Suicide Prevention Hotlines   National Suicide Prevention Lifeline 845-699-NYVW (6209)  [place] [employer] Line Network 800-SUICIDE (568-6553)    I acknowledge receipt and understanding of these Home Care instructions.

## 2024-12-04 NOTE — ANESTHESIA PREPROCEDURE EVALUATION
Case: [id number] Date/Time: 12/04/24 1200    Procedure: SACRAL NEUROMODULATION PERMANENT IMPLANTATION    Pre-op diagnosis: URINARY INCONTINENCE    Location: [facility] OR 10 / SURGERY [facility]    Surgeons: Uriel Bueno M.D.            Relevant Problems   CARDIAC   (positive) Aortic atherosclerosis (HCC)   (positive) Essential hypertension   (positive) Thoracic aortic ectasia (HCC)   (positive) Venous insufficiency of both lower extremities      GI   (positive) Gastroesophageal reflux disease without esophagitis         (positive) Stage 3a chronic kidney disease      ENDO   (positive) Hypothyroidism due to acquired atrophy of thyroid      Other   (positive) Arthritis   (positive) H/O: stroke       Physical Exam    Airway   Mallampati: II  TM distance: >3 FB  Neck ROM: full       Cardiovascular - normal exam  Rhythm: regular  Rate: normal  (-) murmur     Dental - normal exam           Pulmonary - normal exam  Breath sounds clear to auscultation     Abdominal    Neurological - normal exam                   Anesthesia Plan    ASA 3   ASA physical status 3 criteria: CVA or TIA - history (> 3 months)    Plan - MAC               Induction: intravenous    Postoperative Plan: Postoperative administration of opioids is intended.    Pertinent diagnostic labs and testing reviewed    Informed Consent:    Anesthetic plan and risks discussed with patient.

## 2024-12-04 NOTE — OP REPORT
NAME:  Cecilia Alatorre  MRN:  [id number]  :  1933    DATE OF OPERATION: 2024    PREOPERATIVE DIAGNOSIS:  Refractory Urge Urinary Incontinence and Overactive Bladder    POSTOPERATIVE DIAGNOSES:  Refractory Urge Urinary Incontinence and Overactive Bladder    OPERATION PERFORMED:   1. Complete sacral neuromodulation system implantation   2. Incision and implantation of tined quadrapolar lead electrodes in foramen S3   3. Intraoperative fluoroscopic guidance for needle placement and interpretation  4. Subcutaneous implantation of sacral nerve neurostimulator and electric analysis and programing     SURGEON: Uriel Bueno MD    ASSISTANT:  Tyler Perla PA-C, PA-C    ANESTHESIOLOGIST:  Anesthesiologist: Jaylon Patino M.D.    ANESTHESIA: conscious sedation     ESTIMATED BLOOD LOSS: <5cc.     INDICATIONS FOR PROCEDURE: The patient is a 89 y.o. female with a history of refractory Urge Urinary Incontinence and Overactive Bladder and multiple attempts and prior measures and treatments taken without adequate relief, with excellent improvement (>50%) during SNM trial.  She is taken to the operating room today for sacral neuromodulation full system implantation .     DETAILS OF PROCEDURE:  After an extensive informed consent discussion and process, the patient was brought to the operating room. She was placed in a prone position on the operating table. After induction of conscious sedation, pillows were placed under the lower abdomen and under the shins to flatten the sacrum and allow the toes to dangle freely.  The patient was prepped and draped in the usual sterile fashion.     After administration of intravenous antibiotics, a local anesthetic mixture of bupivacaine with epinephrine was used to infiltrate the skin sites.  The C-arm was then draped and moved into AP position to provide fluoroscopic mapping of the sacral region which included marking out midline of the sacrum, SI joints, sciatic notches, medial  "foramenal borders and sacral foramena.  The C-arm was moved to the lateral position to image the area from sacral promontory to the coccyx.     The foramen needle was introduced approximately 2 cm above the sciatic notch and 2 cm lateral to the sacral midline, feeling for foramenal margins until the right S3 foramen was identified and penetrated.  The depth of the foramen needle was confirmed and adjusted fluoroscopically.  Proper needle position was confirmed by identification of location and sensation, direct observation of the lifting of the perineum or \"bellowing\", and observation of plantar flexion of the great toe utilizing the external test stimulator.     The foramen needle stylet was removed and a directional guide was placed and confirmed fluoroscopically.  The foramen needle was removed.  An incision was made peripherally to the directional guide through the fascial layer.  The lead introducer sheath and dilator was placed over the directional guide and directed into the foramen to ensure the radiopaque marker of the lead introducer did not extend beyond the anterior edge of the sacrum.  The dilator was unlocked and removed along with the directional guide.  The lead was then placed through the introducer sheath to the first white line.  The position was then checked fluoroscopically.  The lead was then further introduced until 3 electrodes were visible below the sacrum.  Each electrode was tested for location and patient sensation, visualization of \"hosea\", and plantar flexion of the great toe.  After satisfactory positioning was confirmed, the introducer sheath was retracted under continuous fluoroscopy, deploying lead tines into the perisacral tissue.    Further incision was made into the subcutaneous tissue posterior to the right iliac crest and lateral to the sacrum.  Blunt dissection was continued until the gluteal fascia was identified and hemostasis was achieved allowing for a sufficient pocket " for the neurostimulator.  A tunneling tool with straw was placed from the lead exit site subcutaneously to the incised pocket site.  The tunneling tool was removed and the lead was fed through the straw and pulled out at the pocket site.  The lead was cleansed of bodily fluids and dried.  The lead was then inserted into the Pulse 8 neurostimulator header.  The single set screw was tightened with the hex wrench.    The neurostimulator was placed into the subcutaneous pocket with the etched identification side placed upwards and excessive lead wrapped counterclockwise around the neurostimulator.  The programming head connected and found adequate lead connection and that parameters were within normal limits.  Impedances were confirmed to be within normal limits, greater than 50 and less than 4,000.    The wounds were then irrigated with antibiotic solution and closed with 4-0 vicryl subcuticular sutures.  Steristrips and guaze 4x4's were placed over the incisions. The patient tolerated the procedure well and there were no apparent complications. All sponge, needle, and instrument counts were correct on 2 separate occasions.      She was awakened and transferred to the recovery room in satisfactory condition.Using the clinician , the generator was programmed for:  pulse frequency rate, pulse amplitude, pulse duration, cycling, stimulation train duration, train spacing, number of programs and alternating electrode polarities.  The final electrode selections were set.    The total time spent performing programming was approximately 15 min.  The patient was given instructions on utilizing the patient  prior to discharge.           ____________________________________   Uriel Bueno MD  DD: 12/4/2024  12:56 PM    CC:  [facility]

## 2024-12-04 NOTE — OR NURSING
Pt arrived from OR with anesthesiologist and OR RN. AAOx4. Hearing aids in place. Surgical incision x2 CDI YARED. Pain controlled with pain medication no nausea. Report given to phase 2 [facility] (facility) notified pt ready for D/C.

## 2024-12-04 NOTE — OR NURSING
1350 Pt arrives to phase II from PACU. VSS. Pt reports 1/10 pain. Dressing to lower back clean, dry, and intact.     1400 Discharge instructions reviewed with pt and receiving facility . Both verbalize understanding. Copy of instructions sent home with pt    1420 IV removed. Dressing remains clean, dry, and intact. VSS. Pt dressed independently    1455 Pt wheeled to car with all belongings

## 2024-12-04 NOTE — ANESTHESIA POSTPROCEDURE EVALUATION
Patient: Cecilia Alatorre    Procedure Summary       Date: 12/04/24 Room / Location: [place] [address] / SURGERY [facility]    Anesthesia Start: 1128 Anesthesia Stop: 1212    Procedure: SACRAL NEUROMODULATION PERMANENT IMPLANTATION (Back) Diagnosis: (URINARY INCONTINENCE)    Surgeons: Uriel Bueno M.D. Responsible Provider: Jaylon Patino M.D.    Anesthesia Type: MAC ASA Status: 3            Final Anesthesia Type: MAC  Last vitals  BP   Blood Pressure : 102/63    Temp   36.6 °C (97.9 °F)    Pulse   90   Resp   16    SpO2   91 %      Anesthesia Post Evaluation    Patient location during evaluation: PACU  Patient participation: complete - patient participated  Level of consciousness: awake and alert    Airway patency: patent  Anesthetic complications: no  Cardiovascular status: hemodynamically stable  Respiratory status: acceptable  Hydration status: euvolemic    PONV: none          No notable events documented.     Nurse Pain Score: 5 (NPRS)

## 2024-12-04 NOTE — ANESTHESIA TIME REPORT
Anesthesia Start and Stop Event Times       Date Time Event    12/4/2024 1108 Ready for Procedure     1128 Anesthesia Start     1212 Anesthesia Stop          Responsible Staff  12/04/24      Name Role Begin End    Jaylon Patino M.D. Anesth 1128 1212          Overtime Reason:  no overtime (within assigned shift)    Comments:

## 2024-12-04 NOTE — PROGRESS NOTES
PT resides at [facility] living [facility] ([phone]) and arrived without a MAR. I called and requested a MAR be faxed to me. I will update med rec as soon as MAR arrives.     Preferred Pharmacy for this visit - [facility] ([phone])

## 2024-12-17 PROBLEM — R06.09 EXERTIONAL DYSPNEA: Status: ACTIVE | Noted: 2024-12-17

## 2024-12-20 PROBLEM — J34.89 RHINORRHEA: Status: ACTIVE | Noted: 2024-12-20

## 2024-12-22 NOTE — HPI: FULL BODY SKIN EXAMINATION
How Severe Are Your Spot(S)?: mild
What Type Of Note Output Would You Prefer (Optional)?: Bullet Format
What Is The Reason For Today's Visit?: Full Body Skin Examination
What Is The Reason For Today's Visit? (Being Monitored For X): concerning skin lesions on an annual basis
Chronic stable  [id number]: 4  Continue Eliquis 2.5mg BID as patient meets criteria for reduced dose given age and weight   Not on BB

## 2025-01-02 PROBLEM — H10.33 ACUTE BACTERIAL CONJUNCTIVITIS OF BOTH EYES: Status: ACTIVE | Noted: 2025-01-02

## 2025-01-15 ENCOUNTER — APPOINTMENT (OUTPATIENT)
Dept: URBAN - METROPOLITAN AREA CLINIC 20 | Facility: CLINIC | Age: OVER 89
Setting detail: DERMATOLOGY
End: 2025-01-15

## 2025-01-15 DIAGNOSIS — D18.0 HEMANGIOMA: ICD-10-CM

## 2025-01-15 DIAGNOSIS — L57.0 ACTINIC KERATOSIS: ICD-10-CM

## 2025-01-15 DIAGNOSIS — L57.8 OTHER SKIN CHANGES DUE TO CHRONIC EXPOSURE TO NONIONIZING RADIATION: ICD-10-CM

## 2025-01-15 DIAGNOSIS — D22 MELANOCYTIC NEVI: ICD-10-CM

## 2025-01-15 DIAGNOSIS — L82.1 OTHER SEBORRHEIC KERATOSIS: ICD-10-CM

## 2025-01-15 DIAGNOSIS — L81.4 OTHER MELANIN HYPERPIGMENTATION: ICD-10-CM

## 2025-01-15 DIAGNOSIS — L90.5 SCAR CONDITIONS AND FIBROSIS OF SKIN: ICD-10-CM

## 2025-01-15 DIAGNOSIS — L98.8 OTHER SPECIFIED DISORDERS OF THE SKIN AND SUBCUTANEOUS TISSUE: ICD-10-CM

## 2025-01-15 DIAGNOSIS — Z85.828 PERSONAL HISTORY OF OTHER MALIGNANT NEOPLASM OF SKIN: ICD-10-CM

## 2025-01-15 PROBLEM — D18.01 HEMANGIOMA OF SKIN AND SUBCUTANEOUS TISSUE: Status: ACTIVE | Noted: 2025-01-15

## 2025-01-15 PROBLEM — D22.5 MELANOCYTIC NEVI OF TRUNK: Status: ACTIVE | Noted: 2025-01-15

## 2025-01-15 PROCEDURE — ? LIQUID NITROGEN

## 2025-01-15 PROCEDURE — 17000 DESTRUCT PREMALG LESION: CPT

## 2025-01-15 PROCEDURE — 99213 OFFICE O/P EST LOW 20 MIN: CPT | Mod: 25

## 2025-01-15 PROCEDURE — ? ADDITIONAL NOTES

## 2025-01-15 PROCEDURE — ? COUNSELING

## 2025-01-15 PROCEDURE — 17003 DESTRUCT PREMALG LES 2-14: CPT

## 2025-01-15 ASSESSMENT — LOCATION DETAILED DESCRIPTION DERM
LOCATION DETAILED: LEFT ANTERIOR PROXIMAL UPPER ARM
LOCATION DETAILED: LEFT SUPERIOR UPPER BACK
LOCATION DETAILED: MIDDLE STERNUM
LOCATION DETAILED: RIGHT INFERIOR CENTRAL MALAR CHEEK
LOCATION DETAILED: LEFT RIB CAGE
LOCATION DETAILED: RIGHT INFERIOR MEDIAL UPPER BACK
LOCATION DETAILED: LEFT DISTAL CALF
LOCATION DETAILED: SUPERIOR THORACIC SPINE
LOCATION DETAILED: LEFT RADIAL DORSAL HAND
LOCATION DETAILED: RIGHT ORAL COMMISSURE
LOCATION DETAILED: LEFT PROXIMAL POSTERIOR THIGH
LOCATION DETAILED: LEFT INFERIOR FOREHEAD
LOCATION DETAILED: RIGHT PROXIMAL RADIAL DORSAL FOREARM
LOCATION DETAILED: RIGHT ANKLE
LOCATION DETAILED: RIGHT SUPERIOR UPPER BACK
LOCATION DETAILED: LEFT ANTERIOR DISTAL THIGH
LOCATION DETAILED: LEFT PROXIMAL DORSAL FOREARM
LOCATION DETAILED: LEFT LATERAL FOREHEAD
LOCATION DETAILED: LEFT SUPERIOR OCCIPITAL SCALP
LOCATION DETAILED: RIGHT INFERIOR ANTERIOR NECK
LOCATION DETAILED: RIGHT NASAL ALA
LOCATION DETAILED: RIGHT RADIAL DORSAL HAND
LOCATION DETAILED: RIGHT RIB CAGE
LOCATION DETAILED: LEFT DISTAL POSTERIOR THIGH
LOCATION DETAILED: LEFT AXILLARY TAIL OF BREAST
LOCATION DETAILED: LEFT INFERIOR LATERAL FOREHEAD
LOCATION DETAILED: RIGHT DISTAL CALF

## 2025-01-15 ASSESSMENT — LOCATION ZONE DERM
LOCATION ZONE: HAND
LOCATION ZONE: LEG
LOCATION ZONE: NOSE
LOCATION ZONE: SCALP
LOCATION ZONE: FACE
LOCATION ZONE: LIP
LOCATION ZONE: NECK
LOCATION ZONE: TRUNK
LOCATION ZONE: ARM

## 2025-01-15 ASSESSMENT — LOCATION SIMPLE DESCRIPTION DERM
LOCATION SIMPLE: LEFT THIGH
LOCATION SIMPLE: LEFT POSTERIOR THIGH
LOCATION SIMPLE: RIGHT NOSE
LOCATION SIMPLE: LEFT HAND
LOCATION SIMPLE: LEFT BREAST
LOCATION SIMPLE: CHEST
LOCATION SIMPLE: LEFT FOREARM
LOCATION SIMPLE: LEFT OCCIPITAL SCALP
LOCATION SIMPLE: RIGHT ANKLE
LOCATION SIMPLE: RIGHT FOREARM
LOCATION SIMPLE: RIGHT CALF
LOCATION SIMPLE: ABDOMEN
LOCATION SIMPLE: RIGHT UPPER BACK
LOCATION SIMPLE: LEFT FOREHEAD
LOCATION SIMPLE: RIGHT LIP
LOCATION SIMPLE: LEFT UPPER BACK
LOCATION SIMPLE: LEFT UPPER ARM
LOCATION SIMPLE: LEFT CALF
LOCATION SIMPLE: RIGHT ANTERIOR NECK
LOCATION SIMPLE: UPPER BACK
LOCATION SIMPLE: RIGHT HAND
LOCATION SIMPLE: RIGHT CHEEK

## 2025-01-15 NOTE — PROCEDURE: ADDITIONAL NOTES
Render Risk Assessment In Note?: no
Detail Level: Simple
Additional Notes: Given samples of cortaid to use nightly on the area.

## 2025-01-15 NOTE — PROCEDURE: COUNSELING
Date of Service: 12/19/2022    ELECTRODIAGNOSTIC CONSULTATION    ** Please open THE ABOVE LINK for consultation and data.        ELECTRODIAGNOSTIC IMPRESSION:    1.  R sciatic neuropathy, electrically MODERATE and RECENT (consistent with the patient's symptoms for about 3 months).  There are well-preserved motor and sensory responses in all sciatic nerve branches tested in the RLE.  Needle EMG showed mild-to-moderate recent denervation in all sciatic-innervated muscles tested in the RLE, and evidence of some ongoing reinnervation as well.    2.  No other focal RLE neuropathy in the motor or sensory nerve segments tested    3.  No peripheral polyneuropathy      RECOMMENDATION:    1. Consider MRI scan of the pelvis to further assess the R sciatic nerve.    2. Based on the electrical findings, a right L5/S1 radiculopathy cannot be completely ruled out.    Therefore, consider LS-spine MRI scan to further assess the R lower lumbosacral nerve roots.          Dictated By: Marianela Forbes MD  Signing Provider: Marianela Forbes MD    TAP/smr ([id number])   DD: 12/19/2022 1:33:58 PM TD: 12/19/2022 1:50:39 PM      Copy Sent To:  Itz Hammond MD^  Tamica Buchanan MD  
Detail Level: Simple
Detail Level: Zone
Detail Level: Detailed

## 2025-01-30 PROBLEM — J20.9 ACUTE BRONCHITIS: Status: ACTIVE | Noted: 2017-05-15

## 2025-01-30 PROBLEM — H10.33 ACUTE BACTERIAL CONJUNCTIVITIS OF BOTH EYES: Status: RESOLVED | Noted: 2025-01-02 | Resolved: 2025-01-30

## 2025-02-18 PROBLEM — I50.30 HYPERTENSIVE HEART DISEASE WITH CONGESTIVE HEART FAILURE WITH PRESERVED LEFT VENTRICULAR FUNCTION (HCC): Chronic | Status: ACTIVE | Noted: 2025-02-14

## 2025-02-18 PROBLEM — M54.50 CHRONIC RIGHT-SIDED LOW BACK PAIN WITHOUT SCIATICA: Chronic | Status: ACTIVE | Noted: 2024-04-05

## 2025-02-18 PROBLEM — M19.049 CMC ARTHRITIS: Chronic | Status: ACTIVE | Noted: 2024-08-01

## 2025-02-18 PROBLEM — I11.0 HYPERTENSIVE HEART DISEASE WITH CONGESTIVE HEART FAILURE WITH PRESERVED LEFT VENTRICULAR FUNCTION (HCC): Status: ACTIVE | Noted: 2025-02-14

## 2025-02-18 PROBLEM — H18.9 UNSPECIFIED DISORDER OF CORNEA: Chronic | Status: ACTIVE | Noted: 2023-11-01

## 2025-02-18 PROBLEM — I50.30 HYPERTENSIVE HEART DISEASE WITH CONGESTIVE HEART FAILURE WITH PRESERVED LEFT VENTRICULAR FUNCTION (HCC): Status: ACTIVE | Noted: 2025-02-14

## 2025-02-18 PROBLEM — M19.90 ARTHRITIS: Chronic | Status: ACTIVE | Noted: 2023-07-28

## 2025-02-18 PROBLEM — B37.2 CUTANEOUS CANDIDIASIS: Status: RESOLVED | Noted: 2024-05-30 | Resolved: 2025-02-18

## 2025-02-18 PROBLEM — G89.29 CHRONIC RIGHT-SIDED LOW BACK PAIN WITHOUT SCIATICA: Chronic | Status: ACTIVE | Noted: 2024-04-05

## 2025-02-18 PROBLEM — I11.0 HYPERTENSIVE HEART DISEASE WITH CONGESTIVE HEART FAILURE WITH PRESERVED LEFT VENTRICULAR FUNCTION (HCC): Chronic | Status: ACTIVE | Noted: 2025-02-14

## 2025-03-18 PROBLEM — Z74.09 IMPAIRED FUNCTIONAL MOBILITY, BALANCE, GAIT, AND ENDURANCE: Chronic | Status: ACTIVE | Noted: 2024-03-15

## 2025-03-18 PROBLEM — K21.9 GASTROESOPHAGEAL REFLUX DISEASE WITHOUT ESOPHAGITIS: Chronic | Status: ACTIVE | Noted: 2023-04-03

## 2025-03-18 PROBLEM — M43.26 ANKYLOSIS OF LUMBAR SPINE: Status: ACTIVE | Noted: 2018-08-21

## 2025-05-12 PROBLEM — G89.4 CHRONIC PAIN SYNDROME: Chronic | Status: ACTIVE | Noted: 2025-05-12

## 2025-05-12 PROBLEM — G89.4 CHRONIC PAIN SYNDROME: Status: ACTIVE | Noted: 2025-05-12

## 2025-05-12 PROBLEM — N39.46 MIXED STRESS AND URGE URINARY INCONTINENCE: Status: ACTIVE | Noted: 2024-01-31

## 2025-05-12 PROBLEM — N39.46 MIXED STRESS AND URGE URINARY INCONTINENCE: Chronic | Status: ACTIVE | Noted: 2024-01-31

## 2025-05-27 ENCOUNTER — HOSPITAL ENCOUNTER (OUTPATIENT)
Dept: LAB | Facility: MEDICAL CENTER | Age: OVER 89
End: 2025-05-27
Attending: PHYSICIAN ASSISTANT
Payer: MEDICARE

## 2025-05-27 PROCEDURE — 36415 COLL VENOUS BLD VENIPUNCTURE: CPT

## 2025-05-28 ENCOUNTER — APPOINTMENT (OUTPATIENT)
Dept: URBAN - METROPOLITAN AREA CLINIC 4 | Facility: CLINIC | Age: OVER 89
Setting detail: DERMATOLOGY
End: 2025-05-28

## 2025-05-28 DIAGNOSIS — L72.8 OTHER FOLLICULAR CYSTS OF THE SKIN AND SUBCUTANEOUS TISSUE: ICD-10-CM

## 2025-05-28 DIAGNOSIS — L57.8 OTHER SKIN CHANGES DUE TO CHRONIC EXPOSURE TO NONIONIZING RADIATION: ICD-10-CM

## 2025-05-28 DIAGNOSIS — L30.9 DERMATITIS, UNSPECIFIED: ICD-10-CM

## 2025-05-28 DIAGNOSIS — L82.1 OTHER SEBORRHEIC KERATOSIS: ICD-10-CM

## 2025-05-28 PROCEDURE — 99213 OFFICE O/P EST LOW 20 MIN: CPT

## 2025-05-28 PROCEDURE — ? COUNSELING

## 2025-05-28 PROCEDURE — ? PRESCRIPTION

## 2025-05-28 RX ORDER — TRIAMCINOLONE ACETONIDE 1 MG/G
CREAM TOPICAL
Qty: 45 | Refills: 0 | Status: ERX | COMMUNITY
Start: 2025-05-28

## 2025-05-28 RX ADMIN — TRIAMCINOLONE ACETONIDE: 1 CREAM TOPICAL at 00:00

## 2025-05-28 ASSESSMENT — LOCATION ZONE DERM
LOCATION ZONE: TRUNK
LOCATION ZONE: FACE
LOCATION ZONE: LIP

## 2025-05-28 ASSESSMENT — LOCATION DETAILED DESCRIPTION DERM
LOCATION DETAILED: RIGHT CENTRAL MALAR CHEEK
LOCATION DETAILED: RIGHT RIB CAGE
LOCATION DETAILED: LEFT LOWER CUTANEOUS LIP
LOCATION DETAILED: RIGHT LATERAL SUPERIOR CHEST
LOCATION DETAILED: LEFT RIB CAGE
LOCATION DETAILED: MIDDLE STERNUM

## 2025-05-28 ASSESSMENT — LOCATION SIMPLE DESCRIPTION DERM
LOCATION SIMPLE: RIGHT CHEEK
LOCATION SIMPLE: CHEST
LOCATION SIMPLE: ABDOMEN
LOCATION SIMPLE: LEFT LIP

## (undated) DEVICE — DRAPE 36X28IN RAD CARM BND BG - (25/CA) O

## (undated) DEVICE — SODIUM CHL IRRIGATION 0.9% 1000ML (12EA/CA)

## (undated) DEVICE — ELECTRODE 850 FOAM ADHESIVE - HYDROGEL RADIOTRNSPRNT (50/PK)

## (undated) DEVICE — SUTURE GENERAL

## (undated) DEVICE — SUCTION INSTRUMENT YANKAUER BULBOUS TIP W/O VENT (50EA/CA)

## (undated) DEVICE — SLEEVE VASO CALF MED - (10PR/CA)

## (undated) DEVICE — WATER IRRIG. STER. 1500 ML - (9/CA)

## (undated) DEVICE — GLOVE BIOGEL PI ORTHO SZ 6 1/2 SURGICAL PF LF (40PR/BX)

## (undated) DEVICE — GLOVE BIOGEL PI INDICATOR SZ 6.5 SURGICAL PF LF - (50/BX 4BX/CA)

## (undated) DEVICE — GOWN WARMING STANDARD FLEX - (30/CA)

## (undated) DEVICE — SUTURE 0 VICRYL PLUS CT-1 - 36 INCH (36/BX)

## (undated) DEVICE — SLEEVE, VASO, THIGH, MED

## (undated) DEVICE — WATER IRRIGATION STERILE 1000ML (12EA/CA)

## (undated) DEVICE — Device

## (undated) DEVICE — DRESSING POST OP BORDER 4 X 10 (5EA/BX)

## (undated) DEVICE — GLOVE BIOGEL INDICATOR SZ 7.5 SURGICAL PF LTX - (50PR/BX 4BX/CA)

## (undated) DEVICE — SET EXTENSION WITH 2 PORTS (48EA/CA) ***PART #2C8610 IS A SUBSTITUTE*****

## (undated) DEVICE — ELECTRODE DUAL RETURN W/ CORD - (50/PK)

## (undated) DEVICE — SET LEADWIRE 5 LEAD BEDSIDE DISPOSABLE ECG (1SET OF 5/EA)

## (undated) DEVICE — DRAPE U ORTHOPEDIC - (10/BX)

## (undated) DEVICE — SHEET PEDIATRIC LAPAROTOMY - (10/CA)

## (undated) DEVICE — GLOVE BIOGEL PI INDICATOR SZ 7.5 SURGICAL PF LF -(50/BX 4BX/CA)

## (undated) DEVICE — SUTURE QUILL 2 PDO - (12/BX)

## (undated) DEVICE — PROTECTOR ULNA NERVE - (36PR/CA)

## (undated) DEVICE — CANISTER SUCTION 3000ML MECHANICAL FILTER AUTO SHUTOFF MEDI-VAC NONSTERILE LF DISP (40EA/CA)

## (undated) DEVICE — DRAPE C ARMOR (12EA/CA)

## (undated) DEVICE — DRAPE LARGE 3 QUARTER - (20/CA)

## (undated) DEVICE — ARMREST CRADLE FOAM - (2PR/PK 12PR/CA)

## (undated) DEVICE — PIN

## (undated) DEVICE — CHLORAPREP 26 ML APPLICATOR - ORANGE TINT(25/CA)

## (undated) DEVICE — DRAPE SURG STERI-DRAPE 7X11OD - (40EA/CA)

## (undated) DEVICE — TUBING CLEARLINK DUO-VENT - C-FLO (48EA/CA)

## (undated) DEVICE — HEAD HOLDER JUNIOR/ADULT

## (undated) DEVICE — DRAPE STRLE REG TOWEL 18X24 - (10/BX 4BX/CA)"

## (undated) DEVICE — COVER LIGHT HANDLE ALC PLUS DISP (18EA/BX)

## (undated) DEVICE — HANDPIECE 10FT INTPLS SCT PLS IRRIGATION HAND CONTROL SET (6/PK)

## (undated) DEVICE — TOWEL STOP TIMEOUT SAFETY FLAG (40EA/CA)

## (undated) DEVICE — GLOVE BIOGEL PI ORTHO SZ 7.5 PF LF (40PR/BX)

## (undated) DEVICE — SENSOR OXIMETER ADULT SPO2 RD SET (20EA/BX)

## (undated) DEVICE — SUTURE 4-0 VICRYL PLUS FS-1 - 27 INCH (36/BX)

## (undated) DEVICE — STOCKINET TUBULAR 6IN STERILE - 6 X 48YDS (25/CA)

## (undated) DEVICE — KIT ROOM DECONTAMINATION

## (undated) DEVICE — BAG SPONGE COUNT 10.25 X 32 - BLUE (250/CA)

## (undated) DEVICE — KIT ANESTHESIA W/CIRCUIT & 3/LT BAG W/FILTER (20EA/CA)

## (undated) DEVICE — SUTURE 2-0 VICRYL PLUS CT-1 - 8 X 18 INCH(12/BX)

## (undated) DEVICE — SUCTION INSTRUMENT YANKAUER OPEN TIP W/O VENT (50EA/CA)

## (undated) DEVICE — LEAD SET 6 DISP. EKG NIHON KOHDEN

## (undated) DEVICE — CLOSURE SKIN STRIP 1/2 X 4 IN - (STERI STRIP) (50/BX 4BX/CA)

## (undated) DEVICE — PAD UNIVERSAL MULTI USE (1/EA)

## (undated) DEVICE — TOURNIQUET, STERILE 24 (YELLOW)

## (undated) DEVICE — SPONGE GAUZESTER. 2X2 4-PL - (2/PK 50PK/BX 30BX/CS)

## (undated) DEVICE — ADHESIVE MASTISOL - (48/BX)

## (undated) DEVICE — PACK MINOR BASIN - (2EA/CA)

## (undated) DEVICE — PENCIL ELECTSURG 10FT BTN SWH - (50/CA)

## (undated) DEVICE — PACK TOTAL KNEE  (1/CA)

## (undated) DEVICE — CANISTER SUCTION 3000ML MECHANICAL FILTER AUTO SHUTOFF MEDI-VAC NONSTERILE LF DISP  (40EA/CA)

## (undated) DEVICE — REMOTE PATIENT CONTROL AXONICS (1EA)

## (undated) DEVICE — LACTATED RINGERS INJ 1000 ML - (14EA/CA 60CA/PF)

## (undated) DEVICE — BLADE SURGICAL #11 - (50/BX)

## (undated) DEVICE — DRESSING TRANSPARENT FILM TEGADERM 2.375 X 2.75" (100EA/BX)"

## (undated) DEVICE — GLOVE BIOGEL PI ORTHO SZ 6 SURGICAL PF LF (40PR/BX)

## (undated) DEVICE — GLOVE BIOGEL PI INDICATOR SZ 7.0 SURGICAL PF LF - (50/BX 4BX/CA)

## (undated) DEVICE — TIP INTPLS HFLO ML ORFC BTRY - (12/CS)  FOR SURGILAV

## (undated) DEVICE — BLOCK

## (undated) DEVICE — PACK MAJOR ORTHO - (2EA/CA)

## (undated) DEVICE — DRESSING TRANSPARENT FILM TEGADERM 4 X 4.75" (50EA/BX)"

## (undated) DEVICE — MIXER BONE CEMENT REVOLUTION - W/FEMORAL PRESSURIZER (6/CA)

## (undated) DEVICE — TRAY SPINAL ANESTHESIA NON-SAFETY (10/CA)

## (undated) DEVICE — GLOVE BIOGEL SZ 7.5 SURGICAL PF LTX - (50PR/BX 4BX/CA)

## (undated) DEVICE — SODIUM CHL. IRRIGATION 0.9% 3000ML (4EA/CA 65CA/PF)

## (undated) DEVICE — SUTURE 3-0 MONOCRYL PLUS PS-1 - 27 INCH (36/BX)

## (undated) DEVICE — DERMABOND ADVANCED - (12EA/BX)

## (undated) DEVICE — NEEDLE NON SAFETY 25 GA X 1 1/2 IN HYPO (100EA/BX)

## (undated) DEVICE — SPINAL

## (undated) DEVICE — LOCKING DRILL 4.2X360MM

## (undated) DEVICE — NEPTUNE 4 PORT MANIFOLD - (20/PK)

## (undated) DEVICE — MASK ANESTHESIA ADULT  - (100/CA)

## (undated) DEVICE — BOVIE BLADE COATED - (50/PK)

## (undated) DEVICE — PAD LAP STERILE 18 X 18 - (5/PK 40PK/CA)

## (undated) DEVICE — DRESSING LEUKOMED STERILE 11.75X4IN - (50/CA)

## (undated) DEVICE — SUTURE 3-0 VICRYL PLUS SH - 8X 18 INCH (12/BX)

## (undated) DEVICE — LENS/HOOD FOR SPACESUIT - (32/PK) PEEL AWAY FACE

## (undated) DEVICE — GLOVE SZ 7 BIOGEL PI MICRO - PF LF (50PR/BX 4BX/CA)

## (undated) DEVICE — SENSOR SPO2 NEO LNCS ADHESIVE (20/BX) SEE USER NOTES

## (undated) DEVICE — STAPLER SKIN DISP - (6/BX 10BX/CA) VISISTAT

## (undated) DEVICE — BLADE SAGITTAL DUAL 25MM

## (undated) DEVICE — BLADE 90X12.5X1.37MM SAW SAGITTAL